# Patient Record
Sex: MALE | Race: WHITE | Employment: OTHER | URBAN - METROPOLITAN AREA
[De-identification: names, ages, dates, MRNs, and addresses within clinical notes are randomized per-mention and may not be internally consistent; named-entity substitution may affect disease eponyms.]

---

## 2017-10-02 ENCOUNTER — TELEPHONE (OUTPATIENT)
Dept: HEMATOLOGY | Age: 62
End: 2017-10-02

## 2017-10-02 NOTE — TELEPHONE ENCOUNTER
Spoke to patient about appointment on 10/3/17. Informed patient to bring insurance information and medications. Patient asked if we received referral. Transferred patient to referral coordinator.

## 2017-10-03 ENCOUNTER — DOCUMENTATION ONLY (OUTPATIENT)
Dept: HEMATOLOGY | Age: 62
End: 2017-10-03

## 2017-10-03 ENCOUNTER — OFFICE VISIT (OUTPATIENT)
Dept: HEMATOLOGY | Age: 62
End: 2017-10-03

## 2017-10-03 VITALS
TEMPERATURE: 97.4 F | HEART RATE: 74 BPM | WEIGHT: 278.4 LBS | BODY MASS INDEX: 36.9 KG/M2 | SYSTOLIC BLOOD PRESSURE: 179 MMHG | HEIGHT: 73 IN | DIASTOLIC BLOOD PRESSURE: 72 MMHG | OXYGEN SATURATION: 99 %

## 2017-10-03 DIAGNOSIS — K75.81 NASH (NONALCOHOLIC STEATOHEPATITIS): Primary | ICD-10-CM

## 2017-10-03 RX ORDER — POTASSIUM CHLORIDE 750 MG/1
TABLET, FILM COATED, EXTENDED RELEASE ORAL
COMMUNITY
End: 2017-12-28 | Stop reason: ALTCHOICE

## 2017-10-03 RX ORDER — LISINOPRIL 20 MG/1
20 TABLET ORAL DAILY
Qty: 90 TAB | Refills: 3 | Status: SHIPPED | OUTPATIENT
Start: 2017-10-03 | End: 2017-11-02 | Stop reason: SDUPTHER

## 2017-10-03 RX ORDER — FUROSEMIDE 40 MG/1
40 TABLET ORAL DAILY
Qty: 90 TAB | Refills: 3 | Status: SHIPPED | OUTPATIENT
Start: 2017-10-03 | End: 2018-01-02 | Stop reason: SDUPTHER

## 2017-10-03 RX ORDER — CHOLECALCIFEROL (VITAMIN D3) 125 MCG
1 CAPSULE ORAL 2 TIMES DAILY
COMMUNITY
End: 2020-06-19

## 2017-10-03 RX ORDER — SPIRONOLACTONE 100 MG/1
100 TABLET, FILM COATED ORAL DAILY
Qty: 90 TAB | Refills: 3 | Status: SHIPPED | OUTPATIENT
Start: 2017-10-03 | End: 2018-01-02 | Stop reason: SDUPTHER

## 2017-10-03 RX ORDER — METOLAZONE 5 MG/1
TABLET ORAL DAILY
COMMUNITY
End: 2017-10-03

## 2017-10-03 RX ORDER — GUAIFENESIN 100 MG/5ML
81 LIQUID (ML) ORAL DAILY
COMMUNITY
End: 2018-05-09

## 2017-10-03 NOTE — PROGRESS NOTES
[address] MD Miguel, [address], [facility], [facility], NP    KENNETH Lacy, [facility]-BC   Camille Draper, MELVA Gann NP        at [facility]     [address] [facility], [address] [address]  [address].     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) (Chronic) ICD-10-CM: K75.81, K74.60  ICD-9-CM: 571.8, 571.5  2/22/2016        Thrombocytopenia (HCC) (Chronic) ICD-10-CM: D69.6  ICD-9-CM: 287.5  2/22/2016        GERD (gastroesophageal reflux disease) (Chronic) ICD-10-CM: K21.9  ICD-9-CM: 530.81  2/22/2016        CAD (coronary artery disease) (Chronic) ICD-10-CM: I25.10  ICD-9-CM: 414.00  2/22/2016        DM type 2 (diabetes mellitus, type 2) ([facility] (Chronic) ICD-10-CM: E11.9  ICD-9-CM: 250.00  2/22/2016              The physicians listed above have asked me to see Genaro Binu in consultation regarding chronic HCV and its management. All medical records sent by the referring physicians were reviewed including imaging studies. The patient is a 57 y.o.  male who was found to have fatty liver disease on liver biopsy in 2014. Serologic evaluation was either all negative or within the limits of normal.      Ultrasound and CT scan of the liver were performed in 3/2017. The results of the imaging suggested cirrhosis. An assessment of liver fibrosis with biopsy done 7/2014 showed cirrhosis and mild mixed macro- and microvesicular steatosis.     The most recent laboratory studies indicate the AST is elevated, ALT is normal, ALP is elevated, tests of hepatic synthetic and metabolic function are depressed, total bilirubin is elevated, INR is elevated, albumin is depressed, and the platelet count is depressed. The patient notes fatigue, pain in the right side over the liver, problems concentrating, jumbles words and sentences, lower extremity edema. The patient has limitations in functional activities secondary to these symptoms. The patient has not experienced pain in the right side over the liver, yellowing of the eyes or skin, problems concentrating or swelling of the lower extremities. ALLERGIES  No Known Allergies    MEDICATIONS  Current Outpatient Prescriptions   Medication Sig    aspirin 81 mg chewable tablet Take 81 mg by mouth daily.  potassium chloride SR (KLOR-CON 10) 10 mEq tablet Take  by mouth.  cholecalciferol, vitamin D3, (VITAMIN D3) 2,000 unit tab Take  by mouth.  lisinopril (PRINIVIL, ZESTRIL) 20 mg tablet Take 1 Tab by mouth daily. Indications: hypertension    spironolactone (ALDACTONE) 100 mg tablet Take 1 Tab by mouth daily. Indications: EDEMA DUE TO HEPATIC CIRRHOSIS    furosemide (LASIX) 40 mg tablet Take 1 Tab by mouth daily.  lactulose (CHRONULAC) 20 gram/30 mL soln solution Take 30 mL by mouth three (3) times daily. Adjust  dose as needed such that you have 2 loose/soft bowel movements a day without diarrhea. Indications: HEPATIC ENCEPHALOPATHY    rifaximin (XIFAXAN) 550 mg tablet Take 1 Tab by mouth two (2) times a day.  pantoprazole (PROTONIX) 40 mg tablet Take 40 mg by mouth daily.  metFORMIN (GLUCOPHAGE) 500 mg tablet Take 500 mg by mouth two (2) times daily (with meals).  glimepiride (AMARYL) 4 mg tablet Take 4 mg by mouth two (2) times a day.  simvastatin (ZOCOR) 20 mg tablet Take 20 mg by mouth nightly.  levothyroxine (SYNTHROID) 50 mcg tablet Take 75 mcg by mouth Daily (before breakfast).      No current facility-administered medications for this visit. SYSTEM REVIEW NOT RELATED TO LIVER DISEASE OR REVIEWED ABOVE:  Constitution systems: Negative for fever, chills, weight gain, weight loss. Eyes: Negative for visual changes. ENT: Negative for sore throat, painful swallowing. Respiratory: Negative for cough, hemoptysis, SOB. Cardiology: Negative for chest pain, palpitations. GI:  Negative for constipation or diarrhea. : Negative for urinary frequency, dysuria, hematuria, nocturia. Skin: Negative for rash. Hematology: Negative for easy bruising, blood clots. Musculo-skeletal: Legs are hot below the knees. Neurologic:  + dizziness, vertigo, memory problems related to HE. Psychology: Negative for anxiety, depression. FAMILY HISTORY:  The father  of prostate cancer. The mother  of multiple myeloma. There is no family history of liver disease. SOCIAL HISTORY:  The patient is . The patient has 3 children. The patient occasional cigar. The patient has been abstinent from alcohol since 2016. The patient does not work, he is on social security. PHYSICAL EXAMINATION:  Visit Vitals    /72 (BP 1 Location: Left arm, BP Patient Position: Sitting)    Pulse 74    Temp 97.4 °F (36.3 °C) (Tympanic)    Ht 6' 1\" (1.854 m)    Wt 278 lb 6.4 oz (126.3 kg)    SpO2 99%    BMI 36.73 kg/m2     General: No acute distress. Eyes: Sclera anicteric. ENT: No oral lesions. Nodes: No adenopathy. Skin: No spider angiomata. No jaundice. No palmar erythema. Respiratory: Lungs clear to auscultation. Cardiovascular:  Regular heart rate. No murmurs. No JVD. Abdomen: Soft non-tender. Liver size enlarged 4 finger widths below rib cage. Spleen enlarged. No obvious ascites. Extremities: +1 BLE. No muscle wasting. No gross arthritic changes. Neurologic: Alert and oriented, using incorrect words during interaction, slow speech. Cranial nerves grossly intact.   No brigitteixis. LABORATORY STUDIES:  From 1/2017:  AST/ALT/ALP/T Bili/ALB:  64/ 36/ 179/ 2.2 /2.9  WBC/HB/PLT/INR: 5.4/ hct: 37.9/ 91/ 1.2  BUN/CREAT: xx/ 0.82    Liver [facility] Latest Ref Rng & Units 10/3/2017 2/23/2016   WBC 3.4 - 10.8 x10E3/uL 4.2 4.5   ANC 1.8 - 8.0 K/UL  2.1   HGB 12.6 - 17.7 g/dL 10.6 (L) 10.3 (L)    - 379 x10E3/uL 66 (LL) 67 (L)   INR 0.8 - 1.2 1.3 (H) 1.3 (H)   AST 0 - 40 IU/L 52 (H) 55 (H)   ALT 0 - 44 IU/L 29 52   Alk Phos 39 - 117 IU/L 175 (H) 129 (H)   Bili, Total 0.0 - 1.2 mg/dL 4.0 (H) 1.9 (H)   Albumin 3.6 - 4.8 g/dL 2.8 (L) 2.6 (L)   BUN 8 - 27 mg/dL 17 17   Creat 0.76 - 1.27 mg/dL 1.06 0.90   Na 134 - 144 mmol/L 137 144   K 3.5 - 5.2 mmol/L 3.7 3.3 (L)   Cl 96 - 106 mmol/L 100 109 (H)   CO2 18 - 29 mmol/L 28 24   Glucose 65 - 99 mg/dL 341 (H) 208 (H)   Ammonia <32 UMOL/L  83 (H)     SEROLOGIES:  Not available or performed. LIVER HISTOLOGY:  7/2014:  Cirhosis with mild mixed macro- and microvesicular steatosis. ENDOSCOPIC PROCEDURES:  2/2017. EGD by Dr Deya Cote. No esophageal varices. RADIOLOGY:  2/2017. Ultrasound of liver. Echogenic consistent with chronic liver disease. No liver mass lesions. No dilated bile ducts. No ascites. 3/2017 CT scan of abdomen. Changes consistent with cirrhosis. No liver mass lesions. Splenomegaly. Splenorenal shunt. No ascites. OTHER TESTING:  Not available or performed    ASSESSMENT AND PLAN:  CAMEJO with cirrhosis. Liver transaminases are elevated. Alkaline phosphate is elevated. Liver function is depressed. Total bilirubin is elevated. Serum albumin is depressed. INR is prolonged. The platelet count is depressed. Will request medial records from [facility]. Suspect extensive serologic studies were performed there and do not need to repeat them now if we are able to get records. Based upon laboratory studies and imaging the patient appears to have cirrhosis.       Will perform laboratory testing to monitor liver function and degree of liver injury. This included BMP, hepatic panel, CBC with platelet count and INR. There is no medication of vitamin supplements that we advocate for CAMEJO. Using glitazones in patients without diabetes mellitus has been shown to reduce fat content in the liver but has no effect on fibrosis and is associated with weight gain. Vitamin E has also been used but the data is not very good and most experts no longer advocate this. The only medical treatments for CAMEJO and cirrhosis are though clinical trials. The patient was offered participation in a clinical trial for treatment of CAMEJO. The patient would like to particiapte in a clinical trial.    The patient was counseled regarding diet and exercise to achieve weight loss. The best diet for patients with fatty liver is one very low in carbohydrates and enriched with protein such as an [employer] program.      The patient was told to to consume any food products and drinks containing fructose as this enhances hepatic fat synthesis. Hepatic encephalopathy  is not well controlled on current doses of lactulose and Xifaxan. Restrict dietary protein to that tolerated without overt HE. Patient was instructed to keep a food diary to record protein intake. It will be be extremely difficult for the patients to do Sue Bump in the face of HE. The patient has not developed ascites. Lower extremity edema and anasarca is improved but persistent despite current dose of diuretics. Will stop HCTZ and metolazone. Will start step 1 diuretics. The patient does not have esophageal varices by EGD in 2/2017. The patient was directed to continue all current medications at the current dosages. There are no contraindications for the patient to take any medications that are necessary for treatment of other medical issues including medications for diabetes mellitus and hypercholesterolemia.     The patient was counseled regarding alcohol consumption and that this could contribute to fatty liver disease. The need for vaccination against viral hepatitis A and B will be assessed with serologic and instituted as appropriate. [facility] screening will be performed. AFP was ordered today and ultrasound will be scheduled. All of the above issues were discussed with the patient. All questions were answered. The patient expressed a clear understanding of the above.     [address] in 4 weeks or sooner if can enroll into a CAMEJO clinical trial.    Kayla Solorzano MD  Liver [place] of [address] [place], [place] [id number] Rainer Espinosa [address] [place] [address] [address]  [address].  [phone]

## 2017-10-04 LAB
ALBUMIN SERPL-MCNC: 2.8 G/DL (ref 3.6–4.8)
ALBUMIN/GLOB SERPL: 1 {RATIO} (ref 1.2–2.2)
ALP SERPL-CCNC: 175 IU/L (ref 39–117)
ALT SERPL-CCNC: 29 IU/L (ref 0–44)
AST SERPL-CCNC: 52 IU/L (ref 0–40)
BILIRUB SERPL-MCNC: 4 MG/DL (ref 0–1.2)
BUN SERPL-MCNC: 17 MG/DL (ref 8–27)
BUN/CREAT SERPL: 16 (ref 10–24)
CALCIUM SERPL-MCNC: 8.6 MG/DL (ref 8.6–10.2)
CHLORIDE SERPL-SCNC: 100 MMOL/L (ref 96–106)
CO2 SERPL-SCNC: 28 MMOL/L (ref 18–29)
CREAT SERPL-MCNC: 1.06 MG/DL (ref 0.76–1.27)
ERYTHROCYTE [DISTWIDTH] IN BLOOD BY AUTOMATED COUNT: 14.8 % (ref 12.3–15.4)
GLOBULIN SER CALC-MCNC: 2.8 G/DL (ref 1.5–4.5)
GLUCOSE SERPL-MCNC: 341 MG/DL (ref 65–99)
HCT VFR BLD AUTO: 29.9 % (ref 37.5–51)
HGB BLD-MCNC: 10.6 G/DL (ref 12.6–17.7)
INR PPP: 1.3 (ref 0.8–1.2)
MCH RBC QN AUTO: 32.8 PG (ref 26.6–33)
MCHC RBC AUTO-ENTMCNC: 35.5 G/DL (ref 31.5–35.7)
MCV RBC AUTO: 93 FL (ref 79–97)
MORPHOLOGY BLD-IMP: ABNORMAL
PLATELET # BLD AUTO: 66 X10E3/UL (ref 150–379)
POTASSIUM SERPL-SCNC: 3.7 MMOL/L (ref 3.5–5.2)
PROT SERPL-MCNC: 5.6 G/DL (ref 6–8.5)
PROTHROMBIN TIME: 13.4 SEC (ref 9.1–12)
RBC # BLD AUTO: 3.23 X10E6/UL (ref 4.14–5.8)
SODIUM SERPL-SCNC: 137 MMOL/L (ref 134–144)
WBC # BLD AUTO: 4.2 X10E3/UL (ref 3.4–10.8)

## 2017-10-11 ENCOUNTER — TELEPHONE (OUTPATIENT)
Dept: HEMATOLOGY | Age: 62
End: 2017-10-11

## 2017-10-11 NOTE — TELEPHONE ENCOUNTER
Called patient to obtain the name of his mail order pharmacy and phone number so that I can fax his Rx to the mail order per Shaji Pabon NP.

## 2017-10-19 NOTE — PROGRESS NOTES
Left VM for pt stating labs the same as last year. Giving name to Paula Dumont today to inquire about study availability.

## 2017-10-24 NOTE — TELEPHONE ENCOUNTER
Pt's wife called saying the request for Lisinopril was not sent to their mail-in pharmacy ([facility]). Requested Prescriptions     Pending Prescriptions Disp Refills    lisinopril (PRINIVIL, ZESTRIL) 20 mg tablet 90 Tab 3     Sig: Take 1 Tab by mouth daily.  Indications: hypertension

## 2017-11-02 ENCOUNTER — OFFICE VISIT (OUTPATIENT)
Dept: HEMATOLOGY | Age: 62
End: 2017-11-02

## 2017-11-02 VITALS
BODY MASS INDEX: 34.54 KG/M2 | OXYGEN SATURATION: 100 % | SYSTOLIC BLOOD PRESSURE: 159 MMHG | DIASTOLIC BLOOD PRESSURE: 69 MMHG | HEART RATE: 76 BPM | WEIGHT: 261.8 LBS | TEMPERATURE: 97.8 F

## 2017-11-02 DIAGNOSIS — K75.81 LIVER CIRRHOSIS SECONDARY TO NASH (HCC): Primary | Chronic | ICD-10-CM

## 2017-11-02 DIAGNOSIS — K74.60 LIVER CIRRHOSIS SECONDARY TO NASH (HCC): Primary | Chronic | ICD-10-CM

## 2017-11-02 DIAGNOSIS — R60.0 LOWER LEG EDEMA: ICD-10-CM

## 2017-11-02 DIAGNOSIS — D69.6 THROMBOCYTOPENIA (HCC): Chronic | ICD-10-CM

## 2017-11-02 DIAGNOSIS — K75.81 NASH (NONALCOHOLIC STEATOHEPATITIS): ICD-10-CM

## 2017-11-02 DIAGNOSIS — E11.9 TYPE 2 DIABETES MELLITUS WITHOUT COMPLICATION, WITHOUT LONG-TERM CURRENT USE OF INSULIN (HCC): Chronic | ICD-10-CM

## 2017-11-02 RX ORDER — GABAPENTIN 300 MG/1
300 CAPSULE ORAL 3 TIMES DAILY
Qty: 90 CAP | Refills: 3 | Status: SHIPPED | OUTPATIENT
Start: 2017-11-02 | End: 2018-01-05 | Stop reason: SDUPTHER

## 2017-11-02 RX ORDER — LISINOPRIL 20 MG/1
20 TABLET ORAL DAILY
Qty: 90 TAB | Refills: 3 | Status: SHIPPED | OUTPATIENT
Start: 2017-11-02 | End: 2018-05-09

## 2017-11-02 NOTE — PROGRESS NOTES
[address] MD Miguel, [address], [facility], [place]       MELVA Tanner PA-C Jenita Colonel, [facility]   [facility] [place], NP Florine Merlin, NP        at [address]     [address] [facility], [address] [address]     [phone]     FAX: [phone]    at [address], [id number] [place] [address], [address]     [phone]     FAX: [phone]     Patient Care Team:  Jarrell Pérez MD as PCP - General (Family Practice)  Lacie Paige MD (Nephrology)  Megan Bryant MD (Gastroenterology)  Paula Avila MD as Physician (Internal Medicine)     Patient Active Problem List   Diagnosis Code    Liver cirrhosis secondary to [facility] ([facility] K75.81, K74.60    Thrombocytopenia ([facility] D69.6    GERD (gastroesophageal reflux disease) K21.9    CAD (coronary artery disease) I25.10    DM type 2 (diabetes mellitus, type 2) ([facility] E11.9       Charlean Harada returns to the [address] regarding chronic HCV in the setting of cirrhosis. The active problem list, all pertinent past medical history, medications, radiologic findings and laboratory findings related to the liver disorder were reviewed with the patient. The patient is a 57 y.o.  male who was found to have fatty liver disease on liver biopsy in 2014. Serologic evaluation was either all negative or within the limits of normal.      Ultrasound and CT scan of the liver were performed in 3/2017. The results of the imaging suggested cirrhosis with no liver masses suggestive of HCC. An assessment of liver fibrosis with biopsy done 7/2014 showed cirrhosis and mild mixed macro- and microvesicular steatosis. LE edema resolved with step 1 diuretics. He has lost 16 lbs over the last month.     The patient notes fatigue, pain in the right side over the liver, problems concentrating, jumbles words and sentences, balance issues and LE 'burning' bilaterally. The patient has limitations in functional activities secondary to these symptoms. The patient has not experienced pain in the right side over the liver, yellowing of the eyes or skin, pruritus, melena or hematochezia. ALLERGIES  No Known Allergies    MEDICATIONS  Current Outpatient Prescriptions   Medication Sig    lisinopril (PRINIVIL, ZESTRIL) 20 mg tablet Take 1 Tab by mouth daily. Indications: hypertension    gabapentin (NEURONTIN) 300 mg capsule Take 1 Cap by mouth three (3) times daily.  aspirin 81 mg chewable tablet Take 81 mg by mouth daily.  potassium chloride SR (KLOR-CON 10) 10 mEq tablet Take  by mouth.  cholecalciferol, vitamin D3, (VITAMIN D3) 2,000 unit tab Take  by mouth.  spironolactone (ALDACTONE) 100 mg tablet Take 1 Tab by mouth daily. Indications: EDEMA DUE TO HEPATIC CIRRHOSIS    furosemide (LASIX) 40 mg tablet Take 1 Tab by mouth daily.  lactulose (CHRONULAC) 20 gram/30 mL soln solution Take 30 mL by mouth three (3) times daily. Adjust  dose as needed such that you have 2 loose/soft bowel movements a day without diarrhea. Indications: HEPATIC ENCEPHALOPATHY    rifaximin (XIFAXAN) 550 mg tablet Take 1 Tab by mouth two (2) times a day.  pantoprazole (PROTONIX) 40 mg tablet Take 40 mg by mouth daily.  metFORMIN (GLUCOPHAGE) 500 mg tablet Take 500 mg by mouth two (2) times daily (with meals).  glimepiride (AMARYL) 4 mg tablet Take 4 mg by mouth two (2) times a day.  simvastatin (ZOCOR) 20 mg tablet Take 20 mg by mouth nightly.  levothyroxine (SYNTHROID) 50 mcg tablet Take 75 mcg by mouth Daily (before breakfast). No current facility-administered medications for this visit. SYSTEM REVIEW NOT RELATED TO LIVER DISEASE OR REVIEWED ABOVE:  Constitution systems: Negative for fever, chills, weight gain, weight loss. Eyes: Negative for visual changes. ENT: Negative for sore throat, painful swallowing.    Respiratory: Negative for cough, hemoptysis, SOB. Cardiology: Negative for chest pain, palpitations. GI:  Negative for constipation or diarrhea. : Negative for urinary frequency, dysuria, hematuria, nocturia. Skin: Negative for rash. Hematology: Negative for easy bruising, blood clots. Musculo-skeletal: Legs are hot below the knees. Neurologic:  + dizziness, vertigo, memory problems related to HE. Psychology: Negative for anxiety, depression. FAMILY HISTORY:  The father  of prostate cancer. The mother  of multiple myeloma. There is no family history of liver disease. SOCIAL HISTORY:  The patient is . The patient has 3 children. The patient occasional cigar. The patient has been abstinent from alcohol since 2016. The patient is on social security. PHYSICAL EXAMINATION:  Visit Vitals    /69    Pulse 76    Temp 97.8 °F (36.6 °C) (Oral)    Wt 261 lb 12.8 oz (118.8 kg)    SpO2 100%    BMI 34.54 kg/m2     General: No acute distress. Eyes: Sclera anicteric. ENT: No oral lesions. Nodes: No adenopathy. Skin: No spider angiomata. No jaundice. No palmar erythema. Respiratory: Lungs clear to auscultation. Cardiovascular:  Regular heart rate. No murmurs. No JVD. Abdomen: Soft non-tender. Liver size enlarged 4 finger widths below rib cage. Spleen enlarged. No obvious ascites. Extremities: Trace LE edema bilaterally. No muscle wasting. No gross arthritic changes. Neurologic: Alert and oriented, using incorrect words during interaction, slow speech. Cranial nerves grossly intact. No asterixis.     LABORATORY STUDIES:  Liver [place] of [address]  [address] & Units 2017 10/3/2017 2016   WBC 3.4 - 10.8 x10E3/uL 6.1 4.2 4.5   ANC 1.8 - 8.0 K/UL   2.1   HGB 12.6 - 17.7 g/dL 11.7 (L) 10.6 (L) 10.3 (L)    - 379 x10E3/uL 92 (LL) 66 (LL) 67 (L)   INR 0.8 - 1.2 1.3 (H) 1.3 (H) 1.3 (H)   AST 0 - 40 IU/L 53 (H) 52 (H) 55 (H)   ALT 0 - 44 IU/L 38 29 52   Alk Phos 39 - 117 IU/L 215 (H) 175 (H) 129 (H)   Bili, Total 0.0 - 1.2 mg/dL 2.6 (H) 4.0 (H) 1.9 (H)   Albumin 3.6 - 4.8 g/dL 2.8 (L) 2.8 (L) 2.6 (L)   BUN 8 - 27 mg/dL 18 17 17   Creat 0.76 - 1.27 mg/dL 1.27 1.06 0.90   Na 134 - 144 mmol/L 145 (H) 137 144   K 3.5 - 5.2 mmol/L 4.5 3.7 3.3 (L)   Cl 96 - 106 mmol/L 106 100 109 (H)   CO2 18 - 29 mmol/L 26 28 24   Glucose 65 - 99 mg/dL 105 (H) 341 (H) 208 (H)   Ammonia 27 - 102 ug/dL 317 (HH)  83 (H)     SEROLOGIES:  Not available or performed. LIVER HISTOLOGY:  7/2014: Cirhosis with mild mixed macro- and microvesicular steatosis. ENDOSCOPIC PROCEDURES:  2/2017. EGD by Dr Tyler Matthew. No esophageal varices. RADIOLOGY:  2/2017. Ultrasound of liver. Echogenic consistent with chronic liver disease. No liver mass lesions. No dilated bile ducts. No ascites. 3/2017 CT scan of abdomen. Changes consistent with cirrhosis. No liver mass lesions. Splenomegaly. Splenorenal shunt. No ascites. OTHER TESTING:  Not available or performed    ASSESSMENT AND PLAN:  CAMEJO with cirrhosis. Liver transaminases are elevated. Alkaline phosphate is elevated. Liver function is depressed. Total bilirubin is elevated. Serum albumin is depressed. INR is prolonged. The platelet count is depressed. There is no medication of vitamin supplements that we advocate for CAMEJO. Using glitazones in patients without diabetes mellitus has been shown to reduce fat content in the liver but has no effect on fibrosis and is associated with weight gain. Vitamin E has also been used but the data is not very good and most experts no longer advocate this. The only medical treatments for CAMEJO and cirrhosis are through clinical trials. The patient would like to particiapte in a clinical trial. Unfortunately, he is currently not eligible for any of our active clinical trials that are enrolling. He will be monitored regularly.      The patient was counseled regarding diet and exercise to achieve weight loss. Encouraged patient to follow a healthy diet and make sure his other medical conditions are well controlled. The patient was told to to consume any food products and drinks containing fructose as this enhances hepatic fat synthesis. Hepatic encephalopathy. Currenlty not consistently well-controlled on current doses of lactulose and Xifaxan. He achieves 2-3 BMs without diarrhea but he remains off balance and confused. Ammonia is significantly elevated today. Directed patient to continue protein restriction and a food diary to see if this improves his symptoms. The patient has not developed ascites. Lower extremity edema and anasarca. Step 1 diuretics have been effective. Continue. LE discomfort. He describes this as an internal burning sensation in both LE. Prescribed patient Neurontin to see if this improves his symptoms. Will reassess at his next follow up visit. The patient does not have esophageal varices by EGD in 2/2017. Hypertension. Patient's BP is significantly elevated in the clinic today. Discussed the importance of regular follow up with their PCP to monitor/manage this. Patient verbalized understanding. The patient was directed to continue all current medications at the current dosages. There are no contraindications for the patient to take any medications that are necessary for treatment of other medical issues including medications for diabetes mellitus and hypercholesterolemia. The patient was counseled regarding alcohol consumption and that this could contribute to fatty liver disease. The need for vaccination against viral hepatitis A and B will be assessed with serologic and instituted as appropriate. [address] screening will be performed. CT was ordered today to rule out [address] All of the above issues were discussed with the patient. All questions were answered. The patient expressed a clear understanding of the above.     Follow-up Liver [place] [facility] [place] in 6-8 weeks.     Shahzad Griffith NP  [address] [address] [address], [address] [place] [address]  Ph: [phone]  Fax: [phone]

## 2017-11-02 NOTE — MR AVS SNAPSHOT
Visit Information Date & Time Provider Department Dept. Phone Encounter #  
 11/2/2017  8:30 AM April SHARON Gtz, [facility] [facility] 219 [id number] Follow-up Instructions Return in about 6 weeks (around 12/14/2017). Upcoming Health Maintenance Date Due Hepatitis C Screening 1955 LIPID PANEL Q1 1955 FOOT EXAM Q1 12/21/1964 MICROALBUMIN Q1 12/21/1964 EYE EXAM RETINAL OR DILATED Q1 12/21/1964 Pneumococcal 19-64 Medium Risk (1 of 1 - PPSV23) 12/21/1973 DTaP/Tdap/Td series (1 - Tdap) 12/21/1975 FOBT Q 1 YEAR AGE 50-75 12/21/2004 ZOSTER VACCINE AGE 60> 10/21/2014 HEMOGLOBIN A1C Q6M 8/23/2016 INFLUENZA AGE 9 TO ADULT 8/1/2017 Allergies as of 11/2/2017  Review Complete On: 11/2/2017 By: Berna Orellana NP No Known Allergies Current Immunizations  Never Reviewed No immunizations on file. Not reviewed this visit You Were Diagnosed With   
  
 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  
 159/69 76 97.8 °F (36.6 °C) (Oral) 261 lb 12.8 oz (118.8 kg) 100% 34.54 kg/m2 Smoking Status Never Smoker Vitals History BMI and BSA Data Body Mass Index Body Surface Area 34.54 kg/m 2 2.47 m 2 Preferred Pharmacy Pharmacy Name Phone Swetha Hives [address], [address] [facility] [phone] Your Updated Medication List  
  
   
This list is accurate as of: 11/2/17  9:02 AM.  Always use your most recent med list.  
  
  
  
  
 aspirin 81 mg chewable tablet Take 81 mg by mouth daily. furosemide 40 mg tablet Commonly known as:  LASIX Take 1 Tab by mouth daily. gabapentin 300 mg capsule Commonly known as:  NEURONTIN  
 Take 1 Cap by mouth three (3) times daily. glimepiride 4 mg tablet Commonly known as:  AMARYL Take 4 mg by mouth two (2) times a day. lactulose 20 gram/30 mL Soln solution Commonly known as:  Sid Alamo Take 30 mL by mouth three (3) times daily. Adjust  dose as needed such that you have 2 loose/soft bowel movements a day without diarrhea. Indications: HEPATIC ENCEPHALOPATHY  
  
 levothyroxine 50 mcg tablet Commonly known as:  SYNTHROID Take 75 mcg by mouth Daily (before breakfast). lisinopril 20 mg tablet Commonly known as:  Mart Domingo Take 1 Tab by mouth daily. Indications: hypertension  
  
 metFORMIN 500 mg tablet Commonly known as:  GLUCOPHAGE Take 500 mg by mouth two (2) times daily (with meals). pantoprazole 40 mg tablet Commonly known as:  PROTONIX Take 40 mg by mouth daily. potassium chloride SR 10 mEq tablet Commonly known as:  KLOR-CON 10 Take  by mouth. rifAXIMin 550 mg tablet Commonly known as:  Vanessa Fish Take 1 Tab by mouth two (2) times a day. simvastatin 20 mg tablet Commonly known as:  ZOCOR Take 20 mg by mouth nightly. spironolactone 100 mg tablet Commonly known as:  ALDACTONE Take 1 Tab by mouth daily. Indications: EDEMA DUE TO HEPATIC CIRRHOSIS  
  
 VITAMIN D3 2,000 unit Tab Generic drug:  cholecalciferol (vitamin D3) Take  by mouth. Prescriptions Sent to Pharmacy Refills  
 lisinopril (PRINIVIL, ZESTRIL) 20 mg tablet 3 Sig: Take 1 Tab by mouth daily. Indications: hypertension Class: Normal  
 Pharmacy: [facility] [address] Ph #: [phone] Route: Oral  
 gabapentin (NEURONTIN) 300 mg capsule 3 Sig: Take 1 Cap by mouth three (3) times daily. Class: Normal  
 [facility] [address] Ph #: [phone] Route: Oral  
  
We Performed the Following AMMONIA [id number] CPT(R)] CBC W/O DIFF [91160 CPT(R)] METABOLIC PANEL, COMPREHENSIVE [20312 CPT(R)] PROTHROMBIN TIME + INR [64289 CPT(R)] Follow-up Instructions Return in about 6 weeks (around 12/14/2017). Introducing \[facility]\"" & HEALTH SERVICES! Yannibarrett Levy introduces Ungalli patient portal. Now you can access parts of your medical record, email your doctor's office, and request medication refills online. 1. In your internet browser, go to [email or web address] 2. Click on the First Time User? Click Here link in the Sign In box. You will see the New Member Sign Up page. 3. Enter your Ungalli Access Code exactly as it appears below. You will not need to use this code after youve completed the sign-up process. If you do not sign up before the expiration date, you must request a new code. · Ungalli Access Code: [email or web address] Expires: 1/1/2018 10:19 AM 
 
4. Enter the last four digits of your Social Security Number (xxxx) and Date of Birth (mm/dd/yyyy) as indicated and click Submit. You will be taken to the next sign-up page. 5. Create a Ungalli ID. This will be your Ungalli login ID and cannot be changed, so think of one that is secure and easy to remember. 6. Create a Ungalli password. You can change your password at any time. 7. Enter your Password Reset Question and Answer. This can be used at a later time if you forget your password. 8. Enter your e-mail address. You will receive e-mail notification when new information is available in [address] 9. Click Sign Up. You can now view and download portions of your medical record. 10. Click the Download Summary menu link to download a portable copy of your medical information. If you have questions, please visit the Frequently Asked Questions section of the Ungalli website. Remember, Ungalli is NOT to be used for urgent needs. For medical emergencies, dial 911. Now available from your iPhone and Android! Please provide this summary of care documentation to your next provider. Your primary care clinician is listed as Blanca Avery. If you have any questions after today's visit, please call [phone].

## 2017-11-03 LAB
ALBUMIN SERPL-MCNC: 2.8 G/DL (ref 3.6–4.8)
ALBUMIN/GLOB SERPL: 1 {RATIO} (ref 1.2–2.2)
ALP SERPL-CCNC: 215 IU/L (ref 39–117)
ALT SERPL-CCNC: 38 IU/L (ref 0–44)
AMMONIA PLAS-MCNC: 317 UG/DL (ref 27–102)
AST SERPL-CCNC: 53 IU/L (ref 0–40)
BILIRUB SERPL-MCNC: 2.6 MG/DL (ref 0–1.2)
BUN SERPL-MCNC: 18 MG/DL (ref 8–27)
BUN/CREAT SERPL: 14 (ref 10–24)
CALCIUM SERPL-MCNC: 8.5 MG/DL (ref 8.6–10.2)
CHLORIDE SERPL-SCNC: 106 MMOL/L (ref 96–106)
CO2 SERPL-SCNC: 26 MMOL/L (ref 18–29)
CREAT SERPL-MCNC: 1.27 MG/DL (ref 0.76–1.27)
ERYTHROCYTE [DISTWIDTH] IN BLOOD BY AUTOMATED COUNT: 14.2 % (ref 12.3–15.4)
GFR SERPLBLD CREATININE-BSD FMLA CKD-EPI: 60 ML/MIN/1.73
GFR SERPLBLD CREATININE-BSD FMLA CKD-EPI: 70 ML/MIN/1.73
GLOBULIN SER CALC-MCNC: 2.9 G/DL (ref 1.5–4.5)
GLUCOSE SERPL-MCNC: 105 MG/DL (ref 65–99)
HCT VFR BLD AUTO: 33.3 % (ref 37.5–51)
HGB BLD-MCNC: 11.7 G/DL (ref 12.6–17.7)
INR PPP: 1.3 (ref 0.8–1.2)
MCH RBC QN AUTO: 32.4 PG (ref 26.6–33)
MCHC RBC AUTO-ENTMCNC: 35.1 G/DL (ref 31.5–35.7)
MCV RBC AUTO: 92 FL (ref 79–97)
MORPHOLOGY BLD-IMP: ABNORMAL
PLATELET # BLD AUTO: 92 X10E3/UL (ref 150–379)
POTASSIUM SERPL-SCNC: 4.5 MMOL/L (ref 3.5–5.2)
PROT SERPL-MCNC: 5.7 G/DL (ref 6–8.5)
PROTHROMBIN TIME: 13.4 SEC (ref 9.1–12)
RBC # BLD AUTO: 3.61 X10E6/UL (ref 4.14–5.8)
SODIUM SERPL-SCNC: 145 MMOL/L (ref 134–144)
WBC # BLD AUTO: 6.1 X10E3/UL (ref 3.4–10.8)

## 2017-11-27 RX ORDER — LACTULOSE 10 G/15ML
20 SOLUTION ORAL 3 TIMES DAILY
Qty: 1800 ML | Refills: 1 | Status: SHIPPED | OUTPATIENT
Start: 2017-11-27 | End: 2018-04-27 | Stop reason: SDUPTHER

## 2017-11-27 NOTE — PROGRESS NOTES
Called back wife Sandy Alejandro about lactulose refill and hyperkalemia. Dr. Alanna Lundberg took pt off potassium and spironolactone. Do not know the actual number. Advised wife risk of cardiac arrhythmia outweighs edema management. She expects a call back from Dr. Alanna Lundberg to discuss. She is frustrated with his symptoms and lack of improvement. Listened to her for a long time and offered support. Chiquita Mccrary NP  3:00 PM

## 2017-11-27 NOTE — TELEPHONE ENCOUNTER
The patient is out of medicatioin and does not have enough to make it through the day. Please send to the pharmacy ([facility] [address] in additionl to the refill the pts wife (JACEK ) would like tracy reyes to give her a call as soon as possible to discuss the patients progress since last visit. Requested Prescriptions     Pending Prescriptions Disp Refills    lactulose (CHRONULAC) 20 gram/30 mL soln solution 1800 mL 1     Sig: Take 30 mL by mouth three (3) times daily. Adjust  dose as needed such that you have 2 loose/soft bowel movements a day without diarrhea.   Indications: Hepatic Encephalopathy

## 2017-12-08 ENCOUNTER — HOSPITAL ENCOUNTER (OUTPATIENT)
Dept: CT IMAGING | Age: 62
Discharge: HOME | End: 2017-12-08
Attending: NURSE PRACTITIONER
Payer: COMMERCIAL

## 2017-12-08 DIAGNOSIS — K75.81 LIVER CIRRHOSIS SECONDARY TO NASH (HCC): Chronic | ICD-10-CM

## 2017-12-08 DIAGNOSIS — D69.6 THROMBOCYTOPENIA (HCC): Chronic | ICD-10-CM

## 2017-12-08 DIAGNOSIS — K74.60 LIVER CIRRHOSIS SECONDARY TO NASH (HCC): Chronic | ICD-10-CM

## 2017-12-08 PROCEDURE — 74011636320 HC RX REV CODE- 636/320: Performed by: NURSE PRACTITIONER

## 2017-12-08 PROCEDURE — 74011000255 HC RX REV CODE- 255: Performed by: NURSE PRACTITIONER

## 2017-12-08 PROCEDURE — 74170 CT ABD WO CNTRST FLWD CNTRST: CPT

## 2017-12-08 PROCEDURE — 74011000258 HC RX REV CODE- 258: Performed by: NURSE PRACTITIONER

## 2017-12-08 RX ORDER — SODIUM CHLORIDE 9 MG/ML
50 INJECTION, SOLUTION INTRAVENOUS
Status: COMPLETED | OUTPATIENT
Start: 2017-12-08 | End: 2017-12-08

## 2017-12-08 RX ORDER — BARIUM SULFATE 20 MG/ML
900 SUSPENSION ORAL
Status: COMPLETED | OUTPATIENT
Start: 2017-12-08 | End: 2017-12-08

## 2017-12-08 RX ADMIN — IOPAMIDOL 100 ML: 612 INJECTION, SOLUTION INTRAVENOUS at 09:37

## 2017-12-08 RX ADMIN — SODIUM CHLORIDE 50 ML/HR: 900 INJECTION, SOLUTION INTRAVENOUS at 09:37

## 2017-12-08 RX ADMIN — BARIUM SULFATE 900 ML: 21 SUSPENSION ORAL at 09:39

## 2017-12-28 ENCOUNTER — OFFICE VISIT (OUTPATIENT)
Dept: HEMATOLOGY | Age: 62
End: 2017-12-28

## 2017-12-28 VITALS
OXYGEN SATURATION: 100 % | BODY MASS INDEX: 37.21 KG/M2 | DIASTOLIC BLOOD PRESSURE: 60 MMHG | WEIGHT: 282 LBS | TEMPERATURE: 96 F | SYSTOLIC BLOOD PRESSURE: 146 MMHG | HEART RATE: 70 BPM

## 2017-12-28 DIAGNOSIS — K75.81 LIVER CIRRHOSIS SECONDARY TO NASH (HCC): Chronic | ICD-10-CM

## 2017-12-28 DIAGNOSIS — K74.60 LIVER CIRRHOSIS SECONDARY TO NASH (HCC): Chronic | ICD-10-CM

## 2017-12-28 DIAGNOSIS — K75.81 NASH (NONALCOHOLIC STEATOHEPATITIS): Primary | ICD-10-CM

## 2017-12-28 DIAGNOSIS — D69.6 THROMBOCYTOPENIA (HCC): Chronic | ICD-10-CM

## 2017-12-28 DIAGNOSIS — R60.0 LOWER LEG EDEMA: ICD-10-CM

## 2017-12-28 DIAGNOSIS — G57.93 NEUROPATHY INVOLVING BOTH LOWER EXTREMITIES: ICD-10-CM

## 2017-12-28 NOTE — MR AVS SNAPSHOT
Visit Information Date & Time Provider Department Dept. Phone Encounter #  
 12/28/2017 11:00 AM April SHARON Acpatricia, [address] [facility] 219 [id number] Follow-up Instructions Return for 2-3 month follow up. Upcoming Health Maintenance Date Due Hepatitis C Screening 1955 LIPID PANEL Q1 1955 FOOT EXAM Q1 12/21/1964 MICROALBUMIN Q1 12/21/1964 EYE EXAM RETINAL OR DILATED Q1 12/21/1964 Pneumococcal 19-64 Medium Risk (1 of 1 - PPSV23) 12/21/1973 DTaP/Tdap/Td series (1 - Tdap) 12/21/1975 FOBT Q 1 YEAR AGE 50-75 12/21/2004 ZOSTER VACCINE AGE 60> 10/21/2014 HEMOGLOBIN A1C Q6M 8/23/2016 Influenza Age 5 to Adult 8/1/2017 Allergies as of 12/28/2017  Review Complete On: 12/28/2017 By: Berna Rios NP No Known Allergies Current Immunizations  Never Reviewed No immunizations on file. Not reviewed this visit You Were Diagnosed With   
  
 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 3 Vitals BP Pulse Temp Weight(growth percentile) SpO2 BMI  
 146/60 (BP 1 Location: Left arm, BP Patient Position: Sitting) 70 96 °F (35.6 °C) (Tympanic) 282 lb (127.9 kg) 100% 37.21 kg/m2 Smoking Status Never Smoker Vitals History BMI and BSA Data Body Mass Index Body Surface Area  
 37.21 kg/m 2 2.57 m 2 Preferred Pharmacy Pharmacy Name Phone Edu Tavera [address], [address] [facility] [phone] Your Updated Medication List  
  
   
This list is accurate as of: 12/28/17 11:43 AM.  Always use your most recent med list.  
  
  
  
  
 aspirin 81 mg chewable tablet Take 81 mg by mouth daily. furosemide 40 mg tablet Commonly known as:  LASIX Take 1 Tab by mouth daily. gabapentin 300 mg capsule Commonly known as:  NEURONTIN Take 1 Cap by mouth three (3) times daily. glimepiride 4 mg tablet Commonly known as:  AMARYL Take 4 mg by mouth two (2) times a day. lactulose 20 gram/30 mL Soln solution Commonly known as:  Coby Bustard Take 30 mL by mouth three (3) times daily. Adjust  dose as needed such that you have 2 loose/soft bowel movements a day without diarrhea. Indications: Hepatic Encephalopathy  
  
 levothyroxine 50 mcg tablet Commonly known as:  SYNTHROID Take 75 mcg by mouth Daily (before breakfast). lisinopril 20 mg tablet Commonly known as:  Nae Cage Take 1 Tab by mouth daily. Indications: hypertension  
  
 metFORMIN 500 mg tablet Commonly known as:  GLUCOPHAGE Take 500 mg by mouth two (2) times daily (with meals). pantoprazole 40 mg tablet Commonly known as:  PROTONIX Take 40 mg by mouth daily. rifAXIMin 550 mg tablet Commonly known as:  [place]le Take 1 Tab by mouth two (2) times a day. simvastatin 20 mg tablet Commonly known as:  ZOCOR Take 20 mg by mouth nightly. spironolactone 100 mg tablet Commonly known as:  ALDACTONE Take 1 Tab by mouth daily. Indications: EDEMA DUE TO HEPATIC CIRRHOSIS  
  
 VITAMIN D3 2,000 unit Tab Generic drug:  cholecalciferol (vitamin D3) Take  by mouth. We Performed the Following AMMONIA [id number] CPT(R)] CBC W/O DIFF [57532 CPT(R)] METABOLIC PANEL, COMPREHENSIVE [11022 CPT(R)] PROTHROMBIN TIME + INR [55619 CPT(R)] Follow-up Instructions Return for 2-3 month follow up. Introducing [facility] & HEALTH SERVICES! [facility] introduces Progeniq patient portal. Now you can access parts of your medical record, email your doctor's office, and request medication refills online. 1. In your internet browser, go to [email or web address] 2. Click on the First Time User? Click Here link in the Sign In box. You will see the New Member Sign Up page. 3. Enter your NullPointer Access Code exactly as it appears below. You will not need to use this code after youve completed the sign-up process. If you do not sign up before the expiration date, you must request a new code. · NullPointer Access Code: [email or web address] Expires: 1/1/2018  9:19 AM 
 
4. Enter the last four digits of your Social Security Number (xxxx) and Date of Birth (mm/dd/yyyy) as indicated and click Submit. You will be taken to the next sign-up page. 5. Create a Ryzingt ID. This will be your NullPointer login ID and cannot be changed, so think of one that is secure and easy to remember. 6. Create a NullPointer password. You can change your password at any time. 7. Enter your Password Reset Question and Answer. This can be used at a later time if you forget your password. 8. Enter your e-mail address. You will receive e-mail notification when new information is available in [address] 9. Click Sign Up. You can now view and download portions of your medical record. 10. Click the Download Summary menu link to download a portable copy of your medical information. If you have questions, please visit the Frequently Asked Questions section of the NullPointer website. Remember, NullPointer is NOT to be used for urgent needs. For medical emergencies, dial 911. Now available from your iPhone and Android! Please provide this summary of care documentation to your next provider. Your primary care clinician is listed as Nitin Benson. If you have any questions after today's visit, please call [phone].

## 2017-12-28 NOTE — PROGRESS NOTES
1. Have you been to the ER, urgent care clinic since your last visit? Hospitalized since your last visit? No    2. Have you seen or consulted any other health care providers outside of the [facility] since your last visit? Include any pap smears or colon screening.  No   Chief Complaint   Patient presents with    Follow-up     6 week follow up      Visit Vitals    /60 (BP 1 Location: Left arm, BP Patient Position: Sitting)    Pulse 70    Temp 96 °F (35.6 °C) (Tympanic)    Wt 282 lb (127.9 kg)    SpO2 100%    BMI 37.21 kg/m2     PHQ over the last two weeks 12/28/2017   Little interest or pleasure in doing things Several days   Feeling down, depressed or hopeless Several days   Total Score PHQ 2 2     Learning Assessment 12/28/2017   PRIMARY LEARNER Patient   PRIMARY LANGUAGE ENGLISH   LEARNER PREFERENCE PRIMARY LISTENING   ANSWERED BY patient    RELATIONSHIP SELF

## 2017-12-29 LAB
ALBUMIN SERPL-MCNC: 2.9 G/DL (ref 3.6–4.8)
ALBUMIN/GLOB SERPL: 1 {RATIO} (ref 1.2–2.2)
ALP SERPL-CCNC: 162 IU/L (ref 39–117)
ALT SERPL-CCNC: 20 IU/L (ref 0–44)
AMMONIA PLAS-MCNC: 131 UG/DL (ref 27–102)
AST SERPL-CCNC: 39 IU/L (ref 0–40)
BILIRUB SERPL-MCNC: 3.4 MG/DL (ref 0–1.2)
BUN SERPL-MCNC: 22 MG/DL (ref 8–27)
BUN/CREAT SERPL: 21 (ref 10–24)
CALCIUM SERPL-MCNC: 8.4 MG/DL (ref 8.6–10.2)
CHLORIDE SERPL-SCNC: 106 MMOL/L (ref 96–106)
CO2 SERPL-SCNC: 27 MMOL/L (ref 18–29)
CREAT SERPL-MCNC: 1.03 MG/DL (ref 0.76–1.27)
ERYTHROCYTE [DISTWIDTH] IN BLOOD BY AUTOMATED COUNT: 15.6 % (ref 12.3–15.4)
GLOBULIN SER CALC-MCNC: 2.8 G/DL (ref 1.5–4.5)
GLUCOSE SERPL-MCNC: 186 MG/DL (ref 65–99)
HCT VFR BLD AUTO: 28.2 % (ref 37.5–51)
HGB BLD-MCNC: 9.4 G/DL (ref 13–17.7)
INR PPP: 1.3 (ref 0.8–1.2)
MCH RBC QN AUTO: 31.8 PG (ref 26.6–33)
MCHC RBC AUTO-ENTMCNC: 33.3 G/DL (ref 31.5–35.7)
MCV RBC AUTO: 95 FL (ref 79–97)
MORPHOLOGY BLD-IMP: ABNORMAL
PLATELET # BLD AUTO: 67 X10E3/UL (ref 150–379)
POTASSIUM SERPL-SCNC: 4.7 MMOL/L (ref 3.5–5.2)
PROT SERPL-MCNC: 5.7 G/DL (ref 6–8.5)
PROTHROMBIN TIME: 13.7 SEC (ref 9.1–12)
RBC # BLD AUTO: 2.96 X10E6/UL (ref 4.14–5.8)
SODIUM SERPL-SCNC: 144 MMOL/L (ref 134–144)
WBC # BLD AUTO: 4.3 X10E3/UL (ref 3.4–10.8)

## 2018-01-02 PROBLEM — G57.93 NEUROPATHY INVOLVING BOTH LOWER EXTREMITIES: Status: ACTIVE | Noted: 2018-01-02

## 2018-01-02 RX ORDER — TRIAMCINOLONE ACETONIDE 1 MG/G
CREAM TOPICAL 2 TIMES DAILY
Qty: 80 G | Refills: 3 | Status: SHIPPED | OUTPATIENT
Start: 2018-01-02 | End: 2018-08-27

## 2018-01-02 RX ORDER — SPIRONOLACTONE 100 MG/1
200 TABLET, FILM COATED ORAL DAILY
Qty: 180 TAB | Refills: 3 | Status: SHIPPED | OUTPATIENT
Start: 2018-01-02 | End: 2018-05-09

## 2018-01-02 RX ORDER — FUROSEMIDE 40 MG/1
80 TABLET ORAL DAILY
Qty: 180 TAB | Refills: 3 | Status: SHIPPED | OUTPATIENT
Start: 2018-01-02 | End: 2018-05-09

## 2018-01-02 NOTE — PROGRESS NOTES
[address] Maryanne Rivera MD, [address], [facility], [place]       Marci Hoffmann, KENNETH Millan, [facility]   MELVA Montiel NP        at [facility]     [address], [address] [facility], [address]     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 K75.81, K74.60    Thrombocytopenia ([facility] D69.6    GERD (gastroesophageal reflux disease) K21.9    CAD (coronary artery disease) I25.10    DM type 2 (diabetes mellitus, type 2) ([facility] E11.9    Lower leg edema R60.0    CAMEJO (nonalcoholic steatohepatitis) K75.81       Deloris Elias [facility] the [facility] [place] [facility] [place] regarding chronic HCV in the setting of cirrhosis. The active problem list, all pertinent past medical history, medications, radiologic findings and laboratory findings related to the liver disorder were reviewed with the patient. The patient is a 60 y.o.  male who was found to have fatty liver disease on liver biopsy in 2014. Serologic evaluation was either all negative or within the limits of normal.      Recent abdominal CT in December 2017 demonstrated changes consistent with cirrhosis with no liver masses suggestive of HCC. An assessment of liver fibrosis with biopsy done 7/2014 showed cirrhosis and mild mixed macro- and microvesicular steatosis. LE edema had resolved with step 1 diuretics. Spironolactone was discontinued along with potassium supplements by another provider because of hyperkalemia.  This resulted in weight gain/edema again. He then restarted it 4 weeks ago and edema has improved. He feels much better today. Patient continues on Xifaxan and lactulose daily. He has at least 2-3 BMs daily without diarrhea. He has not had an excerebration in HE since last office visit. Patient reported a burning sensation in both LE last visit. Neurontin was prescribed. He states that his symptoms are much better today. The patient notes ongoing fatigue, pain in the right side over the liver, problems concentrating and balance issues. He states that he overall feels better compared to last visit. The patient has limitations in functional activities secondary to these symptoms. The patient has not experienced pain in the right side over the liver, yellowing of the eyes or skin, pruritus, melena or hematochezia. ALLERGIES  No Known Allergies    MEDICATIONS  Current Outpatient Prescriptions   Medication Sig    lactulose (CHRONULAC) 20 gram/30 mL soln solution Take 30 mL by mouth three (3) times daily. Adjust  dose as needed such that you have 2 loose/soft bowel movements a day without diarrhea. Indications: Hepatic Encephalopathy    lisinopril (PRINIVIL, ZESTRIL) 20 mg tablet Take 1 Tab by mouth daily. Indications: hypertension    gabapentin (NEURONTIN) 300 mg capsule Take 1 Cap by mouth three (3) times daily.  aspirin 81 mg chewable tablet Take 81 mg by mouth daily.  cholecalciferol, vitamin D3, (VITAMIN D3) 2,000 unit tab Take  by mouth.  spironolactone (ALDACTONE) 100 mg tablet Take 1 Tab by mouth daily. Indications: EDEMA DUE TO HEPATIC CIRRHOSIS    furosemide (LASIX) 40 mg tablet Take 1 Tab by mouth daily.  rifaximin (XIFAXAN) 550 mg tablet Take 1 Tab by mouth two (2) times a day.  pantoprazole (PROTONIX) 40 mg tablet Take 40 mg by mouth daily.  metFORMIN (GLUCOPHAGE) 500 mg tablet Take 500 mg by mouth two (2) times daily (with meals).     glimepiride (AMARYL) 4 mg tablet Take 4 mg by mouth two (2) times a day.  simvastatin (ZOCOR) 20 mg tablet Take 20 mg by mouth nightly.  levothyroxine (SYNTHROID) 50 mcg tablet Take 75 mcg by mouth Daily (before breakfast). No current facility-administered medications for this visit. SYSTEM REVIEW NOT RELATED TO LIVER DISEASE OR REVIEWED ABOVE:  Constitution systems: Negative for fever, chills, weight gain, weight loss. Eyes: Negative for visual changes. ENT: Negative for sore throat, painful swallowing. Respiratory: Negative for cough, hemoptysis, SOB. Cardiology: Negative for chest pain, palpitations. GI:  Negative for constipation or diarrhea. : Negative for urinary frequency, dysuria, hematuria, nocturia. Skin: Positive for LE skin discoloration. Negative for rash. Hematology: Negative for easy bruising, blood clots. Musculo-skeletal: Legs are hot below the knees. Neurologic:  + dizziness, vertigo, memory problems related to HE. Psychology: Negative for anxiety, depression. FAMILY HISTORY:  The father  of prostate cancer. The mother  of multiple myeloma. There is no family history of liver disease. SOCIAL HISTORY:  The patient is . The patient has 3 children. The patient occasional cigar. The patient has been abstinent from alcohol since 2016. The patient is on social security. PHYSICAL EXAMINATION:  Visit Vitals    /60 (BP 1 Location: Left arm, BP Patient Position: Sitting)    Pulse 70    Temp 96 °F (35.6 °C) (Tympanic)    Wt 282 lb (127.9 kg)    SpO2 100%    BMI 37.21 kg/m2     General: No acute distress. Eyes: Sclera anicteric. ENT: No oral lesions. Nodes: No adenopathy. Skin: No spider angiomata. No jaundice. No palmar erythema. Respiratory: Lungs clear to auscultation. Cardiovascular:  Regular heart rate. No murmurs. No JVD. Abdomen: Soft non-tender. Liver size enlarged 4 finger widths below rib cage. Spleen enlarged.  No obvious ascites. Extremities: Trace LE edema bilaterally. No muscle wasting. No gross arthritic changes. Neurologic: Alert and oriented but has slow speech. Cranial nerves grossly intact. No asterixis. LABORATORY STUDIES:  Liver [place] of [address] [place] [address] 12/28/2017 11/2/2017   WBC 3.4 - 10.8 x10E3/uL 4.3 6.1   ANC 1.8 - 8.0 K/UL     HGB 13.0 - 17.7 g/dL 9.4 (L) 11.7 (L)    - 379 x10E3/uL 67 (LL) 92 (LL)   INR 0.8 - 1.2 1.3 (H) 1.3 (H)   AST 0 - 40 IU/L 39 53 (H)   ALT 0 - 44 IU/L 20 38   Alk Phos 39 - 117 IU/L 162 (H) 215 (H)   Bili, Total 0.0 - 1.2 mg/dL 3.4 (H) 2.6 (H)   Albumin 3.6 - 4.8 g/dL 2.9 (L) 2.8 (L)   BUN 8 - 27 mg/dL 22 18   Creat 0.76 - 1.27 mg/dL 1.03 1.27   Na 134 - 144 mmol/L 144 145 (H)   K 3.5 - 5.2 mmol/L 4.7 4.5   Cl 96 - 106 mmol/L 106 106   CO2 18 - 29 mmol/L 27 26   Glucose 65 - 99 mg/dL 186 (H) 105 (H)   Ammonia 27 - 102 ug/dL 131 (HH) 317 (HH)     11/2017. MELD 15  12/2017. MELD 14    SEROLOGIES:  Not available or performed. LIVER HISTOLOGY:  7/2014: Cirhosis with mild mixed macro- and microvesicular steatosis. ENDOSCOPIC PROCEDURES:  2/2017. EGD by Dr Gerry Sharpe. No esophageal varices. RADIOLOGY:  2/2017. Ultrasound of liver. Echogenic consistent with chronic liver disease. No liver mass lesions. No dilated bile ducts. No ascites. 3/2017 CT scan of abdomen. Changes consistent with cirrhosis. No liver mass lesions. Splenomegaly. Splenorenal shunt. No ascites. 12/2017. CT scan of abdomen. Cirrhotic liver and splenomegaly, with evidence of portal hypertension. No acute findings. No suspicious liver masses. OTHER TESTING:  Not available or performed    ASSESSMENT AND PLAN:  CAMEJO with cirrhosis. Liver transaminases are normal. Alkaline phosphate is elevated. Liver function is depressed. Total bilirubin is elevated. Serum albumin is depressed. INR is prolonged. The platelet count is depressed.  Current MELD is 14,    There is no medication of vitamin supplements that we advocate for CAMEJO. Using glitazones in patients without diabetes mellitus has been shown to reduce fat content in the liver but has no effect on fibrosis and is associated with weight gain. Vitamin E has also been used but the data is not very good and most experts no longer advocate this. The only medical treatments for CAMEJO and cirrhosis are through clinical trials. The patient would like to particiapte in a clinical trial. Unfortunately, he is currently not eligible for any of our active clinical trials that are enrolling. He will be monitored regularly. The patient was counseled regarding diet and exercise to achieve weight loss. Encouraged patient to follow a healthy diet and make sure his other medical conditions are well controlled. The patient was told to to consume any food products and drinks containing fructose as this enhances hepatic fat synthesis. Hepatic encephalopathy is better controlled on current doses of lactulose and Xifaxan. He achieves 2-3 BMs without diarrhea. Ammonia is better today compared to last office visit. The patient has not developed ascites. Lower extremity edema and anasarca. Step 1 diuretics have been effective. Continue without K supplements. LE discomfort. Neurontin improves his symptoms. Continue. The patient does not have esophageal varices by EGD in 2/2017. The patient was directed to continue all current medications at the current dosages. There are no contraindications for the patient to take any medications that are necessary for treatment of other medical issues including medications for diabetes mellitus and hypercholesterolemia. The patient was counseled regarding alcohol consumption and that this could contribute to fatty liver disease. The need for vaccination against viral hepatitis A and B will be assessed with serologic and instituted as appropriate. [address] screening. CT ruled out [address] in December 2017.  He is up to date. Next imaging will be in June 2017. All of the above issues were discussed with the patient. All questions were answered. The patient expressed a clear understanding of the above. [facility] [address] in 2 months.     Gabrielle Vincent NP  [address] [address], [address] [place] [address]  Ph: [phone]  Fax: [phone]

## 2018-01-05 NOTE — TELEPHONE ENCOUNTER
Requested Prescriptions     Pending Prescriptions Disp Refills    gabapentin (NEURONTIN) 300 mg capsule 90 Cap 3     Sig: Take 1 Cap by mouth three (3) times daily.

## 2018-01-08 RX ORDER — GABAPENTIN 300 MG/1
300 CAPSULE ORAL 3 TIMES DAILY
Qty: 90 CAP | Refills: 3 | Status: SHIPPED | OUTPATIENT
Start: 2018-01-08 | End: 2018-08-27

## 2018-01-09 ENCOUNTER — APPOINTMENT (OUTPATIENT)
Dept: GENERAL RADIOLOGY | Age: 63
End: 2018-01-09
Attending: EMERGENCY MEDICINE
Payer: COMMERCIAL

## 2018-01-09 ENCOUNTER — HOSPITAL ENCOUNTER (OUTPATIENT)
Age: 63
Setting detail: OBSERVATION
Discharge: HOME | End: 2018-01-10
Attending: EMERGENCY MEDICINE | Admitting: HOSPITALIST
Payer: COMMERCIAL

## 2018-01-09 DIAGNOSIS — R06.02 SOB (SHORTNESS OF BREATH): Primary | ICD-10-CM

## 2018-01-09 DIAGNOSIS — R09.02 HYPOXIA: ICD-10-CM

## 2018-01-09 DIAGNOSIS — J06.9 ACUTE UPPER RESPIRATORY INFECTION: ICD-10-CM

## 2018-01-09 PROBLEM — R05.9 COUGH: Status: ACTIVE | Noted: 2018-01-09

## 2018-01-09 LAB
ALBUMIN SERPL-MCNC: 2.6 G/DL (ref 3.5–5)
ALBUMIN/GLOB SERPL: 0.7 {RATIO} (ref 1.1–2.2)
ALP SERPL-CCNC: 121 U/L (ref 45–117)
ALT SERPL-CCNC: 39 U/L (ref 12–78)
AMMONIA PLAS-SCNC: 61 UMOL/L
ANION GAP SERPL CALC-SCNC: 10 MMOL/L (ref 5–15)
APPEARANCE UR: CLEAR
AST SERPL-CCNC: 68 U/L (ref 15–37)
BACTERIA URNS QL MICRO: NEGATIVE /HPF
BASOPHILS # BLD: 0 K/UL (ref 0–0.1)
BASOPHILS NFR BLD: 0 % (ref 0–1)
BILIRUB SERPL-MCNC: 3.8 MG/DL (ref 0.2–1)
BILIRUB UR QL: NEGATIVE
BNP SERPL-MCNC: 350 PG/ML (ref 0–125)
BUN SERPL-MCNC: 26 MG/DL (ref 6–20)
BUN/CREAT SERPL: 19 (ref 12–20)
CALCIUM SERPL-MCNC: 8.5 MG/DL (ref 8.5–10.1)
CHLORIDE SERPL-SCNC: 104 MMOL/L (ref 97–108)
CO2 SERPL-SCNC: 24 MMOL/L (ref 21–32)
COLOR UR: ABNORMAL
CREAT SERPL-MCNC: 1.34 MG/DL (ref 0.7–1.3)
DIFFERENTIAL METHOD BLD: ABNORMAL
EOSINOPHIL # BLD: 0.1 K/UL (ref 0–0.4)
EOSINOPHIL NFR BLD: 1 % (ref 0–7)
EPITH CASTS URNS QL MICRO: ABNORMAL /LPF
ERYTHROCYTE [DISTWIDTH] IN BLOOD BY AUTOMATED COUNT: 14.6 % (ref 11.5–14.5)
FLUAV AG NPH QL IA: POSITIVE
FLUBV AG NOSE QL IA: NEGATIVE
GLOBULIN SER CALC-MCNC: 3.7 G/DL (ref 2–4)
GLUCOSE BLD STRIP.AUTO-MCNC: 100 MG/DL (ref 65–100)
GLUCOSE BLD STRIP.AUTO-MCNC: 121 MG/DL (ref 65–100)
GLUCOSE BLD STRIP.AUTO-MCNC: 61 MG/DL (ref 65–100)
GLUCOSE BLD STRIP.AUTO-MCNC: 63 MG/DL (ref 65–100)
GLUCOSE SERPL-MCNC: 48 MG/DL (ref 65–100)
GLUCOSE UR STRIP.AUTO-MCNC: NEGATIVE MG/DL
HCT VFR BLD AUTO: 30 % (ref 36.6–50.3)
HGB BLD-MCNC: 10.4 G/DL (ref 12.1–17)
HGB UR QL STRIP: ABNORMAL
HYALINE CASTS URNS QL MICRO: ABNORMAL /LPF (ref 0–5)
KETONES UR QL STRIP.AUTO: NEGATIVE MG/DL
LEUKOCYTE ESTERASE UR QL STRIP.AUTO: NEGATIVE
LYMPHOCYTES # BLD: 0.4 K/UL (ref 0.8–3.5)
LYMPHOCYTES NFR BLD: 6 % (ref 12–49)
MCH RBC QN AUTO: 32.1 PG (ref 26–34)
MCHC RBC AUTO-ENTMCNC: 34.7 G/DL (ref 30–36.5)
MCV RBC AUTO: 92.6 FL (ref 80–99)
MONOCYTES # BLD: 0.7 K/UL (ref 0–1)
MONOCYTES NFR BLD: 11 % (ref 5–13)
NEUTS SEG # BLD: 5.5 K/UL (ref 1.8–8)
NEUTS SEG NFR BLD: 82 % (ref 32–75)
NITRITE UR QL STRIP.AUTO: NEGATIVE
PH UR STRIP: 5 [PH] (ref 5–8)
PLATELET # BLD AUTO: 61 K/UL (ref 150–400)
POTASSIUM SERPL-SCNC: 4.2 MMOL/L (ref 3.5–5.1)
PROT SERPL-MCNC: 6.3 G/DL (ref 6.4–8.2)
PROT UR STRIP-MCNC: ABNORMAL MG/DL
RBC # BLD AUTO: 3.24 M/UL (ref 4.1–5.7)
RBC #/AREA URNS HPF: ABNORMAL /HPF (ref 0–5)
RBC MORPH BLD: ABNORMAL
SERVICE CMNT-IMP: ABNORMAL
SERVICE CMNT-IMP: NORMAL
SODIUM SERPL-SCNC: 138 MMOL/L (ref 136–145)
SP GR UR REFRACTOMETRY: 1.01 (ref 1–1.03)
UR CULT HOLD, URHOLD: NORMAL
UROBILINOGEN UR QL STRIP.AUTO: 1 EU/DL (ref 0.2–1)
WBC # BLD AUTO: 6.7 K/UL (ref 4.1–11.1)
WBC URNS QL MICRO: ABNORMAL /HPF (ref 0–4)

## 2018-01-09 PROCEDURE — 71045 X-RAY EXAM CHEST 1 VIEW: CPT

## 2018-01-09 PROCEDURE — 81001 URINALYSIS AUTO W/SCOPE: CPT | Performed by: EMERGENCY MEDICINE

## 2018-01-09 PROCEDURE — 99285 EMERGENCY DEPT VISIT HI MDM: CPT

## 2018-01-09 PROCEDURE — 77030029684 HC NEB SM VOL KT MONA -A

## 2018-01-09 PROCEDURE — 96376 TX/PRO/DX INJ SAME DRUG ADON: CPT

## 2018-01-09 PROCEDURE — 96366 THER/PROPH/DIAG IV INF ADDON: CPT

## 2018-01-09 PROCEDURE — 74011636637 HC RX REV CODE- 636/637: Performed by: HOSPITALIST

## 2018-01-09 PROCEDURE — 74011000250 HC RX REV CODE- 250: Performed by: HOSPITALIST

## 2018-01-09 PROCEDURE — 87804 INFLUENZA ASSAY W/OPTIC: CPT | Performed by: EMERGENCY MEDICINE

## 2018-01-09 PROCEDURE — 96375 TX/PRO/DX INJ NEW DRUG ADDON: CPT

## 2018-01-09 PROCEDURE — 96361 HYDRATE IV INFUSION ADD-ON: CPT

## 2018-01-09 PROCEDURE — 36415 COLL VENOUS BLD VENIPUNCTURE: CPT | Performed by: EMERGENCY MEDICINE

## 2018-01-09 PROCEDURE — 74011000258 HC RX REV CODE- 258: Performed by: HOSPITALIST

## 2018-01-09 PROCEDURE — 94640 AIRWAY INHALATION TREATMENT: CPT

## 2018-01-09 PROCEDURE — 96367 TX/PROPH/DG ADDL SEQ IV INF: CPT

## 2018-01-09 PROCEDURE — 74011250636 HC RX REV CODE- 250/636: Performed by: EMERGENCY MEDICINE

## 2018-01-09 PROCEDURE — 93005 ELECTROCARDIOGRAM TRACING: CPT

## 2018-01-09 PROCEDURE — 85025 COMPLETE CBC W/AUTO DIFF WBC: CPT | Performed by: EMERGENCY MEDICINE

## 2018-01-09 PROCEDURE — 82140 ASSAY OF AMMONIA: CPT | Performed by: EMERGENCY MEDICINE

## 2018-01-09 PROCEDURE — 80053 COMPREHEN METABOLIC PANEL: CPT | Performed by: EMERGENCY MEDICINE

## 2018-01-09 PROCEDURE — 83880 ASSAY OF NATRIURETIC PEPTIDE: CPT | Performed by: EMERGENCY MEDICINE

## 2018-01-09 PROCEDURE — 94761 N-INVAS EAR/PLS OXIMETRY MLT: CPT

## 2018-01-09 PROCEDURE — 74011250637 HC RX REV CODE- 250/637: Performed by: HOSPITALIST

## 2018-01-09 PROCEDURE — 82962 GLUCOSE BLOOD TEST: CPT

## 2018-01-09 PROCEDURE — 99218 HC RM OBSERVATION: CPT

## 2018-01-09 PROCEDURE — 74011000250 HC RX REV CODE- 250: Performed by: EMERGENCY MEDICINE

## 2018-01-09 PROCEDURE — 96365 THER/PROPH/DIAG IV INF INIT: CPT

## 2018-01-09 RX ORDER — ALBUTEROL SULFATE 0.83 MG/ML
2.5 SOLUTION RESPIRATORY (INHALATION)
Status: COMPLETED | OUTPATIENT
Start: 2018-01-09 | End: 2018-01-09

## 2018-01-09 RX ORDER — DEXTROSE 50 % IN WATER (D50W) INTRAVENOUS SYRINGE
25
Status: COMPLETED | OUTPATIENT
Start: 2018-01-09 | End: 2018-01-09

## 2018-01-09 RX ORDER — ACETAMINOPHEN 325 MG/1
650 TABLET ORAL
Status: DISCONTINUED | OUTPATIENT
Start: 2018-01-09 | End: 2018-01-10 | Stop reason: HOSPADM

## 2018-01-09 RX ORDER — GUAIFENESIN 100 MG/5ML
81 LIQUID (ML) ORAL DAILY
Status: DISCONTINUED | OUTPATIENT
Start: 2018-01-10 | End: 2018-01-10 | Stop reason: HOSPADM

## 2018-01-09 RX ORDER — DEXTROSE 50 % IN WATER (D50W) INTRAVENOUS SYRINGE
12.5-25 AS NEEDED
Status: DISCONTINUED | OUTPATIENT
Start: 2018-01-09 | End: 2018-01-10 | Stop reason: HOSPADM

## 2018-01-09 RX ORDER — ZOLPIDEM TARTRATE 5 MG/1
5 TABLET ORAL
Status: DISCONTINUED | OUTPATIENT
Start: 2018-01-09 | End: 2018-01-10 | Stop reason: HOSPADM

## 2018-01-09 RX ORDER — GABAPENTIN 300 MG/1
300 CAPSULE ORAL 3 TIMES DAILY
Status: DISCONTINUED | OUTPATIENT
Start: 2018-01-09 | End: 2018-01-10 | Stop reason: HOSPADM

## 2018-01-09 RX ORDER — OSELTAMIVIR PHOSPHATE 75 MG/1
75 CAPSULE ORAL 2 TIMES DAILY
Status: DISCONTINUED | OUTPATIENT
Start: 2018-01-09 | End: 2018-01-10 | Stop reason: HOSPADM

## 2018-01-09 RX ORDER — SIMVASTATIN 20 MG/1
20 TABLET, FILM COATED ORAL
Status: DISCONTINUED | OUTPATIENT
Start: 2018-01-09 | End: 2018-01-10 | Stop reason: HOSPADM

## 2018-01-09 RX ORDER — LEVOTHYROXINE SODIUM 75 UG/1
75 TABLET ORAL
Status: DISCONTINUED | OUTPATIENT
Start: 2018-01-10 | End: 2018-01-10 | Stop reason: HOSPADM

## 2018-01-09 RX ORDER — SODIUM CHLORIDE 0.9 % (FLUSH) 0.9 %
5-10 SYRINGE (ML) INJECTION AS NEEDED
Status: DISCONTINUED | OUTPATIENT
Start: 2018-01-09 | End: 2018-01-10 | Stop reason: HOSPADM

## 2018-01-09 RX ORDER — PANTOPRAZOLE SODIUM 40 MG/1
40 TABLET, DELAYED RELEASE ORAL
Status: DISCONTINUED | OUTPATIENT
Start: 2018-01-10 | End: 2018-01-10 | Stop reason: HOSPADM

## 2018-01-09 RX ORDER — LISINOPRIL 10 MG/1
20 TABLET ORAL DAILY
Status: DISCONTINUED | OUTPATIENT
Start: 2018-01-10 | End: 2018-01-10 | Stop reason: HOSPADM

## 2018-01-09 RX ORDER — MAGNESIUM SULFATE 100 %
4 CRYSTALS MISCELLANEOUS AS NEEDED
Status: DISCONTINUED | OUTPATIENT
Start: 2018-01-09 | End: 2018-01-10 | Stop reason: HOSPADM

## 2018-01-09 RX ORDER — IPRATROPIUM BROMIDE AND ALBUTEROL SULFATE 2.5; .5 MG/3ML; MG/3ML
1.5 SOLUTION RESPIRATORY (INHALATION)
Status: DISCONTINUED | OUTPATIENT
Start: 2018-01-09 | End: 2018-01-10 | Stop reason: HOSPADM

## 2018-01-09 RX ORDER — DEXTROSE MONOHYDRATE AND SODIUM CHLORIDE 5; .45 G/100ML; G/100ML
75 INJECTION, SOLUTION INTRAVENOUS CONTINUOUS
Status: DISCONTINUED | OUTPATIENT
Start: 2018-01-09 | End: 2018-01-10

## 2018-01-09 RX ORDER — SODIUM CHLORIDE 0.9 % (FLUSH) 0.9 %
5-10 SYRINGE (ML) INJECTION EVERY 8 HOURS
Status: DISCONTINUED | OUTPATIENT
Start: 2018-01-09 | End: 2018-01-10 | Stop reason: HOSPADM

## 2018-01-09 RX ORDER — SPIRONOLACTONE 100 MG/1
200 TABLET, FILM COATED ORAL DAILY
Status: DISCONTINUED | OUTPATIENT
Start: 2018-01-10 | End: 2018-01-10 | Stop reason: HOSPADM

## 2018-01-09 RX ORDER — ONDANSETRON 2 MG/ML
4 INJECTION INTRAMUSCULAR; INTRAVENOUS
Status: DISCONTINUED | OUTPATIENT
Start: 2018-01-09 | End: 2018-01-10 | Stop reason: HOSPADM

## 2018-01-09 RX ORDER — SODIUM CHLORIDE 9 MG/ML
75 INJECTION, SOLUTION INTRAVENOUS CONTINUOUS
Status: CANCELLED | OUTPATIENT
Start: 2018-01-09

## 2018-01-09 RX ORDER — PREDNISONE 20 MG/1
40 TABLET ORAL
Status: DISCONTINUED | OUTPATIENT
Start: 2018-01-09 | End: 2018-01-10 | Stop reason: HOSPADM

## 2018-01-09 RX ORDER — INSULIN LISPRO 100 [IU]/ML
INJECTION, SOLUTION INTRAVENOUS; SUBCUTANEOUS
Status: DISCONTINUED | OUTPATIENT
Start: 2018-01-09 | End: 2018-01-10 | Stop reason: HOSPADM

## 2018-01-09 RX ADMIN — CEFTRIAXONE SODIUM 1 G: 1 INJECTION, POWDER, FOR SOLUTION INTRAMUSCULAR; INTRAVENOUS at 19:32

## 2018-01-09 RX ADMIN — ACETAMINOPHEN 650 MG: 325 TABLET, FILM COATED ORAL at 20:38

## 2018-01-09 RX ADMIN — AZITHROMYCIN 500 MG: 500 INJECTION, POWDER, LYOPHILIZED, FOR SOLUTION INTRAVENOUS at 20:06

## 2018-01-09 RX ADMIN — IPRATROPIUM BROMIDE AND ALBUTEROL SULFATE 1.5 ML: .5; 3 SOLUTION RESPIRATORY (INHALATION) at 23:08

## 2018-01-09 RX ADMIN — DEXTROSE MONOHYDRATE 25 G: 25 INJECTION, SOLUTION INTRAVENOUS at 22:08

## 2018-01-09 RX ADMIN — PREDNISONE 40 MG: 20 TABLET ORAL at 22:09

## 2018-01-09 RX ADMIN — OSELTAMIVIR PHOSPHATE 75 MG: 75 CAPSULE ORAL at 21:29

## 2018-01-09 RX ADMIN — ALBUTEROL SULFATE 2.5 MG: 2.5 SOLUTION RESPIRATORY (INHALATION) at 17:04

## 2018-01-09 RX ADMIN — GABAPENTIN 300 MG: 300 CAPSULE ORAL at 22:09

## 2018-01-09 RX ADMIN — SODIUM CHLORIDE 1000 ML: 900 INJECTION, SOLUTION INTRAVENOUS at 17:58

## 2018-01-09 RX ADMIN — DEXTROSE MONOHYDRATE AND SODIUM CHLORIDE 75 ML/HR: 5; .45 INJECTION, SOLUTION INTRAVENOUS at 20:37

## 2018-01-09 RX ADMIN — DEXTROSE MONOHYDRATE 12.5 G: 500 INJECTION PARENTERAL at 17:57

## 2018-01-09 NOTE — ED PROVIDER NOTES
HPI Comments: 60 y.o. male with past medical history significant for HTN, CAD, diabetes, GERD, anemia, and hepatic encephalopathy who presents from home via private vehicle with chief complaint of SOB. Pt reports he started with cold symptoms 4 days ago, but states it seemed to resolve on its own 2 days ago. Pt states yesterday he had just a slight cough. Pt states today he started with SOB and \"raspy\" breath sounds. Pt reports he has chronic calf edema, stating he had gained 28 pounds of water weight, 20 of it he has lost after his diuretic was increased. Pt reports he taking HTN medication and has an enlarged heart. Pt denies a hx of asthma or COPD or MI. There are no other acute medical concerns at this time. Social hx: Nonsmoker; No EtOH use  PCP: Mary Grace Lovelace MD    Note written by Francy Villalpando. Talia Causey, as dictated by Ronak Alvarez MD 4:12 PM      The history is provided by the patient. No  was used. Past Medical History:   Diagnosis Date    Arthritis     Autoimmune disease ([address]     ITP    CAD (coronary artery disease)     enlarged heart ?  Cancer ([address]     skin ca removed from forehead    Chronic kidney disease     kidney stones    Coagulation disorder (HCC)     low plts    Diabetes (HCC)     type 2    GERD (gastroesophageal reflux disease)     Hepatic encephalopathy (HCC)     Hypertension     Ill-defined condition     obesity per pt    Ill-defined condition     anemia    Liver disease     elevated liver enzymes    PUD (peptic ulcer disease)     stomach ulcers       Past Surgical History:   Procedure Laterality Date    ABDOMEN SURGERY PROC UNLISTED      ana    HX APPENDECTOMY      HX OTHER SURGICAL      mohs procedure for skin ca.    [facility] HX UROLOGICAL      vasectomy         Family History:   Problem Relation Age of Onset    Cancer Mother      multiple myeloma    Cancer Father      prostate    MS Sister     Cancer Maternal Grandmother      ?where   [facility] Cancer Maternal Grandfather      ?where    Heart Failure Paternal Grandmother     Cancer Paternal Grandfather      ? where       Social History     Social History    Marital status:      Spouse name: N/A    Number of children: N/A    Years of education: N/A     Occupational History    Not on file. Social History Main Topics    Smoking status: Never Smoker    Smokeless tobacco: Never Used    Alcohol use No    Drug use: Not on file    Sexual activity: Not on file     Other Topics Concern    Not on file     Social History Narrative         ALLERGIES: Review of patient's allergies indicates no known allergies. Review of Systems   Constitutional: Positive for appetite change. Negative for chills and fever. HENT: Positive for congestion. Negative for rhinorrhea, sore throat and trouble swallowing. Eyes: Negative for photophobia. Respiratory: Positive for cough and shortness of breath. Cardiovascular: Negative for chest pain and palpitations. Gastrointestinal: Negative for abdominal pain, nausea and vomiting. Genitourinary: Negative for dysuria, frequency and hematuria. Musculoskeletal: Negative for arthralgias. Neurological: Negative for dizziness, syncope and weakness. Psychiatric/Behavioral: Negative for behavioral problems. The patient is not nervous/anxious. All other systems reviewed and are negative. Vitals:    01/09/18 1501 01/09/18 1555   BP: 147/66    Pulse: (!) 101    Resp: 24    Temp: 99.2 °F (37.3 °C) (!) 100.5 °F (38.1 °C)   SpO2: 94%    Height: 6' (1.829 m)             Physical Exam   Constitutional: He appears well-developed. Obese. HENT:   Head: Normocephalic and atraumatic. Mouth/Throat: Oropharynx is clear and moist.   Eyes: EOM are normal. Pupils are equal, round, and reactive to light. Neck: Normal range of motion. Neck supple. Cardiovascular: Normal rate, regular rhythm, normal heart sounds and intact distal pulses.   Exam reveals no gallop and no friction rub. No murmur heard. Pulse is 110. Pulmonary/Chest: Effort normal. No respiratory distress. He has wheezes. He has no rales. Scattered wheezes throughout. Sats 92% on RA. Abdominal: Soft. There is no tenderness. There is no rebound. Musculoskeletal: Normal range of motion. He exhibits edema. He exhibits no tenderness. 1/4 edema on lower extremities. Muscle deconditioning. Neurological: He is alert. No cranial nerve deficit. Motor; symmetric   Skin: No erythema. Psychiatric: He has a normal mood and affect. His behavior is normal.   Nursing note and vitals reviewed. Note written by Deandre Carlton, as dictated by Mirta Duverney, MD 4:12 PM    [facility]  ED Course       Procedures         Patient is a nonsmoker. He denies having a history of asthma and COPD. Patient has diabetes. cirrhosis,  and hypertension. He reports a history of cardiomegaly. Patient has chronic renal disease from hypertension and diabetes. Recently he has had a 20 pound weight loss after a diuresis. Apparently the edema is felt to be renal and hepatic in etiology. Patient has what sounds like a viral upper respiratory infection. He has mild hypoxia at 92% on room air. He was quite short of breath at triage but more comfortable at rest. Chest x-ray does not show pneumonia although patient has a fever and cough and shortness of breath. His pneumonia may show up later. Renal function test are pending. Mirta Duverney, MD  4:17 PM  ED EKG interpretation:  Rhythm: normal sinus rhythm; and regular . Rate (approx.): 100; Axis: normal; P wave: normal; QRS interval: normal ; ST/T wave: non-specific changes; in  Lead: ; Other findings: left ventricular hypertrophy. This EKG was interpreted by Mirta Duverney, MD,ED Provider. 4:18 PM    Note: Patient has hypoglycemia. He is on metformin and glipizide. Appetite and food intake has been decreased since the onset of this respiratory illness several days ago. Chest x-ray does not show pneumonia. Patient will be covered with antibiotics for possible community-acquired pneumonia anyway. Despite his problems with edema since he has an acute infection he'll be given a liter of normal saline. Patient has eaten a snack and glucose will be rechecked.   Ariel Esparza MD  4:51 PM

## 2018-01-09 NOTE — ED NOTES
Patients lab BS 48. Dr. Kimberly Diaz notified and ordered patient to be given food and BS to be recheck. Peanut butter crackers given to patient.

## 2018-01-10 VITALS
BODY MASS INDEX: 38.22 KG/M2 | DIASTOLIC BLOOD PRESSURE: 88 MMHG | TEMPERATURE: 97.7 F | HEART RATE: 100 BPM | SYSTOLIC BLOOD PRESSURE: 152 MMHG | RESPIRATION RATE: 24 BRPM | WEIGHT: 282.19 LBS | OXYGEN SATURATION: 97 % | HEIGHT: 72 IN

## 2018-01-10 PROBLEM — J11.1 INFLUENZA: Status: ACTIVE | Noted: 2018-01-10

## 2018-01-10 LAB
ALBUMIN SERPL-MCNC: 2.4 G/DL (ref 3.5–5)
ALBUMIN/GLOB SERPL: 0.7 {RATIO} (ref 1.1–2.2)
ALP SERPL-CCNC: 99 U/L (ref 45–117)
ALT SERPL-CCNC: 36 U/L (ref 12–78)
ANION GAP SERPL CALC-SCNC: 8 MMOL/L (ref 5–15)
ANION GAP SERPL CALC-SCNC: 9 MMOL/L (ref 5–15)
AST SERPL-CCNC: 60 U/L (ref 15–37)
ATRIAL RATE: 100 BPM
BASOPHILS # BLD: 0 K/UL (ref 0–0.1)
BASOPHILS NFR BLD: 0 % (ref 0–1)
BILIRUB SERPL-MCNC: 2.8 MG/DL (ref 0.2–1)
BUN SERPL-MCNC: 28 MG/DL (ref 6–20)
BUN SERPL-MCNC: 30 MG/DL (ref 6–20)
BUN/CREAT SERPL: 21 (ref 12–20)
BUN/CREAT SERPL: 23 (ref 12–20)
CALCIUM SERPL-MCNC: 7.9 MG/DL (ref 8.5–10.1)
CALCIUM SERPL-MCNC: 8.1 MG/DL (ref 8.5–10.1)
CALCULATED P AXIS, ECG09: 60 DEGREES
CALCULATED R AXIS, ECG10: 5 DEGREES
CALCULATED T AXIS, ECG11: 98 DEGREES
CHLORIDE SERPL-SCNC: 102 MMOL/L (ref 97–108)
CHLORIDE SERPL-SCNC: 103 MMOL/L (ref 97–108)
CO2 SERPL-SCNC: 24 MMOL/L (ref 21–32)
CO2 SERPL-SCNC: 25 MMOL/L (ref 21–32)
CREAT SERPL-MCNC: 1.32 MG/DL (ref 0.7–1.3)
CREAT SERPL-MCNC: 1.35 MG/DL (ref 0.7–1.3)
DIAGNOSIS, 93000: NORMAL
DIFFERENTIAL METHOD BLD: ABNORMAL
EOSINOPHIL # BLD: 0 K/UL (ref 0–0.4)
EOSINOPHIL NFR BLD: 0 % (ref 0–7)
ERYTHROCYTE [DISTWIDTH] IN BLOOD BY AUTOMATED COUNT: 14.6 % (ref 11.5–14.5)
GLOBULIN SER CALC-MCNC: 3.4 G/DL (ref 2–4)
GLUCOSE BLD STRIP.AUTO-MCNC: 107 MG/DL (ref 65–100)
GLUCOSE BLD STRIP.AUTO-MCNC: 245 MG/DL (ref 65–100)
GLUCOSE BLD STRIP.AUTO-MCNC: 259 MG/DL (ref 65–100)
GLUCOSE SERPL-MCNC: 203 MG/DL (ref 65–100)
GLUCOSE SERPL-MCNC: 292 MG/DL (ref 65–100)
HCT VFR BLD AUTO: 26.7 % (ref 36.6–50.3)
HGB BLD-MCNC: 9.1 G/DL (ref 12.1–17)
LYMPHOCYTES # BLD: 0.2 K/UL (ref 0.8–3.5)
LYMPHOCYTES NFR BLD: 3 % (ref 12–49)
MCH RBC QN AUTO: 31.7 PG (ref 26–34)
MCHC RBC AUTO-ENTMCNC: 34.1 G/DL (ref 30–36.5)
MCV RBC AUTO: 93 FL (ref 80–99)
MONOCYTES # BLD: 0.5 K/UL (ref 0–1)
MONOCYTES NFR BLD: 6 % (ref 5–13)
NEUTS BAND NFR BLD MANUAL: 5 % (ref 0–6)
NEUTS SEG # BLD: 6.8 K/UL (ref 1.8–8)
NEUTS SEG NFR BLD: 86 % (ref 32–75)
P-R INTERVAL, ECG05: 178 MS
PLATELET # BLD AUTO: 50 K/UL (ref 150–400)
POTASSIUM SERPL-SCNC: 4.1 MMOL/L (ref 3.5–5.1)
POTASSIUM SERPL-SCNC: 4.4 MMOL/L (ref 3.5–5.1)
PROT SERPL-MCNC: 5.8 G/DL (ref 6.4–8.2)
Q-T INTERVAL, ECG07: 334 MS
QRS DURATION, ECG06: 94 MS
QTC CALCULATION (BEZET), ECG08: 430 MS
RBC # BLD AUTO: 2.87 M/UL (ref 4.1–5.7)
RBC MORPH BLD: ABNORMAL
RBC MORPH BLD: ABNORMAL
SERVICE CMNT-IMP: ABNORMAL
SODIUM SERPL-SCNC: 135 MMOL/L (ref 136–145)
SODIUM SERPL-SCNC: 136 MMOL/L (ref 136–145)
VENTRICULAR RATE, ECG03: 100 BPM
WBC # BLD AUTO: 7.5 K/UL (ref 4.1–11.1)

## 2018-01-10 PROCEDURE — 74011636637 HC RX REV CODE- 636/637: Performed by: HOSPITALIST

## 2018-01-10 PROCEDURE — 74011250637 HC RX REV CODE- 250/637: Performed by: HOSPITALIST

## 2018-01-10 PROCEDURE — 80053 COMPREHEN METABOLIC PANEL: CPT | Performed by: HOSPITALIST

## 2018-01-10 PROCEDURE — 85025 COMPLETE CBC W/AUTO DIFF WBC: CPT | Performed by: HOSPITALIST

## 2018-01-10 PROCEDURE — 80048 BASIC METABOLIC PNL TOTAL CA: CPT | Performed by: NURSE PRACTITIONER

## 2018-01-10 PROCEDURE — 96366 THER/PROPH/DIAG IV INF ADDON: CPT

## 2018-01-10 PROCEDURE — 99218 HC RM OBSERVATION: CPT

## 2018-01-10 PROCEDURE — 36415 COLL VENOUS BLD VENIPUNCTURE: CPT | Performed by: HOSPITALIST

## 2018-01-10 PROCEDURE — 74011250636 HC RX REV CODE- 250/636: Performed by: NURSE PRACTITIONER

## 2018-01-10 PROCEDURE — 74011000250 HC RX REV CODE- 250: Performed by: HOSPITALIST

## 2018-01-10 PROCEDURE — 94640 AIRWAY INHALATION TREATMENT: CPT

## 2018-01-10 PROCEDURE — 82962 GLUCOSE BLOOD TEST: CPT

## 2018-01-10 RX ORDER — OSELTAMIVIR PHOSPHATE 75 MG/1
75 CAPSULE ORAL 2 TIMES DAILY
Qty: 10 CAP | Refills: 0 | Status: SHIPPED | OUTPATIENT
Start: 2018-01-10 | End: 2018-01-15

## 2018-01-10 RX ORDER — AMOXICILLIN AND CLAVULANATE POTASSIUM 875; 125 MG/1; MG/1
1 TABLET, FILM COATED ORAL EVERY 12 HOURS
Qty: 14 TAB | Refills: 0 | OUTPATIENT
Start: 2018-01-10 | End: 2018-01-10

## 2018-01-10 RX ORDER — AMOXICILLIN AND CLAVULANATE POTASSIUM 875; 125 MG/1; MG/1
1 TABLET, FILM COATED ORAL EVERY 12 HOURS
Qty: 14 TAB | Refills: 0 | Status: SHIPPED | OUTPATIENT
Start: 2018-01-10 | End: 2018-01-17

## 2018-01-10 RX ORDER — OSELTAMIVIR PHOSPHATE 75 MG/1
75 CAPSULE ORAL 2 TIMES DAILY
Qty: 10 CAP | Refills: 0 | OUTPATIENT
Start: 2018-01-10 | End: 2018-01-10

## 2018-01-10 RX ORDER — SODIUM CHLORIDE 9 MG/ML
100 INJECTION, SOLUTION INTRAVENOUS CONTINUOUS
Status: DISCONTINUED | OUTPATIENT
Start: 2018-01-10 | End: 2018-01-10 | Stop reason: HOSPADM

## 2018-01-10 RX ORDER — GUAIFENESIN 600 MG/1
600 TABLET, EXTENDED RELEASE ORAL EVERY 12 HOURS
Status: DISCONTINUED | OUTPATIENT
Start: 2018-01-10 | End: 2018-01-10 | Stop reason: HOSPADM

## 2018-01-10 RX ADMIN — OSELTAMIVIR PHOSPHATE 75 MG: 75 CAPSULE ORAL at 08:22

## 2018-01-10 RX ADMIN — SODIUM CHLORIDE 100 ML/HR: 900 INJECTION, SOLUTION INTRAVENOUS at 12:00

## 2018-01-10 RX ADMIN — IPRATROPIUM BROMIDE AND ALBUTEROL SULFATE 1.5 ML: .5; 3 SOLUTION RESPIRATORY (INHALATION) at 04:30

## 2018-01-10 RX ADMIN — LISINOPRIL 20 MG: 10 TABLET ORAL at 08:22

## 2018-01-10 RX ADMIN — IPRATROPIUM BROMIDE AND ALBUTEROL SULFATE 1.5 ML: .5; 3 SOLUTION RESPIRATORY (INHALATION) at 07:57

## 2018-01-10 RX ADMIN — LEVOTHYROXINE SODIUM 75 MCG: 75 TABLET ORAL at 05:29

## 2018-01-10 RX ADMIN — PREDNISONE 40 MG: 20 TABLET ORAL at 08:23

## 2018-01-10 RX ADMIN — RIFAXIMIN 550 MG: 550 TABLET ORAL at 05:29

## 2018-01-10 RX ADMIN — IPRATROPIUM BROMIDE AND ALBUTEROL SULFATE 1.5 ML: .5; 3 SOLUTION RESPIRATORY (INHALATION) at 11:20

## 2018-01-10 RX ADMIN — SPIRONOLACTONE 200 MG: 100 TABLET, FILM COATED ORAL at 08:23

## 2018-01-10 RX ADMIN — LACTULOSE 20 G: 20 SOLUTION ORAL at 08:22

## 2018-01-10 RX ADMIN — INSULIN LISPRO 3 UNITS: 100 INJECTION, SOLUTION INTRAVENOUS; SUBCUTANEOUS at 08:23

## 2018-01-10 RX ADMIN — INSULIN LISPRO 5 UNITS: 100 INJECTION, SOLUTION INTRAVENOUS; SUBCUTANEOUS at 12:36

## 2018-01-10 RX ADMIN — ASPIRIN 81 MG 81 MG: 81 TABLET ORAL at 08:22

## 2018-01-10 RX ADMIN — PANTOPRAZOLE SODIUM 40 MG: 40 TABLET, DELAYED RELEASE ORAL at 08:23

## 2018-01-10 NOTE — PROGRESS NOTES
Admission Medication Reconciliation:    Information obtained from: Pt and pt's visitors    Significant PMH/Disease States:   Past Medical History:   Diagnosis Date    Arthritis     Autoimmune disease ([facility]     ITP    CAD (coronary artery disease)     enlarged heart ?  Cancer ([facility] [address] [facility]     skin ca removed from forehead    Chronic kidney disease     kidney stones    Coagulation disorder (HCC)     low plts    Diabetes (HCC)     type 2    GERD (gastroesophageal reflux disease)     Hepatic encephalopathy (HCC)     Hypertension     Ill-defined condition     obesity per pt    Ill-defined condition     anemia    Liver disease     elevated liver enzymes    PUD (peptic ulcer disease)     stomach ulcers       Chief Complaint for this Admission:    Chief Complaint   Patient presents with    Cough    Shortness of Breath         Allergies:  Review of patient's allergies indicates no known allergies. Prior to Admission Medications:   Prior to Admission Medications   Prescriptions Last Dose Informant Patient Reported? Taking?   aspirin 81 mg chewable tablet 1/9/2018 at am  Yes Yes   Sig: Take 81 mg by mouth daily. cholecalciferol, vitamin D3, (VITAMIN D3) 2,000 unit tab 1/8/2018 at pm  Yes Yes   Sig: Take 1 Tab by mouth two (2) times a day. furosemide (LASIX) 40 mg tablet 1/9/2018 at am  No Yes   Sig: Take 2 Tabs by mouth daily. Patient taking differently: Take 40 mg by mouth daily. gabapentin (NEURONTIN) 300 mg capsule 1/8/2018 at pm  No Yes   Sig: Take 1 Cap by mouth three (3) times daily. Patient taking differently: Take 300 mg by mouth daily. glimepiride (AMARYL) 4 mg tablet 1/9/2018 at am  Yes Yes   Sig: Take 4 mg by mouth two (2) times a day. lactulose (CHRONULAC) 20 gram/30 mL soln solution 1/9/2018 at Unknown time  No Yes   Sig: Take 30 mL by mouth three (3) times daily. Adjust  dose as needed such that you have 2 loose/soft bowel movements a day without diarrhea.   Indications: Hepatic Encephalopathy   Patient taking differently: Take 20 g by mouth two (2) times a day. Adjust  dose as needed such that you have 2 loose/soft bowel movements a day without diarrhea. Currently taking 30 mL every morning and 15 mL every afternoon  Indications: Hepatic Encephalopathy   levothyroxine (SYNTHROID) 50 mcg tablet 1/8/2018 at pm  Yes Yes   Sig: Take 75 mcg by mouth Daily (before breakfast). lisinopril (PRINIVIL, ZESTRIL) 20 mg tablet 1/8/2018 at pm  No Yes   Sig: Take 1 Tab by mouth daily. Indications: hypertension   metFORMIN (GLUCOPHAGE) 500 mg tablet 1/9/2018 at am  Yes Yes   Sig: Take 500 mg by mouth two (2) times daily (with meals). pantoprazole (PROTONIX) 40 mg tablet 1/9/2018 at am  Yes Yes   Sig: Take 40 mg by mouth daily. rifaximin (XIFAXAN) 550 mg tablet 1/9/2018 at am  No Yes   Sig: Take 1 Tab by mouth two (2) times a day. simvastatin (ZOCOR) 20 mg tablet 1/9/2018 at am  Yes Yes   Sig: Take 20 mg by mouth nightly. spironolactone (ALDACTONE) 100 mg tablet 1/9/2018 at am  No Yes   Sig: Take 2 Tabs by mouth daily. Indications: EDEMA DUE TO HEPATIC CIRRHOSIS   Patient taking differently: Take 100 mg by mouth daily. Indications: EDEMA DUE TO HEPATIC CIRRHOSIS   triamcinolone acetonide (KENALOG) 0.1 % topical cream 1/2/2018 at Unknown time  No Yes   Sig: Apply  to affected area two (2) times a day. use thin layer      Facility-Administered Medications: None         Comments/Recommendations:   Reviewed medications and allergies with pt and visitor, updated last doses. Medications removed: None  Medications added: None  Medications changed: Lactulose 20g/30mL soln updated to 30mL qAM and 15mL every afternoon, Vitamin D3 now 2000 units BID, furosemide is now 40mg once daily, gabapentin is now 300mg once daily, spironolactone now 100mg once daily. No other medication changes.       Deon Perez, PharmD Candidate [id number]

## 2018-01-10 NOTE — DISCHARGE SUMMARY
Discharge Summary       PATIENT ID: Denise Goldstein  MRN: [id number]   YOB: 1955    DATE OF ADMISSION: 1/9/2018  3:29 PM    DATE OF DISCHARGE: 1/10/2018  PRIMARY CARE PROVIDER: Yakaov Beach MD       DISCHARGING PHYSICIAN: Linette Helms NP    To contact this individual call [phone] and ask the  to page. If unavailable ask to be transferred the Adult Hospitalist Department. CONSULTATIONS: IP CONSULT TO HOSPITALIST    PROCEDURES/SURGERIES: * No surgery found *    ADMITTING DIAGNOSES & HOSPITAL COURSE:     This is a 79-year-old  male with a past medical history of hypertension, diabetes mellitus type 2, GERD, cirrhosis, being followed by Dr. Lorena Arguello. He comes over here because of shortness of breath and cough that he has been having since last 4-5 days. He claims that his symptoms started last Saturday evening at around 11:00 p.m. At that time, he all of a sudden started having cough, which was \"a rattling cough\" with shortness of breath which he continued to have. He claims that he felt slightly better on Sunday, but then since yesterday he continued to get worse to the point that he was having difficulty in breathing as well as sleeping. He claims that he has not been eating as much as he should and when he was brought in the ER his blood sugars were low as well. Also, he claims that he has nausea but he never vomited. He did not check his temperature at home, but in the ER the patient was noted to have a low-grade fever. No headache, dizziness. No diarrhea. He also claims that his wife also has a similar cough. Mr. Claire Rosales was diagnosed with Influenza A and was treated with Zully Flu. He looked remarkably better the following AM and his repeat BMP was stable. He was discharged in stable condition with Tamiflu and Augmentin for likely flu related bronchitis.     DISCHARGE DIAGNOSES / PLAN:      Influenza A  Zully Flu  IVF  Rest and Supportive Care  Tylenol for fever, careful in light of liver failure    Dehydration  Responded well to IVF  Tolerating diet    Elevated Creatinine on known CKD 3  For fu for BMP in PCP office next week  Patient of Dr. Chela CAMEJO and Cirrhosis  Outpatient fu as previously scheduled    Hypertension  Home meds resumption    Hypoglycemia  Resolved    Disposition: home       PENDING TEST RESULTS:   At the time of discharge the following test results are still pending:na     FOLLOW UP APPOINTMENTS:    Follow-up Information     Follow up With Details Comments Allie Ho MD Schedule an appointment as soon as possible for a visit in 1 week hospital fu [address] [place] [address] 48:  1. Please follow up with your PCP, Nephrologist, endocrinologist and hepatologist as needed2. Complete your course of Tamiflu for 5 days and antibiotic for possible bronchitis exacerbation with your flu. 3. Take Mucinex as needed for cough and eat a yogurt daily to prevent secondary infection  4. Have your Labs drawn next week for BMP to check your kidney function with your PCP      DIET: resume    ACTIVITY: resume    WOUND CARE: na    EQUIPMENT needed: na              DISCHARGE MEDICATIONS:  Discharge Medication List as of 1/10/2018  1:06 PM      CONTINUE these medications which have CHANGED    Details   amoxicillin-clavulanate (AUGMENTIN) 875-125 mg per tablet Take 1 Tab by mouth every twelve (12) hours for 7 days. , Print, Disp-14 Tab, R-0      oseltamivir (TAMIFLU) 75 mg capsule Take 1 Cap by mouth two (2) times a day for 5 days. , Print, Disp-10 Cap, R-0         CONTINUE these medications which have NOT CHANGED    Details   gabapentin (NEURONTIN) 300 mg capsule Take 1 Cap by mouth three (3) times daily. , Normal, Disp-90 Cap, R-3      triamcinolone acetonide (KENALOG) 0.1 % topical cream Apply  to affected area two (2) times a day.  use thin layer, Normal, Disp-80 g, R-3 spironolactone (ALDACTONE) 100 mg tablet Take 2 Tabs by mouth daily. Indications: EDEMA DUE TO HEPATIC CIRRHOSIS, Normal, Disp-180 Tab, R-3      furosemide (LASIX) 40 mg tablet Take 2 Tabs by mouth daily. , Normal, Disp-180 Tab, R-3      lactulose (CHRONULAC) 20 gram/30 mL soln solution Take 30 mL by mouth three (3) times daily. Adjust  dose as needed such that you have 2 loose/soft bowel movements a day without diarrhea. Indications: Hepatic Encephalopathy, Print, Disp-1800 mL, R-1      lisinopril (PRINIVIL, ZESTRIL) 20 mg tablet Take 1 Tab by mouth daily. Indications: hypertension, NormalPlease discontinue lisinopril-HCTZ and fill this prescription instead/please call [phone] with any questionsDisp-90 Tab, R-3      aspirin 81 mg chewable tablet Take 81 mg by mouth daily. , Historical Med      cholecalciferol, vitamin D3, (VITAMIN D3) 2,000 unit tab Take 1 Tab by mouth two (2) times a day., Historical Med      rifaximin (XIFAXAN) 550 mg tablet Take 1 Tab by mouth two (2) times a day., Print, Disp-60 Tab, R-0      pantoprazole (PROTONIX) 40 mg tablet Take 40 mg by mouth daily. , Historical Med      metFORMIN (GLUCOPHAGE) 500 mg tablet Take 500 mg by mouth two (2) times daily (with meals). , Historical Med      glimepiride (AMARYL) 4 mg tablet Take 4 mg by mouth two (2) times a day., Historical Med      simvastatin (ZOCOR) 20 mg tablet Take 20 mg by mouth nightly., Historical Med      levothyroxine (SYNTHROID) 50 mcg tablet Take 75 mcg by mouth Daily (before breakfast). , Historical Med               NOTIFY YOUR PHYSICIAN FOR ANY OF THE FOLLOWING:   Fever over 101 degrees for 24 hours. Chest pain, shortness of breath, fever, chills, nausea, vomiting, diarrhea, change in mentation, falling, weakness, bleeding. Severe pain or pain not relieved by medications. Or, any other signs or symptoms that you may have questions about.     DISPOSITION:    Home With:   OT  PT  HH  RN       Long term SNF/Inpatient Rehab   x Independent/assisted living    Hospice    Other:       PATIENT CONDITION AT DISCHARGE:     Functional status    Poor     Deconditioned    x Independent      Cognition   x  Lucid     Forgetful     Dementia      Catheters/lines (plus indication)    Sharma     PICC     PEG    x None      Code status   x  Full code     DNR      PHYSICAL EXAMINATION AT DISCHARGE:     GENERAL:  Not in acute distress, comfortable, sitting up on stretcher   HEAD:  Normocephalic, atraumatic. Eyes:  anicteric Ears: Bilateral hearing normal, no growth. Nose:  No polyps, no bleeding. Mouth:  Dry mucous membranes. Decent oral hygiene. NECK:  Supple, no JVD, no thyromegaly. LUNGS:  few scattered ronchi but otherwise clear blt  HEART:  S1, S2 normal.  No murmurs, rubs or gallops. ABDOMEN:  Bowel sounds present, soft, nontender, nondistended. NEUROLOGIC:  Alert and oriented, SILVA x 4, ambulates with normal gait  PSYCHIATRIC:  Mood and affect appropriate. Alert, awake, oriented x3.     Visit Vitals    /88 (BP 1 Location: Right arm, BP Patient Position: At rest)    Pulse 100    Temp 97.7 °F (36.5 °C)    Resp 24    Ht 6' (1.829 m)    Wt 128 kg (282 lb 3 oz)    SpO2 97%    BMI 38.27 kg/m2       CHRONIC MEDICAL DIAGNOSES:  Problem List as of 1/10/2018  Date Reviewed: 1/9/2018          Codes Class Noted - Resolved    Influenza ICD-10-CM: J11.1  ICD-9-CM: 487.1  1/10/2018 - Present        * (Principal)Cough ICD-10-CM: V04  ICD-9-CM: 786.2  1/9/2018 - Present        Neuropathy involving both lower extremities ICD-10-CM: G57.93  ICD-9-CM: 356.9  1/2/2018 - Present        Lower leg edema ICD-10-CM: R60.0  ICD-9-CM: 782.3  11/2/2017 - Present        CAMEJO (nonalcoholic steatohepatitis) ICD-10-CM: K75.81  ICD-9-CM: 571.8  11/2/2017 - Present        Liver cirrhosis secondary to CAMEJO ([facility] [address] (Chronic) ICD-10-CM: K75.81, K74.60  ICD-9-CM: 571.8, 571.5  2/22/2016 - Present        Thrombocytopenia (HCC) (Chronic) ICD-10-CM: D69.6  ICD-9-CM: 287.5 2/22/2016 - Present        GERD (gastroesophageal reflux disease) (Chronic) ICD-10-CM: K21.9  ICD-9-CM: 530.81  2/22/2016 - Present        CAD (coronary artery disease) (Chronic) ICD-10-CM: I25.10  ICD-9-CM: 414.00  2/22/2016 - Present        DM type 2 (diabetes mellitus, type 2) (HCC) (Chronic) ICD-10-CM: E11.9  ICD-9-CM: 250.00  2/22/2016 - Present        RESOLVED: Hepatic encephalopathy ([facility] [address] ICD-10-CM: K72.90  ICD-9-CM: 572.2  2/22/2016 - 2/23/2016              Greater than 30 minutes were spent with the patient on counseling and coordination of care    Signed:   Denis Baker NP  1/10/2018  4:56 PM

## 2018-01-10 NOTE — ED NOTES
Patient had a repeat blood sugar taken, it was found to be 100. Patient reports that his mouth is dry but otherwise has no complaints. He was given some water and mouth swabs to help. He was also told the RT would be by shortly to give him a breathing treatment and it might help with that feeling. Patient otherwise has no complaints. Will continue to monitor. CM x 3 Call bell within reach.

## 2018-01-10 NOTE — DISCHARGE INSTRUCTIONS
Discharge Instructions       PATIENT ID: Eileen Cotrez  MRN: [id number]   YOB: 1955    DATE OF ADMISSION: 1/9/2018  3:29 PM    DATE OF DISCHARGE: 1/10/2018    PRIMARY CARE PROVIDER: Tyler Blood MD       DISCHARGING PHYSICIAN: Nya Prater NP    To contact this individual call [phone] and ask the  to page. If unavailable ask to be transferred the Adult Hospitalist Department. DISCHARGE DIAGNOSES Influenza    CONSULTATIONS: IP CONSULT TO HOSPITALIST    PROCEDURES/SURGERIES: * No surgery found *    PENDING TEST RESULTS:   At the time of discharge the following test results are still pending: na    FOLLOW UP APPOINTMENTS:   Follow-up Information     Follow up With Details Comments Contact Elizabeth Cline MD Schedule an appointment as soon as possible for a visit in 1 week [address]:  1. Please follow up with your PCP, Nephrologist, endocrinologist and hepatologist as needed2. Complete your course of Tamiflu for 5 days and antibiotic for possible bronchitis exacerbation with your flu. 3. Take Mucinex as needed for cough and eat a yogurt daily to prevent secondary infection  4. Have your Labs drawn next week for BMP to check your kidney function with your PCP      DIET: resume    ACTIVITY: resume    WOUND CARE: na    EQUIPMENT needed: na      DISCHARGE MEDICATIONS:   See Medication Reconciliation Form    · It is important that you take the medication exactly as they are prescribed. · Keep your medication in the bottles provided by the pharmacist and keep a list of the medication names, dosages, and times to be taken in your wallet. · Do not take other medications without consulting your doctor. NOTIFY YOUR PHYSICIAN FOR ANY OF THE FOLLOWING:   Fever over 101 degrees for 24 hours.    Chest pain, shortness of breath, fever, chills, nausea, vomiting, diarrhea, change in mentation, falling, weakness, bleeding. Severe pain or pain not relieved by medications. Or, any other signs or symptoms that you may have questions about.       DISPOSITION:    Home With:   OT  PT  HH  RN       SNF/Inpatient Rehab/LTAC   x Independent/assisted living    Hospice    Other:     CDMP Checked:   Yes x       Signed:   Teresa Saleem NP  1/10/2018  12:07 PM

## 2018-01-10 NOTE — ED NOTES
Patient is resting in bed comfortably. Remains on monitor, all vitals are stable. No apparent distress. Call bell within reach of patient at this time.

## 2018-01-10 NOTE — H&P
[address]   ACUTE CARE HISTORY AND PHYSICAL    Nikhil Rodriguez  MR#: [id number]  : 1955  ACCOUNT #: [de-identified]   DATE OF SERVICE: 2018    ADMITTING ATTENDING: Nikita Estrada MD     PRIMARY CARE PHYSICIAN:  Zander Garcia MD    CHIEF COMPLAINT:  Cough with shortness of breath since the last 4 days. HISTORY OF PRESENT ILLNESS:  This is a 59-year-old  male with a past medical history of hypertension, diabetes mellitus type 2, GERD, cirrhosis, being followed by Dr. Burton Holland. He comes over here because of shortness of breath and cough that he has been having since last 4-5 days. He claims that his symptoms started last Saturday evening at around 11:00 p.m. At that time, he all of a sudden started having cough, which was \"a rattling cough\" with shortness of breath which he continued to have. He claims that he felt slightly better on , but then since yesterday he continued to get worse to the point that he was having difficulty in breathing as well as sleeping. He claims that he has not been eating as much as he should and when he was brought in the ER his blood sugars were low as well. Also, he claims that he has nausea but he never vomited. He did not check his temperature at home, but in the ER the patient was noted to have a low-grade fever. No headache, dizziness. No diarrhea. He also claims that his wife also has a similar cough. REVIEW OF SYSTEMS:  Pertinent positives as above. Rest of the review of systems was done. They were all negative except for above. On specifically asking the patient's wife, admits that since last 10 days the patient has gained weight, but recently Dr. Burton Holland had increased the dose of his home medications and that helped him. PAST MEDICAL HISTORY:  1. Hypertension. 2.  Diabetes mellitus type 2.  3.  GERD. 4.  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 PAST SURGICAL HISTORY:    1. Appendectomy. 2.  Cholecystectomy.     FAMILY HISTORY:  Not significant for coronary artery disease. SOCIAL HISTORY:  Nonalcoholic. Smokes pipe occasionally. Non-IV drug abuser. ALLERGIES:  NO KNOWN DRUG ALLERGIES. HOME MEDICATIONS: The patient is on the following medications:   1. Lasix 80 mg p.o. daily. 2.  Neurontin 300 mg p.o. t.i.d.  3.  Aspirin 81 mg p.o. daily. 4.  Vitamin D3 2000 units p.o. daily. 5.  Synthroid 75 mcg p.o. daily. 6.  Lisinopril 20 mg p.o. daily. 7.  Metformin 500 mg p.o. b.i.d.  8.  Protonix 40 mg p.o. daily. 9.  Xifaxan 550 mg p.o. b.i.d. 10.  Zocor 20 mg p.o. at bedtime. 11.  Aldactone 100 mg tablet, 2 tablets p.o. daily. PHYSICAL EXAMINATION:    VITAL SIGNS:  At the time the patient was seen, the patient's vitals were as follows:  Blood pressure 147/66, pulse 101, temperature 100.5 degrees Fahrenheit, respiratory rate 24, saturating 94% on room air. GENERAL:  Not in acute distress, comfortable, lying in bed. HEAD:  Normocephalic, atraumatic. Eyes:  PERRLA. EOMI. Ears: Bilateral hearing normal, no growth. Nose:  No polyps, no bleeding. Mouth:  Dry mucous membranes. Decent oral hygiene. NECK:  Supple, no JVD, no thyromegaly. LUNGS:  Bilateral coarse breath sounds throughout with wheezes. HEART:  S1, S2 normal.  No murmurs, rubs or gallops. ABDOMEN:  Bowel sounds present, soft, nontender, nondistended. NEUROLOGIC:  Cranial nerves II-XII intact. Motor 5/5 bilateral upper limbs and lower limbs. Sensory normal.  PSYCHIATRIC:  Mood and affect appropriate. Alert, awake, oriented x3. LABORATORY AND RADIOLOGICAL RESULTS:  WBC 6.7, hemoglobin 10.2, hematocrit 30 and a platelet count of 19,031. Urinalysis no signs of infection. Sodium 138, potassium 4.2, chloride 104, bicarbonate 24, glucose of 48, BUN 26, creatinine 1.3, ammonia level of 61. X-ray of the chest shows hypoinflated, grossly clear lungs. EKG shows sinus rhythm, no ST-T wave changes suggestive of ischemia.     ASSESSMENT AND PLAN: 1.  This is a 78-year-old male with a past medical history of liver cirrhosis with non-alcoholic steatohepatitis (CAMEJO), diabetes mellitus type 2 on oral hypoglycemics, hypertension. He comes over here with cough and shortness of breath likely secondary to viral prodrome. The patient's flu screen is still awaited. I will admit the patient under observation. We will put the patient on Levaquin for now and will also put the patient on steroid to decrease his inflammation. Based on his flu screen we will decide whether to put the patient on Tamiflu or not. I will also put the patient on DuoNeb. I am admitting the patient under observation. The patient is already informed regarding that. 2.  Dehydration. I will put the patient on IV fluids. I am holding the patient's Lasix for now, which might be why might need to be restarted tomorrow. 3.  History of cirrhosis with CAMEJO. I will continue the patient's home medication except for the Lasix. Again, that needs to be readdressed tomorrow. 4.  Hypertension. We will continue the patient's home medication. 5.  History of hepatic encephalopathy. I will continue the patient's Xifaxan and lactulose. 6.  Hypoglycemia. I will hold the patient's oral hypoglycemic and will put the patient on IV fluids. I spent about 45 minutes in taking care of this patient.       Gabriella Warner MD       PG / HN  D: 01/09/2018 18:29     T: 01/09/2018 19:41  JOB #: [id number]

## 2018-01-10 NOTE — PROGRESS NOTES
CM met with pt at the bedside, confirmed pt is active with DR. Kristian Briscoe, currently flu positive. Pt expects to discharge back to his home with outpatient follow up. Observation letter provided to pt, pt verbalized understanding of this. No barriers or needs identified. DEBBIE Bhandari    Care Management Interventions  PCP Verified by CM:  Yes  Current Support Network: Lives with Spouse  Confirm Follow Up Transport: Family  Plan discussed with Pt/Family/Caregiver: Yes  Freedom of Choice Offered: Yes  Discharge Location  Discharge Placement: Home

## 2018-01-10 NOTE — ED NOTES
Patient was stuck for his labs. He was also assisted to stand out of bed to void which he accomplished without distress. Continues to be comfortable. CM x 3. Call bell within reach.

## 2018-01-10 NOTE — ED NOTES
Patient found to have a blood sugar of 62 at this time. His Lispro has been held and he was given an amp of D50 per his standing orders. He stated he immediately felt a little better. He was repositioned per his request. No other complaints at this time. Will continue to monitor. CM x 3. Call bell within reach of patient.

## 2018-01-10 NOTE — ED NOTES
Bedside and Verbal shift change report given to Arlette Read RN (oncoming nurse) by Khanh Arreguin RN (offgoing nurse). Report given with SBAR, Kardex, Intake/Output, MAR and Recent Results.

## 2018-01-10 NOTE — ED NOTES
Patient continues to rest comfortably at this time. VSS. Afebrile. Will monitor. CM x 3. Call bell within reach.

## 2018-01-10 NOTE — ED NOTES
Assumed care of patient at this time. He is alert and oriented, but states, \"I feel like crap. \" His next antibiotic, Azithromycin was started through his PIV, and he was assisted with repositioning. VSS, but he is febrile at 100F. He is slightly tachycardic at this time as well. Will re-check his sugar when I return to the room. CM x 3. Call bell within reach. No complaints at this time.

## 2018-01-10 NOTE — ED NOTES
Patient was woken up by RT for a breathing treatment and a repeat sugar. . He is tolerating breathing treatment without complication at this time. Continue to monitor. CM x 3. Call bell within reach of patient.

## 2018-01-10 NOTE — PROGRESS NOTES
Problem: Falls - Risk of  Goal: *Absence of Falls  Document Jeet Fall Risk and appropriate interventions in the flowsheet.    Outcome: Progressing Towards Goal  Fall Risk Interventions:  Mobility Interventions: Communicate number of staff needed for ambulation/transfer         Medication Interventions: Patient to call before getting OOB    Elimination Interventions: Call light in reach, Patient to call for help with toileting needs, Toilet paper/wipes in reach, Toileting schedule/hourly rounds, Urinal in reach

## 2018-01-10 NOTE — ED NOTES
Reassessed patient at this time. His blood sugar has dropped from last check, but is still within range. Patient has been started on continuous D5NS. 45 infusion. At this time he states he is very comfortable on his current bed, and would like to just go to sleep and not get a hospital bed. Denies any pain at this time. Lungs sound coarse throughout. VSS. CM x 3. Call bell within reach of patient.

## 2018-01-19 ENCOUNTER — DOCUMENTATION ONLY (OUTPATIENT)
Dept: HEMATOLOGY | Age: 63
End: 2018-01-19

## 2018-01-19 NOTE — PROGRESS NOTES
Chart reviewed for possible screening into clinical trial for CAMEJO. Will discuss further with providers.

## 2018-02-15 ENCOUNTER — TELEPHONE (OUTPATIENT)
Dept: HEMATOLOGY | Age: 63
End: 2018-02-15

## 2018-02-15 NOTE — TELEPHONE ENCOUNTER
Spoke with patient's wife Antonette Jones who is on the HIPPA list.  Patient requires a prior [place] for Xifaxan. Called for a prior auth. Will have approval status within 24-48 hours. Prior Auth number:  [id number]. Informed patients wife, who stated he has enough to get through Sunday 2/18/18. Informed patient I will speak with Marcella Acosta NP to see if Lactulose alone will be okay if prior authorization is not approved by Friday 2/16/18, then call her back.

## 2018-03-29 ENCOUNTER — OFFICE VISIT (OUTPATIENT)
Dept: HEMATOLOGY | Age: 63
End: 2018-03-29

## 2018-03-29 VITALS
SYSTOLIC BLOOD PRESSURE: 151 MMHG | OXYGEN SATURATION: 99 % | DIASTOLIC BLOOD PRESSURE: 69 MMHG | BODY MASS INDEX: 34.95 KG/M2 | WEIGHT: 258 LBS | HEIGHT: 72 IN | RESPIRATION RATE: 14 BRPM | TEMPERATURE: 97.8 F | HEART RATE: 75 BPM

## 2018-03-29 DIAGNOSIS — K75.81 LIVER CIRRHOSIS SECONDARY TO NASH (HCC): Primary | Chronic | ICD-10-CM

## 2018-03-29 DIAGNOSIS — E11.9 TYPE 2 DIABETES MELLITUS WITHOUT COMPLICATION, WITHOUT LONG-TERM CURRENT USE OF INSULIN (HCC): Chronic | ICD-10-CM

## 2018-03-29 DIAGNOSIS — I10 ESSENTIAL HYPERTENSION: ICD-10-CM

## 2018-03-29 DIAGNOSIS — K75.81 NASH (NONALCOHOLIC STEATOHEPATITIS): ICD-10-CM

## 2018-03-29 DIAGNOSIS — K76.82 HEPATIC ENCEPHALOPATHY: ICD-10-CM

## 2018-03-29 DIAGNOSIS — K74.60 LIVER CIRRHOSIS SECONDARY TO NASH (HCC): Primary | Chronic | ICD-10-CM

## 2018-03-29 DIAGNOSIS — D69.6 THROMBOCYTOPENIA (HCC): Chronic | ICD-10-CM

## 2018-03-29 DIAGNOSIS — E11.21 TYPE 2 DIABETES WITH NEPHROPATHY (HCC): ICD-10-CM

## 2018-03-29 DIAGNOSIS — G57.93 NEUROPATHY INVOLVING BOTH LOWER EXTREMITIES: ICD-10-CM

## 2018-03-29 DIAGNOSIS — R60.0 LOWER LEG EDEMA: ICD-10-CM

## 2018-03-29 NOTE — MR AVS SNAPSHOT
[address] [facility] [phone] [address] [place] [address] 
[phone] Patient: Fareed Yu MRN: [id number] :1955 Visit Information Date & Time Provider Department Dept. Phone Encounter #  
 3/29/2018 10:00 AM April SHARON Gu[facility], [facility] [id number] Upcoming Health Maintenance Date Due Hepatitis C Screening 1955 LIPID PANEL Q1 1955 FOOT EXAM Q1 1964 MICROALBUMIN Q1 1964 EYE EXAM RETINAL OR DILATED Q1 1964 Pneumococcal 19-64 Medium Risk (1 of 1 - PPSV23) 1973 DTaP/Tdap/Td series (1 - Tdap) 1975 FOBT Q 1 YEAR AGE 50-75 2004 ZOSTER VACCINE AGE 60> 10/21/2014 Influenza Age 5 to Adult 2017 HEMOGLOBIN A1C Q6M 2018 Allergies as of 3/29/2018  Review Complete On: 3/29/2018 By: Leonela Hayes No Known Allergies Current Immunizations  Never Reviewed No immunizations on file. Not reviewed this visit You Were Diagnosed With   
  
 Codes Comments Liver cirrhosis secondary to CAMEJO ([facility] [address]    -  Primary ICD-10-CM: K75.81, K74.60 ICD-9-CM: 571.8, 571.5 CAMEJO (nonalcoholic steatohepatitis)     ICD-10-CM: I53.58 ICD-9-CM: 571.8 Thrombocytopenia ([facility] [address]     ICD-10-CM: D69.6 ICD-9-CM: 287.5 Vitals BP Pulse Temp Resp Height(growth percentile) Weight(growth percentile) 151/69 (BP 1 Location: Left arm, BP Patient Position: Sitting) 75 97.8 °F (36.6 °C) (Oral) 14 6' (1.829 m) 258 lb (117 kg) SpO2 BMI Smoking Status 99% 34.99 kg/m2 Never Smoker Vitals History BMI and BSA Data Body Mass Index Body Surface Area 34.99 kg/m 2 2.44 m 2 Preferred Pharmacy Pharmacy Name Phone [facility], [address] [address] [place] [address] [address] Your Updated Medication List  
  
   
This list is accurate as of 3/29/18 10:42 AM.  Always use your most recent med list.  
  
  
  
  
 aspirin 81 mg chewable tablet Take 81 mg by mouth daily. furosemide 40 mg tablet Commonly known as:  LASIX Take 2 Tabs by mouth daily. gabapentin 300 mg capsule Commonly known as:  NEURONTIN Take 1 Cap by mouth three (3) times daily. glimepiride 4 mg tablet Commonly known as:  AMARYL Take 2 mg by mouth two (2) times a day. lactulose 20 gram/30 mL Soln solution Commonly known as:  Thomasena Christen Take 30 mL by mouth three (3) times daily. Adjust  dose as needed such that you have 2 loose/soft bowel movements a day without diarrhea. Indications: Hepatic Encephalopathy  
  
 levothyroxine 50 mcg tablet Commonly known as:  SYNTHROID Take 75 mcg by mouth Daily (before breakfast). lisinopril 20 mg tablet Commonly known as:  Leta Ohara Take 1 Tab by mouth daily. Indications: hypertension  
  
 metFORMIN 500 mg tablet Commonly known as:  GLUCOPHAGE Take 500 mg by mouth two (2) times daily (with meals). pantoprazole 40 mg tablet Commonly known as:  PROTONIX Take 40 mg by mouth daily. rifAXIMin 550 mg tablet Commonly known as:  Brain Shank Take 1 Tab by mouth two (2) times a day. simvastatin 20 mg tablet Commonly known as:  ZOCOR Take 20 mg by mouth nightly. spironolactone 100 mg tablet Commonly known as:  ALDACTONE Take 2 Tabs by mouth daily. Indications: EDEMA DUE TO HEPATIC CIRRHOSIS  
  
 triamcinolone acetonide 0.1 % topical cream  
Commonly known as:  KENALOG Apply  to affected area two (2) times a day. use thin layer VITAMIN D3 2,000 unit Tab Generic drug:  cholecalciferol (vitamin D3) Take 1 Tab by mouth two (2) times a day. To-Do List   
 04/28/2018 Imaging:  CT ABD W WO CONT Referral Information Referral ID Referred By Referred To  
  
 [id number] [facility] Not Available Visits Status Start Date End Date 1 New Request 3/29/18 3/29/19 If your referral has a status of pending review or denied, additional information will be sent to support the outcome of this decision. Introducing [facility]! Dear Adrienne Lopez: 
Thank you for requesting a [employer] account. Our records indicate that you already have an active [employer] account. You can access your account anytime at [email or web address] Did you know that you can access your hospital and ER discharge instructions at any time in [employer]? You can also review all of your test results from your hospital stay or ER visit. Additional Information If you have questions, please visit the Frequently Asked Questions section of the [employer] website at [email or web address] Remember, [employer] is NOT to be used for urgent needs. For medical emergencies, dial 911. Now available from your iPhone and Android! Please provide this summary of care documentation to your next provider. Your primary care clinician is listed as Mary Grace Lovelace. If you have any questions after today's visit, please call [phone].

## 2018-03-29 NOTE — PROGRESS NOTES
Pola Feldman is a 60 y.o. male      Chief Complaint   Patient presents with    Follow-up     3 Month Follow-up       1. Have you been to the ER, urgent care clinic since your last visit? Hospitalized since your last visit? No    2. Have you seen or consulted any other health care providers outside of the [place] [address] since your last visit? Include any pap smears or colon screening.  No

## 2018-03-29 NOTE — PROGRESS NOTES
[address] Maryanne Rivera MD, [address], [place], [address] [facility]       MELVA De La Rosa PA-C Hulda Abbot, MICHELE-BC   MELVA Luque NP        at [address], [address] [address]     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 ([facility] K75.81, K74.60    Thrombocytopenia ([facility] D69.6    GERD (gastroesophageal reflux disease) K21.9    CAD (coronary artery disease) I25.10    DM type 2 (diabetes mellitus, type 2) ([facility] E11.9    Lower leg edema R60.0    CAMEJO (nonalcoholic steatohepatitis) K75.81    Neuropathy involving both lower extremities G57.93    Cough R05    Influenza J11.1    Type 2 diabetes with nephropathy ([facility] E11.21       Kamila Mills returns to the [address] regarding chronic HCV in the setting of cirrhosis. The active problem list, all pertinent past medical history, medications, radiologic findings and laboratory findings related to the liver disorder were reviewed with the patient. The patient is a 60 y.o.  male who was found to have fatty liver disease on liver biopsy in 2014. Serologic evaluation was either all negative or within the limits of normal.      Recent abdominal CT in December 2017 demonstrated changes consistent with cirrhosis with no liver masses suggestive of HCC. An assessment of liver fibrosis with biopsy done 7/2014 showed cirrhosis and mild mixed macro- and microvesicular steatosis. LE edema.  Resolved with step 1 diuretics. Patient continues on Xifaxan and lactulose daily. He has at least 2-3 BMs daily without diarrhea. He reports today that he still has bouts of confusion and unsteadiness. Patient reported a burning sensation in both LE last visit. Neurontin has improved his symptoms. The patient notes ongoing fatigue, pain in the right side over the liver, problems concentrating and balance issues. He states that he overall feels the same compared to last visit. The patient has limitations in functional activities secondary to these symptoms. The patient has not experienced yellowing of the eyes or skin, pruritus, melena or hematochezia. ALLERGIES  No Known Allergies    MEDICATIONS  Current Outpatient Prescriptions   Medication Sig    rifAXIMin (XIFAXAN) 550 mg tablet Take 1 Tab by mouth two (2) times a day.  gabapentin (NEURONTIN) 300 mg capsule Take 1 Cap by mouth three (3) times daily. (Patient taking differently: Take 300 mg by mouth daily.)    triamcinolone acetonide (KENALOG) 0.1 % topical cream Apply  to affected area two (2) times a day. use thin layer    spironolactone (ALDACTONE) 100 mg tablet Take 2 Tabs by mouth daily. Indications: EDEMA DUE TO HEPATIC CIRRHOSIS (Patient taking differently: Take 100 mg by mouth daily. Indications: EDEMA DUE TO HEPATIC CIRRHOSIS)    furosemide (LASIX) 40 mg tablet Take 2 Tabs by mouth daily. (Patient taking differently: Take 40 mg by mouth daily.)    lactulose (CHRONULAC) 20 gram/30 mL soln solution Take 30 mL by mouth three (3) times daily. Adjust  dose as needed such that you have 2 loose/soft bowel movements a day without diarrhea. Indications: Hepatic Encephalopathy (Patient taking differently: Take 20 g by mouth two (2) times a day. Adjust  dose as needed such that you have 2 loose/soft bowel movements a day without diarrhea.  Currently taking 30 mL every morning and 15 mL every afternoon  Indications: Hepatic Encephalopathy)    lisinopril (PRINIVIL, ZESTRIL) 20 mg tablet Take 1 Tab by mouth daily. Indications: hypertension (Patient taking differently: Take 10 mg by mouth daily. Indications: hypertension)    aspirin 81 mg chewable tablet Take 81 mg by mouth daily.  cholecalciferol, vitamin D3, (VITAMIN D3) 2,000 unit tab Take 1 Tab by mouth two (2) times a day.  pantoprazole (PROTONIX) 40 mg tablet Take 40 mg by mouth daily.  metFORMIN (GLUCOPHAGE) 500 mg tablet Take 500 mg by mouth two (2) times daily (with meals).  glimepiride (AMARYL) 4 mg tablet Take 2 mg by mouth two (2) times a day.  simvastatin (ZOCOR) 20 mg tablet Take 20 mg by mouth nightly.  levothyroxine (SYNTHROID) 50 mcg tablet Take 75 mcg by mouth Daily (before breakfast). No current facility-administered medications for this visit. SYSTEM REVIEW NOT RELATED TO LIVER DISEASE OR REVIEWED ABOVE:  Constitution systems: Negative for fever, chills, weight gain, weight loss. Eyes: Negative for visual changes. ENT: Negative for sore throat, painful swallowing. Respiratory: Negative for cough, hemoptysis, SOB. Cardiology: Negative for chest pain, palpitations. GI:  Negative for constipation or diarrhea. : Negative for urinary frequency, dysuria, hematuria, nocturia. Skin: Positive for LE skin discoloration. Negative for rash. Hematology: Negative for easy bruising, blood clots. Musculo-skeletal: Legs are hot below the knees. Neurologic:  + dizziness, vertigo, memory problems related to HE. Psychology: Negative for anxiety, depression. FAMILY HISTORY:  The father  of prostate cancer. The mother  of multiple myeloma. There is no family history of liver disease. SOCIAL HISTORY:  The patient is . The patient has 3 children. The patient occasional cigar. The patient has been abstinent from alcohol since 2016. The patient is on social security.     PHYSICAL EXAMINATION:  Visit Vitals    /69 (BP 1 Location: Left arm, BP Patient Position: Sitting)    Pulse 75    Temp 97.8 °F (36.6 °C) (Oral)    Resp 14    Ht 6' (1.829 m)    Wt 258 lb (117 kg)    SpO2 99%    BMI 34.99 kg/m2     General: No acute distress. Eyes: Sclera anicteric. ENT: No oral lesions. Nodes: No adenopathy. Skin: No spider angiomata. No jaundice. No palmar erythema. Respiratory: Lungs clear to auscultation. Cardiovascular:  Regular heart rate. No murmurs. No JVD. Abdomen: Soft non-tender. Liver size enlarged 4 finger widths below rib cage. Spleen enlarged. No obvious ascites. Extremities: Trace LE edema bilaterally. No muscle wasting. No gross arthritic changes. Neurologic: Alert and oriented but has slow speech. Cranial nerves grossly intact. No asterixis. LABORATORY STUDIES:  LOCAL LABS-3/19/2018:  WBC/HGB/PLT: 6.5/10.8/80  AST/ALT/ALP/BILI/ALB: 58/36/160/3.1/3.1  BUN/CR: 30/1.26    Liver [place] of [address] Units 12/28/2017 11/2/2017   WBC 3.4 - 10.8 x10E3/uL 4.3 6.1   ANC 1.8 - 8.0 K/UL     HGB 13.0 - 17.7 g/dL 9.4 (L) 11.7 (L)    - 379 x10E3/uL 67 (LL) 92 (LL)   INR 0.8 - 1.2 1.3 (H) 1.3 (H)   AST 0 - 40 IU/L 39 53 (H)   ALT 0 - 44 IU/L 20 38   Alk Phos 39 - 117 IU/L 162 (H) 215 (H)   Bili, Total 0.0 - 1.2 mg/dL 3.4 (H) 2.6 (H)   Albumin 3.6 - 4.8 g/dL 2.9 (L) 2.8 (L)   BUN 8 - 27 mg/dL 22 18   Creat 0.76 - 1.27 mg/dL 1.03 1.27   Na 134 - 144 mmol/L 144 145 (H)   K 3.5 - 5.2 mmol/L 4.7 4.5   Cl 96 - 106 mmol/L 106 106   CO2 18 - 29 mmol/L 27 26   Glucose 65 - 99 mg/dL 186 (H) 105 (H)   Ammonia 27 - 102 ug/dL 131 (HH) 317 (HH)     11/2017. MELD 15  12/2017. MELD 14    SEROLOGIES:  Not available or performed. LIVER HISTOLOGY:  7/2014: Cirhosis with mild mixed macro- and microvesicular steatosis. ENDOSCOPIC PROCEDURES:  2/2017. EGD by Dr Heide Alvarez. No esophageal varices. RADIOLOGY:  2/2017. Ultrasound of liver. Echogenic consistent with chronic liver disease. No liver mass lesions.  No dilated bile ducts. No ascites. 3/2017 CT scan of abdomen. Changes consistent with cirrhosis. No liver mass lesions. Splenomegaly. Splenorenal shunt. No ascites. 12/2017. CT scan of abdomen. Cirrhotic liver and splenomegaly, with evidence of portal hypertension. No acute findings. No suspicious liver masses. OTHER TESTING:  Not available or performed    ASSESSMENT AND PLAN:  CAMEJO with cirrhosis. Liver transaminases are normal. Alkaline phosphate is elevated. Liver function is depressed but overall stable. The platelet count is depressed. Current MELD is 14. There is no medication of vitamin supplements that we advocate for CAMEJO. Using glitazones in patients without diabetes mellitus has been shown to reduce fat content in the liver but has no effect on fibrosis and is associated with weight gain. Vitamin E has also been used but the data is not very good and most experts no longer advocate this. The patient was counseled regarding diet and exercise to achieve weight loss. Encouraged patient to follow a healthy diet and make sure his other medical conditions are well controlled. The patient was told to to consume any food products and drinks containing fructose as this enhances hepatic fat synthesis. Hepatic encephalopathy is better controlled on current doses of lactulose and Xifaxan but he still has his good days and bad days. He achieves 2-3 BMs without diarrhea but only takes lactulose 30 ml once daily. Directed patient to split the dose to 15 mL BID to see if this improves his symptoms. Will reassess at his next follow up appointment. The patient has not developed ascites. Lower extremity edema and anasarca. Step 1 diuretics have been effective. Continue. LE discomfort. Neurontin improves his symptoms. Continue. The patient does not have esophageal varices by EGD in 2/2017. Next EGD will be in February 2019. [address] screening. CT ruled out [address] in December 2017. He is up to date.  Next imaging will be in June 2018. This was ordered today. Hypertension. Patient's BP is significantly elevated in the clinic today. Discussed the importance of regular follow up with their PCP to monitor/manage this. Patient verbalized understanding. The patient was directed to continue all current medications at the current dosages. There are no contraindications for the patient to take any medications that are necessary for treatment of other medical issues including medications for diabetes mellitus and hypercholesterolemia. The patient was counseled regarding alcohol consumption and that this could contribute to fatty liver disease. The need for vaccination against viral hepatitis A and B will be assessed with serologic and instituted as appropriate. All of the above issues were discussed with the patient. All questions were answered. The patient expressed a clear understanding of the above. [address] [place] [address] in 3-4 months.     Agnieszka Mendes NP  [address] [address]  Ph: [phone]  Fax: [phone]

## 2018-04-27 DIAGNOSIS — K76.82 HEPATIC ENCEPHALOPATHY: Primary | ICD-10-CM

## 2018-04-27 RX ORDER — LACTULOSE 10 G/15ML
20 SOLUTION ORAL 3 TIMES DAILY
Qty: 1800 ML | Refills: 1 | OUTPATIENT
Start: 2018-04-27 | End: 2018-04-30 | Stop reason: SDUPTHER

## 2018-04-27 NOTE — TELEPHONE ENCOUNTER
Patient called needing a refill sent to the [address] [place] [address] for lactulose ASAP as they are completely out.

## 2018-04-30 DIAGNOSIS — K76.82 HEPATIC ENCEPHALOPATHY: ICD-10-CM

## 2018-04-30 RX ORDER — LACTULOSE 10 G/15ML
20 SOLUTION ORAL 3 TIMES DAILY
Qty: 1800 ML | Refills: 1 | Status: SHIPPED | OUTPATIENT
Start: 2018-04-30 | End: 2018-11-29 | Stop reason: ALTCHOICE

## 2018-04-30 NOTE — TELEPHONE ENCOUNTER
Patient's prescription was sent to mail order as opposed to at the local pharmacy. Patient is completely out and requesting that prescription be sent there instead. Patient is concerned that medicaiton has already been processed and mailed but needs it ASAP as he is out of the medication. Patient requests a phone call back regarding the status of this medication.

## 2018-05-06 ENCOUNTER — APPOINTMENT (OUTPATIENT)
Dept: GENERAL RADIOLOGY | Age: 63
DRG: 683 | End: 2018-05-06
Attending: EMERGENCY MEDICINE
Payer: COMMERCIAL

## 2018-05-06 ENCOUNTER — APPOINTMENT (OUTPATIENT)
Dept: CT IMAGING | Age: 63
DRG: 683 | End: 2018-05-06
Attending: EMERGENCY MEDICINE
Payer: COMMERCIAL

## 2018-05-06 ENCOUNTER — APPOINTMENT (OUTPATIENT)
Dept: ULTRASOUND IMAGING | Age: 63
DRG: 683 | End: 2018-05-06
Attending: HOSPITALIST
Payer: COMMERCIAL

## 2018-05-06 ENCOUNTER — HOSPITAL ENCOUNTER (INPATIENT)
Age: 63
LOS: 3 days | Discharge: HOME | DRG: 683 | End: 2018-05-09
Attending: EMERGENCY MEDICINE | Admitting: INTERNAL MEDICINE
Payer: COMMERCIAL

## 2018-05-06 DIAGNOSIS — D69.6 THROMBOCYTOPENIA (HCC): Chronic | ICD-10-CM

## 2018-05-06 DIAGNOSIS — K76.82 HEPATIC ENCEPHALOPATHY: ICD-10-CM

## 2018-05-06 DIAGNOSIS — D64.9 ANEMIA, UNSPECIFIED TYPE: ICD-10-CM

## 2018-05-06 DIAGNOSIS — N17.9 ACUTE KIDNEY INJURY (HCC): Primary | ICD-10-CM

## 2018-05-06 DIAGNOSIS — K74.60 CIRRHOSIS OF LIVER WITHOUT ASCITES, UNSPECIFIED HEPATIC CIRRHOSIS TYPE (HCC): ICD-10-CM

## 2018-05-06 DIAGNOSIS — K75.81 NASH (NONALCOHOLIC STEATOHEPATITIS): ICD-10-CM

## 2018-05-06 PROBLEM — R41.82 ALTERED MENTAL STATUS: Status: ACTIVE | Noted: 2018-05-06

## 2018-05-06 LAB
ALBUMIN SERPL-MCNC: 3 G/DL (ref 3.5–5)
ALBUMIN/GLOB SERPL: 0.8 {RATIO} (ref 1.1–2.2)
ALP SERPL-CCNC: 173 U/L (ref 45–117)
ALT SERPL-CCNC: 50 U/L (ref 12–78)
AMMONIA PLAS-SCNC: 136 UMOL/L
AMMONIA PLAS-SCNC: 217 UMOL/L
AMMONIA PLAS-SCNC: 294 UMOL/L
ANION GAP BLD CALC-SCNC: 19 MMOL/L (ref 10–20)
ANION GAP SERPL CALC-SCNC: 10 MMOL/L (ref 5–15)
ANION GAP SERPL CALC-SCNC: 16 MMOL/L (ref 5–15)
ANION GAP SERPL CALC-SCNC: 9 MMOL/L (ref 5–15)
APPEARANCE UR: CLEAR
AST SERPL-CCNC: 55 U/L (ref 15–37)
ATRIAL RATE: 106 BPM
BACTERIA URNS QL MICRO: NEGATIVE /HPF
BASOPHILS # BLD: 0 K/UL (ref 0–0.1)
BASOPHILS NFR BLD: 0 % (ref 0–1)
BILIRUB SERPL-MCNC: 2.9 MG/DL (ref 0.2–1)
BILIRUB UR QL: NEGATIVE
BUN BLD-MCNC: 55 MG/DL (ref 9–20)
BUN SERPL-MCNC: 50 MG/DL (ref 6–20)
BUN SERPL-MCNC: 50 MG/DL (ref 6–20)
BUN SERPL-MCNC: 51 MG/DL (ref 6–20)
BUN/CREAT SERPL: 18 (ref 12–20)
BUN/CREAT SERPL: 20 (ref 12–20)
BUN/CREAT SERPL: 20 (ref 12–20)
CA-I BLD-MCNC: 1.17 MMOL/L (ref 1.12–1.32)
CALCIUM SERPL-MCNC: 8.5 MG/DL (ref 8.5–10.1)
CALCIUM SERPL-MCNC: 8.7 MG/DL (ref 8.5–10.1)
CALCIUM SERPL-MCNC: 9.6 MG/DL (ref 8.5–10.1)
CALCULATED P AXIS, ECG09: 72 DEGREES
CALCULATED R AXIS, ECG10: 11 DEGREES
CALCULATED T AXIS, ECG11: 82 DEGREES
CHLORIDE BLD-SCNC: 108 MMOL/L (ref 98–107)
CHLORIDE SERPL-SCNC: 107 MMOL/L (ref 97–108)
CHLORIDE SERPL-SCNC: 112 MMOL/L (ref 97–108)
CHLORIDE SERPL-SCNC: 113 MMOL/L (ref 97–108)
CO2 BLD-SCNC: 20 MMOL/L (ref 21–32)
CO2 SERPL-SCNC: 15 MMOL/L (ref 21–32)
CO2 SERPL-SCNC: 19 MMOL/L (ref 21–32)
CO2 SERPL-SCNC: 22 MMOL/L (ref 21–32)
COLOR UR: ABNORMAL
CREAT BLD-MCNC: 2.3 MG/DL (ref 0.6–1.3)
CREAT SERPL-MCNC: 2.45 MG/DL (ref 0.7–1.3)
CREAT SERPL-MCNC: 2.49 MG/DL (ref 0.7–1.3)
CREAT SERPL-MCNC: 2.85 MG/DL (ref 0.7–1.3)
CREAT UR-MCNC: 128 MG/DL
DIAGNOSIS, 93000: NORMAL
DIFFERENTIAL METHOD BLD: ABNORMAL
EOSINOPHIL # BLD: 0.2 K/UL (ref 0–0.4)
EOSINOPHIL NFR BLD: 2 % (ref 0–7)
EPITH CASTS URNS QL MICRO: ABNORMAL /LPF
ERYTHROCYTE [DISTWIDTH] IN BLOOD BY AUTOMATED COUNT: 15.3 % (ref 11.5–14.5)
EST. AVERAGE GLUCOSE BLD GHB EST-MCNC: NORMAL MG/DL
GLOBULIN SER CALC-MCNC: 3.9 G/DL (ref 2–4)
GLUCOSE BLD STRIP.AUTO-MCNC: 104 MG/DL (ref 65–100)
GLUCOSE BLD STRIP.AUTO-MCNC: 131 MG/DL (ref 65–100)
GLUCOSE BLD STRIP.AUTO-MCNC: 137 MG/DL (ref 65–100)
GLUCOSE BLD STRIP.AUTO-MCNC: 140 MG/DL (ref 65–100)
GLUCOSE BLD STRIP.AUTO-MCNC: 185 MG/DL (ref 65–100)
GLUCOSE BLD STRIP.AUTO-MCNC: 217 MG/DL (ref 65–100)
GLUCOSE BLD STRIP.AUTO-MCNC: 258 MG/DL (ref 65–100)
GLUCOSE BLD-MCNC: 184 MG/DL (ref 65–100)
GLUCOSE SERPL-MCNC: 118 MG/DL (ref 65–100)
GLUCOSE SERPL-MCNC: 172 MG/DL (ref 65–100)
GLUCOSE SERPL-MCNC: 236 MG/DL (ref 65–100)
GLUCOSE UR STRIP.AUTO-MCNC: NEGATIVE MG/DL
HBA1C MFR BLD: 4.7 % (ref 4.2–6.3)
HCT VFR BLD AUTO: 29.4 % (ref 36.6–50.3)
HCT VFR BLD CALC: 22 % (ref 36.6–50.3)
HEMOCCULT STL QL: NEGATIVE
HGB BLD-MCNC: 10.1 G/DL (ref 12.1–17)
HGB UR QL STRIP: ABNORMAL
IMM GRANULOCYTES # BLD: 0 K/UL
IMM GRANULOCYTES NFR BLD AUTO: 0 %
KETONES UR QL STRIP.AUTO: NEGATIVE MG/DL
LEUKOCYTE ESTERASE UR QL STRIP.AUTO: ABNORMAL
LYMPHOCYTES # BLD: 1 K/UL (ref 0.8–3.5)
LYMPHOCYTES NFR BLD: 13 % (ref 12–49)
MAGNESIUM SERPL-MCNC: 1.9 MG/DL (ref 1.6–2.4)
MCH RBC QN AUTO: 34.4 PG (ref 26–34)
MCHC RBC AUTO-ENTMCNC: 34.4 G/DL (ref 30–36.5)
MCV RBC AUTO: 100 FL (ref 80–99)
MONOCYTES # BLD: 0.2 K/UL (ref 0–1)
MONOCYTES NFR BLD: 2 % (ref 5–13)
NEUTS SEG # BLD: 6.1 K/UL (ref 1.8–8)
NEUTS SEG NFR BLD: 83 % (ref 32–75)
NITRITE UR QL STRIP.AUTO: NEGATIVE
NRBC # BLD: 0 K/UL (ref 0–0.01)
NRBC BLD-RTO: 0 PER 100 WBC
P-R INTERVAL, ECG05: 214 MS
PH UR STRIP: 6 [PH] (ref 5–8)
PLATELET # BLD AUTO: 93 K/UL (ref 150–400)
PMV BLD AUTO: 10.2 FL (ref 8.9–12.9)
POTASSIUM BLD-SCNC: 6.5 MMOL/L (ref 3.5–5.1)
POTASSIUM SERPL-SCNC: 5.1 MMOL/L (ref 3.5–5.1)
POTASSIUM SERPL-SCNC: 5.7 MMOL/L (ref 3.5–5.1)
POTASSIUM SERPL-SCNC: 6 MMOL/L (ref 3.5–5.1)
PROT SERPL-MCNC: 6.9 G/DL (ref 6.4–8.2)
PROT UR STRIP-MCNC: NEGATIVE MG/DL
PSA SERPL-MCNC: 0.1 NG/ML (ref 0.01–4)
Q-T INTERVAL, ECG07: 330 MS
QRS DURATION, ECG06: 94 MS
QTC CALCULATION (BEZET), ECG08: 438 MS
RBC # BLD AUTO: 2.94 M/UL (ref 4.1–5.7)
RBC #/AREA URNS HPF: ABNORMAL /HPF (ref 0–5)
RBC MORPH BLD: ABNORMAL
SERVICE CMNT-IMP: ABNORMAL
SODIUM BLD-SCNC: 139 MMOL/L (ref 136–145)
SODIUM SERPL-SCNC: 138 MMOL/L (ref 136–145)
SODIUM SERPL-SCNC: 141 MMOL/L (ref 136–145)
SODIUM SERPL-SCNC: 144 MMOL/L (ref 136–145)
SODIUM UR-SCNC: 64 MMOL/L
SP GR UR REFRACTOMETRY: 1.02 (ref 1–1.03)
TROPONIN I SERPL-MCNC: <0.04 NG/ML
UR CULT HOLD, URHOLD: NORMAL
UROBILINOGEN UR QL STRIP.AUTO: 1 EU/DL (ref 0.2–1)
VENTRICULAR RATE, ECG03: 106 BPM
WBC # BLD AUTO: 7.5 K/UL (ref 4.1–11.1)
WBC URNS QL MICRO: ABNORMAL /HPF (ref 0–4)
YEAST URNS QL MICRO: PRESENT

## 2018-05-06 PROCEDURE — 74011636637 HC RX REV CODE- 636/637: Performed by: INTERNAL MEDICINE

## 2018-05-06 PROCEDURE — 82570 ASSAY OF URINE CREATININE: CPT | Performed by: INTERNAL MEDICINE

## 2018-05-06 PROCEDURE — 94761 N-INVAS EAR/PLS OXIMETRY MLT: CPT

## 2018-05-06 PROCEDURE — 80053 COMPREHEN METABOLIC PANEL: CPT | Performed by: EMERGENCY MEDICINE

## 2018-05-06 PROCEDURE — 93005 ELECTROCARDIOGRAM TRACING: CPT

## 2018-05-06 PROCEDURE — 81001 URINALYSIS AUTO W/SCOPE: CPT | Performed by: INTERNAL MEDICINE

## 2018-05-06 PROCEDURE — 82272 OCCULT BLD FECES 1-3 TESTS: CPT | Performed by: HOSPITALIST

## 2018-05-06 PROCEDURE — 74011250636 HC RX REV CODE- 250/636: Performed by: INTERNAL MEDICINE

## 2018-05-06 PROCEDURE — 80048 BASIC METABOLIC PNL TOTAL CA: CPT | Performed by: INTERNAL MEDICINE

## 2018-05-06 PROCEDURE — 96374 THER/PROPH/DIAG INJ IV PUSH: CPT

## 2018-05-06 PROCEDURE — 99285 EMERGENCY DEPT VISIT HI MDM: CPT

## 2018-05-06 PROCEDURE — 82140 ASSAY OF AMMONIA: CPT | Performed by: INTERNAL MEDICINE

## 2018-05-06 PROCEDURE — 70450 CT HEAD/BRAIN W/O DYE: CPT

## 2018-05-06 PROCEDURE — 84153 ASSAY OF PSA TOTAL: CPT | Performed by: HOSPITALIST

## 2018-05-06 PROCEDURE — P9047 ALBUMIN (HUMAN), 25%, 50ML: HCPCS | Performed by: INTERNAL MEDICINE

## 2018-05-06 PROCEDURE — 85025 COMPLETE CBC W/AUTO DIFF WBC: CPT | Performed by: EMERGENCY MEDICINE

## 2018-05-06 PROCEDURE — 72125 CT NECK SPINE W/O DYE: CPT

## 2018-05-06 PROCEDURE — 74011250636 HC RX REV CODE- 250/636: Performed by: EMERGENCY MEDICINE

## 2018-05-06 PROCEDURE — 80047 BASIC METABLC PNL IONIZED CA: CPT

## 2018-05-06 PROCEDURE — 76770 US EXAM ABDO BACK WALL COMP: CPT

## 2018-05-06 PROCEDURE — 36415 COLL VENOUS BLD VENIPUNCTURE: CPT | Performed by: HOSPITALIST

## 2018-05-06 PROCEDURE — 96361 HYDRATE IV INFUSION ADD-ON: CPT

## 2018-05-06 PROCEDURE — 82140 ASSAY OF AMMONIA: CPT | Performed by: EMERGENCY MEDICINE

## 2018-05-06 PROCEDURE — 74011636637 HC RX REV CODE- 636/637: Performed by: EMERGENCY MEDICINE

## 2018-05-06 PROCEDURE — 74011000250 HC RX REV CODE- 250: Performed by: HOSPITALIST

## 2018-05-06 PROCEDURE — 65660000001 HC RM ICU INTERMED STEPDOWN

## 2018-05-06 PROCEDURE — 83036 HEMOGLOBIN GLYCOSYLATED A1C: CPT | Performed by: INTERNAL MEDICINE

## 2018-05-06 PROCEDURE — 82140 ASSAY OF AMMONIA: CPT | Performed by: HOSPITALIST

## 2018-05-06 PROCEDURE — 51798 US URINE CAPACITY MEASURE: CPT

## 2018-05-06 PROCEDURE — 84300 ASSAY OF URINE SODIUM: CPT | Performed by: INTERNAL MEDICINE

## 2018-05-06 PROCEDURE — 74011250637 HC RX REV CODE- 250/637: Performed by: INTERNAL MEDICINE

## 2018-05-06 PROCEDURE — 82962 GLUCOSE BLOOD TEST: CPT

## 2018-05-06 PROCEDURE — 74011000250 HC RX REV CODE- 250

## 2018-05-06 PROCEDURE — 84484 ASSAY OF TROPONIN QUANT: CPT | Performed by: EMERGENCY MEDICINE

## 2018-05-06 PROCEDURE — 80048 BASIC METABOLIC PNL TOTAL CA: CPT | Performed by: HOSPITALIST

## 2018-05-06 PROCEDURE — 77030034850

## 2018-05-06 PROCEDURE — 71045 X-RAY EXAM CHEST 1 VIEW: CPT

## 2018-05-06 PROCEDURE — 74011250637 HC RX REV CODE- 250/637: Performed by: HOSPITALIST

## 2018-05-06 PROCEDURE — 83735 ASSAY OF MAGNESIUM: CPT | Performed by: HOSPITALIST

## 2018-05-06 RX ORDER — ONDANSETRON 2 MG/ML
4 INJECTION INTRAMUSCULAR; INTRAVENOUS
Status: DISCONTINUED | OUTPATIENT
Start: 2018-05-06 | End: 2018-05-09 | Stop reason: HOSPADM

## 2018-05-06 RX ORDER — MELATONIN
2000 2 TIMES DAILY
Status: DISCONTINUED | OUTPATIENT
Start: 2018-05-06 | End: 2018-05-09 | Stop reason: HOSPADM

## 2018-05-06 RX ORDER — INSULIN LISPRO 100 [IU]/ML
INJECTION, SOLUTION INTRAVENOUS; SUBCUTANEOUS EVERY 6 HOURS
Status: DISCONTINUED | OUTPATIENT
Start: 2018-05-06 | End: 2018-05-07

## 2018-05-06 RX ORDER — SODIUM POLYSTYRENE SULFONATE 15 G/60ML
15 SUSPENSION ORAL; RECTAL
Status: COMPLETED | OUTPATIENT
Start: 2018-05-06 | End: 2018-05-06

## 2018-05-06 RX ORDER — SODIUM BICARBONATE 1 MEQ/ML
50 SYRINGE (ML) INTRAVENOUS
Status: DISCONTINUED | OUTPATIENT
Start: 2018-05-06 | End: 2018-05-06

## 2018-05-06 RX ORDER — SODIUM POLYSTYRENE SULFONATE 15 G/60ML
30 SUSPENSION ORAL; RECTAL
Status: DISCONTINUED | OUTPATIENT
Start: 2018-05-06 | End: 2018-05-06

## 2018-05-06 RX ORDER — DEXTROSE 50 % IN WATER (D50W) INTRAVENOUS SYRINGE
12.5-25 AS NEEDED
Status: DISCONTINUED | OUTPATIENT
Start: 2018-05-06 | End: 2018-05-09 | Stop reason: HOSPADM

## 2018-05-06 RX ORDER — SODIUM CHLORIDE 0.9 % (FLUSH) 0.9 %
5-10 SYRINGE (ML) INJECTION EVERY 8 HOURS
Status: DISCONTINUED | OUTPATIENT
Start: 2018-05-06 | End: 2018-05-09 | Stop reason: HOSPADM

## 2018-05-06 RX ORDER — GUAIFENESIN 100 MG/5ML
81 LIQUID (ML) ORAL DAILY
Status: DISCONTINUED | OUTPATIENT
Start: 2018-05-06 | End: 2018-05-06

## 2018-05-06 RX ORDER — SIMVASTATIN 20 MG/1
20 TABLET, FILM COATED ORAL
Status: DISCONTINUED | OUTPATIENT
Start: 2018-05-06 | End: 2018-05-09 | Stop reason: HOSPADM

## 2018-05-06 RX ORDER — ALBUTEROL SULFATE 5 MG/ML
5 SOLUTION RESPIRATORY (INHALATION)
Status: DISCONTINUED | OUTPATIENT
Start: 2018-05-06 | End: 2018-05-06

## 2018-05-06 RX ORDER — AMMONIA 15 % (W/V)
AMPUL (EA) INHALATION
Status: COMPLETED
Start: 2018-05-06 | End: 2018-05-06

## 2018-05-06 RX ORDER — SODIUM CHLORIDE 0.9 % (FLUSH) 0.9 %
5-10 SYRINGE (ML) INJECTION AS NEEDED
Status: DISCONTINUED | OUTPATIENT
Start: 2018-05-06 | End: 2018-05-09 | Stop reason: HOSPADM

## 2018-05-06 RX ORDER — MAGNESIUM SULFATE 100 %
4 CRYSTALS MISCELLANEOUS AS NEEDED
Status: DISCONTINUED | OUTPATIENT
Start: 2018-05-06 | End: 2018-05-09 | Stop reason: HOSPADM

## 2018-05-06 RX ORDER — LEVOTHYROXINE SODIUM 75 UG/1
75 TABLET ORAL
Status: DISCONTINUED | OUTPATIENT
Start: 2018-05-06 | End: 2018-05-09 | Stop reason: HOSPADM

## 2018-05-06 RX ORDER — SODIUM BICARBONATE 1 MEQ/ML
50 SYRINGE (ML) INTRAVENOUS ONCE
Status: COMPLETED | OUTPATIENT
Start: 2018-05-06 | End: 2018-05-06

## 2018-05-06 RX ORDER — PANTOPRAZOLE SODIUM 40 MG/1
40 TABLET, DELAYED RELEASE ORAL DAILY
Status: DISCONTINUED | OUTPATIENT
Start: 2018-05-06 | End: 2018-05-09 | Stop reason: HOSPADM

## 2018-05-06 RX ORDER — ALBUMIN HUMAN 250 G/1000ML
25 SOLUTION INTRAVENOUS EVERY 6 HOURS
Status: DISCONTINUED | OUTPATIENT
Start: 2018-05-06 | End: 2018-05-09 | Stop reason: HOSPADM

## 2018-05-06 RX ORDER — SODIUM CHLORIDE 9 MG/ML
75 INJECTION, SOLUTION INTRAVENOUS CONTINUOUS
Status: DISCONTINUED | OUTPATIENT
Start: 2018-05-06 | End: 2018-05-09

## 2018-05-06 RX ADMIN — Medication 5 ML: at 06:59

## 2018-05-06 RX ADMIN — SODIUM POLYSTYRENE SULFONATE 15 G: 15 SUSPENSION ORAL; RECTAL at 09:47

## 2018-05-06 RX ADMIN — RIFAXIMIN 550 MG: 550 TABLET ORAL at 08:57

## 2018-05-06 RX ADMIN — LACTULOSE 30 G: 20 SOLUTION ORAL at 16:01

## 2018-05-06 RX ADMIN — LACTULOSE 20 G: 20 SOLUTION ORAL at 08:58

## 2018-05-06 RX ADMIN — ALBUMIN (HUMAN) 25 G: 0.25 INJECTION, SOLUTION INTRAVENOUS at 23:55

## 2018-05-06 RX ADMIN — SODIUM CHLORIDE 1000 ML: 900 INJECTION, SOLUTION INTRAVENOUS at 02:34

## 2018-05-06 RX ADMIN — ASPIRIN 81 MG CHEWABLE TABLET 81 MG: 81 TABLET CHEWABLE at 09:00

## 2018-05-06 RX ADMIN — RIFAXIMIN 550 MG: 550 TABLET ORAL at 17:46

## 2018-05-06 RX ADMIN — LEVOTHYROXINE SODIUM 75 MCG: 75 TABLET ORAL at 07:30

## 2018-05-06 RX ADMIN — HUMAN INSULIN 10 UNITS: 100 INJECTION, SOLUTION SUBCUTANEOUS at 02:34

## 2018-05-06 RX ADMIN — VITAMIN D, TAB 1000IU (100/BT) 2000 UNITS: 25 TAB at 08:57

## 2018-05-06 RX ADMIN — AMMONIA INHALANTS: 0.04 INHALANT RESPIRATORY (INHALATION) at 06:12

## 2018-05-06 RX ADMIN — SODIUM CHLORIDE 1000 ML: 900 INJECTION, SOLUTION INTRAVENOUS at 02:35

## 2018-05-06 RX ADMIN — SODIUM CHLORIDE 75 ML/HR: 900 INJECTION, SOLUTION INTRAVENOUS at 23:55

## 2018-05-06 RX ADMIN — ALBUMIN (HUMAN) 25 G: 0.25 INJECTION, SOLUTION INTRAVENOUS at 18:12

## 2018-05-06 RX ADMIN — SODIUM BICARBONATE 50 MEQ: 84 INJECTION INTRAVENOUS at 10:10

## 2018-05-06 RX ADMIN — PANTOPRAZOLE SODIUM 40 MG: 40 TABLET, DELAYED RELEASE ORAL at 08:57

## 2018-05-06 RX ADMIN — SODIUM CHLORIDE 75 ML/HR: 900 INJECTION, SOLUTION INTRAVENOUS at 08:58

## 2018-05-06 RX ADMIN — VITAMIN D, TAB 1000IU (100/BT) 2000 UNITS: 25 TAB at 17:46

## 2018-05-06 RX ADMIN — SIMVASTATIN 20 MG: 20 TABLET, FILM COATED ORAL at 21:26

## 2018-05-06 RX ADMIN — SODIUM POLYSTYRENE SULFONATE 15 G: 15 SUSPENSION ORAL; RECTAL at 05:46

## 2018-05-06 RX ADMIN — LACTULOSE 30 G: 20 SOLUTION ORAL at 19:00

## 2018-05-06 RX ADMIN — Medication 10 ML: at 21:28

## 2018-05-06 RX ADMIN — Medication 5 ML: at 14:00

## 2018-05-06 RX ADMIN — INSULIN LISPRO 2 UNITS: 100 INJECTION, SOLUTION INTRAVENOUS; SUBCUTANEOUS at 23:54

## 2018-05-06 RX ADMIN — LACTULOSE 30 G: 20 SOLUTION ORAL at 21:26

## 2018-05-06 NOTE — ROUTINE PROCESS
TRANSFER - OUT REPORT:    Verbal report given to Shelley Vidales RN(name) on Deniz Dorman  being transferred to [facility] (unit) for routine progression of care       Report consisted of patients Situation, Background, Assessment and   Recommendations(SBAR). Information from the following report(s) SBAR, ED Summary, [facility] and Recent Results was reviewed with the receiving nurse. Lines:   Peripheral IV 05/06/18 Right Hand (Active)   Site Assessment Clean, dry, & intact 5/6/2018  1:19 AM   Phlebitis Assessment 0 5/6/2018  1:19 AM   Infiltration Assessment 0 5/6/2018  1:19 AM   Dressing Status Clean, dry, & intact 5/6/2018  1:19 AM   Dressing Type Transparent 5/6/2018  1:19 AM   Hub Color/Line Status Pink;Flushed;Patent 5/6/2018  1:19 AM   Action Taken Blood drawn 5/6/2018  1:19 AM        Opportunity for questions and clarification was provided.       Patient transported with:   Monitor  Registered Nurse

## 2018-05-06 NOTE — H&P
[address]  HISTORY AND PHYSICAL      Allison Beard  MR#: [id number]  : 1955  ACCOUNT #: [de-identified]   ADMIT DATE: 2018    PRIMARY CARE PHYSICIAN:  Swathi Smith MD    CHIEF COMPLAINT:  Altered mental status. HISTORY OF PRESENT ILLNESS:  Patient is a 27-year-old male with history of hepatic encephalopathy and liver cirrhosis who was brought to the emergency room with complaints of altered mental status. His spouse, who is at bedside, reports that altered mental status started a few days ago, but got worse yesterday, Saturday. The patient was said to have fallen on Wednesday because he was taking a shower without the wife by his side. The wife reports that patient has progressively declined over the last several months. His ambulation is more limited. On Saturday, the patient is said to have become more lethargic. The wife started giving him extra doses of lactulose. The patient is on 30 mL of lactulose 3 times a day, but yesterday the wife gave him extra dosage. The patient was said to have started moving his bowels later on in the evening and the bowel movement became loose. There is no report of fever, chills, or rigors. The patient has a slight cough which is unchanged from prior. There is no nausea, vomiting, abdominal pain, constipation. Patient has been on lactulose for more than two years and the wife reports that lactulose has been useful in controlling the patient until recently. There is no report of headache, lightheadedness, dizziness, blurry vision, double vision, syncopal episode or seizures. The patient has no neck pain or neck stiffness. He has no palpitations, irregular heartbeat, anxiety or depression. He denied chest pain, chest tightness, shortness of breath, dyspnea on exertion, PND, orthopnea. Patient denied, diaphoresis, wheezing or rhonchi. There is also no report of fever, chills or rigors.   The patient has no dysuria, frequency, hematuria, urethral discharge. There is no report of lower extremity pain or swelling. The wife states that any time his ammonia level is elevated, he usually becomes more lethargic and ammonia level on presentation to the emergency room was 294. PAST MEDICAL HISTORY:  1. ITP.  2.  Osteoarthritis. 3.  Coronary artery disease. 4.  History of skin cancer in the forehead. 5.  History of kidney stones. 6. Low platelets, thrombocytopenia. 7.  Diabetes type 2.  8.  Gastroesophageal reflux disease. 9.  History of hepatic encephalopathy. 10.  Hypertension. 11.  Liver cirrhosis. The wife is reporting nonalcoholic liver cirrhosis. 12.  Peptic ulcer disease. 13.  Anemia. PAST SURGICAL HISTORY:  1. Cholecystectomy. 2.  Appendectomy. 3.  Mohs procedure for skin cancer. 4.  Vasectomy. MEDICATIONS:  Aspirin 81 mg daily, cholecalciferol 1 tablet 2 times daily, furosemide 40 mg 2 tablets daily, gabapentin 300 mg 3 times daily, glimepiride 2 mg 2 times daily, lactulose 20 grams 3 times daily, levothyroxine 75 mcg daily, lisinopril 20 mg daily, metformin 500 mg 3 times daily with meals, pantoprazole 40 mg daily, rifaximin 550 mg 2 times daily, simvastatin 20 mg every night, spironolactone 100 mg 2 tablets daily and triamcinolone acetonide (Kenalog) 0.1% topical cream applied to affected area 2 times daily. ALLERGIES:  No known drug allergies. SOCIAL HISTORY:  Patient is , lives with spouse. Denies tobacco, alcohol or recreational drug use. FAMILY HISTORY:  Significant for multiple myeloma in the mother, prostate cancer in the father, MS in the sister, heart failure in the paternal grandmother. REVIEW OF SYSTEMS:  More than twelve systems were reviewed and the pertinent positives are in the history of present illness. All others are negative. PHYSICAL EXAMINATION:  GENERAL:  Patient was seen lying in bed. Lethargic and disoriented.   VITAL SIGNS:  Pulse 109, respirations 23, temperature 98.1, O2 sat 99%. HEAD, EYES, EAR, NOSE AND THROAT:  Atraumatic. Normocephalic. Anicteric, not pale, not jaundiced. Extraocular muscles are intact. Pupils bilaterally reactive and equal.  NECK:  Supple, no JVD, no carotid bruit. CARDIOVASCULAR:  Heart sounds 1 and 2 tachycardic. No gallop, no friction rub. RESPIRATION:  No wheezing, no rhonchi, no rales. ABDOMEN:  Soft, nontender. Bowel sounds normoactive. NEUROLOGIC:  Lethargic, disoriented, does not follow commands. SKIN:  Warm, dry, normal skin color, normal skin turgor. PSYCHIATRIC:  Lethargic. EXTREMITIES:  No edema, no cyanosis. DIAGNOSTIC TEST:  EKG showed sinus tachycardia at 106 beats per minute, nonspecific ST-T wave changes. LABORATORY DATA:  WBC 7.5, hemoglobin 10.1, hematocrit 29.4, platelet 93. Sodium 138, potassium 6, chloride 107, carbon dioxide 15, glucose 236, BUN 51, creatinine 3.85, calcium 9.6, ALT 50, AST 55, alkaline phosphatase 173. Troponin negative. Ammonia 294. Chest x-ray showed no acute process. CT head pending. CT cervical spine pending. ASSESSMENT:    1. Altered mental status. 2.  Dehydration. 3.  Acute on chronic kidney disease. 4.  Hepatic encephalopathy. 5.  Liver cirrhosis. 6.  Peptic ulcer disease. 7.  Thrombocytopenia. 8.  Coronary artery disease. 9.  Diabetes. 10.  ITP. 11.  Gastroesophageal reflux disease. 12.  Anemia. PLAN:    1. Admit patient to intermediate care unit under hospitalist service. 2.  IV fluid hydration. 3.  Monitor kidney function with hydration. 4.  Monitor electrolytes with hydration. 5.  Repeat serum potassium s/p fluids and insulin. Serial ammonia levels. 6.  Follow up result of CT head and cervical spine. 7.  Lactulose for 3-4 bowel movements daily. 8.  Aspiration precaution, fall precautions, skin care precautions, out of bed to chair with assistance. 9.  DVT prophylaxis, bilateral SCDs.   10.  Extensive patient and spouse education was done by bedside. 11.  Monitor vital signs including blood pressure as per unit protocol. 12.  We will restart appropriate home medications. Hold metformin and glimepiride for now. Start the patient on sliding scale with Accu-Chek a.c. and at bedtime. 13.  I discussed advanced directive with the patient and the spouse who is by bedside. The spouse is the next of kin. 14.  CODE:  FULL CODE.  15.  Further management will depend on the patient's clinical progression, further evaluation by attending physician and the results of tests.     MD PRABHJOT Obrien/HUMBERTO  D: 05/06/2018 03:10     T: 05/06/2018 07:47  JOB #: [id number]

## 2018-05-06 NOTE — PROGRESS NOTES
Problem: Altered Thought Process (Adult/Pediatric)  Goal: *STG: Remains safe in hospital  Outcome: Progressing Towards Goal  Pt A&O x2. Pt received lactulose and kayexalate for elevated ammonia levels. Pt placed on a bed alarm and nurse call cord in place and family at bedside. Will continue to monitor and educate. 2152: pt bladder scanned of 800 mL  0955: Dr. Barbara Bahena notified, received orders to insertion as zaldivar. 1000: zaldivar inserted, drained 650 mL out     Primary Nurse Rosendo Spear and Tish Tim RN performed a dual skin assessment on this patient No impairment noted  Norman score is 15      Bedside shift change report given to [address] [place] [address] (oncoming nurse) by Kirsty Escobar RN (offgoing nurse). Report included the following information SBAR, Kardex, Procedure Summary, Intake/Output, MAR, Accordion, Recent Results, Med Rec Status, Cardiac Rhythm NSR and Alarm Parameters .

## 2018-05-06 NOTE — ED PROVIDER NOTES
Patient is a 60 y.o. male presenting with altered mental status, diarrhea, and vomiting. Altered mental status      Diarrhea    Associated symptoms include diarrhea and vomiting. Vomiting    Associated symptoms include diarrhea. 60year old male with history of hepatic encephalopathy, CKD, PUD, low platelets, CAD, presents with AMS worsening through out the day. Hasn't been back to normal for weeks, but for the most part can take care of himself during the day. He did fall on Wednesday because he was taking a shower while wife was gone. Today he seemed acutely worse. Started giving him more lactulose today with increased diarrhea but not helping. No fevers. Slight cough. Past Medical History:   Diagnosis Date    Arthritis     Autoimmune disease ([facility]     ITP    CAD (coronary artery disease)     enlarged heart ?  Cancer ([facility]     skin ca removed from forehead    Chronic kidney disease     kidney stones    Coagulation disorder (HCC)     low plts    Diabetes (HCC)     type 2    GERD (gastroesophageal reflux disease)     Hepatic encephalopathy (HCC)     Hypertension     Ill-defined condition     obesity per pt    Ill-defined condition     anemia    Liver disease     elevated liver enzymes    PUD (peptic ulcer disease)     stomach ulcers       Past Surgical History:   Procedure Laterality Date    ABDOMEN SURGERY PROC UNLISTED      ana    HX APPENDECTOMY      HX OTHER SURGICAL      mohs procedure for skin ca.     HX UROLOGICAL      vasectomy         Family History:   Problem Relation Age of Onset    Cancer Mother      multiple myeloma    Cancer Father      prostate    MS Sister     Cancer Maternal Grandmother      ?where    Cancer Maternal Grandfather      ?where    Heart Failure Paternal Grandmother     Cancer Paternal Grandfather      ? where       Social History     Social History    Marital status:      Spouse name: N/A    Number of children: N/A    Years of education: N/A     Occupational History    Not on file. Social History Main Topics    Smoking status: Never Smoker    Smokeless tobacco: Never Used    Alcohol use No    Drug use: Not on file    Sexual activity: Not on file     Other Topics Concern    Not on file     Social History Narrative         ALLERGIES: Review of patient's allergies indicates no known allergies. Review of Systems   Unable to perform ROS: Mental status change   Gastrointestinal: Positive for diarrhea and vomiting. Vitals:    05/06/18 0114   Pulse: (!) 109   Resp: 23   Temp: 98.1 °F (36.7 °C)   SpO2: 99%   Weight: 113.4 kg (250 lb)   Height: 6' (1.829 m)            Physical Exam   Constitutional: He appears well-developed and well-nourished. No distress. HENT:   Head: Normocephalic and atraumatic. Mouth/Throat: Oropharynx is clear and moist. No oropharyngeal exudate. Eyes: Conjunctivae and EOM are normal. Pupils are equal, round, and reactive to light. Right eye exhibits no discharge. Left eye exhibits no discharge. No scleral icterus. Neck: Normal range of motion. Neck supple. No JVD present. Cardiovascular: Normal rate, regular rhythm, normal heart sounds and intact distal pulses. Exam reveals no gallop and no friction rub. No murmur heard. Pulmonary/Chest: Effort normal and breath sounds normal. No stridor. No respiratory distress. He has no wheezes. He has no rales. He exhibits no tenderness. Abdominal: Soft. Bowel sounds are normal. He exhibits no distension and no mass. There is no tenderness. There is no rebound and no guarding. Musculoskeletal: Normal range of motion. He exhibits no edema or tenderness. Neurological: He is alert. He has normal reflexes. No cranial nerve deficit. He exhibits normal muscle tone. Coordination normal.   Can tell me his name  Follows simple commands. Skin: Skin is warm and dry. No rash noted. No erythema.         MDM  Number of Diagnoses or Management Options  Acute kidney injury [facility]):   Hepatic encephalopathy [facility]):   Critical Care  Total time providing critical care: 30-74 minutes  45 minutes      ED Course       Procedures         ED EKG interpretation:  Rhythm: sinus tachycardia; and regular . Rate (approx.): 106; Axis: normal; P wave: prolonged; QRS interval: normal ; ST/T wave: non-specific changes;. This EKG was interpreted by Roseann Mohamud MD,ED Provider. Acute kidney injury with potassium of 6. Insulin ordered, fluids. Spoke with hospitalist who will admit.

## 2018-05-06 NOTE — ED TRIAGE NOTES
Pt to ED via EMS for c/o AMS, diarrhea and vomiting post at home treatment for hepatic encephalopathy. Per EMS, pt near unresponsive on the toilet their arrival, became more responsive on route, also began vomiting. Last known well sometime Saturday morning. . Sinus tach on 12 lead. Pt is typically A/Ox4; currently oriented to self.

## 2018-05-06 NOTE — CONSULTS
Consultation Note    NAME: Edson Sheriff   :  1955   MRN:  [id number]     Date/Time:  2018 3:50 PM    I have been asked to see this patient by Dr. Helene Briceno  for advice/opinion re: YANA. Assessment :    Plan:  CKD-3-Dr. DukeFqtq-Xasu-urxtxfei creatinine 1.3  YANA  Hyperkalemia  AMS  Metabolic acidosis  Hepatic encephalopathy  DM  HTN  Hypothyroidism   Unclear what has caused YANA. U/S shows no hydro. Creatinine has improved overnight with IVF. Send urine studies. Continue IVF for now. Potassium better with treatment. Subjective:   CHIEF COMPLAINT:  \"I feel better. \"    HISTORY OF PRESENT ILLNESS:     Damien Toth is a 60 y.o.   male who has a history of CKD-3 followed by Dr. Armando Whitehead. Review of records shows that the patient last was seen in our office in March. Creatinine then was 1.26. He can only give limited history. Additional history is obtained from his daughter and review of records. His daughter says that he had been doing well up until yesterday. She says that he started to AMS and as the day progressed was \"almost comatose. \"  She says that his po intake was poor for 48 hours PTA. In the ER, his ammonia level and creatinine were quite elevated and he is admitted. Past Medical History:   Diagnosis Date    Arthritis     Autoimmune disease ([facility]     ITP    CAD (coronary artery disease)     enlarged heart ?     Cancer ([facility]     skin ca removed from forehead    Chronic kidney disease     kidney stones    Coagulation disorder (HCC)     low plts    Diabetes (HCC)     type 2    GERD (gastroesophageal reflux disease)     Hepatic encephalopathy (HCC)     Hypertension     Ill-defined condition     obesity per pt    Ill-defined condition     anemia    Liver disease     elevated liver enzymes    PUD (peptic ulcer disease)     stomach ulcers      Past Surgical History:   Procedure Laterality Date    ABDOMEN SURGERY PROC UNLISTED      ana    HX APPENDECTOMY      HX OTHER SURGICAL mohs procedure for skin ca.  HX UROLOGICAL      vasectomy     Social History   Substance Use Topics    Smoking status: Never Smoker    Smokeless tobacco: Never Used    Alcohol use No      Family History   Problem Relation Age of Onset    Cancer Mother      multiple myeloma    Cancer Father      prostate    MS Sister     Cancer Maternal Grandmother      ?where    Cancer Maternal Grandfather      ?where    Heart Failure Paternal Grandmother     Cancer Paternal Grandfather      ? where      No Known Allergies   Prior to Admission medications    Medication Sig Start Date End Date Taking? Authorizing Provider   lactulose (CHRONULAC) 20 gram/30 mL soln solution Take 30 mL by mouth three (3) times daily. Adjust  dose as needed such that you have 2 loose/soft bowel movements a day without diarrhea. 4/30/18  Yes Berna Roldan NP   rifAXIMin (XIFAXAN) 550 mg tablet Take 1 Tab by mouth two (2) times a day. 2/13/18  Yes Berna Roldan NP   gabapentin (NEURONTIN) 300 mg capsule Take 1 Cap by mouth three (3) times daily. Patient taking differently: Take 300 mg by mouth daily. 1/8/18  Yes Berna Roldan NP   triamcinolone acetonide (KENALOG) 0.1 % topical cream Apply  to affected area two (2) times a day. use thin layer 1/2/18  Yes Berna Roldan NP   spironolactone (ALDACTONE) 100 mg tablet Take 2 Tabs by mouth daily. Indications: EDEMA DUE TO HEPATIC CIRRHOSIS  Patient taking differently: Take 100 mg by mouth daily. Indications: EDEMA DUE TO HEPATIC CIRRHOSIS 1/2/18  Yes Berna Roldan NP   furosemide (LASIX) 40 mg tablet Take 2 Tabs by mouth daily. Patient taking differently: Take 40 mg by mouth daily. 1/2/18  Yes Berna Roldan NP   lisinopril (PRINIVIL, ZESTRIL) 20 mg tablet Take 1 Tab by mouth daily. Indications: hypertension  Patient taking differently: Take 10 mg by mouth daily. Indications: hypertension 11/2/17  Yes Berna Roldan NP   aspirin 81 mg chewable tablet Take 81 mg by mouth daily.    Yes Historical Provider   cholecalciferol, vitamin D3, (VITAMIN D3) 2,000 unit tab Take 1 Tab by mouth two (2) times a day. Yes Historical Provider   pantoprazole (PROTONIX) 40 mg tablet Take 40 mg by mouth daily. Yes Historical Provider   metFORMIN (GLUCOPHAGE) 500 mg tablet Take 500 mg by mouth two (2) times daily (with meals). Yes Historical Provider   glimepiride (AMARYL) 4 mg tablet Take 2 mg by mouth daily. Yes Historical Provider   simvastatin (ZOCOR) 20 mg tablet Take 20 mg by mouth nightly. Yes Historical Provider   levothyroxine (SYNTHROID) 50 mcg tablet Take 75 mcg by mouth Daily (before breakfast).    Yes Historical Provider     REVIEW OF SYSTEMS:     [x]  Unable to obtain reliable ROS due to  [x] mental status  [] sedated   [] intubated   [] Total of 12 systems reviewed as follows:  Constitutional: negative fever, negative chills, negative weight loss  Eyes:   negative visual changes  ENT:   negative sore throat, tongue or lip swelling  Respiratory:  negative cough, negative dyspnea  Cards:  negative for chest pain, palpitations, lower extremity edema  GI:   negative for nausea, vomiting, diarrhea, and abdominal pain  :  negative for frequency, dysuria  Integument:  negative for rash and pruritus  Heme:  negative for easy bruising and gum/nose bleeding  Musculoskel: negative for myalgias,  back pain and muscle weakness  Neuro:  negative for headaches, dizziness, vertigo  Psych:  negative for feelings of anxiety, depression   Travel?: none    Objective:   VITALS:    Visit Vitals    /43 (BP 1 Location: Left arm, BP Patient Position: At rest)    Pulse 85    Temp 98.4 °F (36.9 °C)    Resp 15    Ht 6' (1.829 m)    Wt 111.1 kg (245 lb)    SpO2 100%    BMI 33.23 kg/m2     PHYSICAL EXAM:  Gen:  []  WD []  WN  [] cachectic []  thin []  obese []  disheveled             [x]  ill apearing  []   Critical  []   Chronic    []  No acute distress    HEENT:   [x] NC/AT/PERRL    [x] pink conjunctivae [] pale conjunctivae                  PERRL  [] yes  [] no      [] moist mucosa    [] dry mucosa    hearing intact to voice [] yes  [] No                 NECK:   supple [x] yes  [] no        masses [] yes  [x] No               thyroid  []  non tender  []  tender    RESP:   [x] CTA bilaterally/no wheezing/rhonchi/rales/crackles    [] rhonchi bilaterally - no dullness  [] wheezing   [] rhonchi   [] crackles     use of accessory muscles [] yes [] no    CARD:   [x]  regular rate and rhythm/No murmurs/rubs/gallops    murmur  [] yes ()  [] no      Rubs  [] yes  [] no       Gallops [] yes  [] no    Rate []  regular  []  irregular        carotid bruits  [] Right  []  Left                 LE edema [] yes  [x] no           JVP  []  yes   []  no    ABD:    [x] soft/ distended/non tender/+bowel sounds/no HSM    []  Rigid    tenderness [] yes [] no   Liver enlargement  []  yes []  no                Spleen enlargement  []  yes []  no     distended []  yes [] no     bowel sound  [] hypoactive   [] hyperactive    LYMPH:    Neck []  yes [x]  no       Axillae []  yes [x]  no    SKIN:   Rashes []  yes   [x]  no    Ulcers []  yes   [x]  no               [] tight to palpitation    skin turgor []  good  [] poor  [] decreased               Cyanosis/clubbing []  yes []  no    NEUR:   [x] cranial nerves II-XII grossly intact       [] Cranial nerves deficit                 []  facial droop    []  slurred speech   [] aphasic     [] Strength normal     []  weakness  []  LUE  []   RUE/ []  LLE  []   RLE    follows commands  [x]  yes []  no           PSYCH:   insight [] poor [x] good   Alert and Oriented to  [x] person  [x] place  []  time                    [] depressed [] anxious [] agitated  [] lethargic [] stuporous  [] sedated     LAB DATA REVIEWED:    Recent Labs      05/06/18   0118   WBC  7.5   HGB  10.1*   HCT  29.4*   PLT  93*     Recent Labs      05/06/18   1312  05/06/18   0403  05/06/18   0118   NA  144  141  138   K  5.1  5.7* 6.0*   CL  112*  113*  107   CO2  22  19*  15*   BUN  50*  50*  51*   CREA  2.45*  2.49*  2.85*   GLU  118*  172*  236*   CA  8.7  8.5  9.6   MG  1.9   --    --      Recent Labs      05/06/18   0118   SGOT  55*   ALT  50   AP  173*   TBILI  2.9*   ALB  3.0*   GLOB  3.9     No results for input(s): INR, PTP, APTT in the last 72 hours. No lab exists for component: INREXT   No results for input(s): FE, TIBC, PSAT, FERR in the last 72 hours. No results for input(s): PH, PCO2, PO2 in the last 72 hours. No results for input(s): CPK, CKMB in the last 72 hours.     No lab exists for component: TROPONINI  Lab Results   Component Value Date/Time    Glucose (POC) 104 (H) 05/06/2018 12:15 PM    Glucose (POC) 137 (H) 05/06/2018 08:03 AM    Glucose (POC) 184 (H) 05/06/2018 03:50 AM    Glucose (POC) 217 (H) 05/06/2018 03:20 AM    Glucose (POC) 258 (H) 05/06/2018 02:06 AM    Glucose (POC) 259 (H) 01/10/2018 11:39 AM       Procedures: see electronic medical records for all procedures/Xrays and details which were not copied into this note but were reviewed prior to creation of Plan.    ________________________________________________________________________       ___________________________________________________  Consulting Physician: Fe Vance MD

## 2018-05-06 NOTE — IP AVS SNAPSHOT
[facility] [place] [place] [address] 
[phone] Patient: Lelia Coombs MRN: [id number] :1955 About your hospitalization You were admitted on:  May 6, 2018 You last received care in the:  [address] [place] [address] You were discharged on:  May 9, 2018 Why you were hospitalized Your primary diagnosis was: Altered Mental Status Follow-up Information Follow up With Details Comments Contact Info Jordon Blackwell MD Go on 5/15/2018 Hospital PCP f/u appointment on Tuesday May 15 @ 12:00 p.m. [address] [facility] [address] [place] [address] 
[phone] Ángela Roach MD In 1 week  [address] [facility] [address] [place] [address] 
[phone] Schedule an appointment as soon as possible for a visit in 3 days Call [phone] for pulmonary function test and ABGs on room air and ABGs on oxygen. Your Scheduled Appointments   9:00 AM EDT  
CT ABD W WO CONT with BSI CT 1 [facility] at [address] [facility] ([address] [facility] [address] [phone] CONTRAST STUDY: 1. The patient should not eat solid food four hours before the appointment but should be encouraged to drink clear liquids. 2.  If you have to drink oral contrast, please pick it up any weekday prior to your appointment, if you cannot please check in 2 hrs before appt time. 3.  The patient will require IV access for contrast administration. 4.  The patient should not take Ibuprofen (Advil, Motrin, etc.) and Naproxen Sodium (Aleve, etc.)  on the day of the exam. Stopping non-steroidal anti-inflammatory agents (NSAIDs) like Ibuprofen decreases the risk of kidney damage from the x-ray contrast (dye). 5.  Bring any non [facility] facility films/images pertaining to the area of interest with you on the day of appointment.  6.  Bring current lab work if available (within last 90 days CMP) ***If scheduled at [facility], iSTAT is not available, labs will need to be done before appointment*** 7. Check in at registration at least 30 minutes before appt time unless you were instructed to do otherwise. Thursday June 28, 2018  9:30 AM EDT Follow Up with April MELVA [address] ([facility]) [address] [place] [address] [phone] [address] [place] [address]  
[phone] Discharge Orders None A check fede indicates which time of day the medication should be taken. My Medications CHANGE how you take these medications Instructions Each Dose to Equal  
 Morning Noon Evening Bedtime  
 gabapentin 300 mg capsule Commonly known as:  NEURONTIN What changed:  when to take this Your last dose was: Your next dose is: Take 1 Cap by mouth three (3) times daily. 300 mg CONTINUE taking these medications Instructions Each Dose to Equal  
 Morning Noon Evening Bedtime  
 glimepiride 4 mg tablet Commonly known as:  AMARYL Your last dose was: Your next dose is: Take 2 mg by mouth daily. 2 mg  
    
   
   
   
  
 lactulose 20 gram/30 mL Soln solution Commonly known as:  Kayy Sciara Your last dose was: Your next dose is: Take 30 mL by mouth three (3) times daily. Adjust  dose as needed such that you have 2 loose/soft bowel movements a day without diarrhea. 20 g  
    
   
   
   
  
 levothyroxine 50 mcg tablet Commonly known as:  SYNTHROID Your last dose was: Your next dose is: Take 75 mcg by mouth Daily (before breakfast). 75 mcg  
    
   
   
   
  
 pantoprazole 40 mg tablet Commonly known as:  PROTONIX Your last dose was: Your next dose is: Take 40 mg by mouth daily. 40 mg  
    
   
   
   
  
 rifAXIMin 550 mg tablet Commonly known as:  Kathie Call  
 Your last dose was: Your next dose is: Take 1 Tab by mouth two (2) times a day. 550 mg  
    
   
   
   
  
 simvastatin 20 mg tablet Commonly known as:  ZOCOR Your last dose was: Your next dose is: Take 20 mg by mouth nightly. 20 mg  
    
   
   
   
  
 triamcinolone acetonide 0.1 % topical cream  
Commonly known as:  KENALOG Your last dose was: Your next dose is:    
   
   
 Apply  to affected area two (2) times a day. use thin layer VITAMIN D3 2,000 unit Tab Generic drug:  cholecalciferol (vitamin D3) Your last dose was: Your next dose is: Take 1 Tab by mouth two (2) times a day. 1 Tab STOP taking these medications   
 aspirin 81 mg chewable tablet  
   
  
 furosemide 40 mg tablet Commonly known as:  LASIX  
   
  
 lisinopril 20 mg tablet Commonly known as:  PRINIVIL, ZESTRIL  
   
  
 metFORMIN 500 mg tablet Commonly known as:  GLUCOPHAGE  
   
  
 spironolactone 100 mg tablet Commonly known as:  ALDACTONE Discharge Instructions Discharge Instructions PATIENT ID: Kayla Cook MRN: [id number] YOB: 1955 DATE OF ADMISSION: 5/6/2018  1:05 AM   
DATE OF DISCHARGE: 5/9/2018 PRIMARY CARE PROVIDER: Rocky Alcantar MD  
 
 
DISCHARGING PHYSICIAN: Roberto Carlos Spaulding MD   
To contact this individual call [phone] and ask the  to page. If unavailable ask to be transferred the Adult Hospitalist Department. DISCHARGE DIAGNOSES ; Hepatic encephalopathy, YANA, Liver cirrhosis, CONSULTATIONS: IP CONSULT TO HEPATOLOGY 
IP CONSULT TO NEPHROLOGY 
IP CONSULT TO CARDIOLOGY PROCEDURES/SURGERIES: * No surgery found * PENDING TEST RESULTS:  
At the time of discharge the following test results are still pending: FOLLOW UP APPOINTMENTS:  
Follow-up Information Follow up With Details Comments Contact Info Martha Arevalo MD   [address] [facility] [address] 
[phone] Rosa Tafoya MD In 1 week  [address] [place] [address] [address] 
[phone] ADDITIONAL CARE RECOMMENDATIONS:  
 
DIET: Cardiac Diet ACTIVITY: Activity as tolerated WOUND CARE: none EQUIPMENT needed:  
 
 
DISCHARGE MEDICATIONS: 
 See Medication Reconciliation Form · It is important that you take the medication exactly as they are prescribed. · Keep your medication in the bottles provided by the pharmacist and keep a list of the medication names, dosages, and times to be taken in your wallet. · Do not take other medications without consulting your doctor. NOTIFY YOUR PHYSICIAN FOR ANY OF THE FOLLOWING:  
Fever over 101 degrees for 24 hours. Chest pain, shortness of breath, fever, chills, nausea, vomiting, diarrhea, change in mentation, falling, weakness, bleeding. Severe pain or pain not relieved by medications. Or, any other signs or symptoms that you may have questions about. DISPOSITION: 
 x Home With: 
 OT  PT  [facility]  RN  
  
 SNF/Inpatient Rehab/LTAC Independent/assisted living Hospice Other: CDMP Checked:  
Yes x Signed:  
Aubrey Pizano MD 
5/9/2018 
1:51 PM 
 
  
  
  
[facility] Announcement We are excited to announce that we are making your provider's discharge notes available to you in [facility]. You will see these notes when they are completed and signed by the physician that discharged you from your recent hospital stay. If you have any questions or concerns about any information you see in [facility], please call the Health Information Department where you were seen or reach out to your Primary Care Provider for more information about your plan of care. Introducing [facility]! Dear Jenniffer Abreu: 
Thank you for requesting a [facility] account.   Our records indicate that you already have an active [employer] account. You can access your account anytime at [email or web address]. [employer] Did you know that you can access your hospital and ER discharge instructions at any time in [employer]? You can also review all of your test results from your hospital stay or ER visit. Additional Information If you have questions, please visit the Frequently Asked Questions section of the [employer] website at [email or web address] [email or web address]/. Remember, [employer] is NOT to be used for urgent needs. For medical emergencies, dial 911. Now available from your iPhone and Android! Introducing Ray Stiles As a [employer] patient, I wanted to make you aware of our electronic visit tool called Ray Stiles. [employer] allows you to connect within minutes with a medical provider 24 hours a day, seven days a week via a mobile device or tablet or logging into a secure website from your computer. You can access [employer] from anywhere in the [place]. A virtual visit might be right for you when you have a simple condition and feel like you just dont want to get out of bed, or cant get away from work for an appointment, when your regular [employer] provider is not available (evenings, weekends or holidays), or when youre out of town and need minor care. Electronic visits cost only $49 and if the [employer] provider determines a prescription is needed to treat your condition, one can be electronically transmitted to a nearby pharmacy*. Please take a moment to enroll today if you have not already done so. The enrollment process is free and takes just a few minutes. To enroll, please download the [employer] robb to your tablet or phone, or visit [email or web address] to enroll on your computer.    
And, as an [address] [place] [address] patient with a [employer] account, the results of your visits will be scanned into your electronic medical record and your primary care provider will be able to view the scanned results. We urge you to continue to see your regular [employer] provider for your ongoing medical care. And while your primary care provider may not be the one available when you seek a [employer] virtual visit, the peace of mind you get from getting a real diagnosis real time can be priceless. For more information on Ray [employer]maria d[employer], view our Frequently Asked Questions (FAQs) at [email or web address]. Sincerely, 
 
Dian Bingham MD 
Chief Medical Officer Albania Hansen *:  certain medications cannot be prescribed via [employer] Providers Seen During Your Hospitalization Provider Specialty Primary office phone Shavonne Monson MD Emergency Medicine [phone] Shun Carlin MD Hospitalist [phone] Andrews Mae MD Internal Medicine [phone] Your Primary Care Physician (PCP) Primary Care Physician Office Phone Office Fax [address] [place] [address] [phone] You are allergic to the following No active allergies Recent Documentation Height Weight BMI Smoking Status 1.829 m 111.7 kg 33.4 kg/m2 Never Smoker Emergency Contacts Name Discharge Info Relation Home Work Mobile [address] [facility] CAREGIVER [3] Spouse [3] [phone] Patient Belongings The following personal items are in your possession at time of discharge: 
  Dental Appliances: None  Visual Aid: Glasses      Home Medications: None   Jewelry: With patient, Ring  Clothing: Pants, Shirt, With patient    Other Valuables: Eyeglasses Please provide this summary of care documentation to your next provider.  
  
  
 
  
Signatures-by signing, you are acknowledging that this After Visit Summary has been reviewed with you and you have received a copy. Patient Signature:  ____________________________________________________________ Date:  ____________________________________________________________  
  
Rosendo Matsu Provider Signature:  ____________________________________________________________ Date:  ____________________________________________________________

## 2018-05-06 NOTE — PROGRESS NOTES
Admitted this am    Give K exelate, Bicarb, insert Sharma. Continue Rifaximin, Increase Lactulose titrate to 3-4 loose BM. Repeat Lytes later this morning. He has Flapping tremors. Check stool for OB.

## 2018-05-06 NOTE — CONSULTS
70 Stacie Wylie MD, FACP, [facility] [place], [place]       MELVA Anthony PA-C Johann Boos, ACNP-BC   Sandra Michelle, MELVA [facility] Law LynnCastañeda 136    at [address], [address] Marlee Boo  22.    [phone]    FAX: [address] [place] [address]    at [facility], [address] [place] [address], [address]    [phone]    FAX: [phone]       HEPATOLOGY CONSULT NOTE  The patient is well known to me and regularly cared for at [address] [address]. 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 He has been clinically stable without any overt complications of cirrhosis for the past 2 years. He had and episode of HE in 2016 but has had stable cognitive function on lactulose and xifaxan. Over the past 6 months he has had a decline in functional status without any worsening in the MELD. He has never developed ascites, had variceal bleeding and the last EGD in 1/2017 demonstrated no esophegeal varices. Yesterday he developed worsening confusion and could no longer respond to questions. He was then brought to the ED. He was found to have YANA with Screat up from a baseline of 1.2 mg to 2.8 mg.    I have reviewed the Emergency room note, Hospital admission note, Notes by all other physicians who have seen the patient during this hospitalization to date. I have reviewed the problem list and the reason for this hospitalization. I have reviewed the allergies and the medications the patient was taking at home prior to this hospitalization. ASSESSMENT AND PLAN:  Cirrhosis  This is secondary to CAMEJO. He has been clinically stable for the past 2 years.   This is his first episodes of hepatic decompensation and the first time he has developed YANA as a result of cirrhosis. CTP score 8, Child class B, MELD score 24. Will need to start liver transplant evaluation testing. HE  He had been on lactulose and xifaxan at home. He developed HE as a result of severe dehydration. This was likely precipitated from diarrhea he had from lactulose and inabililty to drink fluids because of HE. Will treat with lactulose and xifaxan. I would keep him on his normal doses of lactulose TID and xifaxan BID. His mental status and HE will improve with resolution of dehydration and improvement in renal function. YANA  This is secondary to dehydration as noted above. Screat is already going down with hydration. However, if you keep giving him saline he will develop edema and ascites which we want to avoid. Will start IV albumin 25 gm Q6H. Please limit IV saline. Hold diuretics. I have Can held his low dose ASA because this may worsen renal function in patients with cirrhosis. Anemia  Secondary to bone marrow suppression due to chronic disease and poor nutrition and portal hypertension with possible GI blood loss. No need for emergent EGD as long as HB remains stable. The last EGD about 1 year ago showed no varices. I will do elective EGD later this week after HE fully resolved. Thrombocytopenia  This is secondary to cirrhosis. No treatment needed. Liver Transplant evaluation  It will take about 4 days for the Screat to come down to near abseline so he could go home. Will start LT evaluation testing while he is here. He will need a heart catheterization and I would like to do this during this hospitalization after SCreat gets down to baseline on last day prior to DC. SYSTEM REVIEW:  Constitution systems: Progressive weakness for past 3-4 months. Eyes: Negative for visual changes. ENT: Negative for sore throat, painful swallowing. Respiratory: Negative for cough, hemoptysis, SOB.    Cardiology: Negative for chest pain, palpitations. GI:  Negative for constipation or diarrhea. : Negative for urinary frequency, dysuria, hematuria, nocturia. Skin: Negative for rash. Hematology: Negative for easy bruising, blood clots. Musculo-skelatal: Negative for back pain, muscle pain, weakness. Neurologic: Negative for headaches, dizziness, vertigo, memory problems not related to HE. Psychology: Negative for anxiety, depression. FAMILY HISTORY:  The father  of prostate cancer. The mother  of multiple myeloma. There is no family history of liver disease.       SOCIAL HISTORY:  The patient is . The patient has 3 children. The patient occasional cigar. The patient has been abstinent from alcohol since 2016. The patient is on social security. PHYSICAL EXAMINATION:  VS: per nursing note  General:  Ill appearing  Eyes:  Sclera anicteric. ENT:  No oral lesions. Thyroid normal.  Nodes:  No adenopathy. Skin:  Spider angiomata. No jaundice. Respiratory:  Lungs clear to auscultation. Cardiovascular:  Regular heart rate. Abdomen:  Soft non-tender, No obvious ascites. Extremities:  No lower extremity edema. No muscle wasting. Neurologic:  Lethargic but oriented. Mildly confused. Cranial nerves grossly intact. Asterixis present. LABORATORY:  Results for Andreas Franklin (MRN [id number]) as of 2018 16:45   Ref.  Range 2018 01:18 2018 04:03   WBC Latest Ref Range: 4.1 - 11.1 K/uL 7.5    HGB Latest Ref Range: 12.1 - 17.0 g/dL 10.1 (L)    PLATELET Latest Ref Range: 150 - 400 K/uL 93 (L)    Sodium Latest Ref Range: 136 - 145 mmol/L 138 141   Potassium Latest Ref Range: 3.5 - 5.1 mmol/L 6.0 (H) 5.7 (H)   Chloride Latest Ref Range: 97 - 108 mmol/L 107 113 (H)   CO2 Latest Ref Range: 21 - 32 mmol/L 15 (LL) 19 (L)   Glucose Latest Ref Range: 65 - 100 mg/dL 236 (H) 172 (H)   BUN Latest Ref Range: 6 - 20 MG/DL 51 (H) 50 (H)   Creatinine Latest Ref Range: 0.70 - 1.30 MG/DL 2.85 (H) 2.49 (H) Bilirubin, total Latest Ref Range: 0.2 - 1.0 MG/DL 2.9 (H)    Albumin Latest Ref Range: 3.5 - 5.0 g/dL 3.0 (L)    ALT (SGPT) Latest Ref Range: 12 - 78 U/L 50    AST Latest Ref Range: 15 - 37 U/L 55 (H)    Alk. phosphatase Latest Ref Range: 45 - 117 U/L 173 (H)    Ammonia Latest Ref Range: <32 UMOL/L 294 (H) 217 (H)       RADIOLOGY:  11/2017. CT scan abdomen with and without IV contrast.  Changes consistent with cirrhosis. No liver mass lesions. No dilated bile ducts. No ascites.       Ovidio Cifuentes MD  Liver [place] of [address] [place] [address] 8098 [facility] 7  [facility]  22.  [phone]

## 2018-05-06 NOTE — IP AVS SNAPSHOT
[address] [address] 
[phone] Patient: Edson Sheriff MRN: [id number] :1955 A check fede indicates which time of day the medication should be taken. My Medications CHANGE how you take these medications Instructions Each Dose to Equal  
 Morning Noon Evening Bedtime  
 gabapentin 300 mg capsule Commonly known as:  NEURONTIN What changed:  when to take this Your last dose was: Your next dose is: Take 1 Cap by mouth three (3) times daily. 300 mg CONTINUE taking these medications Instructions Each Dose to Equal  
 Morning Noon Evening Bedtime  
 glimepiride 4 mg tablet Commonly known as:  AMARYL Your last dose was: Your next dose is: Take 2 mg by mouth daily. 2 mg  
    
   
   
   
  
 lactulose 20 gram/30 mL Soln solution Commonly known as:  Kayy Sciara Your last dose was: Your next dose is: Take 30 mL by mouth three (3) times daily. Adjust  dose as needed such that you have 2 loose/soft bowel movements a day without diarrhea. 20 g  
    
   
   
   
  
 levothyroxine 50 mcg tablet Commonly known as:  SYNTHROID Your last dose was: Your next dose is: Take 75 mcg by mouth Daily (before breakfast). 75 mcg  
    
   
   
   
  
 pantoprazole 40 mg tablet Commonly known as:  PROTONIX Your last dose was: Your next dose is: Take 40 mg by mouth daily. 40 mg  
    
   
   
   
  
 rifAXIMin 550 mg tablet Commonly known as:  Kathie Call Your last dose was: Your next dose is: Take 1 Tab by mouth two (2) times a day. 550 mg  
    
   
   
   
  
 simvastatin 20 mg tablet Commonly known as:  ZOCOR Your last dose was: Your next dose is: Take 20 mg by mouth nightly.   
 20 mg  
    
   
   
 triamcinolone acetonide 0.1 % topical cream  
Commonly known as:  KENALOG Your last dose was: Your next dose is:    
   
   
 Apply  to affected area two (2) times a day. use thin layer VITAMIN D3 2,000 unit Tab Generic drug:  cholecalciferol (vitamin D3) Your last dose was: Your next dose is: Take 1 Tab by mouth two (2) times a day. 1 Tab STOP taking these medications   
 aspirin 81 mg chewable tablet  
   
  
 furosemide 40 mg tablet Commonly known as:  LASIX  
   
  
 lisinopril 20 mg tablet Commonly known as:  PRINIVIL, ZESTRIL  
   
  
 metFORMIN 500 mg tablet Commonly known as:  GLUCOPHAGE  
   
  
 spironolactone 100 mg tablet Commonly known as:  ALDACTONE

## 2018-05-07 LAB
AMMONIA PLAS-SCNC: 93 UMOL/L
ANION GAP SERPL CALC-SCNC: 10 MMOL/L (ref 5–15)
BUN SERPL-MCNC: 39 MG/DL (ref 6–20)
BUN/CREAT SERPL: 20 (ref 12–20)
CALCIUM SERPL-MCNC: 8.9 MG/DL (ref 8.5–10.1)
CHLORIDE SERPL-SCNC: 113 MMOL/L (ref 97–108)
CO2 SERPL-SCNC: 20 MMOL/L (ref 21–32)
CREAT SERPL-MCNC: 1.93 MG/DL (ref 0.7–1.3)
GLUCOSE BLD STRIP.AUTO-MCNC: 114 MG/DL (ref 65–100)
GLUCOSE BLD STRIP.AUTO-MCNC: 204 MG/DL (ref 65–100)
GLUCOSE BLD STRIP.AUTO-MCNC: 222 MG/DL (ref 65–100)
GLUCOSE BLD STRIP.AUTO-MCNC: 268 MG/DL (ref 65–100)
GLUCOSE SERPL-MCNC: 137 MG/DL (ref 65–100)
MAGNESIUM SERPL-MCNC: 2 MG/DL (ref 1.6–2.4)
PHOSPHATE SERPL-MCNC: 3.1 MG/DL (ref 2.6–4.7)
POTASSIUM SERPL-SCNC: 4.6 MMOL/L (ref 3.5–5.1)
SERVICE CMNT-IMP: ABNORMAL
SODIUM SERPL-SCNC: 143 MMOL/L (ref 136–145)

## 2018-05-07 PROCEDURE — 74011250636 HC RX REV CODE- 250/636: Performed by: INTERNAL MEDICINE

## 2018-05-07 PROCEDURE — 74011250637 HC RX REV CODE- 250/637: Performed by: INTERNAL MEDICINE

## 2018-05-07 PROCEDURE — 84100 ASSAY OF PHOSPHORUS: CPT | Performed by: INTERNAL MEDICINE

## 2018-05-07 PROCEDURE — 74011636637 HC RX REV CODE- 636/637: Performed by: INTERNAL MEDICINE

## 2018-05-07 PROCEDURE — 74011250637 HC RX REV CODE- 250/637: Performed by: HOSPITALIST

## 2018-05-07 PROCEDURE — 82962 GLUCOSE BLOOD TEST: CPT

## 2018-05-07 PROCEDURE — P9047 ALBUMIN (HUMAN), 25%, 50ML: HCPCS | Performed by: INTERNAL MEDICINE

## 2018-05-07 PROCEDURE — 82140 ASSAY OF AMMONIA: CPT | Performed by: HOSPITALIST

## 2018-05-07 PROCEDURE — 80048 BASIC METABOLIC PNL TOTAL CA: CPT | Performed by: INTERNAL MEDICINE

## 2018-05-07 PROCEDURE — 65270000032 HC RM SEMIPRIVATE

## 2018-05-07 PROCEDURE — 74011636637 HC RX REV CODE- 636/637: Performed by: HOSPITALIST

## 2018-05-07 PROCEDURE — 83735 ASSAY OF MAGNESIUM: CPT | Performed by: INTERNAL MEDICINE

## 2018-05-07 PROCEDURE — 36415 COLL VENOUS BLD VENIPUNCTURE: CPT | Performed by: INTERNAL MEDICINE

## 2018-05-07 RX ORDER — INSULIN LISPRO 100 [IU]/ML
INJECTION, SOLUTION INTRAVENOUS; SUBCUTANEOUS
Status: DISCONTINUED | OUTPATIENT
Start: 2018-05-07 | End: 2018-05-09 | Stop reason: HOSPADM

## 2018-05-07 RX ORDER — GABAPENTIN 300 MG/1
300 CAPSULE ORAL
Status: DISCONTINUED | OUTPATIENT
Start: 2018-05-07 | End: 2018-05-09 | Stop reason: HOSPADM

## 2018-05-07 RX ADMIN — INSULIN LISPRO 5 UNITS: 100 INJECTION, SOLUTION INTRAVENOUS; SUBCUTANEOUS at 12:36

## 2018-05-07 RX ADMIN — ALBUMIN (HUMAN) 25 G: 0.25 INJECTION, SOLUTION INTRAVENOUS at 23:19

## 2018-05-07 RX ADMIN — ALBUMIN (HUMAN) 25 G: 0.25 INJECTION, SOLUTION INTRAVENOUS at 06:00

## 2018-05-07 RX ADMIN — INSULIN LISPRO 3 UNITS: 100 INJECTION, SOLUTION INTRAVENOUS; SUBCUTANEOUS at 17:09

## 2018-05-07 RX ADMIN — INSULIN LISPRO 2 UNITS: 100 INJECTION, SOLUTION INTRAVENOUS; SUBCUTANEOUS at 22:02

## 2018-05-07 RX ADMIN — RIFAXIMIN 550 MG: 550 TABLET ORAL at 17:10

## 2018-05-07 RX ADMIN — Medication 10 ML: at 22:03

## 2018-05-07 RX ADMIN — LACTULOSE 30 G: 20 SOLUTION ORAL at 22:02

## 2018-05-07 RX ADMIN — VITAMIN D, TAB 1000IU (100/BT) 2000 UNITS: 25 TAB at 09:31

## 2018-05-07 RX ADMIN — ALBUMIN (HUMAN) 25 G: 0.25 INJECTION, SOLUTION INTRAVENOUS at 17:10

## 2018-05-07 RX ADMIN — Medication 10 ML: at 06:00

## 2018-05-07 RX ADMIN — RIFAXIMIN 550 MG: 550 TABLET ORAL at 09:31

## 2018-05-07 RX ADMIN — SIMVASTATIN 20 MG: 20 TABLET, FILM COATED ORAL at 22:02

## 2018-05-07 RX ADMIN — PANTOPRAZOLE SODIUM 40 MG: 40 TABLET, DELAYED RELEASE ORAL at 09:33

## 2018-05-07 RX ADMIN — GABAPENTIN 300 MG: 300 CAPSULE ORAL at 22:02

## 2018-05-07 RX ADMIN — LEVOTHYROXINE SODIUM 75 MCG: 75 TABLET ORAL at 07:30

## 2018-05-07 RX ADMIN — LACTULOSE 30 G: 20 SOLUTION ORAL at 17:10

## 2018-05-07 RX ADMIN — VITAMIN D, TAB 1000IU (100/BT) 2000 UNITS: 25 TAB at 17:10

## 2018-05-07 RX ADMIN — LACTULOSE 30 G: 20 SOLUTION ORAL at 09:30

## 2018-05-07 RX ADMIN — ALBUMIN (HUMAN) 25 G: 0.25 INJECTION, SOLUTION INTRAVENOUS at 12:37

## 2018-05-07 NOTE — PROGRESS NOTES
TRANSFER - IN REPORT:    Verbal report received from NickRN(name) on Julio Bennett  being received from [facility]) for routine progression of care      Report consisted of patients Situation, Background, Assessment and   Recommendations(SBAR). Information from the following report(s) SBAR, Kardex, [facility] and Recent Results was reviewed with the receiving nurse. Opportunity for questions and clarification was provided. Assessment completed upon patients arrival to unit and care assumed.

## 2018-05-07 NOTE — PROGRESS NOTES
Occupational Therapy Screening:  Services maybe indicated at this time. [facility] based on results obtained during the nursing admission assessment. The patients chart was reviewed . Please order a consult for occupational therapy if patient has had a decline in function from baseline and you would like an evaluation to be completed. Thank you.

## 2018-05-07 NOTE — PROGRESS NOTES
Primary Nurse Rosy Tafoya RN and MARCUS Rodriguez performed a dual skin assessment on this patient No impairment noted  Norman score is 19

## 2018-05-07 NOTE — PROGRESS NOTES
[address] [place] Ignacia Eisenberg 281 2439 [facility],6Th Floor       Yaneli Brothers MD, [address], [facility], [place]        April MELVA Patel, KENNETH Cordova, [facility]-BC   Jim Salas, MELVA Cowart, MELVA Garay Liver [place] of [facility]    at [address]    [address] [place] [address], [facility] Alex    [facility], Susanneczi  22.    [phone]    FAX: [address] [place] [address] Avenue    at [address], [address], [address]    [phone]    FAX: [phone]       HEPATOLOGY PROGRESS NOTE   I have reviewed the events of the past 24 hours, including all clinician notes, vital signs, medications the patient has received and laboratory studies. The patient is well known to me and regularly cared for at [address] [address]. 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 He has been clinically stable without any overt complications of cirrhosis for the past 2 years. He had and episode of HE in 2016 but has had stable cognitive function on lactulose and xifaxan. Over the past 6 months he has had a decline in functional status without any worsening in the MELD. He has never developed ascites, had variceal bleeding and the last EGD in 1/2017 demonstrated no esophogeal varices. On 5/5/18 he developed worsening confusion and could no longer respond to questions. He was then brought to the ED. He was found to have YANA with Screat up from a baseline of 1.2 mg to 2.8 mg. No acute events overnight.      ASSESSMENT AND PLAN:  Cirrhosis  This is secondary to CAMEJO. He has had hepatic decompensation over the last 2 years with HE. He started to show signs of YANA in 1/2018. CTP score 8, Child class B, MELD score 20. Will need to start liver transplant evaluation testing.     HE  He had been on lactulose and Xifaxan at home.   He developed HE as a result of severe dehydration. This was likely precipitated from diarrhea he had from lactulose and inability to drink fluids because of HE. Will treat with lactulose and Xifaxan. I would keep him on his normal doses of lactulose TID and Xifaxan BID. His mental status and HE will improve with resolution of dehydration and improvement in renal function. I also advised about protein restrictions. He has trouble remaining completely symptom free.      YANA  This is secondary to dehydration as noted above. Screat is already going down with hydration. The saline order is in but none noted in I/O flowsheet. Continue IV albumin 25 gm Q6H. Please limit IV saline. Hold diuretics. I have held his low dose ASA because this may worsen renal function in patients with cirrhosis.     Anemia  Secondary to bone marrow suppression due to chronic disease and poor nutrition and portal hypertension with possible GI blood loss. No need for emergent EGD as long as HB remains stable. The last EGD about 1 year ago showed no varices. I will do an elective EGD later this week after HE fully resolved.     Thrombocytopenia  This is secondary to cirrhosis. No treatment needed.     Liver Transplant evaluation  It will take about 4 days for the Screat to come down to near baseline so he could go home. Will start LT evaluation testing while he is here. I have spoken with the patient, wife and HUNTER Horton. She will coordinate a meeting with pt and wife tomorrow to start the process. He will need a heart catheterization and I would like to do this during this hospitalization after SCreat gets down to baseline on last day prior to DC.     SYSTEM REVIEW:  Constitution systems: Progressive weakness for past 3-4 months. Eyes: Negative for visual changes. ENT: Negative for sore throat, painful swallowing. Respiratory: Negative for cough, hemoptysis, SOB. Cardiology: Negative for chest pain, palpitations.   GI: Negative for constipation or diarrhea. : Negative for urinary frequency, dysuria, hematuria, nocturia. Skin: Negative for rash. Hematology: Negative for easy bruising, blood clots. Musculo-skeletal: Negative for back pain, muscle pain, weakness. Neurologic: Negative for headaches, dizziness, vertigo, memory problems not related to HE. Psychology: Negative for anxiety, depression.       FAMILY HISTORY:  The father  of prostate cancer.    The mother  of multiple myeloma. There is no family history of liver disease.        SOCIAL HISTORY:  The patient is .    The patient has 3 children. The patient has an occasional cigar.     The patient has been abstinent from alcohol since 2016.    The patient is on social security.      PHYSICAL EXAMINATION:  VS: per nursing note  General:  Sitting up in bed watching TV. Eyes:  Sclera anicteric. ENT:  No oral lesions. Nodes:  No adenopathy. Skin:  Spider angiomata. No jaundice. Respiratory:  Lungs clear to auscultation. Cardiovascular:  Regular heart rate. Abdomen:  Soft non-tender, no obvious ascites. Extremities:  No lower extremity edema. No muscle wasting. Neurologic:  A&O x 3. Feels much better mentally. Cranial nerves grossly intact.   Asterixis present.     LABORATORY:  Liver [place] of [address] [place] [address] Ref Rng & Units 2018   WBC 4.1 - 11.1 K/uL      ANC 1.8 - 8.0 K/UL      HGB 12.1 - 17.0 g/dL       - 400 K/uL      INR 0.8 - 1.2      AST 15 - 37 U/L      ALT 12 - 78 U/L      Alk Phos 45 - 117 U/L      Bili, Total 0.2 - 1.0 MG/DL      Albumin 3.5 - 5.0 g/dL      BUN 6 - 20 MG/DL 39 (H)  50 (H)   BUN (iSTAT) 9 - 20 mg/dL      Creat 0.70 - 1.30 MG/DL 1.93 (H)  2.45 (H)   Creat (iSTAT) 0.6 - 1.3 mg/dL      Na 136 - 145 mmol/L 143  144   K 3.5 - 5.1 mmol/L 4.6  5.1   Cl 97 - 108 mmol/L 113 (H)  112 (H)   CO2 21 - 32 mmol/L 20 (L)  22   Glucose 65 - 100 mg/dL 137 (H)  118 (H)   Magnesium 1.6 - 2.4 mg/dL 2.0  1.9   Ammonia <32 UMOL/L  136 (H)      Liver [place] of [address] Units 5/6/2018   WBC 4.1 - 11.1 K/uL    ANC 1.8 - 8.0 K/UL    HGB 12.1 - 17.0 g/dL     - 400 K/uL    INR 0.8 - 1.2    AST 15 - 37 U/L    ALT 12 - 78 U/L    Alk Phos 45 - 117 U/L    Bili, Total 0.2 - 1.0 MG/DL    Albumin 3.5 - 5.0 g/dL    BUN 6 - 20 MG/DL 50 (H)   BUN (iSTAT) 9 - 20 mg/dL    Creat 0.70 - 1.30 MG/DL 2.49 (H)   Creat (iSTAT) 0.6 - 1.3 mg/dL    Na 136 - 145 mmol/L 141   K 3.5 - 5.1 mmol/L 5.7 (H)   Cl 97 - 108 mmol/L 113 (H)   CO2 21 - 32 mmol/L 19 (L)   Glucose 65 - 100 mg/dL 172 (H)   Magnesium 1.6 - 2.4 mg/dL    Ammonia <32 UMOL/L 217 (H)      RADIOLOGY:  11/2017. CT scan abdomen with and without IV contrast.  Changes consistent with cirrhosis. No liver mass lesions. No dilated bile ducts. No ascites.     MICHELE Salinas-BC  Liver [place] [facility] [address] [facility], [address] [address] andres  22.  [phone]

## 2018-05-07 NOTE — PROGRESS NOTES
Problem: Falls - Risk of  Goal: *Absence of Falls  Document Jeet Fall Risk and appropriate interventions in the flowsheet. Outcome: Progressing Towards Goal  Fall Risk Interventions:  Mobility Interventions: Communicate number of staff needed for ambulation/transfer, Patient to call before getting OOB, PT Consult for mobility concerns    Mentation Interventions: Door open when patient unattended, Evaluate medications/consider consulting pharmacy, Increase mobility, More frequent rounding, Reorient patient, Room close to nurse's station    Medication Interventions: Evaluate medications/consider consulting pharmacy, Patient to call before getting OOB    Elimination Interventions: Call light in reach, Patient to call for help with toileting needs, Toileting schedule/hourly rounds         Bed in low position. Locked bed wheels. Call bell and personal items within reach. Side rails up x3. Hourly rounding performed.  Bed alarm on and working

## 2018-05-07 NOTE — CDMP QUERY
There is documentation of Hepatic encephalopathy for this patient; to accurately capture the SOI & ROM for this patient can this be further clarified to reflect Acute Hepatic encephalopathy in the setting of Ammonia levels 294/217/136/93, & AMS requiring Lactulose, Xifaxan and lab monitoring      => Acute and subacute hepatic failure without coma  => Other explanation of clinical findings  => Clinically Undetermined (no explanation for clinical findings)    The medical record reflects the following clinical findings, treatment, and risk factors. Risk Factors:  Dehydration    Clinical Indicators: AMS  Ammonia levels 294/217/136/93    Treatment:   Lactulose, Xifaxan, lab monitoring and Hepatology consult. Please clarify and document your clinical opinion in the progress notes and discharge summary including the definitive and/or presumptive diagnosis, (suspected or probable), related to the above clinical findings. Please include clinical findings supporting your diagnosis.     Thank you  Freeman Villa  [facility]  [phone]

## 2018-05-07 NOTE — PROGRESS NOTES
NAME: Kye Herrera        :  1955        MRN:  [id number]        Assessment :    Plan:  --CKD-3-Dr. Ling Bunn creatinine 1.3  YANA  Hyperkalemia  AMS  Metabolic acidosis  Hepatic encephalopathy  DM  HTN  Hypothyroidism --Creatinine down today. Urine sediment bland. U/S with no hydro. Good UO    Would continue IVF. Would try to hold off on cath until creatinine back to baseline. Subjective:     Chief Complaint:  \" I feel much better. \"  +stools. No dyspnea. No N/V. Review of Systems:    Symptom Y/N Comments  Symptom Y/N Comments   Fever/Chills    Chest Pain     Poor Appetite    Edema     Cough    Abdominal Pain     Sputum    Joint Pain     SOB/GRIFFITHS    Pruritis/Rash     Nausea/vomit    Tolerating PT/OT     Diarrhea    Tolerating Diet     Constipation    Other       Could not obtain due to:      Objective:     VITALS:   Last 24hrs VS reviewed since prior progress note.  Most recent are:  Visit Vitals    BP (!) 152/31 (BP 1 Location: Left arm, BP Patient Position: At rest)    Pulse 76    Temp 97.8 °F (36.6 °C)    Resp 18    Ht 6' (1.829 m)    Wt 111.7 kg (246 lb 4.1 oz)    SpO2 100%    BMI 33.4 kg/m2       Intake/Output Summary (Last 24 hours) at 18 0915  Last data filed at 18 7687   Gross per 24 hour   Intake           1007.5 ml   Output             2175 ml   Net          -1167.5 ml      Telemetry Reviewed:     PHYSICAL EXAM:  General: NAD  No edema      Lab Data Reviewed: (see below)    Medications Reviewed: (see below)    PMH/SH reviewed - no change compared to H&P  ________________________________________________________________________  Care Plan discussed with:  Patient     Family      RN     Care Manager                    Consultant:          Comments   >50% of visit spent in counseling and coordination of care ________________________________________________________________________  Garrick Aviles MD     Procedures: see electronic medical records for all procedures/Xrays and details which  were not copied into this note but were reviewed prior to creation of Plan. LABS:  Recent Labs      05/06/18   0118   WBC  7.5   HGB  10.1*   HCT  29.4*   PLT  93*     Recent Labs      05/07/18   0405  05/06/18   1312  05/06/18   0403   NA  143  144  141   K  4.6  5.1  5.7*   CL  113*  112*  113*   CO2  20*  22  19*   BUN  39*  50*  50*   CREA  1.93*  2.45*  2.49*   GLU  137*  118*  172*   CA  8.9  8.7  8.5   MG  2.0  1.9   --    PHOS  3.1   --    --      Recent Labs      05/06/18 0118   SGOT  55*   AP  173*   TP  6.9   ALB  3.0*   GLOB  3.9     No results for input(s): INR, PTP, APTT in the last 72 hours. No lab exists for component: INREXT   No results for input(s): FE, TIBC, PSAT, FERR in the last 72 hours. No results found for: FOL, RBCF   No results for input(s): PH, PCO2, PO2 in the last 72 hours. No results for input(s): CPK, CKMB in the last 72 hours.     No lab exists for component: TROPONINI  No components found for: [place]  Lab Results   Component Value Date/Time    Color DARK YELLOW 05/06/2018 05:48 PM    Appearance CLEAR 05/06/2018 05:48 PM    Specific gravity 1.017 05/06/2018 05:48 PM    pH (UA) 6.0 05/06/2018 05:48 PM    Protein NEGATIVE  05/06/2018 05:48 PM    Glucose NEGATIVE  05/06/2018 05:48 PM    Ketone NEGATIVE  05/06/2018 05:48 PM    Bilirubin NEGATIVE  05/06/2018 05:48 PM    Urobilinogen 1.0 05/06/2018 05:48 PM    Nitrites NEGATIVE  05/06/2018 05:48 PM    Leukocyte Esterase MODERATE (A) 05/06/2018 05:48 PM    Epithelial cells FEW 05/06/2018 05:48 PM    Bacteria NEGATIVE  05/06/2018 05:48 PM    WBC 20-50 05/06/2018 05:48 PM    RBC 5-10 05/06/2018 05:48 PM       MEDICATIONS:  Current Facility-Administered Medications   Medication Dose Route Frequency    cholecalciferol (VITAMIN D3) tablet 2,000 Units  2,000 Units Oral BID    levothyroxine (SYNTHROID) tablet 75 mcg  75 mcg Oral ACB    pantoprazole (PROTONIX) tablet 40 mg  40 mg Oral DAILY    rifAXIMin (XIFAXAN) tablet 550 mg  550 mg Oral BID    simvastatin (ZOCOR) tablet 20 mg  20 mg Oral QHS    sodium chloride (NS) flush 5-10 mL  5-10 mL IntraVENous Q8H    sodium chloride (NS) flush 5-10 mL  5-10 mL IntraVENous PRN    ondansetron (ZOFRAN) injection 4 mg  4 mg IntraVENous Q4H PRN    0.9% sodium chloride infusion  75 mL/hr IntraVENous CONTINUOUS    glucose chewable tablet 16 g  4 Tab Oral PRN    dextrose (D50W) injection syrg 12.5-25 g  12.5-25 g IntraVENous PRN    glucagon (GLUCAGEN) injection 1 mg  1 mg IntraMUSCular PRN    insulin lispro (HUMALOG) injection   SubCUTAneous Q6H    lactulose enema in sterile water  1,000 mL Rectal Q6H PRN    lactulose (CHRONULAC) solution 30 g  30 g Oral TID    albumin human 25% (BUMINATE) solution 25 g  25 g IntraVENous Q6H

## 2018-05-07 NOTE — PROGRESS NOTES
Hospitalist Progress Note              Susan Baptiste MD.                                                             Cell: [phone]                               NAME:  Marcella Guzman  :  1955  MRN:  [id number]  Date of Service:  2018    Summary: Patient is a 50-year-old male with history of hepatic encephalopathy and liver cirrhosis who was brought to the emergency room with complaints of altered mental status. Patient was admitted for Hepatic encephalopathy. Assessment/Plan:  Hepatic Encephalopathy: POA, d/t dehydration induced by lactulose. Ammonia on admission was 294. Continue gentle NS IVF hydration. 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 U/A unremarkable except for candiduria  - Continue IVF, and monitor renal indices  - nephrology following. Remove zaldivar    Anemia  Thrombocytopenia: Due to cirrhosis  No active bleeding. Hypothyroidism: Continue synthroid    DM type 2: On ISS as per protocol  BG is stable  Continue accu checks    Asymptomatic candiduria: remove zaldivar         Code status: full  DVT prophylaxsis:SCD  Dispo: to be determined         Interval History/Subjective:    F/u for hepatic encephalopathy  No acute overnight event. Feeling much better. Conversational. No new complaints. No N/V or abdominal pain    Review of Systems:  Pertinent items are noted in HPI. Objective:     VITALS:   Last 24hrs VS reviewed since prior progress note.  Most recent are:  Visit Vitals    BP (!) 152/31 (BP 1 Location: Left arm, BP Patient Position: At rest)    Pulse 76    Temp 97.8 °F (36.6 °C)    Resp 18    Ht 6' (1.829 m)    Wt 111.7 kg (246 lb 4.1 oz)    SpO2 100%    BMI 33.4 kg/m2       Intake/Output Summary (Last 24 hours) at 05/07/18 0943  Last data filed at 05/07/18 4574   Gross per 24 hour   Intake           1007.5 ml   Output             2175 ml   Net          -1167.5 ml        PHYSICAL EXAM:  General: No acute distress, cooperative, pleasant  EENT: EOMI. Anicteric sclerae. Oral mucous moist, oropharynx benign  Resp: CTA bilaterally. No wheezing/rhonchi/rales. No accessory muscle use  CV: Regular rhythm, normal rate, no murmurs, gallops, rubs  GI: Soft, non distended, non tender. normoactive bowel sounds, no hepatosplenomegaly Extremities: No edema, warm, 2+ pulses throughout  Neurologic: Moves all extremities. AAOx3, CN II-XII grossly intact. lethargic  Psych: Good insight. Not anxious nor agitated. Skin: Good Turgor, no rashes or ulcers  : Sharma catheter  Lab Data Personally Reviewed: (see below)     Medications list Personally Reviewed:  x YES  NO     _______________________________________________________________________  Care Plan discussed with:  Patient/Family and Nurse    Total NON critical care TIME:  30 minutes    Tiffanie Verdin MD     Procedures: see electronic medical records for all procedures/Xrays and details which were not copied into this note but were reviewed prior to creation of Plan. LABS:  Recent Labs      05/06/18   0118   WBC  7.5   HGB  10.1*   HCT  29.4*   PLT  93*     Recent Labs      05/07/18   0405  05/06/18   1312  05/06/18   0403   NA  143  144  141   K  4.6  5.1  5.7*   CL  113*  112*  113*   CO2  20*  22  19*   BUN  39*  50*  50*   CREA  1.93*  2.45*  2.49*   GLU  137*  118*  172*   CA  8.9  8.7  8.5   MG  2.0  1.9   --    PHOS  3.1   --    --      Recent Labs      05/06/18   0118   SGOT  55*   ALT  50   AP  173*   TBILI  2.9*   TP  6.9   ALB  3.0*   GLOB  3.9     No results for input(s): INR, PTP, APTT in the last 72 hours. No lab exists for component: INREXT   No results for input(s): FE, TIBC, PSAT, FERR in the last 72 hours.    No results found for: FOL, RBCF   No results for input(s): PH, PCO2, PO2 in the last 72 hours.   Recent Labs      05/06/18   0118   TROIQ  <0.04     No results found for: CHOL, CHOLX, CHLST, CHOLV, HDL, LDL, LDLC, DLDLP, TGLX, TRIGL, TRIGP, CHHD, CHHDX  Lab Results   Component Value Date/Time    Glucose (POC) 114 (H) 05/07/2018 05:57 AM    Glucose (POC) 140 (H) 05/06/2018 11:35 PM    Glucose (POC) 185 (H) 05/06/2018 09:11 PM    Glucose (POC) 131 (H) 05/06/2018 05:41 PM    Glucose (POC) 104 (H) 05/06/2018 12:15 PM     Lab Results   Component Value Date/Time    Color DARK YELLOW 05/06/2018 05:48 PM    Appearance CLEAR 05/06/2018 05:48 PM    Specific gravity 1.017 05/06/2018 05:48 PM    pH (UA) 6.0 05/06/2018 05:48 PM    Protein NEGATIVE  05/06/2018 05:48 PM    Glucose NEGATIVE  05/06/2018 05:48 PM    Ketone NEGATIVE  05/06/2018 05:48 PM    Bilirubin NEGATIVE  05/06/2018 05:48 PM    Urobilinogen 1.0 05/06/2018 05:48 PM    Nitrites NEGATIVE  05/06/2018 05:48 PM    Leukocyte Esterase MODERATE (A) 05/06/2018 05:48 PM    Epithelial cells FEW 05/06/2018 05:48 PM    Bacteria NEGATIVE  05/06/2018 05:48 PM    WBC 20-50 05/06/2018 05:48 PM    RBC 5-10 05/06/2018 05:48 PM

## 2018-05-08 LAB
AMMONIA PLAS-SCNC: 133 UMOL/L
ANION GAP SERPL CALC-SCNC: 8 MMOL/L (ref 5–15)
BUN SERPL-MCNC: 24 MG/DL (ref 6–20)
BUN/CREAT SERPL: 15 (ref 12–20)
CALCIUM SERPL-MCNC: 8.9 MG/DL (ref 8.5–10.1)
CHLORIDE SERPL-SCNC: 114 MMOL/L (ref 97–108)
CO2 SERPL-SCNC: 22 MMOL/L (ref 21–32)
CREAT SERPL-MCNC: 1.65 MG/DL (ref 0.7–1.3)
GLUCOSE BLD STRIP.AUTO-MCNC: 135 MG/DL (ref 65–100)
GLUCOSE BLD STRIP.AUTO-MCNC: 213 MG/DL (ref 65–100)
GLUCOSE BLD STRIP.AUTO-MCNC: 242 MG/DL (ref 65–100)
GLUCOSE BLD STRIP.AUTO-MCNC: 243 MG/DL (ref 65–100)
GLUCOSE SERPL-MCNC: 93 MG/DL (ref 65–100)
INR PPP: 1.5 (ref 0.9–1.1)
POTASSIUM SERPL-SCNC: 4.7 MMOL/L (ref 3.5–5.1)
PROTHROMBIN TIME: 14.8 SEC (ref 9–11.1)
SERVICE CMNT-IMP: ABNORMAL
SODIUM SERPL-SCNC: 144 MMOL/L (ref 136–145)

## 2018-05-08 PROCEDURE — 82140 ASSAY OF AMMONIA: CPT | Performed by: HOSPITALIST

## 2018-05-08 PROCEDURE — 93306 TTE W/DOPPLER COMPLETE: CPT | Performed by: INTERNAL MEDICINE

## 2018-05-08 PROCEDURE — 74011250637 HC RX REV CODE- 250/637: Performed by: SPECIALIST

## 2018-05-08 PROCEDURE — 97116 GAIT TRAINING THERAPY: CPT

## 2018-05-08 PROCEDURE — 36415 COLL VENOUS BLD VENIPUNCTURE: CPT | Performed by: HOSPITALIST

## 2018-05-08 PROCEDURE — 85610 PROTHROMBIN TIME: CPT | Performed by: PHYSICIAN ASSISTANT

## 2018-05-08 PROCEDURE — 74011250637 HC RX REV CODE- 250/637: Performed by: INTERNAL MEDICINE

## 2018-05-08 PROCEDURE — 82962 GLUCOSE BLOOD TEST: CPT

## 2018-05-08 PROCEDURE — 74011250636 HC RX REV CODE- 250/636: Performed by: INTERNAL MEDICINE

## 2018-05-08 PROCEDURE — 74011250637 HC RX REV CODE- 250/637: Performed by: HOSPITALIST

## 2018-05-08 PROCEDURE — 65270000032 HC RM SEMIPRIVATE

## 2018-05-08 PROCEDURE — 74011636637 HC RX REV CODE- 636/637: Performed by: HOSPITALIST

## 2018-05-08 PROCEDURE — 93306 TTE W/DOPPLER COMPLETE: CPT

## 2018-05-08 PROCEDURE — P9047 ALBUMIN (HUMAN), 25%, 50ML: HCPCS | Performed by: INTERNAL MEDICINE

## 2018-05-08 PROCEDURE — 80048 BASIC METABOLIC PNL TOTAL CA: CPT | Performed by: INTERNAL MEDICINE

## 2018-05-08 PROCEDURE — 97161 PT EVAL LOW COMPLEX 20 MIN: CPT

## 2018-05-08 RX ORDER — GUAIFENESIN 100 MG/5ML
81 LIQUID (ML) ORAL DAILY
Status: DISCONTINUED | OUTPATIENT
Start: 2018-05-08 | End: 2018-05-09

## 2018-05-08 RX ADMIN — Medication 10 ML: at 22:49

## 2018-05-08 RX ADMIN — LACTULOSE 30 G: 20 SOLUTION ORAL at 10:28

## 2018-05-08 RX ADMIN — ALBUMIN (HUMAN) 25 G: 0.25 INJECTION, SOLUTION INTRAVENOUS at 12:23

## 2018-05-08 RX ADMIN — ALBUMIN (HUMAN) 25 G: 0.25 INJECTION, SOLUTION INTRAVENOUS at 06:36

## 2018-05-08 RX ADMIN — ALBUMIN (HUMAN) 25 G: 0.25 INJECTION, SOLUTION INTRAVENOUS at 23:37

## 2018-05-08 RX ADMIN — GABAPENTIN 300 MG: 300 CAPSULE ORAL at 21:58

## 2018-05-08 RX ADMIN — INSULIN LISPRO 2 UNITS: 100 INJECTION, SOLUTION INTRAVENOUS; SUBCUTANEOUS at 21:58

## 2018-05-08 RX ADMIN — ASPIRIN 81 MG 81 MG: 81 TABLET ORAL at 18:38

## 2018-05-08 RX ADMIN — INSULIN LISPRO 3 UNITS: 100 INJECTION, SOLUTION INTRAVENOUS; SUBCUTANEOUS at 18:39

## 2018-05-08 RX ADMIN — RIFAXIMIN 550 MG: 550 TABLET ORAL at 18:38

## 2018-05-08 RX ADMIN — SIMVASTATIN 20 MG: 20 TABLET, FILM COATED ORAL at 21:58

## 2018-05-08 RX ADMIN — RIFAXIMIN 550 MG: 550 TABLET ORAL at 10:28

## 2018-05-08 RX ADMIN — LEVOTHYROXINE SODIUM 75 MCG: 75 TABLET ORAL at 06:35

## 2018-05-08 RX ADMIN — LACTULOSE 30 G: 20 SOLUTION ORAL at 18:41

## 2018-05-08 RX ADMIN — Medication 10 ML: at 06:32

## 2018-05-08 RX ADMIN — LACTULOSE 30 G: 20 SOLUTION ORAL at 21:58

## 2018-05-08 RX ADMIN — PANTOPRAZOLE SODIUM 40 MG: 40 TABLET, DELAYED RELEASE ORAL at 10:28

## 2018-05-08 RX ADMIN — VITAMIN D, TAB 1000IU (100/BT) 2000 UNITS: 25 TAB at 18:38

## 2018-05-08 RX ADMIN — INSULIN LISPRO 3 UNITS: 100 INJECTION, SOLUTION INTRAVENOUS; SUBCUTANEOUS at 12:23

## 2018-05-08 RX ADMIN — VITAMIN D, TAB 1000IU (100/BT) 2000 UNITS: 25 TAB at 10:28

## 2018-05-08 RX ADMIN — ALBUMIN (HUMAN) 25 G: 0.25 INJECTION, SOLUTION INTRAVENOUS at 18:38

## 2018-05-08 NOTE — PROGRESS NOTES
Hospitalist Progress Note              Sergo Ross MD.                                                             Cell: [phone]                               NAME:  Felix Gray  :  1955  MRN:  [id number]  Date of Service:  2018    Summary: Patient is a 77-year-old male with history of hepatic encephalopathy and liver cirrhosis who was brought to the emergency room with complaints of altered mental status. Patient was admitted for Hepatic encephalopathy. Assessment/Plan:  Hepatic Encephalopathy: POA, d/t dehydration induced by lactulose. Ammonia on admission was 294. Appears to have resolved although ammonia level is up this morning. 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 Transthoracic Echo ordered. For cardiac cath due to CAD risk factors  Management as per Hepatology. YANA on chronic kidney injury  Metabolic acidosis  Sec to pre-renal azotemia from dehydration  - US no hydronephrosis. U/A unremarkable except for candiduria  - Renal indices stable. IVF Discontinued  - nephrology following. Anemia  Thrombocytopenia: Due to cirrhosis  No active bleeding. Hypothyroidism: Continue synthroid    DM type 2: On ISS as per protocol  BG is stable  Continue accu checks    Asymptomatic candiduria: remove zaldivar  No treatment necessary. Code status: full  DVT prophylaxsis:SCD  Dispo: to be determined         Interval History/Subjective:    F/u for hepatic encephalopathy  No acute overnight event. No new complaints. No N/V or abdominal pain    Review of Systems:  Pertinent items are noted in HPI. Objective:     VITALS:   Last 24hrs VS reviewed since prior progress note.  Most recent are:  Visit Vitals    /47    Pulse 86    Temp 99.5 °F (37.5 °C)  Resp 16    Ht 6' (1.829 m)    Wt 111.7 kg (246 lb 4.1 oz)    SpO2 99%    BMI 33.4 kg/m2       Intake/Output Summary (Last 24 hours) at 05/08/18 1018  Last data filed at 05/08/18 0730   Gross per 24 hour   Intake              150 ml   Output                2 ml   Net              148 ml        PHYSICAL EXAM:  General: No acute distress, cooperative, pleasant  EENT: EOMI. Anicteric sclerae. Oral mucous moist, oropharynx benign  Resp: CTA bilaterally. No wheezing/rhonchi/rales. No accessory muscle use  CV: Regular rhythm, normal rate, no murmurs, gallops, rubs  GI: Soft, non distended, non tender. normoactive bowel sounds, no hepatosplenomegaly Extremities: No edema, warm, 2+ pulses throughout  Neurologic: Moves all extremities. AAOx3, CN II-XII grossly intact. lethargic  Psych: Good insight. Not anxious nor agitated. Skin: Good Turgor, no rashes or ulcers  : Sharma catheter  Lab Data Personally Reviewed: (see below)     Medications list Personally Reviewed:  x YES  NO     _______________________________________________________________________  Care Plan discussed with:  Patient/Family and Nurse    Total NON critical care TIME:  30 minutes    Gracie Read MD     Procedures: see electronic medical records for all procedures/Xrays and details which were not copied into this note but were reviewed prior to creation of Plan. LABS:  Recent Labs      05/06/18   0118   WBC  7.5   HGB  10.1*   HCT  29.4*   PLT  93*     Recent Labs      05/08/18   0535  05/07/18   0405  05/06/18   1312   NA  144  143  144   K  4.7  4.6  5.1   CL  114*  113*  112*   CO2  22  20*  22   BUN  24*  39*  50*   CREA  1.65*  1.93*  2.45*   GLU  93  137*  118*   CA  8.9  8.9  8.7   MG   --   2.0  1.9   PHOS   --   3.1   --      Recent Labs      05/06/18   0118   SGOT  55*   ALT  50   AP  173*   TBILI  2.9*   TP  6.9   ALB  3.0*   GLOB  3.9     No results for input(s): INR, PTP, APTT in the last 72 hours.     No lab exists for component: INREXT, INREXT   No results for input(s): FE, TIBC, PSAT, FERR in the last 72 hours. No results found for: FOL, RBCF   No results for input(s): PH, PCO2, PO2 in the last 72 hours.   Recent Labs      05/06/18   0118   TROIQ  <0.04     No results found for: CHOL, CHOLX, CHLST, CHOLV, HDL, LDL, LDLC, DLDLP, TGLX, TRIGL, TRIGP, CHHD, CHHDX  Lab Results   Component Value Date/Time    Glucose (POC) 135 (H) 05/08/2018 06:08 AM    Glucose (POC) 204 (H) 05/07/2018 09:15 PM    Glucose (POC) 222 (H) 05/07/2018 04:24 PM    Glucose (POC) 268 (H) 05/07/2018 12:05 PM    Glucose (POC) 114 (H) 05/07/2018 05:57 AM     Lab Results   Component Value Date/Time    Color DARK YELLOW 05/06/2018 05:48 PM    Appearance CLEAR 05/06/2018 05:48 PM    Specific gravity 1.017 05/06/2018 05:48 PM    pH (UA) 6.0 05/06/2018 05:48 PM    Protein NEGATIVE  05/06/2018 05:48 PM    Glucose NEGATIVE  05/06/2018 05:48 PM    Ketone NEGATIVE  05/06/2018 05:48 PM    Bilirubin NEGATIVE  05/06/2018 05:48 PM    Urobilinogen 1.0 05/06/2018 05:48 PM    Nitrites NEGATIVE  05/06/2018 05:48 PM    Leukocyte Esterase MODERATE (A) 05/06/2018 05:48 PM    Epithelial cells FEW 05/06/2018 05:48 PM    Bacteria NEGATIVE  05/06/2018 05:48 PM    WBC 20-50 05/06/2018 05:48 PM    RBC 5-10 05/06/2018 05:48 PM

## 2018-05-08 NOTE — PROGRESS NOTES
Problem: Discharge Planning  Goal: *Discharge to safe environment  Outcome: Progressing Towards Goal  See CM notes  DEBBIE Harding

## 2018-05-08 NOTE — PROGRESS NOTES
Reason for Admission:   Hepatic encephalopathy               RRAT Score:     22             Resources/supports as identified by patient/family:   Patient identified his wife, mother-in-law and son                  Top Challenges facing patient (as identified by patient/family and CM): None identified    Finances/Medication cost?   None identified. Patient's insurance covers Rx cost each month. Transportation? Patients' family with provide transportation at the time of discharge               Support system or lack thereof? Patient identified his wife, mother-in-law and son     Living arrangements? Patient lives with his wife, mother-in-law and adult son in a private residence in [facility]. Self-care/ADLs/Cognition? Patient requires assistance with completing ADLs/IADLs occasionally. Current Advanced Directive/Advance Care Plan:  Patient is full code and does not have advance care plan of file                          Plan for utilizing home health:    None recommended at this time. Patient denied previously use of home health                       Likelihood of readmission: Low                 Transition of Care Plan:         TBD pending patient's disposition and recommendations. Care management will be available in case any needs arise. Care Management Interventions  PCP Verified by CM: Yes  Mode of Transport at Discharge:  Other (see comment) (Patient's family )  Transition of Care Consult (CM Consult): Discharge Planning  Discharge Durable Medical Equipment: No  Physical Therapy Consult: Yes  Occupational Therapy Consult: No  Speech Therapy Consult: No  Current Support Network: Lives with Spouse  Confirm Follow Up Transport: Self  Plan discussed with Pt/Family/Caregiver: No   Resource Information Provided?: No  Discharge Location  Discharge Placement: Home with family assistance

## 2018-05-08 NOTE — PROGRESS NOTES
[address]  Rolly Castaneda MD, FACP, [address] [place], [address] [facility]  Camryn Galdamez, MELVA Lai, KENNETH CHÁVEZ, ACNP-BC   Farooq YEAGER, MELVA Conti, MELVA   [address] [facility]  [address] [facility], [address] [place]  [place], [address]    [phone]    FAX: [phone] [facility]  [address], [address]  [place], [address] [facility]    [phone]    FAX: [phone]       HEPATOLOGY PROGRESS NOTE    The patient is well known to me and regularly cared for at [facility] is a 60year old  male with cirrhosis secondary to Anahi Reyes has been clinically stable without any overt complications of cirrhosis for the past 2 years. Julián Nolan had and episode of HE in 2016 but has had stable cognitive function on lactulose and xifaxan.  Over the past 6 months he has had a decline in functional status without any worsening in the MELD.  He has never developed ascites, had variceal bleeding and the last EGD in 1/2017 demonstrated no esophogeal varices. On 5/5/18 he developed worsening confusion and could no longer respond to questions.  He was then brought to the ED. Julián Nolan was found to have YANA with Screat up from a baseline of 1.2 mg to 2.8 mg. With hydration and colloid expansion Screat has declined to 1.65 mg. His mental status is greatly improved. Ammonia remains elevated but this is a poor measure for HE and does not correlate with severity of HE. We can stop checking ammonia. We have started LT evalution testing.     ASSESSMENT AND PLAN:  Cirrhosis  This is secondary to Anahi Reyes has had hepatic decompensation over the last 2 years with HE.  He started to show signs of YANA in 1/2018.  CTP score 8, Child class B, MELD score 20.  Will need to start liver transplant evaluation testing.      HE  He had been on lactulose and Xifaxan at home. [facility] developed HE as a result of severe dehydration.  This was likely precipitated from diarrhea he had from lactulose and inability to drink fluids because of Ezzie Smaller is markedly improved just by continuing lactulose and Xifaxan and with correction of dehydration. YANA  This is secondary to dehydration as noted above.  Screat continues to improve. Can stop IV saline. Continue IV albumin 25 gm Q6H.  I have held his low dose ASA because this may worsen renal function in patients with cirrhosis.     Anemia  Secondary to bone marrow suppression due to chronic disease and poor nutrition and portal hypertension with possible GI blood loss.  No need for emergent EGD as long as HB remains stable. The last EGD about 1 year ago showed no varices.  I will do an elective EGD later this week after HE fully resolved.      Thrombocytopenia  This is secondary to cirrhosis.  No treatment needed.      Liver Transplant evaluation  I have ordered ECHO for today. We should be able to get PFTs and ABGS to assess for shunt tomorrow since mental status is now better. He has risk factors for CAD and will need cardiac catheterization. ,  Would like to do this just prior to DC after SCreat has come down to baseline. I have asked Cardiology to see him        PHYSICAL EXAMINATION:  VS: per nursing note  General:  Sitting up in bed watching TV. Eyes:  Sclera anicteric. ENT:  No oral lesions.    Nodes:  No adenopathy. Skin:  Spider angiomata.  No jaundice. Respiratory:  Lungs clear to auscultation. Cardiovascular:  Regular heart rate. Abdomen:  Soft non-tender, no obvious ascites. Extremities:  No lower extremity edema.  No muscle wasting.    Neurologic:  A&O x 3. Feels much better mentally.  Cranial nerves grossly intact.  Asterixis present.      LABORATORY:  Results for Deng Gr (MRN [id number]) as of 5/8/2018 06:57   Ref.  Range 5/6/2018 01:18 5/6/2018 04:03 5/6/2018 13:12 5/7/2018 04:05 5/8/2018 05:35   WBC Latest Ref Range: 4.1 - 11.1 K/uL 7.5       HGB Latest Ref Range: 12.1 - 17.0 g/dL 10.1 (L)       PLATELET Latest Ref Range: 150 - 400 K/uL 93 (L)       Sodium Latest Ref Range: 136 - 145 mmol/L 138 141 144 143 144   Potassium Latest Ref Range: 3.5 - 5.1 mmol/L 6.0 (H) 5.7 (H) 5.1 4.6 4.7   Chloride Latest Ref Range: 97 - 108 mmol/L 107 113 (H) 112 (H) 113 (H) 114 (H)   CO2 Latest Ref Range: 21 - 32 mmol/L 15 (LL) 19 (L) 22 20 (L) 22   Glucose Latest Ref Range: 65 - 100 mg/dL 236 (H) 172 (H) 118 (H) 137 (H) 93   BUN Latest Ref Range: 6 - 20 MG/DL 51 (H) 50 (H) 50 (H) 39 (H) 24 (H)   Creatinine Latest Ref Range: 0.70 - 1.30 MG/DL 2.85 (H) 2.49 (H) 2.45 (H) 1.93 (H) 1.65 (H)   Bilirubin, total Latest Ref Range: 0.2 - 1.0 MG/DL 2.9 (H)       Albumin Latest Ref Range: 3.5 - 5.0 g/dL 3.0 (L)       ALT (SGPT) Latest Ref Range: 12 - 78 U/L 50       AST Latest Ref Range: 15 - 37 U/L 55 (H)       Alk.  phosphatase Latest Ref Range: 45 - 117 U/L 173 (H)       Ammonia Latest Ref Range: <32 UMOL/L 294 (H) 217 (H) 136 (H) 93 (H) 133 (H)       Almita Matute MD  [facility] [place] of [address] [place] [address] Drive [address] [place] [address]. [phone]

## 2018-05-08 NOTE — PROGRESS NOTES
physical Therapy EVALUATION/DISCHARGE  Patient: Thor Christiansen (4 y.o. male)  Date: 5/8/2018  Primary Diagnosis: Altered mental status        Precautions:   Fall  ASSESSMENT :  Based on the objective data described below, the patient was admitted with AMS. Patient received seated EOB and agreeable to therapy. Pt reported prior to admission he was living in a 2 story  home with his spouse, son, and mother-in-law. Pt reported he typically remains in the home due to low tolerance to activity and will at times use a cane for mobility; pt no longer drives. Patient tolerated evaluation well. Patient oriented to self and location. Patient completed sit<>stand without AD independent, ambulated around the unit with standby assist and no LOB noted. Patient with forward flexed head and trunk, decreased step clearance bilaterally. Patient remained seated in chair with all needs met. Patient does not require any further acute skilled PT needs at this time. Will complete order. Recommend patient remain OOB to chair for all meals, ambulate with RW staff with 1 person assist for supervision. Skilled physical therapy is not indicated at this time. PLAN :  Discharge Recommendations: None  Further Equipment Recommendations for Discharge: none     SUBJECTIVE:   Patient stated I am a very independent person.     OBJECTIVE DATA SUMMARY:   HISTORY:    Past Medical History:   Diagnosis Date    Arthritis     Autoimmune disease ([facility] [address]     ITP    CAD (coronary artery disease)     enlarged heart ?     Cancer ([facility] [address]     skin ca removed from forehead    Chronic kidney disease     kidney stones    Coagulation disorder (HCC)     low plts    Diabetes (HCC)     type 2    GERD (gastroesophageal reflux disease)     Hepatic encephalopathy (HCC)     Hypertension     Ill-defined condition     obesity per pt    Ill-defined condition     anemia    Liver disease     elevated liver enzymes    PUD (peptic ulcer disease)     stomach ulcers Past Surgical History:   Procedure Laterality Date    ABDOMEN SURGERY PROC UNLISTED      ana    HX APPENDECTOMY      HX OTHER SURGICAL      mohs procedure for skin ca.  HX UROLOGICAL      vasectomy     Prior Level of Function/Home Situation: independent, ambulatory without AD, at times will use a cane. Lives with spouse, son and mother-in-law  Personal factors and/or comorbidities impacting plan of care:     Home Situation  Home Environment: Private residence  # Steps to Enter: 3  One/Two Story Residence: Two story  Living Alone: No  Support Systems: Child(niyah), Spouse/Significant Other/Partner  Patient Expects to be Discharged to[de-identified] Private residence  Current DME Used/Available at Home: [address], [place], straight, Walker, rolling, Shower chair    EXAMINATION/PRESENTATION/DECISION MAKING:   Critical Behavior:  Neurologic State: Alert  Orientation Level: Oriented to person, Oriented to place, Oriented to situation  Cognition: Appropriate decision making, Appropriate safety awareness, Follows commands     Hearing: Auditory  Auditory Impairment: None  Skin:    Edema:   Range Of Motion:  AROM: Within functional limits           PROM: Within functional limits           Strength:    Strength: Within functional limits                    Tone & Sensation:                                  Coordination:     Vision:      Functional Mobility:  Bed Mobility:              Transfers:  Sit to Stand: Independent  Stand to Sit: Independent                       Balance:   Sitting: Intact  Standing: Intact  Ambulation/Gait Training:  Distance (ft): 380 Feet (ft)  Assistive Device: Gait belt  Ambulation - Level of Assistance: Supervision        Gait Abnormalities: Decreased step clearance; Path deviations        Base of Support: Widened     Speed/Ania: Pace decreased (<100 feet/min)  Step Length: Right shortened;Left shortened                         Stairs:                Therapeutic Exercises:       Functional Measure:  Tinetti test:    Sitting Balance: 1  Arises: 1  Attempts to Rise: 2  Immediate Standing Balance: 2  Standing Balance: 2  Nudged: 2  Eyes Closed: 1  Turn 360 Degrees - Continuous/Discontinuous: 1  Turn 360 Degrees - Steady/Unsteady: 1  Sitting Down: 1  Balance Score: 14  Indication of Gait: 1  R Step Length/Height: 1  L Step Length/Height: 1  R Foot Clearance: 1  L Foot Clearance: 1  Step Symmetry: 1  Step Continuity: 1  Path: 2  Trunk: 1  Walking Time: 1  Gait Score: 11  Total Score: 25       Tinetti Test and G-code impairment scale:  Percentage of Impairment CH    0%   CI    1-19% CJ    20-39% CK    40-59% CL    60-79% CM    80-99% CN     100%   Tinetti  Score 0-28 28 23-27 17-22 12-16 6-11 1-5 0       Tinetti Tool Score Risk of Falls  <19 = High Fall Risk  19-24 = Moderate Fall Risk  25-28 = Low Fall Risk  Tinetti ME. Performance-Oriented Assessment of Mobility Problems in Elderly Patients. [address]; [id number]. (Scoring Description: PT Bulletin Feb. 10, 1993)    Older adults: Marti Fletcher et al, 2009; n = 1000 [facility] elderly evaluated with ABC, SCOUT, ADL, and IADL)  · Mean SCOUT score for males aged 69-68 years = 26.21(3.40)  · Mean SCOUT score for females age 69-68 years = 25.16(4.30)  · Mean SCOUT score for males over 80 years = 23.29(6.02)  · Mean SCOUT score for females over 80 years = 17.20(8.32)         G codes: In compliance with CMSs Claims Based Outcome Reporting, the following G-code set was chosen for this patient based on their primary functional limitation being treated: The outcome measure chosen to determine the severity of the functional limitation was the tinetti with a score of 25/28 which was correlated with the impairment scale.     ? Mobility - Walking and Moving Around:     - CURRENT STATUS: CI - 1%-19% impaired, limited or restricted    - GOAL STATUS: CI - 1%-19% impaired, limited or restricted    - D/C STATUS:  CI - 1%-19% impaired, limited or restricted        Based on the above components, the patient evaluation is determined to be of the following complexity level: LOW     Pain:  Pain Scale 1: Numeric (0 - 10)  Pain Intensity 1: 0     Activity Tolerance:   Good. VSS  Please refer to the flowsheet for vital signs taken during this treatment. After treatment:   [x]   Patient left in no apparent distress sitting up in chair  []   Patient left in no apparent distress in bed  [x]   Call bell left within reach  [x]   Nursing notified  []   Caregiver present  []   Bed alarm activated    COMMUNICATION/EDUCATION:   Communication/Collaboration:  [x]   Fall prevention education was provided and the patient/caregiver indicated understanding. [x]   Patient/family have participated as able and agree with findings and recommendations. []   Patient is unable to participate in plan of care at this time.   Findings and recommendations were discussed with: Registered Nurse    Thank you for this referral.  Asuncion Chung PT , DPT   Time Calculation: 17 mins

## 2018-05-08 NOTE — CONSULTS
Cardiology Consult Note      Patient Name: Gisele Sheehan  : 1955 MRN: [id number]  Date: 2018  Time: 11:46 AM    Admit Diagnosis: Altered mental status    Primary Cardiologist: none   Consulting Cardiologist: Radha Morales M.D.    Raegan Zazueta for Consult: pre op liver transplant    Requesting MD: Luz Cavanaugh MD    HPI:  Gisele Sheehan is a 60 y.o. male admitted on 2018  for Altered mental status. has a past medical history of Arthritis; Autoimmune disease ([facility] [address] CAD (coronary artery disease); Cancer ([facility] [address] Chronic kidney disease; Coagulation disorder ([facility] [address] Diabetes ([facility] [address] GERD (gastroesophageal reflux disease); Hepatic encephalopathy ([facility] [address] Hypertension; Ill-defined condition; Ill-defined condition; Liver disease; and PUD (peptic ulcer disease). He also has no past medical history of Adverse effect of anesthesia or Sleep apnea. Admitted for worsening confusion and hepatic encephalopathy. H/o CAMEJO and now undergoing w/u for live transplant. Patient denies h/o CAD, MI or HLD. Understands his condition very well and admits to looking forward to transplant. Admits to being a non smoker and non drinker of ETOH. No significant family history for CAD, only cancer. Subjective:  Lucid and talkative. Denies CP or SOB. Assessment and Plan     1. Cirrhosis   - 2/2 Alphia Lather   - Work up for transplant  2. HE   - Lactulose and Xifaxan  3. YANA   - Nephrology following   - IVF - Cr 1.6 - baseline 1.3  4. Anemia   - bone marrow suppresion   - check PT/INR prior to cath  5. Thrombocytopenia   - 2/2 Cirrhosis    Echo EF 65%, NRWMA. Mod PHTN. Normal valves. Checking PT/INR. Work up underway for transplant. Request for cardiac cath. D/w patient risks and procedure details. Posted for 9:30 am on . Patient's wife will be available tomorrow before cath to have all questions answered. Cardiology attending: seen and examined.  Agree with assess and plan  Minimal cardiac risk factors and no worrisome symptoms, however no real physical activity. Chest clear, cv rrr sys murmur, ext tr edema. High risk for complications related to cath because of liver disease, diabetes and renal insufficiency. Will plan for right and left heart cath wed am if INR ok. No lv gram to minimize contrast. Procedure risks benefits alternatives and potential outcomes discussed with pt and questions answered. His wife will be in early tomorrow and will discuss further with her as well. No specialty comments available. Review of Systems:     GENERAL   Recent weight loss - no   Fever -----------------   no   Chills -----------------   no     EYES, VISION   Visual Changes - no     EARS, NOSE, THROAT   Hearing loss ----------- no   Swallowing difficulties - no     CARDIOVASCULAR   Chest pain/pressure ---- no   Arrhythmia/palpitations - no       RESPIRATORY   Cough ------------------ no   Shortness of breath - no   Wheezing -------------- no   GASTROINTESTINAL   Abdominal pain - no   Heartburn -------- no   Bloody stool ----- no     GENITOURINARY   Frequent urination - no   Urgency -------------- no     MUSCULOSKELETAL   Joint pain/swelling ---- no   Musculoskeletal pain - no     SKIN & INTEGUMENTARY   Rashes - no   Sores --- no         NEUROLOGICAL   Numbness/tingling - no   Sensation loss ------ no     PSYCHIATRIC   Nervousness/anxiety - no   Depression -------------- no     ENDOCRINE   Heat/cold intolerance - no   Excessive thirst -------- no     HEMATOLOGIC/LYMPHATIC   Abnormal bleeding - no     ALL/IMMUN   Allergic reaction ------ no   Recurrent infections - no     Previous treatment/evaluation includes none . Cardiac risk factors: male gender. Past Medical History:   Diagnosis Date    Arthritis     Autoimmune disease ([facility] [address]     ITP    CAD (coronary artery disease)     enlarged heart ?     Cancer ([facility] [address]     skin ca removed from forehead    Chronic kidney disease kidney stones    Coagulation disorder (HCC)     low plts    Diabetes (HCC)     type 2    GERD (gastroesophageal reflux disease)     Hepatic encephalopathy (HCC)     Hypertension     Ill-defined condition     obesity per pt    Ill-defined condition     anemia    Liver disease     elevated liver enzymes    PUD (peptic ulcer disease)     stomach ulcers     Past Surgical History:   Procedure Laterality Date    ABDOMEN SURGERY PROC UNLISTED      ana    HX APPENDECTOMY      HX OTHER SURGICAL      mohs procedure for skin ca.     HX UROLOGICAL      vasectomy     Current Facility-Administered Medications   Medication Dose Route Frequency    insulin lispro (HUMALOG) injection   SubCUTAneous AC&HS    gabapentin (NEURONTIN) capsule 300 mg  300 mg Oral QHS    cholecalciferol (VITAMIN D3) tablet 2,000 Units  2,000 Units Oral BID    levothyroxine (SYNTHROID) tablet 75 mcg  75 mcg Oral ACB    pantoprazole (PROTONIX) tablet 40 mg  40 mg Oral DAILY    rifAXIMin (XIFAXAN) tablet 550 mg  550 mg Oral BID    simvastatin (ZOCOR) tablet 20 mg  20 mg Oral QHS    sodium chloride (NS) flush 5-10 mL  5-10 mL IntraVENous Q8H    sodium chloride (NS) flush 5-10 mL  5-10 mL IntraVENous PRN    ondansetron (ZOFRAN) injection 4 mg  4 mg IntraVENous Q4H PRN    0.9% sodium chloride infusion  75 mL/hr IntraVENous CONTINUOUS    glucose chewable tablet 16 g  4 Tab Oral PRN    dextrose (D50W) injection syrg 12.5-25 g  12.5-25 g IntraVENous PRN    glucagon (GLUCAGEN) injection 1 mg  1 mg IntraMUSCular PRN    lactulose enema in sterile water  1,000 mL Rectal Q6H PRN    lactulose (CHRONULAC) solution 30 g  30 g Oral TID    albumin human 25% (BUMINATE) solution 25 g  25 g IntraVENous Q6H       No Known Allergies   Family History   Problem Relation Age of Onset    Cancer Mother      multiple myeloma    Cancer Father      prostate    MS Sister     Cancer Maternal Grandmother      ?where    Cancer Maternal Grandfather ?where    Heart Failure Paternal Grandmother     Cancer Paternal Grandfather      ? where      Social History     Social History    Marital status:      Spouse name: N/A    Number of children: N/A    Years of education: N/A     Social History Main Topics    Smoking status: Never Smoker    Smokeless tobacco: Never Used    Alcohol use No    Drug use: None    Sexual activity: Not Asked     Other Topics Concern    None     Social History Narrative       Objective:    Physical Exam    Vitals:   Vitals:    05/07/18 1344 05/07/18 2200 05/08/18 0135 05/08/18 1020   BP: 145/70 154/61 128/47 145/68   Pulse: 78 90 86 89   Resp: 14 16 16 16   Temp: 98.4 °F (36.9 °C) 98.6 °F (37 °C) 99.5 °F (37.5 °C) 98.1 °F (36.7 °C)   SpO2: 100% 100% 99% 100%   Weight:       Height:           General:    Alert, cooperative, no distress, appears stated age. Neck:   Supple, no carotid bruit and no JVD. Back:     Symmetric, normal curvature. Lungs:     Clear to auscultation bilaterally. Heart[de-identified]    Regular rate and rhythm, S1, S2 normal, 1/6 SE murmur. NO click, rub or gallop. Abdomen:     Soft, non-tender. Bowel sounds normal.    Extremities:   Extremities normal, atraumatic, no cyanosis tr edema. Vascular:   Pulses - 2+ radials   Skin:   Skin color normal. No rashes or lesions   Neurologic:   CN II-XII grossly intact.         Telemetry: normal sinus rhythm    ECG:   EKG Results     Procedure 720 Value Units Date/Time    SCANNED CARDIAC RHYTHM STRIP [[id number]] Collected:  05/08/18 0545    Order Status:  Completed Updated:  05/08/18 0557    SCANNED CARDIAC RHYTHM STRIP [[id number]] Collected:  05/07/18 0516    Order Status:  Completed Updated:  05/07/18 0627    EKG 12 LEAD INITIAL [[id number]] Collected:  05/06/18 0118    Order Status:  Completed Updated:  05/06/18 0925     Ventricular Rate 106 BPM      Atrial Rate 106 BPM      P-R Interval 214 ms      QRS Duration 94 ms      Q-T Interval 330 ms      QTC Calculation (Bezet) 438 ms      Calculated P Axis 72 degrees      Calculated R Axis 11 degrees      Calculated T Axis 82 degrees      Diagnosis --     Sinus tachycardia with 1st degree AV block  When compared with ECG of 09-JAN-2018 15:18,  MS interval has increased  T wave inversion less evident in Lateral leads  Confirmed by Ernie Paul MD, Barbara Corona ([id number]) on 5/6/2018 9:25:00 AM            Data Review:     Radiology:   XR Results (most recent):    Results from [place] encounter on 05/06/18   XR CHEST PORT   Narrative EXAM:  XR CHEST PORT    INDICATION:  cough    COMPARISON:  1/9/2018    FINDINGS:    A portable AP radiograph of the chest was obtained at 2:11 hours. The patient is  on a cardiac monitor. The lungs are hypoinflated, but clear. The cardiac and  mediastinal contours and pulmonary vascularity are normal.  The bones and soft  tissues are unremarkable. There has been no change since the prior study. Impression IMPRESSION:     No acute process. Recent Labs      05/06/18   0118   TROIQ  <0.04     Recent Labs      05/08/18   0535  05/07/18   0405   NA  144  143   K  4.7  4.6   CL  114*  113*   CO2  22  20*   BUN  24*  39*   CREA  1.65*  1.93*   GLU  93  137*   PHOS   --   3.1   CA  8.9  8.9     Recent Labs      05/06/18   0118   WBC  7.5   HGB  10.1*   HCT  29.4*   PLT  93*     Recent Labs      05/06/18   0118   SGOT  55*   AP  173*     No results for input(s): CHOL, LDLC in the last 72 hours. No lab exists for component: TGL, HDLC,  HBA1C  No results for input(s): CRP, TSH, TSHEXT in the last 72 hours. No lab exists for component: ESR    Tinomye Glenmp. Michelle Thomas M.D.          [facility] [address] [place] [address] Rd[address] [facility] [facility] [address]     Donna Osei     [phone]    Luis Medeiros MD

## 2018-05-08 NOTE — PROGRESS NOTES
NAME: Chirag Santos        :  1955        MRN:  [id number]        Assessment :    Plan:  --CKD-3-Dr. Lenore Rutledge creatinine 1.3  YANA  Hyperkalemia  AMS  Metabolic acidosis  Hepatic encephalopathy  DM  HTN  Hypothyroidism --Creatinine continues to improve. Urine sediment bland. U/S with no hydro. Good UO    Would continue IVF. Creatinine just about at baseline. Subjective:     Chief Complaint:  \" Can you change me off of this renal diet? \"  Improved PO intake. No dyspnea. No N/V. Review of Systems:    Symptom Y/N Comments  Symptom Y/N Comments   Fever/Chills    Chest Pain     Poor Appetite    Edema     Cough    Abdominal Pain     Sputum    Joint Pain     SOB/GRIFFITHS    Pruritis/Rash     Nausea/vomit    Tolerating PT/OT     Diarrhea    Tolerating Diet     Constipation    Other       Could not obtain due to:      Objective:     VITALS:   Last 24hrs VS reviewed since prior progress note.  Most recent are:  Visit Vitals    /68    Pulse 89    Temp 98.1 °F (36.7 °C)    Resp 16    Ht 6' (1.829 m)    Wt 111.7 kg (246 lb 4.1 oz)    SpO2 100%    BMI 33.4 kg/m2       Intake/Output Summary (Last 24 hours) at 18 1131  Last data filed at 18 0730   Gross per 24 hour   Intake              150 ml   Output                2 ml   Net              148 ml      Telemetry Reviewed:     PHYSICAL EXAM:  General: NAD  No edema      Lab Data Reviewed: (see below)    Medications Reviewed: (see below)    PMH/SH reviewed - no change compared to H&P  ________________________________________________________________________  Care Plan discussed with:  Patient     Family      RN     Care Manager                    Consultant:          Comments   >50% of visit spent in counseling and coordination of care       ________________________________________________________________________  Wai Robins MD     Procedures: see electronic medical records for all procedures/Xrays and details which  were not copied into this note but were reviewed prior to creation of Plan. LABS:  Recent Labs      05/06/18   0118   WBC  7.5   HGB  10.1*   HCT  29.4*   PLT  93*     Recent Labs      05/08/18   0535  05/07/18   0405  05/06/18   1312   NA  144  143  144   K  4.7  4.6  5.1   CL  114*  113*  112*   CO2  22  20*  22   BUN  24*  39*  50*   CREA  1.65*  1.93*  2.45*   GLU  93  137*  118*   CA  8.9  8.9  8.7   MG   --   2.0  1.9   PHOS   --   3.1   --      Recent Labs      05/06/18   0118   SGOT  55*   AP  173*   TP  6.9   ALB  3.0*   GLOB  3.9     No results for input(s): INR, PTP, APTT in the last 72 hours. No lab exists for component: INREXT, INREXT   No results for input(s): FE, TIBC, PSAT, FERR in the last 72 hours. No results found for: FOL, RBCF   No results for input(s): PH, PCO2, PO2 in the last 72 hours. No results for input(s): CPK, CKMB in the last 72 hours.     No lab exists for component: TROPONINI  No components found for: [place]  Lab Results   Component Value Date/Time    Color DARK YELLOW 05/06/2018 05:48 PM    Appearance CLEAR 05/06/2018 05:48 PM    Specific gravity 1.017 05/06/2018 05:48 PM    pH (UA) 6.0 05/06/2018 05:48 PM    Protein NEGATIVE  05/06/2018 05:48 PM    Glucose NEGATIVE  05/06/2018 05:48 PM    Ketone NEGATIVE  05/06/2018 05:48 PM    Bilirubin NEGATIVE  05/06/2018 05:48 PM    Urobilinogen 1.0 05/06/2018 05:48 PM    Nitrites NEGATIVE  05/06/2018 05:48 PM    Leukocyte Esterase MODERATE (A) 05/06/2018 05:48 PM    Epithelial cells FEW 05/06/2018 05:48 PM    Bacteria NEGATIVE  05/06/2018 05:48 PM    WBC 20-50 05/06/2018 05:48 PM    RBC 5-10 05/06/2018 05:48 PM       MEDICATIONS:  Current Facility-Administered Medications   Medication Dose Route Frequency    insulin lispro (HUMALOG) injection   SubCUTAneous AC&HS    gabapentin (NEURONTIN) capsule 300 mg  300 mg Oral QHS    cholecalciferol (VITAMIN D3) tablet 2,000 Units  2,000 Units Oral BID    levothyroxine (SYNTHROID) tablet 75 mcg  75 mcg Oral ACB    pantoprazole (PROTONIX) tablet 40 mg  40 mg Oral DAILY    rifAXIMin (XIFAXAN) tablet 550 mg  550 mg Oral BID    simvastatin (ZOCOR) tablet 20 mg  20 mg Oral QHS    sodium chloride (NS) flush 5-10 mL  5-10 mL IntraVENous Q8H    sodium chloride (NS) flush 5-10 mL  5-10 mL IntraVENous PRN    ondansetron (ZOFRAN) injection 4 mg  4 mg IntraVENous Q4H PRN    0.9% sodium chloride infusion  75 mL/hr IntraVENous CONTINUOUS    glucose chewable tablet 16 g  4 Tab Oral PRN    dextrose (D50W) injection syrg 12.5-25 g  12.5-25 g IntraVENous PRN    glucagon (GLUCAGEN) injection 1 mg  1 mg IntraMUSCular PRN    lactulose enema in sterile water  1,000 mL Rectal Q6H PRN    lactulose (CHRONULAC) solution 30 g  30 g Oral TID    albumin human 25% (BUMINATE) solution 25 g  25 g IntraVENous Q6H

## 2018-05-08 NOTE — PROGRESS NOTES
Attempted to see patient for PT evaluation, however patient off the unit at this time. Will hold at this time and follow up time permitting and patient availability. Thanks.    Ilene Obrien, PT, DPT

## 2018-05-09 VITALS
WEIGHT: 246.25 LBS | TEMPERATURE: 98.3 F | BODY MASS INDEX: 33.35 KG/M2 | SYSTOLIC BLOOD PRESSURE: 145 MMHG | DIASTOLIC BLOOD PRESSURE: 72 MMHG | HEIGHT: 72 IN | HEART RATE: 76 BPM | RESPIRATION RATE: 16 BRPM | OXYGEN SATURATION: 100 %

## 2018-05-09 LAB
ANION GAP SERPL CALC-SCNC: 10 MMOL/L (ref 5–15)
BUN SERPL-MCNC: 22 MG/DL (ref 6–20)
BUN/CREAT SERPL: 16 (ref 12–20)
CALCIUM SERPL-MCNC: 8.7 MG/DL (ref 8.5–10.1)
CHLORIDE SERPL-SCNC: 113 MMOL/L (ref 97–108)
CO2 SERPL-SCNC: 21 MMOL/L (ref 21–32)
CREAT SERPL-MCNC: 1.38 MG/DL (ref 0.7–1.3)
GLUCOSE BLD STRIP.AUTO-MCNC: 124 MG/DL (ref 65–100)
GLUCOSE BLD STRIP.AUTO-MCNC: 148 MG/DL (ref 65–100)
GLUCOSE SERPL-MCNC: 166 MG/DL (ref 65–100)
POTASSIUM SERPL-SCNC: 4.5 MMOL/L (ref 3.5–5.1)
SERVICE CMNT-IMP: ABNORMAL
SERVICE CMNT-IMP: ABNORMAL
SODIUM SERPL-SCNC: 144 MMOL/L (ref 136–145)

## 2018-05-09 PROCEDURE — 82962 GLUCOSE BLOOD TEST: CPT

## 2018-05-09 PROCEDURE — 74011000250 HC RX REV CODE- 250: Performed by: SPECIALIST

## 2018-05-09 PROCEDURE — 93456 R HRT CORONARY ARTERY ANGIO: CPT

## 2018-05-09 PROCEDURE — 80048 BASIC METABOLIC PNL TOTAL CA: CPT | Performed by: INTERNAL MEDICINE

## 2018-05-09 PROCEDURE — 74011250637 HC RX REV CODE- 250/637: Performed by: SPECIALIST

## 2018-05-09 PROCEDURE — 36415 COLL VENOUS BLD VENIPUNCTURE: CPT | Performed by: INTERNAL MEDICINE

## 2018-05-09 PROCEDURE — 74011636320 HC RX REV CODE- 636/320: Performed by: SPECIALIST

## 2018-05-09 PROCEDURE — 74011250637 HC RX REV CODE- 250/637: Performed by: INTERNAL MEDICINE

## 2018-05-09 PROCEDURE — 77030013744

## 2018-05-09 PROCEDURE — 74011250636 HC RX REV CODE- 250/636: Performed by: SPECIALIST

## 2018-05-09 PROCEDURE — 77030004533 HC CATH ANGI DX IMP BSC -B

## 2018-05-09 PROCEDURE — P9047 ALBUMIN (HUMAN), 25%, 50ML: HCPCS | Performed by: INTERNAL MEDICINE

## 2018-05-09 PROCEDURE — B2111ZZ FLUOROSCOPY OF MULTIPLE CORONARY ARTERIES USING LOW OSMOLAR CONTRAST: ICD-10-PCS | Performed by: SPECIALIST

## 2018-05-09 PROCEDURE — 74011636637 HC RX REV CODE- 636/637: Performed by: HOSPITALIST

## 2018-05-09 PROCEDURE — C1894 INTRO/SHEATH, NON-LASER: HCPCS

## 2018-05-09 PROCEDURE — 4A023N6 MEASUREMENT OF CARDIAC SAMPLING AND PRESSURE, RIGHT HEART, PERCUTANEOUS APPROACH: ICD-10-PCS | Performed by: SPECIALIST

## 2018-05-09 PROCEDURE — 74011250636 HC RX REV CODE- 250/636: Performed by: INTERNAL MEDICINE

## 2018-05-09 PROCEDURE — C1751 CATH, INF, PER/CENT/MIDLINE: HCPCS

## 2018-05-09 PROCEDURE — C1769 GUIDE WIRE: HCPCS

## 2018-05-09 RX ORDER — HEPARIN SODIUM 1000 [USP'U]/ML
1000-10000 INJECTION, SOLUTION INTRAVENOUS; SUBCUTANEOUS AS NEEDED
Status: DISCONTINUED | OUTPATIENT
Start: 2018-05-09 | End: 2018-05-09

## 2018-05-09 RX ORDER — SODIUM CHLORIDE 9 MG/ML
3 INJECTION, SOLUTION INTRAVENOUS AS NEEDED
Status: DISCONTINUED | OUTPATIENT
Start: 2018-05-09 | End: 2018-05-09

## 2018-05-09 RX ORDER — SODIUM CHLORIDE 9 MG/ML
3 INJECTION, SOLUTION INTRAVENOUS CONTINUOUS
Status: DISCONTINUED | OUTPATIENT
Start: 2018-05-09 | End: 2018-05-09 | Stop reason: SDUPTHER

## 2018-05-09 RX ORDER — SODIUM CHLORIDE 0.9 % (FLUSH) 0.9 %
5 SYRINGE (ML) INJECTION AS NEEDED
Status: DISCONTINUED | OUTPATIENT
Start: 2018-05-09 | End: 2018-05-09

## 2018-05-09 RX ORDER — SODIUM CHLORIDE 9 MG/ML
1.5 INJECTION, SOLUTION INTRAVENOUS AS NEEDED
Status: DISCONTINUED | OUTPATIENT
Start: 2018-05-09 | End: 2018-05-09

## 2018-05-09 RX ORDER — SODIUM CHLORIDE 9 MG/ML
25 INJECTION, SOLUTION INTRAVENOUS CONTINUOUS
Status: DISCONTINUED | OUTPATIENT
Start: 2018-05-09 | End: 2018-05-09 | Stop reason: HOSPADM

## 2018-05-09 RX ORDER — MIDAZOLAM HYDROCHLORIDE 1 MG/ML
1-10 INJECTION, SOLUTION INTRAMUSCULAR; INTRAVENOUS
Status: DISCONTINUED | OUTPATIENT
Start: 2018-05-09 | End: 2018-05-09

## 2018-05-09 RX ORDER — FENTANYL CITRATE 50 UG/ML
25-200 INJECTION, SOLUTION INTRAMUSCULAR; INTRAVENOUS
Status: DISCONTINUED | OUTPATIENT
Start: 2018-05-09 | End: 2018-05-09

## 2018-05-09 RX ORDER — HEPARIN SODIUM 200 [USP'U]/100ML
1000 INJECTION, SOLUTION INTRAVENOUS AS NEEDED
Status: DISCONTINUED | OUTPATIENT
Start: 2018-05-09 | End: 2018-05-09

## 2018-05-09 RX ORDER — SODIUM CHLORIDE 9 MG/ML
1.5 INJECTION, SOLUTION INTRAVENOUS CONTINUOUS
Status: DISCONTINUED | OUTPATIENT
Start: 2018-05-09 | End: 2018-05-09

## 2018-05-09 RX ORDER — CLOPIDOGREL 300 MG/1
600 TABLET, FILM COATED ORAL AS NEEDED
Status: DISCONTINUED | OUTPATIENT
Start: 2018-05-09 | End: 2018-05-09

## 2018-05-09 RX ORDER — ATROPINE SULFATE 0.1 MG/ML
.5-1 INJECTION INTRAVENOUS AS NEEDED
Status: DISCONTINUED | OUTPATIENT
Start: 2018-05-09 | End: 2018-05-09

## 2018-05-09 RX ORDER — INSULIN GLARGINE 100 [IU]/ML
10 INJECTION, SOLUTION SUBCUTANEOUS
Status: DISCONTINUED | OUTPATIENT
Start: 2018-05-09 | End: 2018-05-09 | Stop reason: HOSPADM

## 2018-05-09 RX ORDER — LIDOCAINE HYDROCHLORIDE 10 MG/ML
10-30 INJECTION INFILTRATION; PERINEURAL
Status: DISCONTINUED | OUTPATIENT
Start: 2018-05-09 | End: 2018-05-09

## 2018-05-09 RX ADMIN — INSULIN LISPRO 2 UNITS: 100 INJECTION, SOLUTION INTRAVENOUS; SUBCUTANEOUS at 13:31

## 2018-05-09 RX ADMIN — FENTANYL CITRATE 25 MCG: 50 INJECTION, SOLUTION INTRAMUSCULAR; INTRAVENOUS at 09:48

## 2018-05-09 RX ADMIN — ASPIRIN 81 MG 81 MG: 81 TABLET ORAL at 08:58

## 2018-05-09 RX ADMIN — Medication 10 ML: at 06:37

## 2018-05-09 RX ADMIN — HEPARIN SODIUM 2000 UNITS: 200 INJECTION, SOLUTION INTRAVENOUS at 09:43

## 2018-05-09 RX ADMIN — ALBUMIN (HUMAN) 25 G: 0.25 INJECTION, SOLUTION INTRAVENOUS at 13:31

## 2018-05-09 RX ADMIN — ALBUMIN (HUMAN) 25 G: 0.25 INJECTION, SOLUTION INTRAVENOUS at 06:37

## 2018-05-09 RX ADMIN — LIDOCAINE HYDROCHLORIDE 10 ML: 10 INJECTION, SOLUTION INFILTRATION; PERINEURAL at 09:54

## 2018-05-09 RX ADMIN — IOPAMIDOL 59 ML: 755 INJECTION, SOLUTION INTRAVENOUS at 10:16

## 2018-05-09 RX ADMIN — SODIUM CHLORIDE 3 ML/KG/HR: 900 INJECTION, SOLUTION INTRAVENOUS at 09:30

## 2018-05-09 RX ADMIN — SODIUM CHLORIDE 25 ML/HR: 900 INJECTION, SOLUTION INTRAVENOUS at 09:48

## 2018-05-09 RX ADMIN — MIDAZOLAM HYDROCHLORIDE 1 MG: 1 INJECTION, SOLUTION INTRAMUSCULAR; INTRAVENOUS at 09:48

## 2018-05-09 NOTE — DISCHARGE SUMMARY
Discharge Summary       PATIENT ID: Rolinda Nyhan  MRN: [id number]   YOB: 1955    DATE OF ADMISSION: 5/6/2018  1:05 AM    DATE OF DISCHARGE: 5/9/2018  PRIMARY CARE PROVIDER: Paradise Murrieta MD       DISCHARGING PHYSICIAN: Landrum Opitz, MD    To contact this individual call [phone] and ask the  to page. If unavailable ask to be transferred the Adult Hospitalist Department. CONSULTATIONS: IP CONSULT TO HEPATOLOGY  IP CONSULT TO NEPHROLOGY  IP CONSULT TO CARDIOLOGY    PROCEDURES/SURGERIES: * No surgery found *    ADMITTING DIAGNOSES & HOSPITAL COURSE:     Patient is a 71-year-old male with history of hepatic encephalopathy and liver cirrhosis who was brought to the emergency room with complaints of altered mental status. Patient was admitted for Hepatic encephalopathy    Patient was rehydrated with normal saline intravenous fluid and with albumin due to hypoalbuminemia. His hepatic encephalopathy was likely precipitated by dehydration. He also received lactulose due to hyperammonemia. Hepatology- Dr Jessie You was consulted. Patient's mental status improved on admission and was at baseline prior to discharge. DISCHARGE DIAGNOSES / PLAN:      Hepatic Encephalopathy: POA, d/t dehydration induced by lactulose. Ammonia on admission was 294. Appears to have resolved although ammonia level is up this morning. We will continue lactulose and rifaximine  Resolved  No need checking ammonia levels  Continue supportive care and follow up with PCP and hepatology as an out-patient.     Liver Cirrhosis due to CAMEJO: Meld score of 24  Continue lactulose and rifaximin  He is being considered for liver transplantation. Transthoracic Echo; ef 65%. No regional wall motion abnormalities. S/p cardiac catheterization; very mild pul htn with elevated ra pressure. Left main ok, 43% mid lad (quantitative analysis), lcx and rca mild irregs.  No left heart or lv because of renal insufficiency and recent normal echo  Follow up with Hepatology as an out-patient.     YANA on chronic kidney injury  Metabolic acidosis: resolved  Sec to pre-renal azotemia from dehydration  - US no hydronephrosis. U/A unremarkable except for candiduria  - Renal indices improved and stable. IVF Discontinued  - Follow up with nephrology as an OP     Anemia  Thrombocytopenia: Due to cirrhosis  No active bleeding.     Hypothyroidism: Continue synthroid     DM type 2: On ISS as per protocol  BG range 140- 243  Add low dose lantus 10 units qhs  Continue accu checks     Asymptomatic candiduria: remove zaldivar  No treatment necessary. Code status: full  DVT prophylaxsis:SCD  Disposition prior to discharge: Patient is hemodynamically stable and has improved. Patient was discharged home. PENDING TEST RESULTS:   At the time of discharge the following test results are still pending:     FOLLOW UP APPOINTMENTS:    Follow-up Information     None           ADDITIONAL CARE RECOMMENDATIONS:     DIET: Cardiac Diet    ACTIVITY: Activity as tolerated    WOUND CARE: none    EQUIPMENT needed:       DISCHARGE MEDICATIONS:  Current Discharge Medication List            NOTIFY YOUR PHYSICIAN FOR ANY OF THE FOLLOWING:   Fever over 101 degrees for 24 hours. Chest pain, shortness of breath, fever, chills, nausea, vomiting, diarrhea, change in mentation, falling, weakness, bleeding. Severe pain or pain not relieved by medications. Or, any other signs or symptoms that you may have questions about.     DISPOSITION:   x Home With:   OT  PT  HH  RN       Long term SNF/Inpatient Rehab    Independent/assisted living    Hospice    Other:       PATIENT CONDITION AT DISCHARGE:     Functional status    Poor    x Deconditioned     Independent      Cognition    x Lucid     Forgetful     Dementia      Catheters/lines (plus indication)    Zaldivar     PICC     PEG    x None      Code status    x Full code     DNR      PHYSICAL EXAMINATION AT DISCHARGE:   Refer to Progress Note      CHRONIC MEDICAL DIAGNOSES:  Problem List as of 5/9/2018  Date Reviewed: 5/6/2018          Codes Class Noted - Resolved    * (Principal)Altered mental status ICD-10-CM: R41.82  ICD-9-CM: 780.97  5/6/2018 - Present        Type 2 diabetes with nephropathy ([facility] ICD-10-CM: E11.21  ICD-9-CM: 250.40, 583.81  3/29/2018 - Present        Hepatic encephalopathy ([facility] ICD-10-CM: K72.90  ICD-9-CM: 572.2  3/29/2018 - Present        Influenza ICD-10-CM: J11.1  ICD-9-CM: 487.1  1/10/2018 - Present        Cough ICD-10-CM: R05  ICD-9-CM: 786.2  1/9/2018 - Present        Neuropathy involving both lower extremities ICD-10-CM: G57.93  ICD-9-CM: 356.9  1/2/2018 - Present        Lower leg edema ICD-10-CM: R60.0  ICD-9-CM: 782.3  11/2/2017 - Present        CAMEJO (nonalcoholic steatohepatitis) ICD-10-CM: K75.81  ICD-9-CM: 571.8  11/2/2017 - Present        Liver cirrhosis secondary to CAMEJO ([facility] (Chronic) ICD-10-CM: K75.81, K74.60  ICD-9-CM: 571.8, 571.5  2/22/2016 - Present        Thrombocytopenia ([facility] (Chronic) ICD-10-CM: D69.6  ICD-9-CM: 287.5  2/22/2016 - Present        GERD (gastroesophageal reflux disease) (Chronic) ICD-10-CM: K21.9  ICD-9-CM: 530.81  2/22/2016 - Present        CAD (coronary artery disease) (Chronic) ICD-10-CM: I25.10  ICD-9-CM: 414.00  2/22/2016 - Present        DM type 2 (diabetes mellitus, type 2) ([facility] (Chronic) ICD-10-CM: E11.9  ICD-9-CM: 250.00  2/22/2016 - Present        RESOLVED: Hepatic encephalopathy ([facility] ICD-10-CM: K72.90  ICD-9-CM: 572.2  2/22/2016 - 2/23/2016              Greater than 30 minutes were spent with the patient on counseling and coordination of care    Signed:   Kenrick Bailey MD  5/9/2018  1:45 PM

## 2018-05-09 NOTE — PROGRESS NOTES
Came by to see patient. He is still in cath lab. Will come back later. Nurse navigator meeting with family regarding transplant will happen today at 15:00. Chiquita Hoyos NP  11:49 AM

## 2018-05-09 NOTE — ROUTINE PROCESS
TRANSFER - IN REPORT:    Verbal report received from Oleksandr, [place] (name) on Lelia Dong  being received from Cath lab (unit) for routine progression of care      Report consisted of patients Situation, Background, Assessment and   Recommendations(SBAR). Information from the following report(s) SBAR, Kardex, Procedure Summary, Intake/Output and MAR was reviewed with the receiving nurse. Opportunity for questions and clarification was provided.

## 2018-05-09 NOTE — PROGRESS NOTES
Hospitalist Progress Note              Aleksandr Ortiz MD.                                                             Cell: [phone]                               NAME:  Vickey Perrin  :  1955  MRN:  [id number]  Date of Service:  2018    Summary: Patient is a 25-year-old male with history of hepatic encephalopathy and liver cirrhosis who was brought to the emergency room with complaints of altered mental status. Patient was admitted for Hepatic encephalopathy. Assessment/Plan:  Hepatic Encephalopathy: POA, d/t dehydration induced by lactulose. Ammonia on admission was 294. Appears to have resolved although ammonia level is up this morning. We will continue lactulose and rifaximine  Resolved  No need checking ammonia levels  Continue supportive care    Liver Cirrhosis due to CAMEJO: Meld score of 24  Continue lactulose and rifaximin  He is being considered for liver transplantation. Transthoracic Echo; ef 65%. No regional wall motion abnormalities. S/p cardiac catheterization; very mild pul htn with elevated ra pressure. Left main ok, 43% mid lad (quantitative analysis), lcx and rca mild irregs. No left heart or lv because of renal insufficiency and recent normal echo  Management as per Hepatology. YANA on chronic kidney injury  Metabolic acidosis: resolved  Sec to pre-renal azotemia from dehydration  - US no hydronephrosis. U/A unremarkable except for candiduria  - Renal indices stable. IVF Discontinued  - nephrology following. Anemia  Thrombocytopenia: Due to cirrhosis  No active bleeding. Hypothyroidism: Continue synthroid    DM type 2: On ISS as per protocol  BG range 140- 243  Add low dose lantus 1o units qhs  Continue accu checks    Asymptomatic candiduria: remove zaldivar  No treatment necessary. Code status: full  DVT prophylaxsis:SCD  Dispo: d/c home later today after liver transplant meeting.      Interval History/Subjective:    F/u for hepatic encephalopathy  No acute overnight event. No new complaints. Review of Systems:  Pertinent items are noted in HPI. Objective:     VITALS:   Last 24hrs VS reviewed since prior progress note. Most recent are:  Visit Vitals    /66    Pulse 71    Temp 97.8 °F (36.6 °C)    Resp 16    Ht 6' (1.829 m)    Wt 111.7 kg (246 lb 4.1 oz)    SpO2 100%    BMI 33.4 kg/m2     No intake or output data in the 24 hours ending 05/09/18 1336     PHYSICAL EXAM:  General: No acute distress, cooperative, pleasant  EENT: EOMI. Anicteric sclerae. Oral mucous moist, oropharynx benign  Resp: CTA bilaterally. No wheezing/rhonchi/rales. No accessory muscle use  CV: Regular rhythm, normal rate, no murmurs, gallops, rubs  GI: Soft, non distended, non tender. normoactive bowel sounds, no hepatosplenomegaly Extremities: No edema, warm, 2+ pulses throughout  Neurologic: Moves all extremities. AAOx3, CN II-XII grossly intact. lethargic  Psych: Good insight. Not anxious nor agitated. Skin: Good Turgor, no rashes or ulcers  :  Right groin- no hematoma  Lab Data Personally Reviewed: (see below)     Medications list Personally Reviewed:  x YES  NO     _______________________________________________________________________  Care Plan discussed with:  Patient/Family and Nurse    Total NON critical care TIME:  30 minutes    Shaunna Oswald MD     Procedures: see electronic medical records for all procedures/Xrays and details which were not copied into this note but were reviewed prior to creation of Plan. LABS:  No results for input(s): WBC, HGB, HCT, PLT, HGBEXT, HCTEXT, PLTEXT, HGBEXT, HCTEXT, PLTEXT in the last 72 hours.   Recent Labs      05/09/18   0214  05/08/18   0535  05/07/18   0405   NA  144  144  143   K  4.5  4.7  4.6   CL  113*  114*  113*   CO2  21  22  20*   BUN  22*  24*  39*   CREA  1.38*  1.65*  1.93*   GLU  166*  93  137*   CA  8.7  8.9  8.9   MG   --    --   2.0 PHOS   --    --   3.1     No results for input(s): SGOT, GPT, ALT, AP, TBIL, TBILI, TP, ALB, GLOB, GGT, AML, LPSE in the last 72 hours. No lab exists for component: AMYP, HLPSE  Recent Labs      05/08/18   1409   INR  1.5*   PTP  14.8*      No results for input(s): FE, TIBC, PSAT, FERR in the last 72 hours. No results found for: FOL, RBCF   No results for input(s): PH, PCO2, PO2 in the last 72 hours. No results for input(s): CPK, CKNDX, TROIQ in the last 72 hours.     No lab exists for component: CPKMB  No results found for: CHOL, CHOLX, CHLST, CHOLV, HDL, LDL, LDLC, DLDLP, TGLX, TRIGL, TRIGP, CHHD, CHHDX  Lab Results   Component Value Date/Time    Glucose (POC) 148 (H) 05/09/2018 12:55 PM    Glucose (POC) 124 (H) 05/09/2018 06:23 AM    Glucose (POC) 213 (H) 05/08/2018 08:45 PM    Glucose (POC) 242 (H) 05/08/2018 04:29 PM    Glucose (POC) 243 (H) 05/08/2018 12:07 PM     Lab Results   Component Value Date/Time    Color DARK YELLOW 05/06/2018 05:48 PM    Appearance CLEAR 05/06/2018 05:48 PM    Specific gravity 1.017 05/06/2018 05:48 PM    pH (UA) 6.0 05/06/2018 05:48 PM    Protein NEGATIVE  05/06/2018 05:48 PM    Glucose NEGATIVE  05/06/2018 05:48 PM    Ketone NEGATIVE  05/06/2018 05:48 PM    Bilirubin NEGATIVE  05/06/2018 05:48 PM    Urobilinogen 1.0 05/06/2018 05:48 PM    Nitrites NEGATIVE  05/06/2018 05:48 PM    Leukocyte Esterase MODERATE (A) 05/06/2018 05:48 PM    Epithelial cells FEW 05/06/2018 05:48 PM    Bacteria NEGATIVE  05/06/2018 05:48 PM    WBC 20-50 05/06/2018 05:48 PM    RBC 5-10 05/06/2018 05:48 PM

## 2018-05-09 NOTE — PROGRESS NOTES
Hospital PCP LEATHA follow-up appointment with Cynthia Paul on Tuesday May 15,2018 @ 12:00 p.m.  Added to AVS.   Lorie Polo CM Specialist

## 2018-05-09 NOTE — PROGRESS NOTES
D/C paperwork printed and reviewed with pt and wife. Pt given copy of d/c instructions. Pt verbalized understanding of information. This nurse emphasized the importance of making follow-up apts especially for his Pulmonary Function Tests. Pt going to be seen and followed by [facility] [employer] after his discharge home. All lines discontinued. Pt changed and belongings collected. Unit staff to escort him off floor in wheelchair.

## 2018-05-09 NOTE — PROGRESS NOTES
NAME: Savita Gómez        :  1955        MRN:  [id number]        Assessment :    Plan:  --CKD-3-Dr. Samantha Chicas creatinine 1.3  YANA  Hyperkalemia  AMS  Metabolic acidosis  Hepatic encephalopathy  DM  HTN  Hypothyroidism --Creatinine continues to improve. Urine sediment bland. U/S with no hydro. Good UO. Creatinine appears to be at baseline. Subjective:     Chief Complaint:  \"My test went fine. \"    No dyspnea. No N/V. Review of Systems:    Symptom Y/N Comments  Symptom Y/N Comments   Fever/Chills    Chest Pain     Poor Appetite    Edema     Cough    Abdominal Pain     Sputum    Joint Pain     SOB/GRIFFITHS    Pruritis/Rash     Nausea/vomit    Tolerating PT/OT     Diarrhea    Tolerating Diet     Constipation    Other       Could not obtain due to:      Objective:     VITALS:   Last 24hrs VS reviewed since prior progress note. Most recent are:  Visit Vitals    /70    Pulse 77    Temp 97.7 °F (36.5 °C)    Resp 16    Ht 6' (1.829 m)    Wt 111.7 kg (246 lb 4.1 oz)    SpO2 100%    BMI 33.4 kg/m2     No intake or output data in the 24 hours ending 18 1435   Telemetry Reviewed:     PHYSICAL EXAM:  General: NAD  No edema      Lab Data Reviewed: (see below)    Medications Reviewed: (see below)    PMH/SH reviewed - no change compared to H&P  ________________________________________________________________________  Care Plan discussed with:  Patient     Family      RN     Care Manager                    Consultant:          Comments   >50% of visit spent in counseling and coordination of care       ________________________________________________________________________  Megan Rasmussen MD     Procedures: see electronic medical records for all procedures/Xrays and details which  were not copied into this note but were reviewed prior to creation of Plan.       LABS:  No results for input(s): WBC, HGB, HCT, PLT, HGBEXT, HCTEXT, PLTEXT, HGBEXT, HCTEXT, PLTEXT in the last 72 hours. Recent Labs      05/09/18   0214  05/08/18   0535  05/07/18   0405   NA  144  144  143   K  4.5  4.7  4.6   CL  113*  114*  113*   CO2  21  22  20*   BUN  22*  24*  39*   CREA  1.38*  1.65*  1.93*   GLU  166*  93  137*   CA  8.7  8.9  8.9   MG   --    --   2.0   PHOS   --    --   3.1     No results for input(s): SGOT, GPT, AP, TBIL, TP, ALB, GLOB, GGT, AML, LPSE in the last 72 hours. No lab exists for component: AMYP, HLPSE  Recent Labs      05/08/18   1409   INR  1.5*   PTP  14.8*      No results for input(s): FE, TIBC, PSAT, FERR in the last 72 hours. No results found for: FOL, RBCF   No results for input(s): PH, PCO2, PO2 in the last 72 hours. No results for input(s): CPK, CKMB in the last 72 hours.     No lab exists for component: TROPONINI  No components found for: [place]  Lab Results   Component Value Date/Time    Color DARK YELLOW 05/06/2018 05:48 PM    Appearance CLEAR 05/06/2018 05:48 PM    Specific gravity 1.017 05/06/2018 05:48 PM    pH (UA) 6.0 05/06/2018 05:48 PM    Protein NEGATIVE  05/06/2018 05:48 PM    Glucose NEGATIVE  05/06/2018 05:48 PM    Ketone NEGATIVE  05/06/2018 05:48 PM    Bilirubin NEGATIVE  05/06/2018 05:48 PM    Urobilinogen 1.0 05/06/2018 05:48 PM    Nitrites NEGATIVE  05/06/2018 05:48 PM    Leukocyte Esterase MODERATE (A) 05/06/2018 05:48 PM    Epithelial cells FEW 05/06/2018 05:48 PM    Bacteria NEGATIVE  05/06/2018 05:48 PM    WBC 20-50 05/06/2018 05:48 PM    RBC 5-10 05/06/2018 05:48 PM       MEDICATIONS:  Current Facility-Administered Medications   Medication Dose Route Frequency    insulin glargine (LANTUS) injection 10 Units  10 Units SubCUTAneous QHS    insulin lispro (HUMALOG) injection   SubCUTAneous AC&HS    gabapentin (NEURONTIN) capsule 300 mg  300 mg Oral QHS    cholecalciferol (VITAMIN D3) tablet 2,000 Units  2,000 Units Oral BID    levothyroxine (SYNTHROID) tablet 75 mcg  75 mcg Oral ACB    pantoprazole (PROTONIX) tablet 40 mg  40 mg Oral DAILY    rifAXIMin (XIFAXAN) tablet 550 mg  550 mg Oral BID    simvastatin (ZOCOR) tablet 20 mg  20 mg Oral QHS    sodium chloride (NS) flush 5-10 mL  5-10 mL IntraVENous Q8H    sodium chloride (NS) flush 5-10 mL  5-10 mL IntraVENous PRN    ondansetron (ZOFRAN) injection 4 mg  4 mg IntraVENous Q4H PRN    glucose chewable tablet 16 g  4 Tab Oral PRN    dextrose (D50W) injection syrg 12.5-25 g  12.5-25 g IntraVENous PRN    glucagon (GLUCAGEN) injection 1 mg  1 mg IntraMUSCular PRN    lactulose enema in sterile water  1,000 mL Rectal Q6H PRN    lactulose (CHRONULAC) solution 30 g  30 g Oral TID    albumin human 25% (BUMINATE) solution 25 g  25 g IntraVENous Q6H

## 2018-05-09 NOTE — DIABETES MGMT
Progress Note     Chart reviewed for elevated blood glucose ( > 180 mg/dL x 2 in the past 24 hours). Recommendations/ Comments: If appropriate, please consider adding a basal insulin while inpatient, such as Lantus 11 units (0.1 units/kg/day) if glucose continues to be > 140 mg/dL. Morning glucose: 124 mg/dL (per am POCT Glucose). Required 8 units of correction in the last 24 hours. Inpatient medications for glucose management:  1. Correction Scale: Lispro (Humalog) Normal Sensitivity scale to cover for glucose > 139 mg/dL before meals and for glucose >199 at bedtime        POC Glucose last 24hrs:   Lab Results   Component Value Date/Time     (H) 05/09/2018 02:14 AM    GLUCPOC 124 (H) 05/09/2018 06:23 AM    GLUCPOC 213 (H) 05/08/2018 08:45 PM    GLUCPOC 242 (H) 05/08/2018 04:29 PM        Estimated Creatinine Clearance: 70.7 mL/min (based on Cr of 1.38). Diet order:   Active Orders   Diet    DIET NPO        PO intake: Patient Vitals for the past 72 hrs:   % Diet Eaten   05/06/18 1715 30 %   05/06/18 1400 15 %       History of Diabetes:   Rolinda Nyhan is a 60 y.o. male with a past medical history significant for DM per  Uma Drew MD's H&P dated 5/6/2018. Prior to admission medications for glucose management: per past medical records  -Metformin 500 mg- twice a day with meals   -Amaryl 2mg daily     A1C:   Lab Results   Component Value Date/Time    Hemoglobin A1c 4.7 05/06/2018 04:03 AM    Hemoglobin A1c 6.3 02/23/2016 02:06 AM       Reference range*:  Increased risk for diabetes: 5.7 - 6.4%  Diabetes: >6.4%  Glycemic control for adults with diabetes: <7.0 %    *RICKI FERRER (2014). Diagnosis and classification of diabetes mellitus. Diabetes care, 37, S81. Thank you. Kenton Luevano.  Ray MPH, RN, BSN, [address]   [phone]    -A target glucose range of 140180 mg/dL (7.810.0 mmol/L) is recommended for the majority of critically ill patients and noncritically ill patients. -More stringent goals, such as 110140 mg/dL (6.17.8 mmol/L), may be appropriate for selected patients, if this can be achieved without significant hypoglycemia. *    *Diabetes Care in the Hospital: Kary Hansen in Diabetes2018  American Diabetes Association  Diabetes Care Jan 2018, 41 (Supplement 1) [id number]; DOI: 10.2337/tm96-M305

## 2018-05-09 NOTE — PROGRESS NOTES
[address]  Farzana Bro MD, FACP, [address] [place], [address] [facility]  Anita Hernandez, MELVA Leyva, KENNETH CHÁVEZ, ACNP-BC   Farooq YEAGER, MELVA Mayorga NP   [address] [facility]  [address] [facility], [address] [address]  [place], [address]    [phone]    FAX: [phone] [address]    at [facility]  [address], [address]  [place], [address] [facility]    [phone]    FAX: [phone]       HEPATOLOGY PROGRESS NOTE    The patient is well known to me and regularly cared for at [facility] is a 60year old  male with cirrhosis secondary to [place] has been clinically stable without any overt complications of cirrhosis for the past 2 years. Bharathi Oneill had and episode of HE in 2016 but has had stable cognitive function on lactulose and xifaxan.  Over the past 6 months he has had a decline in functional status without any worsening in the MELD.  He has never developed ascites, had variceal bleeding and the last EGD in 1/2017 demonstrated no esophogeal varices. On 5/5/18 he developed worsening confusion and could no longer respond to questions.  He was then brought to the ED. Bharathi Oneill was found to have YANA with Screat up from a baseline of 1.2 mg to 2.8 mg. With hydration and colloid expansion Screat has declined to 1.65 mg. His mental status is greatly improved. Ammonia remains elevated but this is a poor measure for HE and does not correlate with severity of HE. We can stop checking ammonia. We have started LT evalution testing.     ASSESSMENT AND PLAN:  Cirrhosis  This is secondary to [place] has had hepatic decompensation over the last 2 years with HE.  He started to show signs of YANA in 1/2018.  CTP score 8, Child class B, MELD score 20.       HE  He had been on lactulose and Xifaxan at home. Kenna Gordon developed HE as a result of severe dehydration.  This was likely precipitated from diarrhea he had from lactulose and inability to drink fluids because of Malini Damari is markedly improved just by continuing lactulose and Xifaxan and with correction of dehydration. YANA  This is secondary to dehydration as noted above.  Screat continues to improve. Continue IV albumin 25 gm Q6H.  I have held his low dose ASA because this may worsen renal function in patients with cirrhosis.     Anemia  Secondary to bone marrow suppression due to chronic disease and poor nutrition and portal hypertension with possible GI blood loss.  No need for emergent EGD as long as HB remains stable. The last EGD about 1 year ago showed no varices. I will do an elective EGD later this week after HE fully resolved.      Thrombocytopenia  This is secondary to cirrhosis.  No treatment needed.      Liver Transplant evaluation  Kyra met with the patient and family today. Cath was completed this morning and showed no significant disease. We have ordered PFTs to be done tomorrow morning, so hopefully patient can be discharged tomorrow.         PHYSICAL EXAMINATION:  VS: per nursing note  General:  Sitting up in bed watching TV. Eyes:  Sclera anicteric. ENT:  No oral lesions.    Nodes:  No adenopathy. Skin:  Spider angiomata.  No jaundice. Respiratory:  Lungs clear to auscultation. Cardiovascular:  Regular heart rate. Abdomen:  Soft non-tender, no obvious ascites. Extremities:  No lower extremity edema.  No muscle wasting.    Neurologic:  A&O x 3.  Feels much better mentally.  Cranial nerves grossly intact.  Asterixis present.      LABORATORY:  Liver [place] of [address] & Units 5/9/2018 5/8/2018 5/7/2018   WBC 4.1 - 11.1 K/uL      ANC 1.8 - 8.0 K/UL      HGB 12.1 - 17.0 g/dL       - 400 K/uL      INR 0.9 - 1.1    1.5 (H)    AST 15 - 37 U/L      ALT 12 - 78 U/L      Alk Phos 45 - 117 U/L      Bili, Total 0.2 - 1.0 MG/DL      Albumin 3.5 - 5.0 g/dL      BUN 6 - 20 MG/DL 22 (H) 24 (H) 39 (H)   BUN (iSTAT) 9 - 20 mg/dL      Creat 0.70 - 1.30 MG/DL 1.38 (H) 1.65 (H) 1.93 (H)   Creat (iSTAT) 0.6 - 1.3 mg/dL      Na 136 - 145 mmol/L 144 144 143   K 3.5 - 5.1 mmol/L 4.5 4.7 4.6   Cl 97 - 108 mmol/L 113 (H) 114 (H) 113 (H)   CO2 21 - 32 mmol/L 21 22 20 (L)   Glucose 65 - 100 mg/dL 166 (H) 93 137 (H)   Magnesium 1.6 - 2.4 mg/dL   2.0   Ammonia <32 UMOL/L  133 (H) 93 (H)     MICHELE Castro-BC  Liver [place] of [address] [place] [address], [address] [address]  22. [phone]

## 2018-05-09 NOTE — DISCHARGE INSTRUCTIONS
Discharge Instructions       PATIENT ID: Terrance Gregorio  MRN: [id number]   YOB: 1955    DATE OF ADMISSION: 5/6/2018  1:05 AM    DATE OF DISCHARGE: 5/9/2018    PRIMARY CARE PROVIDER: Ingrid Pinto MD       DISCHARGING PHYSICIAN: Edita Barr MD    To contact this individual call [phone] and ask the  to page. If unavailable ask to be transferred the Adult Hospitalist Department. DISCHARGE DIAGNOSES ; Hepatic encephalopathy, YANA, Liver cirrhosis,     CONSULTATIONS: IP CONSULT TO HEPATOLOGY  IP CONSULT TO NEPHROLOGY  IP CONSULT TO CARDIOLOGY    PROCEDURES/SURGERIES: * No surgery found *    PENDING TEST RESULTS:   At the time of discharge the following test results are still pending:     FOLLOW UP APPOINTMENTS:   Follow-up Information     Follow up With Details Comments 119 Markus Cadet MD   [address] [place] [address] [address] [facility]      David Obando MD In 1 week  [address] [place] [address] [phone]             ADDITIONAL CARE RECOMMENDATIONS:     DIET: Cardiac Diet    ACTIVITY: Activity as tolerated    WOUND CARE: none    EQUIPMENT needed:       DISCHARGE MEDICATIONS:   See Medication Reconciliation Form    · It is important that you take the medication exactly as they are prescribed. · Keep your medication in the bottles provided by the pharmacist and keep a list of the medication names, dosages, and times to be taken in your wallet. · Do not take other medications without consulting your doctor. NOTIFY YOUR PHYSICIAN FOR ANY OF THE FOLLOWING:   Fever over 101 degrees for 24 hours. Chest pain, shortness of breath, fever, chills, nausea, vomiting, diarrhea, change in mentation, falling, weakness, bleeding. Severe pain or pain not relieved by medications. Or, any other signs or symptoms that you may have questions about.       DISPOSITION:   x Home With:   OT  PT  [place]  RN       SNF/Inpatient Rehab/LTAC Independent/assisted living    Hospice    Other:     CDMP Checked:   Yes x       Signed:   Jess Rodriguez MD  5/9/2018  1:51 PM

## 2018-05-09 NOTE — PROGRESS NOTES
Cardiac Cath Lab Procedure Area Arrival Note:    Noemy Sandoval arrived to Cardiac Cath Lab, Procedure Area. Patient identifiers verified with NAME and DATE OF BIRTH. Procedure verified with patient. Consent forms verified. Allergies verified. Patient informed of procedure and plan of care. Questions answered with review. Patient voiced understanding of procedure and plan of care. Patient on cardiac monitor, non-invasive blood pressure, SPO2 monitor. On room air and placed on or O2 @ 2 lpm via nc. IV of ns on pump at 25 ml/hr. Patient status doing well without problems. Patient is A&Ox 3. Patient reports no pain. Patient medicated during procedure with orders obtained and verified by Dr. eJro Hung. Refer to patients Cardiac Cath Lab PROCEDURE REPORT for vital signs, assessment, status, and response during procedure, printed at end of case. Printed report on chart or scanned into chart.

## 2018-05-09 NOTE — PROGRESS NOTES
Patient tolerated procedure R/LHC well, stable, no c/o of pain or discomfort. Received 1mg of Midazolam and 25 mcg Fentanyl. Right femoral vein and artery 6 fr shaeth to be pulled in recovery room. Report given to [facility]3] RTR prior transferring patient to recovery room for further monitoring.

## 2018-05-09 NOTE — PROGRESS NOTES
TRANSFER - OUT REPORT: Pt. Transported back to room 538    Verbal report given to AMY Dunn RN on Gisele Sheehan being transferred to [facility] for routine progression of care       Report consisted of patients Situation, Background, Assessment and   Recommendations(SBAR). Information from the following report(s) Procedure Summary, MAR and Recent Results was reviewed with the receiving nurse. Opportunity for questions and clarification was provided.

## 2018-05-09 NOTE — PROGRESS NOTES
TRANSFER - IN REPORT:    Verbal report received from EHSAN Zavala RN on [employer], Procedure Cath procedure with no stent , from the Cardiac Cath lab, for routine progression of care. Report consisted of patients Situation, Background, Assessment and Recommendations(SBAR). Information from the following report(s) Procedure Summary, MAR and Recent Results was reviewed with the receiving clinician. Opportunity for questions and clarification was provided. Assessment completed upon patients arrival to [address] [place] [address] and care assumed. Cardiac Cath Lab Recovery Arrival Note:     [employer] arrived to [facility] recovery area. Patient procedure= Cath procedure with no stent. Patient on cardiac monitor, non-invasive blood pressure, Patient status doing well without problems. Patient is A&Ox 4. Patient reports no pain, no chest pain, no n/v. Procedure site without any bleeding and no hematoma.

## 2018-05-09 NOTE — PROCEDURES
Cardiac Catheterization Procedure Note   Patient: Edson Sheriff  MRN: [id number]  SSN: [id number]   YOB: 1955 Age: 60 y.o. Sex: male    Date of Procedure: 5/9/2018   Pre-procedure Diagnosis: pre liver transplant  Post-procedure Diagnosis: no significant cad  Procedure: Right Heart Cath, coronaries, no left heart or lv gram  :  Dr. Khloe Estrada MD    Assistant(s):  None  Anesthesia: Moderate Sedation   Estimated Blood Loss: Less than 10 mL   Specimens Removed: None  Findings: very mild pul htn with elevated ra pressure. Left main ok, 43% mid lad (quantitative analysis), lcx and rca mild irregs. No left heart or lv because of renal insufficiency and recent normal echo.   Complications: None   Implants:  None  Signed by:  Khloe Estrada MD  5/9/2018  10:34 AM

## 2018-05-10 ENCOUNTER — PATIENT OUTREACH (OUTPATIENT)
Dept: HEMATOLOGY | Age: 63
End: 2018-05-10

## 2018-05-10 DIAGNOSIS — K74.60 CIRRHOSIS OF LIVER WITHOUT ASCITES, UNSPECIFIED HEPATIC CIRRHOSIS TYPE (HCC): Primary | ICD-10-CM

## 2018-05-11 ENCOUNTER — PATIENT MESSAGE (OUTPATIENT)
Dept: HEMATOLOGY | Age: 63
End: 2018-05-11

## 2018-05-17 ENCOUNTER — TELEPHONE (OUTPATIENT)
Dept: CARDIOLOGY CLINIC | Age: 63
End: 2018-05-17

## 2018-05-17 NOTE — TELEPHONE ENCOUNTER
Pt's wife calling to speak with someone regarding the pt's hemoglobin levels. Dr. Lennox Campbell did a cardiac cath on this pt on 5/9 @ [facility]. I asked her if she wanted to schedule an appointment for him and she states that she wanted to speak to someone first. She can be reached @ [phone]. Thanks!

## 2018-05-17 NOTE — TELEPHONE ENCOUNTER
Called Mrs. Ratliff. Verified patient's identity with two identifiers. Mrs. Everardo Rosen stated patient's hemoglobin was low so she was contacting Dr. Anjana Zimmerman because he did cardiac cath last week. I asked about cath site. She states patient has some bruising in the area, but no bleeding, sweeling, or pain. I explained that the low hemoglobin is not from the cardiac cath and Dr. Anjana Zimmerman does not treat low hemoglobin. I advised they discuss with PCP. Mrs. Everardo Rosen states patient is scheduled for an endoscopy. I told her that is a good idea and most likely where the ordering doctor expects the problem. She denied further questions or concerns.

## 2018-05-24 ENCOUNTER — HOSPITAL ENCOUNTER (OUTPATIENT)
Dept: MAMMOGRAPHY | Age: 63
Discharge: HOME | End: 2018-05-24
Attending: NURSE PRACTITIONER
Payer: COMMERCIAL

## 2018-05-24 ENCOUNTER — OFFICE VISIT (OUTPATIENT)
Dept: HEMATOLOGY | Age: 63
End: 2018-05-24

## 2018-05-24 ENCOUNTER — APPOINTMENT (OUTPATIENT)
Dept: PULMONOLOGY | Age: 63
End: 2018-05-24
Attending: NURSE PRACTITIONER
Payer: COMMERCIAL

## 2018-05-24 VITALS
OXYGEN SATURATION: 98 % | HEART RATE: 71 BPM | SYSTOLIC BLOOD PRESSURE: 167 MMHG | BODY MASS INDEX: 39.99 KG/M2 | HEIGHT: 69 IN | WEIGHT: 270 LBS | TEMPERATURE: 96.1 F | DIASTOLIC BLOOD PRESSURE: 65 MMHG

## 2018-05-24 DIAGNOSIS — K75.81 NASH (NONALCOHOLIC STEATOHEPATITIS): ICD-10-CM

## 2018-05-24 DIAGNOSIS — K75.81 LIVER CIRRHOSIS SECONDARY TO NASH (HCC): Primary | Chronic | ICD-10-CM

## 2018-05-24 DIAGNOSIS — K76.82 HEPATIC ENCEPHALOPATHY: ICD-10-CM

## 2018-05-24 DIAGNOSIS — K21.9 GASTROESOPHAGEAL REFLUX DISEASE WITHOUT ESOPHAGITIS: Chronic | ICD-10-CM

## 2018-05-24 DIAGNOSIS — K74.60 LIVER CIRRHOSIS SECONDARY TO NASH (HCC): Primary | Chronic | ICD-10-CM

## 2018-05-24 DIAGNOSIS — K74.60 CIRRHOSIS OF LIVER WITHOUT ASCITES, UNSPECIFIED HEPATIC CIRRHOSIS TYPE (HCC): Primary | ICD-10-CM

## 2018-05-24 DIAGNOSIS — D69.6 THROMBOCYTOPENIA (HCC): Chronic | ICD-10-CM

## 2018-05-24 DIAGNOSIS — E66.01 SEVERE OBESITY (BMI 35.0-39.9) WITH COMORBIDITY (HCC): ICD-10-CM

## 2018-05-24 DIAGNOSIS — R60.0 LOWER LEG EDEMA: ICD-10-CM

## 2018-05-24 DIAGNOSIS — K74.60 CIRRHOSIS OF LIVER WITHOUT ASCITES, UNSPECIFIED HEPATIC CIRRHOSIS TYPE (HCC): ICD-10-CM

## 2018-05-24 DIAGNOSIS — G57.93 NEUROPATHY INVOLVING BOTH LOWER EXTREMITIES: ICD-10-CM

## 2018-05-24 PROCEDURE — 77080 DXA BONE DENSITY AXIAL: CPT

## 2018-05-24 RX ORDER — METFORMIN HYDROCHLORIDE 500 MG/1
500 TABLET ORAL 2 TIMES DAILY WITH MEALS
COMMUNITY
Start: 2018-04-07 | End: 2018-08-28

## 2018-05-24 RX ORDER — SPIRONOLACTONE 100 MG/1
50 TABLET, FILM COATED ORAL DAILY
COMMUNITY
Start: 2018-04-07 | End: 2018-07-05 | Stop reason: SDUPTHER

## 2018-05-24 NOTE — PROGRESS NOTES
1. Have you been to the ER, urgent care clinic since your last visit? Hospitalized since your last visit? Yes Where: yes, [facility]     2. Have you seen or consulted any other health care providers outside of the [address] [place] [address] since your last visit? Include any pap smears or colon screening. No   Chief Complaint   Patient presents with    Follow-up     Visit Vitals    /65 (BP 1 Location: Left arm, BP Patient Position: Sitting)    Pulse 71    Temp 96.1 °F (35.6 °C) (Tympanic)    Ht 5' 9\" (1.753 m)    Wt 270 lb (122.5 kg)    SpO2 98%    BMI 39.87 kg/m2     PHQ over the last two weeks 5/24/2018   Little interest or pleasure in doing things Not at all   Feeling down, depressed or hopeless Not at all   Total Score PHQ 2 0     Learning Assessment 5/24/2018   PRIMARY LEARNER Patient   BARRIERS PRIMARY LEARNER NONE   CO-LEARNER CAREGIVER No   PRIMARY LANGUAGE ENGLISH   LEARNER PREFERENCE PRIMARY LISTENING   ANSWERED BY patient    RELATIONSHIP SELF     Abuse Screening Questionnaire 5/24/2018   Do you ever feel afraid of your partner? N   Are you in a relationship with someone who physically or mentally threatens you? N   Is it safe for you to go home?  Dann Guo

## 2018-05-24 NOTE — MR AVS SNAPSHOT
[address] [facility] [place] .[id number] [address] [place] [address] 
[phone] Patient: Kayla Cook MRN: [id number] :1955 Visit Information Date & Time Provider Department Dept. Phone Encounter #  
 2018  3:00 PM Berna Guaman 96, [phone] [facility] [id number] Your Appointments 2018  9:30 AM  
Follow Up with MELVA Carbajal 75 ([facility]) Appt Note: Follow up [address] [facility] [place] .[id number] [facility] [address]  
[address] [facility] [place] [address] Upcoming Health Maintenance Date Due Hepatitis C Screening 1955 LIPID PANEL Q1 1955 FOOT EXAM Q1 1964 MICROALBUMIN Q1 1964 EYE EXAM RETINAL OR DILATED Q1 1964 Pneumococcal 19-64 Medium Risk (1 of 1 - PPSV23) 1973 DTaP/Tdap/Td series (1 - Tdap) 1975 ZOSTER VACCINE AGE 60> 10/21/2014 Influenza Age 5 to Adult 2018 HEMOGLOBIN A1C Q6M 2018 FOBT Q 1 YEAR AGE 50-75 2019 Allergies as of 2018  Review Complete On: 2018 By: Berna Lentz NP No Known Allergies Current Immunizations  Never Reviewed No immunizations on file. Not reviewed this visit Vitals BP Pulse Temp Height(growth percentile)  
 167/65 (BP 1 Location: Left arm, BP Patient Position: Sitting) 71 96.1 °F (35.6 °C) (Tympanic) 5' 9\" (1.753 m) Weight(growth percentile) SpO2 BMI Smoking Status 270 lb (122.5 kg) 98% 39.87 kg/m2 Never Smoker BMI and BSA Data Body Mass Index Body Surface Area  
 39.87 kg/m 2 2.44 m 2 Preferred Pharmacy Pharmacy Name Phone [facility] [address], [address] [phone] Your Updated Medication List  
  
   
This list is accurate as of 18  4:03 PM.  Always use your most recent med list.  
  
  
  
  
 gabapentin 300 mg capsule Commonly known as:  NEURONTIN Take 1 Cap by mouth three (3) times daily. glimepiride 4 mg tablet Commonly known as:  AMARYL Take 2 mg by mouth daily. lactulose 20 gram/30 mL Soln solution Commonly known as:  Angelica Moll Take 30 mL by mouth three (3) times daily. Adjust  dose as needed such that you have 2 loose/soft bowel movements a day without diarrhea.  
  
 levothyroxine 50 mcg tablet Commonly known as:  SYNTHROID Take 75 mcg by mouth Daily (before breakfast). metFORMIN 500 mg tablet Commonly known as:  GLUCOPHAGE Take 500 mg by mouth two (2) times daily (with meals). pantoprazole 40 mg tablet Commonly known as:  PROTONIX Take 40 mg by mouth daily. rifAXIMin 550 mg tablet Commonly known as:  Maurita Specking Take 1 Tab by mouth two (2) times a day. simvastatin 20 mg tablet Commonly known as:  ZOCOR Take 20 mg by mouth nightly. spironolactone 100 mg tablet Commonly known as:  ALDACTONE Take 50 mg by mouth daily. triamcinolone acetonide 0.1 % topical cream  
Commonly known as:  KENALOG Apply  to affected area two (2) times a day. use thin layer VITAMIN D3 2,000 unit Tab Generic drug:  cholecalciferol (vitamin D3) Take 1 Tab by mouth two (2) times a day. To-Do List   
 05/25/2018 8:00 AM  
(Arrive by 7:30 AM) Appointment with PULMONARY LAB [facility] [address] [place] [address] ([phone]) 1. Bring list of current medications or bag medicines. 2. No breathing medicines 6 hours before the procedure. 3. Patient should eat light the day of procedure. 06/07/2018 9:00 AM  
  Appointment with BSI CT 1 at [facility] at [address] [place] [address] ([phone]) CONTRAST STUDY: 1. The patient should not eat solid food four hours before the appointment but should be encouraged to drink clear liquids.  2.  If you have to drink oral contrast, please pick it up any weekday prior to your appointment, if you cannot please check in 2 hrs before appt time. 3.  The patient will require IV access for contrast administration. 4.  The patient should not take Ibuprofen (Advil, Motrin, etc.) and Naproxen Sodium (Aleve, etc.)  on the day of the exam. Stopping non-steroidal anti-inflammatory agents (NSAIDs) like Ibuprofen decreases the risk of kidney damage from the x-ray contrast (dye). 5.  [facility] films/images pertaining to the area of interest with you on the day of appointment. 6.  Bring current lab work if available (within last 90 days CMP) ***If scheduled at [facility], iSTAT is not available, labs will need to be done before appointment*** 7. Check in at registration at least 30 minutes before appt time unless you were instructed to do otherwise. Introducing [facility] SERVICES! Dear Ryan Goldberg: 
Thank you for requesting a [employer] account. Our records indicate that you already have an active [employer] account. You can access your account anytime at [email or web address] Did you know that you can access your hospital and ER discharge instructions at any time in [employer]? You can also review all of your test results from your hospital stay or ER visit. Additional Information If you have questions, please visit the Frequently Asked Questions section of the [employer] website at [email or web address] Remember, [employer] is NOT to be used for urgent needs. For medical emergencies, dial 911. Now available from your iPhone and Android! Please provide this summary of care documentation to your next provider. Your primary care clinician is listed as Fernanda Garcia. If you have any questions after today's visit, please call [phone].

## 2018-05-25 ENCOUNTER — HOSPITAL ENCOUNTER (OUTPATIENT)
Dept: PULMONOLOGY | Age: 63
Discharge: HOME | End: 2018-05-25
Attending: NURSE PRACTITIONER
Payer: COMMERCIAL

## 2018-05-25 DIAGNOSIS — K74.60 CIRRHOSIS OF LIVER WITHOUT ASCITES, UNSPECIFIED HEPATIC CIRRHOSIS TYPE (HCC): ICD-10-CM

## 2018-05-25 LAB
ALBUMIN SERPL-MCNC: 3.6 G/DL (ref 3.6–4.8)
ALP SERPL-CCNC: 140 IU/L (ref 39–117)
ALT SERPL-CCNC: 16 IU/L (ref 0–44)
ARTERIAL PATENCY WRIST A: YES
ARTERIAL PATENCY WRIST A: YES
AST SERPL-CCNC: 30 IU/L (ref 0–40)
BASE DEFICIT BLD-SCNC: 1 MMOL/L
BASE DEFICIT BLD-SCNC: 3 MMOL/L
BASOPHILS # BLD AUTO: 0.1 X10E3/UL (ref 0–0.2)
BASOPHILS NFR BLD AUTO: 3 %
BDY SITE: ABNORMAL
BDY SITE: ABNORMAL
BILIRUB DIRECT SERPL-MCNC: 0.83 MG/DL (ref 0–0.4)
BILIRUB SERPL-MCNC: 3.4 MG/DL (ref 0–1.2)
BUN SERPL-MCNC: 22 MG/DL (ref 8–27)
BUN/CREAT SERPL: 17 (ref 10–24)
CALCIUM SERPL-MCNC: 9.1 MG/DL (ref 8.6–10.2)
CHLORIDE SERPL-SCNC: 108 MMOL/L (ref 96–106)
CO2 SERPL-SCNC: 21 MMOL/L (ref 18–29)
CREAT SERPL-MCNC: 1.27 MG/DL (ref 0.76–1.27)
EOSINOPHIL # BLD AUTO: 0.1 X10E3/UL (ref 0–0.4)
EOSINOPHIL NFR BLD AUTO: 4 %
ERYTHROCYTE [DISTWIDTH] IN BLOOD BY AUTOMATED COUNT: 14.9 % (ref 12.3–15.4)
FERRITIN SERPL-MCNC: 161 NG/ML (ref 30–400)
GAS FLOW.O2 O2 DELIVERY SYS: ABNORMAL L/MIN
GAS FLOW.O2 O2 DELIVERY SYS: ABNORMAL L/MIN
GAS FLOW.O2 SETTING OXYMISER: 15 L/M
GFR SERPLBLD CREATININE-BSD FMLA CKD-EPI: 60 ML/MIN/1.73
GFR SERPLBLD CREATININE-BSD FMLA CKD-EPI: 69 ML/MIN/1.73
GLUCOSE SERPL-MCNC: 301 MG/DL (ref 65–99)
HCO3 BLD-SCNC: 20.9 MMOL/L (ref 22–26)
HCO3 BLD-SCNC: 22.8 MMOL/L (ref 22–26)
HCT VFR BLD AUTO: 23.2 % (ref 37.5–51)
HGB BLD-MCNC: 7.9 G/DL (ref 13–17.7)
HIV 1+2 AB+HIV1 P24 AG SERPL QL IA: NON REACTIVE
IMM GRANULOCYTES # BLD: 0 X10E3/UL (ref 0–0.1)
IMM GRANULOCYTES NFR BLD: 0 %
INR PPP: 1.5 (ref 0.8–1.2)
IRON SERPL-MCNC: 106 UG/DL (ref 38–169)
LYMPHOCYTES # BLD AUTO: 1 X10E3/UL (ref 0.7–3.1)
LYMPHOCYTES NFR BLD AUTO: 32 %
MCH RBC QN AUTO: 33.1 PG (ref 26.6–33)
MCHC RBC AUTO-ENTMCNC: 34.1 G/DL (ref 31.5–35.7)
MCV RBC AUTO: 97 FL (ref 79–97)
MONOCYTES # BLD AUTO: 0.3 X10E3/UL (ref 0.1–0.9)
MONOCYTES NFR BLD AUTO: 9 %
MORPHOLOGY BLD-IMP: ABNORMAL
NEUTROPHILS # BLD AUTO: 1.7 X10E3/UL (ref 1.4–7)
NEUTROPHILS NFR BLD AUTO: 52 %
O2/TOTAL GAS SETTING VFR VENT: 100 %
PCO2 BLD: 29.7 MMHG (ref 35–45)
PCO2 BLD: 30.3 MMHG (ref 35–45)
PH BLD: 7.46 [PH] (ref 7.35–7.45)
PH BLD: 7.48 [PH] (ref 7.35–7.45)
PLATELET # BLD AUTO: 58 X10E3/UL (ref 150–379)
PO2 BLD: 116 MMHG (ref 80–100)
PO2 BLD: 250 MMHG (ref 80–100)
POTASSIUM SERPL-SCNC: 4.6 MMOL/L (ref 3.5–5.2)
PROT SERPL-MCNC: 6.1 G/DL (ref 6–8.5)
PROTHROMBIN TIME: 15.4 SEC (ref 9.1–12)
PSA SERPL-MCNC: 0.5 NG/ML (ref 0–4)
RBC # BLD AUTO: 2.39 X10E6/UL (ref 4.14–5.8)
SAO2 % BLD: 100 % (ref 92–97)
SAO2 % BLD: 99 % (ref 92–97)
SODIUM SERPL-SCNC: 138 MMOL/L (ref 134–144)
SPECIMEN TYPE: ABNORMAL
SPECIMEN TYPE: ABNORMAL
T3FREE SERPL-MCNC: 1.9 PG/ML (ref 2–4.4)
T4 FREE SERPL-MCNC: 1.33 NG/DL (ref 0.82–1.77)
TSH SERPL DL<=0.005 MIU/L-ACNC: 1.56 UIU/ML (ref 0.45–4.5)
WBC # BLD AUTO: 3.2 X10E3/UL (ref 3.4–10.8)

## 2018-05-25 PROCEDURE — 36600 WITHDRAWAL OF ARTERIAL BLOOD: CPT

## 2018-05-25 PROCEDURE — 82803 BLOOD GASES ANY COMBINATION: CPT

## 2018-05-25 PROCEDURE — 94729 DIFFUSING CAPACITY: CPT

## 2018-05-25 PROCEDURE — 94010 BREATHING CAPACITY TEST: CPT

## 2018-05-26 LAB — SPECIMEN STATUS REPORT, ROLRST: NORMAL

## 2018-05-27 LAB
IRON SATN MFR SERPL: 61 % (ref 15–55)
IRON SERPL-MCNC: 107 UG/DL (ref 38–169)
TIBC SERPL-MCNC: 174 UG/DL (ref 250–450)
UIBC SERPL-MCNC: 67 UG/DL (ref 111–343)

## 2018-05-29 ENCOUNTER — TELEPHONE (OUTPATIENT)
Dept: HEMATOLOGY | Age: 63
End: 2018-05-29

## 2018-05-29 LAB
ABO GROUP BLD: NORMAL
CMV IGG SERPL IA-ACNC: <0.6 U/ML (ref 0–0.59)
CMV IGM SERPL IA-ACNC: <30 AU/ML (ref 0–29.9)
EBV EA IGG SER-ACNC: <9 U/ML (ref 0–8.9)
EBV NA IGG SER IA-ACNC: <18 U/ML (ref 0–17.9)
EBV VCA IGG SER IA-ACNC: <18 U/ML (ref 0–17.9)
EBV VCA IGM SER IA-ACNC: <36 U/ML (ref 0–35.9)
RH BLD: POSITIVE
SERVICE CMNT-IMP: NORMAL

## 2018-05-29 NOTE — PROGRESS NOTES
[address] Maryanne Rivera MD, [address], [facility], [place]       Martha Estimable, NP    KENNETH Rodriguez, YOANNAP-BC   Eligio Stephens, MELVA Trinidad NP        at [address], [address] [address]     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 9) with comorbidity ([address] E66.01       Juan Manuel Session returns to the [place] [address] regarding chronic HCV in the setting of cirrhosis. The active problem list, all pertinent past medical history, medications, radiologic findings and laboratory findings related to the liver disorder were reviewed with the patient. The patient is a 60 y.o.  male who was found to have fatty liver disease on liver biopsy in 2014. Serologic evaluation was either all negative or within the limits of normal.      Recent abdominal CT in December 2017 demonstrated changes consistent with cirrhosis with no liver masses suggestive of HCC.     An assessment of liver fibrosis with biopsy done 7/2014 showed cirrhosis and mild mixed macro- and microvesicular steatosis. LE edema. Resolved with step 1 diuretics. Patient continues on Xifaxan and lactulose daily. He has at least 2-3 BMs daily without diarrhea. He still had bouts of confusion and unsteadiness. He went to the ED in early May 2018 to address these issues. He was found to have a significantly elevated ammonia level, 294 and he was dehydrated. Patient was hospitalized for 3 days. During this hospitalization, liver transplant evaluation was initiated due to increased MELD score. Today, he presents to the clinic for a follow up appointment after hospital discharge. .    Patient feels much better today with no new complaints. His energy level is much better and he is alert and oriented. The patient has limitations in functional activities secondary to these symptoms. The patient has not experienced yellowing of the eyes or skin, pruritus, melena or hematochezia. ALLERGIES  No Known Allergies    MEDICATIONS  Current Outpatient Prescriptions   Medication Sig    metFORMIN (GLUCOPHAGE) 500 mg tablet Take 500 mg by mouth two (2) times daily (with meals).  spironolactone (ALDACTONE) 100 mg tablet Take 50 mg by mouth daily.  lactulose (CHRONULAC) 20 gram/30 mL soln solution Take 30 mL by mouth three (3) times daily. Adjust  dose as needed such that you have 2 loose/soft bowel movements a day without diarrhea.  rifAXIMin (XIFAXAN) 550 mg tablet Take 1 Tab by mouth two (2) times a day.  gabapentin (NEURONTIN) 300 mg capsule Take 1 Cap by mouth three (3) times daily. (Patient taking differently: Take 300 mg by mouth daily.)    triamcinolone acetonide (KENALOG) 0.1 % topical cream Apply  to affected area two (2) times a day. use thin layer    cholecalciferol, vitamin D3, (VITAMIN D3) 2,000 unit tab Take 1 Tab by mouth two (2) times a day.     pantoprazole (PROTONIX) 40 mg tablet Take 40 mg by mouth daily.    glimepiride (AMARYL) 4 mg tablet Take 2 mg by mouth daily.  simvastatin (ZOCOR) 20 mg tablet Take 20 mg by mouth nightly.  levothyroxine (SYNTHROID) 50 mcg tablet Take 75 mcg by mouth Daily (before breakfast). No current facility-administered medications for this visit. SYSTEM REVIEW NOT RELATED TO LIVER DISEASE OR REVIEWED ABOVE:  Constitution systems: Negative for fever, chills, weight gain, weight loss. Eyes: Negative for visual changes. ENT: Negative for sore throat, painful swallowing. Respiratory: Negative for cough, hemoptysis, SOB. Cardiology: Negative for chest pain, palpitations. GI:  Negative for constipation or diarrhea. : Negative for urinary frequency, dysuria, hematuria, nocturia. Skin: Positive for LE skin discoloration. Negative for rash. Hematology: Negative for easy bruising, blood clots. Musculo-skeletal: Legs are hot below the knees. Neurologic:  + dizziness, vertigo, memory problems related to HE. Psychology: Negative for anxiety, depression. FAMILY HISTORY:  The father  of prostate cancer. The mother  of multiple myeloma. There is no family history of liver disease. SOCIAL HISTORY:  The patient is . The patient has 3 children. The patient occasional cigar. The patient has been abstinent from alcohol since 2016. The patient is on social security. PHYSICAL EXAMINATION:  Visit Vitals    /65 (BP 1 Location: Left arm, BP Patient Position: Sitting)    Pulse 71    Temp 96.1 °F (35.6 °C) (Tympanic)    Ht 5' 9\" (1.753 m)    Wt 270 lb (122.5 kg)    SpO2 98%    BMI 39.87 kg/m2     General: No acute distress. Eyes: Sclera anicteric. ENT: No oral lesions. Nodes: No adenopathy. Skin: No spider angiomata. No jaundice. No palmar erythema. Respiratory: Lungs clear to auscultation. Cardiovascular:  Regular heart rate. No murmurs. No JVD. Abdomen: Soft non-tender.  Liver size enlarged 4 finger widths below rib cage. Spleen enlarged. No obvious ascites. Extremities: Trace LE edema bilaterally. No muscle wasting. No gross arthritic changes. Neurologic: Alert and oriented. Cranial nerves grossly intact. No asterixis. LABORATORY STUDIES:  L[facility] [address] [place] [address] & Units 5/24/2018 5/9/2018 5/8/2018   WBC 3.4 - 10.8 x10E3/uL 3.2 (L)     ANC 1.4 - 7.0 x10E3/uL 1.7     HGB 13.0 - 17.7 g/dL 7.9 (L)      - 379 x10E3/uL 58 (LL)     INR 0.8 - 1.2 1.5 (H)  1.5 (H)   AST 0 - 40 IU/L 30     ALT 0 - 44 IU/L 16     Alk Phos 39 - 117 IU/L 140 (H)     Bili, Total 0.0 - 1.2 mg/dL 3.4 (H)     Bili, Direct 0.00 - 0.40 mg/dL 0.83 (H)     Albumin 3.6 - 4.8 g/dL 3.6     BUN 8 - 27 mg/dL 22 22 (H) 24 (H)   BUN (iSTAT) 9 - 20 mg/dL      Creat 0.76 - 1.27 mg/dL 1.27 1.38 (H) 1.65 (H)   Creat (iSTAT) 0.6 - 1.3 mg/dL      Na 134 - 144 mmol/L 138 144 144   K 3.5 - 5.2 mmol/L 4.6 4.5 4.7   Cl 96 - 106 mmol/L 108 (H) 113 (H) 114 (H)   CO2 18 - 29 mmol/L 21 21 22   Glucose 65 - 99 mg/dL 301 (H) 166 (H) 93   Magnesium 1.6 - 2.4 mg/dL      Ammonia <32 UMOL/L   133 (H)     11/2017. MELD 15  12/2017. MELD 14  5/2018. MELD 18    SEROLOGIES:  Serologies Latest Ref Rng & Units 5/24/2018   Ferritin 30 - 400 ng/mL 161   Iron % Saturation 15 - 55 % 61 (H)     LIVER HISTOLOGY:  7/2014: Cirhosis with mild mixed macro- and microvesicular steatosis. ENDOSCOPIC PROCEDURES:  2/2017. EGD by Dr Pranay Crabtree. No esophageal varices. 5/2018. EGD by Dr. Yanci Freeman. Normal esophagus. RADIOLOGY:  2/2017. Ultrasound of liver. Echogenic consistent with chronic liver disease. No liver mass lesions. No dilated bile ducts. No ascites. 3/2017 CT scan of abdomen. Changes consistent with cirrhosis. No liver mass lesions. Splenomegaly. Splenorenal shunt. No ascites. 12/2017. CT scan of abdomen. Cirrhotic liver and splenomegaly, with evidence of portal hypertension. No acute findings. No suspicious liver masses.     OTHER TESTING:  Not available or performed    ASSESSMENT AND PLAN:  CAMEJO with cirrhosis. Liver transaminases are normal. Alkaline phosphate is elevated. Liver function is depressed. The platelet count is depressed. Current MELD is 18. Hepatic encephalopathy is now much better controlled on current doses of lactulose and Xifaxan. He achieves 2-3 BMs without diarrhea. Protein restriction was discussed during his last hospitalization. Encouraged patient to continue this. The patient has not developed ascites. Lower extremity edema and anasarca. Step 1 diuretics has been effective. Continue. LE discomfort/neuropathy. Neurontin improves his symptoms. Continue. The patient does not have esophageal varices by EGD in 5/2018. Next EGD will be in 5/2020. [facility] screening. CT ruled out [facility] in December 2017. He is up to date. Next imaging will be in June 2018. This has been ordered and scheduled. Liver transplant evaluation. Initiated while hospitalized. He has completed most of the required testing. He will finish dental work next week. Colonoscopy will need to be completed. His MELD is decreasing over time after recent hospitalization but still >15. His packet will be sent to [facility]'s transplant team and he will be seen by the team in the near future. Hypertension. Patient's BP is significantly elevated in the clinic today. Discussed the importance of regular follow up with their PCP to monitor/manage this. Patient verbalized understanding. There is no medication of vitamin supplements that we advocate for CAMEJO. Using glitazones in patients without diabetes mellitus has been shown to reduce fat content in the liver but has no effect on fibrosis and is associated with weight gain. Vitamin E has also been used but the data is not very good and most experts no longer advocate this. The patient was counseled regarding diet and exercise to achieve weight loss.  Encouraged patient to follow a healthy diet and make sure his other medical conditions are well-controlled. The patient was told to to consume any food products and drinks containing fructose as this enhances hepatic fat synthesis. The patient was directed to continue all current medications at the current dosages. There are no contraindications for the patient to take any medications that are necessary for treatment of other medical issues including medications for diabetes mellitus and hypercholesterolemia. The patient was counseled regarding alcohol consumption and that this could contribute to fatty liver disease. The need for vaccination against viral hepatitis A and B will be assessed with serologic and instituted as appropriate. All of the above issues were discussed with the patient. All questions were answered. The patient expressed a clear understanding of the above. [address] [place] [address] in 4-6 weeks.     Mariajose Juárez NP  [address] [place] [address], [address] [place] [address]  Ph: [phone]  Fax: [phone]

## 2018-05-30 LAB
AFP L3 MFR SERPL: 13.5 % (ref 0–9.9)
AFP SERPL-MCNC: 7.2 NG/ML (ref 0–8)
AMPHETAMINES BLD QL SCN: NEGATIVE NG/ML
BARBITURATES SERPLBLD QL: NEGATIVE UG/ML
CANNABINOIDS BLD QL SCN: NEGATIVE NG/ML
COCAINE+BZE SERPLBLD QL SCN: NEGATIVE NG/ML
OPIATES BLD QL SCN: NEGATIVE NG/ML
OXYCODONES, 738315: NEGATIVE NG/ML
PCP BLD QL SCN: NEGATIVE NG/ML

## 2018-05-30 NOTE — TELEPHONE ENCOUNTER
Received most recent EGD report and last colonoscopy from Dr. Malvin Bravo's office. Phone call placed to patient's wife, Germaine Wang. Informed her patient does not need to have a colonoscopy for transplant evaluation; it was performed in 6/2014 with recommendation to repeat in 10 years. Raisa verbalized understanding. 5/30 - Received phone call from representative with Dr. Elizabeth Feldman office. She requested most recent platelet count, and PT/INR. Provided with values and faxed results to oral surgeon's office as requested (f: [phone]).

## 2018-06-01 ENCOUNTER — TELEPHONE (OUTPATIENT)
Dept: HEMATOLOGY | Age: 63
End: 2018-06-01

## 2018-06-01 NOTE — TELEPHONE ENCOUNTER
Dr. Сергей Faith from [facility] [phone] called regarding upcoming tooth extraction for this patient. This procedure is to go onto liver transplant in the near future. Needs to know how he needs to manage platelet levels.

## 2018-06-07 ENCOUNTER — HOSPITAL ENCOUNTER (OUTPATIENT)
Dept: CT IMAGING | Age: 63
Discharge: HOME | End: 2018-06-07
Payer: COMMERCIAL

## 2018-06-07 DIAGNOSIS — K74.60 LIVER CIRRHOSIS SECONDARY TO NASH (HCC): Chronic | ICD-10-CM

## 2018-06-07 DIAGNOSIS — D69.6 THROMBOCYTOPENIA (HCC): Chronic | ICD-10-CM

## 2018-06-07 DIAGNOSIS — K75.81 LIVER CIRRHOSIS SECONDARY TO NASH (HCC): Chronic | ICD-10-CM

## 2018-06-07 DIAGNOSIS — K75.81 NASH (NONALCOHOLIC STEATOHEPATITIS): ICD-10-CM

## 2018-06-07 PROCEDURE — 74170 CT ABD WO CNTRST FLWD CNTRST: CPT

## 2018-06-07 RX ORDER — BARIUM SULFATE 20 MG/ML
900 SUSPENSION ORAL
Status: COMPLETED | OUTPATIENT
Start: 2018-06-07 | End: 2018-06-07

## 2018-06-07 RX ORDER — SODIUM CHLORIDE 9 MG/ML
50 INJECTION, SOLUTION INTRAVENOUS
Status: COMPLETED | OUTPATIENT
Start: 2018-06-07 | End: 2018-06-07

## 2018-06-07 RX ORDER — SODIUM CHLORIDE 0.9 % (FLUSH) 0.9 %
10 SYRINGE (ML) INJECTION
Status: COMPLETED | OUTPATIENT
Start: 2018-06-07 | End: 2018-06-07

## 2018-06-07 RX ADMIN — SODIUM CHLORIDE 50 ML/HR: 9 INJECTION, SOLUTION INTRAVENOUS at 09:56

## 2018-06-07 RX ADMIN — BARIUM SULFATE 900 ML: 20 SUSPENSION ORAL at 09:56

## 2018-06-07 RX ADMIN — Medication 10 ML: at 09:56

## 2018-06-08 DIAGNOSIS — K75.81 LIVER CIRRHOSIS SECONDARY TO NASH (HCC): Primary | Chronic | ICD-10-CM

## 2018-06-08 DIAGNOSIS — K74.60 LIVER CIRRHOSIS SECONDARY TO NASH (HCC): Primary | Chronic | ICD-10-CM

## 2018-06-08 NOTE — TELEPHONE ENCOUNTER
Return call placed to Dr. Lulu Ramsey. Discussed plan for tooth extraction. Dr. Lulu Ramsey reports patient needs to have one tooth extracted and he feels comfortable extracting in the office as long as platelet count is >09,448 and INR is <2.5. Dr. Lulu Ramsey has planned for extraction on 6/13. He requests that platelet and INR are checked a day prior. Per patient's wife, patient is scheduled for repeat labs on Monday, 6/11. Dr. Lulu Ramsey reports he is ok with this and asked that labs are sent to his office on Tuesday for review. Discussed the above with Dr. Jonh Reina. Phone call placed to patient's wife. Received . Left message reviewing plan. Asked that she notify NN where patient will be having labs drawn to ensure results can be obtained and forwarded to Dr. Lulu Ramsey. NN name and contact information provided.

## 2018-06-12 LAB
ERYTHROCYTE [DISTWIDTH] IN BLOOD BY AUTOMATED COUNT: 13.3 % (ref 12.3–15.4)
HCT VFR BLD AUTO: 25 % (ref 37.5–51)
HGB BLD-MCNC: 8.6 G/DL (ref 13–17.7)
INR PPP: 1.4 (ref 0.8–1.2)
MCH RBC QN AUTO: 32.6 PG (ref 26.6–33)
MCHC RBC AUTO-ENTMCNC: 34.4 G/DL (ref 31.5–35.7)
MCV RBC AUTO: 95 FL (ref 79–97)
MORPHOLOGY BLD-IMP: ABNORMAL
PLATELET # BLD AUTO: 49 X10E3/UL (ref 150–379)
PROTHROMBIN TIME: 14.7 SEC (ref 9.1–12)
RBC # BLD AUTO: 2.64 X10E6/UL (ref 4.14–5.8)
WBC # BLD AUTO: 4 X10E3/UL (ref 3.4–10.8)

## 2018-06-14 ENCOUNTER — PATIENT OUTREACH (OUTPATIENT)
Dept: HEMATOLOGY | Age: 63
End: 2018-06-14

## 2018-06-15 NOTE — PROGRESS NOTES
6/12 - Platelet result is 03,665. Discussed with Dr. Guera Gaston and Dr. Ladi Castillo. Patient's wife notified extraction planned for 6/13 has been canceled because of current platelet count. Advised recommendation is to increase platelet count prior to the procedure with the use of Doptelet. 6/14 - Faxed completed provider page of Patient Enrollment Form to [employer] ([phone]). Phone call placed to patient's wife. Advised wife to complete patient portion of enrollment form online. Wife informed that once patient consent is received by [employer], they will perform a benefit verification, determine if medication requires prior authorization, make sure patient has access to support programs, and send the prescription to the designated speciality pharmacy who will ship medication to the patient's home. Wife verbalized understanding. Received VM from wife reporting patient was unable to complete patient portion online because of a technical issue. She reports she will fax the paper enrollment form in the morning. 6/15 - Received phone call from Shante perera, care coordinator, with [employer] who confirmed receipt of enrollment application and patient consent. She will provide status updates to the office and patient during the process.

## 2018-06-21 ENCOUNTER — PATIENT OUTREACH (OUTPATIENT)
Dept: HEMATOLOGY | Age: 63
End: 2018-06-21

## 2018-06-21 DIAGNOSIS — K74.60 LIVER CIRRHOSIS SECONDARY TO NASH (HCC): Primary | Chronic | ICD-10-CM

## 2018-06-21 DIAGNOSIS — K75.81 LIVER CIRRHOSIS SECONDARY TO NASH (HCC): Primary | Chronic | ICD-10-CM

## 2018-06-28 ENCOUNTER — OFFICE VISIT (OUTPATIENT)
Dept: HEMATOLOGY | Age: 63
End: 2018-06-28

## 2018-06-28 VITALS
SYSTOLIC BLOOD PRESSURE: 155 MMHG | DIASTOLIC BLOOD PRESSURE: 63 MMHG | HEART RATE: 76 BPM | BODY MASS INDEX: 41.5 KG/M2 | TEMPERATURE: 97.5 F | WEIGHT: 281 LBS | OXYGEN SATURATION: 99 %

## 2018-06-28 DIAGNOSIS — K75.81 LIVER CIRRHOSIS SECONDARY TO NASH (HCC): Primary | Chronic | ICD-10-CM

## 2018-06-28 DIAGNOSIS — K74.60 LIVER CIRRHOSIS SECONDARY TO NASH (HCC): Primary | Chronic | ICD-10-CM

## 2018-06-28 DIAGNOSIS — D69.6 THROMBOCYTOPENIA (HCC): Chronic | ICD-10-CM

## 2018-06-28 DIAGNOSIS — K76.82 HEPATIC ENCEPHALOPATHY: ICD-10-CM

## 2018-06-28 DIAGNOSIS — K75.81 NASH (NONALCOHOLIC STEATOHEPATITIS): ICD-10-CM

## 2018-06-28 DIAGNOSIS — E11.21 TYPE 2 DIABETES WITH NEPHROPATHY (HCC): ICD-10-CM

## 2018-06-28 DIAGNOSIS — R60.0 LOWER LEG EDEMA: ICD-10-CM

## 2018-06-28 DIAGNOSIS — E66.01 SEVERE OBESITY (BMI 35.0-39.9) WITH COMORBIDITY (HCC): ICD-10-CM

## 2018-06-28 PROBLEM — R05.9 COUGH: Status: RESOLVED | Noted: 2018-01-09 | Resolved: 2018-06-28

## 2018-06-28 PROBLEM — J11.1 INFLUENZA: Status: RESOLVED | Noted: 2018-01-10 | Resolved: 2018-06-28

## 2018-06-28 PROBLEM — R41.82 ALTERED MENTAL STATUS: Status: RESOLVED | Noted: 2018-05-06 | Resolved: 2018-06-28

## 2018-06-28 NOTE — PROGRESS NOTES
1. Have you been to the ER, urgent care clinic since your last visit? Hospitalized since your last visit? No    2. Have you seen or consulted any other health care providers outside of the [address] [place] [address] since your last visit? Include any pap smears or colon screening. No   Chief Complaint   Patient presents with    Follow-up     Visit Vitals    /63 (BP 1 Location: Right arm, BP Patient Position: Sitting)    Pulse 76    Temp 97.5 °F (36.4 °C) (Tympanic)    Wt 281 lb (127.5 kg)    SpO2 99%    BMI 41.5 kg/m2     PHQ over the last two weeks 6/28/2018   Little interest or pleasure in doing things Not at all   Feeling down, depressed or hopeless Not at all   Total Score PHQ 2 0     Learning Assessment 6/28/2018   PRIMARY LEARNER Patient   BARRIERS PRIMARY LEARNER NONE   CO-LEARNER CAREGIVER No   PRIMARY LANGUAGE ENGLISH   LEARNER PREFERENCE PRIMARY LISTENING   ANSWERED BY patient    RELATIONSHIP SELF     Abuse Screening Questionnaire 6/28/2018   Do you ever feel afraid of your partner? N   Are you in a relationship with someone who physically or mentally threatens you? N   Is it safe for you to go home?  Simi Bautista

## 2018-06-28 NOTE — MR AVS SNAPSHOT
[address] [facility] .[phone] [place] [address] 
[phone] Patient: Jad Dubon MRN: [id number] :1955 Visit Information Date & Time Provider Department Dept. Phone Encounter #  
 2018  9:30 AM Berna Owen, [facility] 219 [id number] Your Appointments 2018 10:00 AM  
Follow Up with Berna Yousif NP [address] ([facility] Appt Note: Follow up [facility] .[phone] [facility] [address]  
[address] [place] [address] [place] Upcoming Health Maintenance Date Due Hepatitis C Screening 1955 FOOT EXAM Q1 1964 MICROALBUMIN Q1 1964 EYE EXAM RETINAL OR DILATED Q1 1964 Pneumococcal 19-64 Medium Risk (1 of 1 - PPSV23) 1973 DTaP/Tdap/Td series (1 - Tdap) 1975 ZOSTER VACCINE AGE 60> 10/21/2014 Influenza Age 5 to Adult 2018 HEMOGLOBIN A1C Q6M 2018 LIPID PANEL Q1 2019 FOBT Q 1 YEAR AGE 50-75 2019 Allergies as of 2018  Review Complete On: 2018 By: Berna Yousif NP No Known Allergies Current Immunizations  Never Reviewed No immunizations on file. Not reviewed this visit Vitals BP Pulse Temp Weight(growth percentile) SpO2 BMI  
 155/63 (BP 1 Location: Right arm, BP Patient Position: Sitting) 76 97.5 °F (36.4 °C) (Tympanic) 281 lb (127.5 kg) 99% 41.5 kg/m2 Smoking Status Never Smoker BMI and BSA Data Body Mass Index Body Surface Area 41.5 kg/m 2 2.49 m 2 Preferred Pharmacy Pharmacy Name Phone [facility] [address] [place], [address] [place] [address] Avenue [phone] Your Updated Medication List  
  
   
This list is accurate as of 18  9:45 AM.  Always use your most recent med list.  
  
  
  
  
 gabapentin 300 mg capsule Commonly known as:  NEURONTIN  
 Take 1 Cap by mouth three (3) times daily. glimepiride 4 mg tablet Commonly known as:  AMARYL Take 2 mg by mouth daily. lactulose 20 gram/30 mL Soln solution Commonly known as:  Joaquín Judd Take 30 mL by mouth three (3) times daily. Adjust  dose as needed such that you have 2 loose/soft bowel movements a day without diarrhea.  
  
 levothyroxine 50 mcg tablet Commonly known as:  SYNTHROID Take 75 mcg by mouth Daily (before breakfast). metFORMIN 500 mg tablet Commonly known as:  GLUCOPHAGE Take 500 mg by mouth two (2) times daily (with meals). pantoprazole 40 mg tablet Commonly known as:  PROTONIX Take 40 mg by mouth daily. rifAXIMin 550 mg tablet Commonly known as:  Callie Pong Take 1 Tab by mouth two (2) times a day. simvastatin 20 mg tablet Commonly known as:  ZOCOR Take 20 mg by mouth nightly. spironolactone 100 mg tablet Commonly known as:  ALDACTONE Take 50 mg by mouth daily. triamcinolone acetonide 0.1 % topical cream  
Commonly known as:  KENALOG Apply  to affected area two (2) times a day. use thin layer VITAMIN D3 2,000 unit Tab Generic drug:  cholecalciferol (vitamin D3) Take 1 Tab by mouth two (2) times a day. Introducing [facility]! Dear Michelle Schaffer: 
Thank you for requesting a [employer] account. Our records indicate that you already have an active [employer]Guest account. You can access your account anytime at [email or web address] Did you know that you can access your hospital and ER discharge instructions at any time in IMGuest? You can also review all of your test results from your hospital stay or ER visit. Additional Information If you have questions, please visit the Frequently Asked Questions section of the IMGuest website at [email or web address] Remember, IMGuest is NOT to be used for urgent needs. For medical emergencies, dial 911. Now available from your iPhone and Android! Please provide this summary of care documentation to your next provider. Your primary care clinician is listed as Blanco Bell. If you have any questions after today's visit, please call [phone].

## 2018-06-29 NOTE — PROGRESS NOTES
Patient's wife notified Jan Liz has been approved and speciality pharmacy will be contacting her to arrange delivery. Tooth extraction is scheduled for 7/5. Advised wife to have patient begin medication on 6/25. Advised of need for repeat labs on 7/3 to ensure platelet count is appropriate for extraction. Faxed requisition to patient's wife.

## 2018-07-04 LAB
ALBUMIN SERPL-MCNC: 2.9 G/DL (ref 3.6–4.8)
ALP SERPL-CCNC: 142 IU/L (ref 39–117)
ALT SERPL-CCNC: 20 IU/L (ref 0–44)
AST SERPL-CCNC: 47 IU/L (ref 0–40)
BILIRUB DIRECT SERPL-MCNC: 1.1 MG/DL (ref 0–0.4)
BILIRUB SERPL-MCNC: 3.3 MG/DL (ref 0–1.2)
BUN SERPL-MCNC: 18 MG/DL (ref 8–27)
BUN/CREAT SERPL: 16 (ref 10–24)
CALCIUM SERPL-MCNC: 8.7 MG/DL (ref 8.6–10.2)
CHLORIDE SERPL-SCNC: 111 MMOL/L (ref 96–106)
CO2 SERPL-SCNC: 23 MMOL/L (ref 20–29)
CREAT SERPL-MCNC: 1.12 MG/DL (ref 0.76–1.27)
ERYTHROCYTE [DISTWIDTH] IN BLOOD BY AUTOMATED COUNT: 13.7 % (ref 12.3–15.4)
GLUCOSE SERPL-MCNC: 83 MG/DL (ref 65–99)
HCT VFR BLD AUTO: 26.3 % (ref 37.5–51)
HGB BLD-MCNC: 9.1 G/DL (ref 13–17.7)
INR PPP: 1.4 (ref 0.8–1.2)
MCH RBC QN AUTO: 31.5 PG (ref 26.6–33)
MCHC RBC AUTO-ENTMCNC: 34.6 G/DL (ref 31.5–35.7)
MCV RBC AUTO: 91 FL (ref 79–97)
MORPHOLOGY BLD-IMP: ABNORMAL
PLATELET # BLD AUTO: 89 X10E3/UL (ref 150–379)
POTASSIUM SERPL-SCNC: 4.3 MMOL/L (ref 3.5–5.2)
PROT SERPL-MCNC: 6.1 G/DL (ref 6–8.5)
PROTHROMBIN TIME: 15 SEC (ref 9.1–12)
RBC # BLD AUTO: 2.89 X10E6/UL (ref 4.14–5.8)
SODIUM SERPL-SCNC: 146 MMOL/L (ref 134–144)
WBC # BLD AUTO: 3 X10E3/UL (ref 3.4–10.8)

## 2018-07-05 ENCOUNTER — PATIENT OUTREACH (OUTPATIENT)
Dept: HEMATOLOGY | Age: 63
End: 2018-07-05

## 2018-07-05 DIAGNOSIS — K75.81 LIVER CIRRHOSIS SECONDARY TO NASH (HCC): Primary | Chronic | ICD-10-CM

## 2018-07-05 DIAGNOSIS — K74.60 LIVER CIRRHOSIS SECONDARY TO NASH (HCC): Primary | Chronic | ICD-10-CM

## 2018-07-05 RX ORDER — FUROSEMIDE 40 MG/1
40 TABLET ORAL DAILY
Qty: 90 TAB | Refills: 3 | Status: SHIPPED | OUTPATIENT
Start: 2018-07-05 | End: 2018-08-20 | Stop reason: SDUPTHER

## 2018-07-05 RX ORDER — SPIRONOLACTONE 100 MG/1
100 TABLET, FILM COATED ORAL DAILY
Qty: 90 TAB | Refills: 3 | Status: SHIPPED | OUTPATIENT
Start: 2018-07-05 | End: 2018-08-20 | Stop reason: SDUPTHER

## 2018-07-05 NOTE — PROGRESS NOTES
Reviewed lab results. Faxed to Dr. Robert Rodriguez office as well as Dr. Aicha Daniels office. Wife notified platelet count is sufficient for extraction today and MELD score is 16. Wife reports worsened LE edema. Patient had been taking Aldactone 25 mg daily for edema. She reports over the past few days he has added Lasix 40 mg daily. Wife states patient reports that his legs feel better, but she does not notice a significant difference in edema. Discussed with Denise Perez NP. Return call placed to wife. Notified to increase Aldactone to 100 mg daily and continue with Lasix 40 mg daily. Advised repeat labs should be obtained in 2 weeks. Advised wife to notify NN when [facility] liver transplant evaluation appointment is scheduled as it's possible he could be seen there in about 2 weeks. Wife verbalized understanding of the above information.

## 2018-07-20 ENCOUNTER — PATIENT OUTREACH (OUTPATIENT)
Dept: HEMATOLOGY | Age: 63
End: 2018-07-20

## 2018-07-20 LAB
ALBUMIN SERPL-MCNC: 2.6 G/DL (ref 3.6–4.8)
ALBUMIN/GLOB SERPL: 0.8 {RATIO} (ref 1.2–2.2)
ALP SERPL-CCNC: 139 IU/L (ref 39–117)
ALT SERPL-CCNC: 20 IU/L (ref 0–44)
AST SERPL-CCNC: 39 IU/L (ref 0–40)
BILIRUB SERPL-MCNC: 3.2 MG/DL (ref 0–1.2)
BUN SERPL-MCNC: 18 MG/DL (ref 8–27)
BUN/CREAT SERPL: 18 (ref 10–24)
CALCIUM SERPL-MCNC: 8.5 MG/DL (ref 8.6–10.2)
CHLORIDE SERPL-SCNC: 108 MMOL/L (ref 96–106)
CO2 SERPL-SCNC: 26 MMOL/L (ref 20–29)
CREAT SERPL-MCNC: 1.01 MG/DL (ref 0.76–1.27)
GLOBULIN SER CALC-MCNC: 3.2 G/DL (ref 1.5–4.5)
GLUCOSE SERPL-MCNC: 142 MG/DL (ref 65–99)
POTASSIUM SERPL-SCNC: 4.2 MMOL/L (ref 3.5–5.2)
PROT SERPL-MCNC: 5.8 G/DL (ref 6–8.5)
SODIUM SERPL-SCNC: 144 MMOL/L (ref 134–144)

## 2018-07-23 NOTE — PROGRESS NOTES
Received VM from patient's wife regarding most recent lab results. Return call placed to wife. Received VM. Left message reviewing lab results. Wife notified patient may continue Lasix 80 mg daily and Aldactone 200 mg daily. NN name and contact information provided in case of questions.

## 2018-08-07 ENCOUNTER — PATIENT OUTREACH (OUTPATIENT)
Dept: HEMATOLOGY | Age: 63
End: 2018-08-07

## 2018-08-07 DIAGNOSIS — K75.81 LIVER CIRRHOSIS SECONDARY TO NASH (HCC): Chronic | ICD-10-CM

## 2018-08-07 DIAGNOSIS — K74.60 LIVER CIRRHOSIS SECONDARY TO NASH (HCC): Chronic | ICD-10-CM

## 2018-08-07 DIAGNOSIS — E66.01 SEVERE OBESITY (BMI 35.0-39.9) WITH COMORBIDITY (HCC): ICD-10-CM

## 2018-08-07 DIAGNOSIS — I27.20 MILD PULMONARY HYPERTENSION (HCC): Primary | ICD-10-CM

## 2018-08-09 ENCOUNTER — OFFICE VISIT (OUTPATIENT)
Dept: HEMATOLOGY | Age: 63
End: 2018-08-09

## 2018-08-09 VITALS
OXYGEN SATURATION: 98 % | SYSTOLIC BLOOD PRESSURE: 157 MMHG | HEART RATE: 73 BPM | BODY MASS INDEX: 39.93 KG/M2 | TEMPERATURE: 97.8 F | WEIGHT: 270.4 LBS | DIASTOLIC BLOOD PRESSURE: 62 MMHG

## 2018-08-09 DIAGNOSIS — D69.6 THROMBOCYTOPENIA (HCC): Chronic | ICD-10-CM

## 2018-08-09 DIAGNOSIS — E66.01 SEVERE OBESITY (BMI 35.0-39.9) WITH COMORBIDITY (HCC): ICD-10-CM

## 2018-08-09 DIAGNOSIS — I10 ESSENTIAL HYPERTENSION: ICD-10-CM

## 2018-08-09 DIAGNOSIS — K75.81 LIVER CIRRHOSIS SECONDARY TO NASH (HCC): Primary | Chronic | ICD-10-CM

## 2018-08-09 DIAGNOSIS — K75.81 NASH (NONALCOHOLIC STEATOHEPATITIS): ICD-10-CM

## 2018-08-09 DIAGNOSIS — K74.60 LIVER CIRRHOSIS SECONDARY TO NASH (HCC): Primary | Chronic | ICD-10-CM

## 2018-08-09 DIAGNOSIS — G57.93 NEUROPATHY INVOLVING BOTH LOWER EXTREMITIES: ICD-10-CM

## 2018-08-09 DIAGNOSIS — K76.82 HEPATIC ENCEPHALOPATHY: ICD-10-CM

## 2018-08-09 DIAGNOSIS — R60.0 LOWER LEG EDEMA: ICD-10-CM

## 2018-08-09 NOTE — PROGRESS NOTES
[address] Maryanne Rivera MD, [address], [place], [address] [facility]       MELVA Aranda PA-C Suan Goes, [facility]   MELVA Pablo NP        at [address], [address] [address]     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 9) with comorbidity ([address] E66.01       Burton Esparza returns to the [place] [address] regarding chronic HCV in the setting of cirrhosis. The active problem list, all pertinent past medical history, medications, radiologic findings and laboratory findings related to the liver disorder were reviewed with the patient. The patient is a 60 y.o.  male who was found to have fatty liver disease on liver biopsy in 2014. Serologic evaluation was either all negative or within the limits of normal.      Recent abdominal CT in June 2018 demonstrated changes consistent with cirrhosis with no liver masses suggestive of HCC.     An assessment of liver fibrosis with biopsy done 7/2014 showed cirrhosis and mild mixed macro- and microvesicular steatosis. LE edema. Stable with step 1 diuretics. Patient continues on Xifaxan and lactulose daily. He has at least 2-3 BMs daily without diarrhea. He still had bouts of confusion and unsteadiness but is overall stable, requiring no hospitalizations since last office visit. All testing required for liver transplant evaluation have been completed. He is currently listed for transplant. He returns to the clinic today to manage his advanced liver disease. Patient has overall done well with no new physical complaints today. The patient has limitations in functional activities secondary to these symptoms. The patient has not experienced yellowing of the eyes or skin, pruritus, melena or hematochezia. ALLERGIES  No Known Allergies    MEDICATIONS  Current Outpatient Prescriptions   Medication Sig    furosemide (LASIX) 40 mg tablet Take 1 Tab by mouth daily.  spironolactone (ALDACTONE) 100 mg tablet Take 1 Tab by mouth daily.  metFORMIN (GLUCOPHAGE) 500 mg tablet Take 500 mg by mouth two (2) times daily (with meals).  lactulose (CHRONULAC) 20 gram/30 mL soln solution Take 30 mL by mouth three (3) times daily. Adjust  dose as needed such that you have 2 loose/soft bowel movements a day without diarrhea.  rifAXIMin (XIFAXAN) 550 mg tablet Take 1 Tab by mouth two (2) times a day.  gabapentin (NEURONTIN) 300 mg capsule Take 1 Cap by mouth three (3) times daily. (Patient taking differently: Take 300 mg by mouth daily.)    triamcinolone acetonide (KENALOG) 0.1 % topical cream Apply  to affected area two (2) times a day. use thin layer    cholecalciferol, vitamin D3, (VITAMIN D3) 2,000 unit tab Take 1 Tab by mouth two (2) times a day.  pantoprazole (PROTONIX) 40 mg tablet Take 40 mg by mouth daily.  glimepiride (AMARYL) 4 mg tablet Take 2 mg by mouth daily.  simvastatin (ZOCOR) 20 mg tablet Take 20 mg by mouth nightly.     levothyroxine (SYNTHROID) 50 mcg tablet Take 75 mcg by mouth Daily (before breakfast). No current facility-administered medications for this visit. SYSTEM REVIEW NOT RELATED TO LIVER DISEASE OR REVIEWED ABOVE:  Constitution systems: Negative for fever, chills, weight gain, weight loss. Eyes: Negative for visual changes. ENT: Negative for sore throat, painful swallowing. Respiratory: Negative for cough, hemoptysis, SOB. Cardiology: Negative for chest pain, palpitations. GI:  Negative for constipation or diarrhea. : Negative for urinary frequency, dysuria, hematuria, nocturia. Skin: Positive for LE skin discoloration. Negative for rash. Hematology: Negative for easy bruising, blood clots. Musculo-skeletal: Negative for weakness. Neurologic:  No confusion. Psychology: Negative for anxiety, depression. FAMILY HISTORY:  The father  of prostate cancer. The mother  of multiple myeloma. There is no family history of liver disease. SOCIAL HISTORY:  The patient is . The patient has 3 children. The patient does not use tobacco products. The patient has been abstinent from alcohol since 2016. The patient is on social security. PHYSICAL EXAMINATION:  Visit Vitals    /62 (BP 1 Location: Left arm, BP Patient Position: Sitting)  Comment (BP 1 Location): blue cuff    Pulse 73    Temp 97.8 °F (36.6 °C) (Oral)    Wt 270 lb 6.4 oz (122.7 kg)    SpO2 98%    BMI 39.93 kg/m2     General: No acute distress. Eyes: Sclera anicteric. ENT: No oral lesions. Nodes: No adenopathy. Skin: No spider angiomata. No jaundice. No palmar erythema. Respiratory: Lungs clear to auscultation. Cardiovascular:  Regular heart rate. No murmurs. No JVD. Abdomen: Soft non-tender. Liver size enlarged 4 finger widths below rib cage. Spleen enlarged. No obvious ascites. Extremities: Trace LE edema bilaterally. No muscle wasting. No gross arthritic changes. Neurologic: Alert and oriented.  Cranial nerves grossly intact. No asterixis. LABORATORY STUDIES:  Liver [place] [facility] [address] Units 7/19/2018 7/3/2018 6/11/2018   WBC 3.4 - 10.8 x10E3/uL  3.0 (L) 4.0   ANC 1.4 - 7.0 x10E3/uL      HGB 13.0 - 17.7 g/dL  9.1 (L) 8.6 (L)    - 379 x10E3/uL  89 (LL) 49 (LL)   INR 0.8 - 1.2  1.4 (H) 1.4 (H)   AST 0 - 40 IU/L 39 47 (H)    ALT 0 - 44 IU/L 20 20    Alk Phos 39 - 117 IU/L 139 (H) 142 (H)    Bili, Total 0.0 - 1.2 mg/dL 3.2 (H) 3.3 (H)    Bili, Direct 0.00 - 0.40 mg/dL  1.10 (H)    Albumin 3.6 - 4.8 g/dL 2.6 (L) 2.9 (L)    BUN 8 - 27 mg/dL 18 18    BUN (iSTAT) 9 - 20 mg/dL      Creat 0.76 - 1.27 mg/dL 1.01 1.12    Creat (iSTAT) 0.6 - 1.3 mg/dL      Na 134 - 144 mmol/L 144 146 (H)    K 3.5 - 5.2 mmol/L 4.2 4.3    Cl 96 - 106 mmol/L 108 (H) 111 (H)    CO2 20 - 29 mmol/L 26 23    Glucose 65 - 99 mg/dL 142 (H) 83      11/2017. MELD 15  12/2017. MELD 14  5/2018. MELD 18  7/2018. MELD 15    Labs will be repeated on 7/3/2018    SEROLOGIES:  Serologies Latest Ref Rng & Units 5/24/2018   Ferritin 30 - 400 ng/mL 161   Iron % Saturation 15 - 55 % 61 (H)     LIVER HISTOLOGY:  7/2014: Cirhosis with mild mixed macro- and microvesicular steatosis. ENDOSCOPIC PROCEDURES:  2/2017. EGD by Dr Casey Ghotra. No esophageal varices. 5/2018. EGD by Dr. Karthik Forrest. Normal esophagus. RADIOLOGY:  2/2017. Ultrasound of liver. Echogenic consistent with chronic liver disease. No liver mass lesions. No dilated bile ducts. No ascites. 3/2017 CT scan of abdomen. Changes consistent with cirrhosis. No liver mass lesions. Splenomegaly. Splenorenal shunt. No ascites. 12/2017. CT scan of abdomen. Cirrhotic liver and splenomegaly, with evidence of portal hypertension. No acute findings. No suspicious liver masses. 6/2018. CT scan of abdomen. No enhancing masses are seen in the liver. OTHER TESTING:  Not available or performed    ASSESSMENT AND PLAN:  CAMEJO with cirrhosis.  Liver transaminases are normal. Alkaline phosphate is elevated. Liver function is depressed. The platelet count is depressed. Current MELD is 15. Hepatic encephalopathy is now much better controlled on current doses of lactulose and Xifaxan. He achieves 2-3 BMs without diarrhea. Protein restriction was discussed. Encouraged patient to continue this. The patient has not developed ascites. Lower extremity edema and anasarca. Step 1 diuretics has been effective. Continue. LE discomfort/neuropathy. Neurontin improves his symptoms. Continue. The patient does not have esophageal varices by EGD in 5/2018. Next EGD will be in 5/2020. [address] screening. CT ruled out [address] in June 2018. He is up to date. Next imaging will be in December 2018. Liver transplant evaluation. He has completed the required testing. He has been activated on the liver transplant list.    Hypertension. Patient's BP is significantly elevated in the clinic today. Discussed the importance of regular follow up with their PCP to monitor/manage this. Patient verbalized understanding. The patient was counseled regarding diet and exercise to achieve weight loss. Encouraged patient to follow a healthy diet and make sure his other medical conditions are well-controlled. The patient was told to to consume any food products and drinks containing fructose as this enhances hepatic fat synthesis. The patient was directed to continue all current medications at the current dosages. There are no contraindications for the patient to take any medications that are necessary for treatment of other medical issues including medications for diabetes mellitus and hypercholesterolemia. The patient was counseled regarding alcohol consumption and that this could contribute to fatty liver disease. The need for vaccination against viral hepatitis A and B will be assessed with serologic and instituted as appropriate. All of the above issues were discussed with the patient. All questions were answered.  The patient expressed a clear understanding of the above. [facility] in 6 weeks.     Torrey Miles NP  [address] [place] [address], [address] [place] [address]  Ph: [phone]  Fax: [phone]

## 2018-08-09 NOTE — PROGRESS NOTES
1. Have you been to the ER, urgent care clinic since your last visit? Hospitalized since your last visit? No    2. Have you seen or consulted any other health care providers outside of the [address] [place] [address] since your last visit? Include any pap smears or colon screening. No   Chief Complaint   Patient presents with    Follow-up     Visit Vitals    /62 (BP 1 Location: Left arm, BP Patient Position: Sitting)    Pulse 73    Temp 97.8 °F (36.6 °C) (Oral)    Wt 270 lb 6.4 oz (122.7 kg)    SpO2 98%    BMI 39.93 kg/m2     PHQ over the last two weeks 8/9/2018   Little interest or pleasure in doing things Not at all   Feeling down, depressed, irritable, or hopeless Not at all   Total Score PHQ 2 0     Learning Assessment 8/9/2018   PRIMARY LEARNER Patient   BARRIERS PRIMARY LEARNER NONE   CO-LEARNER CAREGIVER No   PRIMARY LANGUAGE ENGLISH   LEARNER PREFERENCE PRIMARY LISTENING   ANSWERED BY patient   RELATIONSHIP SELF     Abuse Screening Questionnaire 8/9/2018   Do you ever feel afraid of your partner? N   Are you in a relationship with someone who physically or mentally threatens you? N   Is it safe for you to go home?  Jovanni Blevins

## 2018-08-09 NOTE — MR AVS SNAPSHOT
[address] [facility] [phone] [address] [place] [address] 
[phone] Patient: Savage Driscoll MRN: [id number] :1955 Visit Information Date & Time Provider Department Dept. Phone Encounter #  
 2018 10:00 AM Berna HERRERA Sergio Joyda, [address] [facility] 219 [id number] Upcoming Health Maintenance Date Due Hepatitis C Screening 1955 FOOT EXAM Q1 1964 MICROALBUMIN Q1 1964 EYE EXAM RETINAL OR DILATED Q1 1964 Pneumococcal 19-64 Medium Risk (1 of 1 - PPSV23) 1973 DTaP/Tdap/Td series (1 - Tdap) 1975 ZOSTER VACCINE AGE 60> 10/21/2014 Influenza Age 5 to Adult 2018 HEMOGLOBIN A1C Q6M 2018 LIPID PANEL Q1 2019 FOBT Q 1 YEAR AGE 50-75 2019 Allergies as of 2018  Review Complete On: 2018 By: Berna Turner NP No Known Allergies Current Immunizations  Never Reviewed No immunizations on file. Not reviewed this visit You Were Diagnosed With   
  
 Codes Comments Liver cirrhosis secondary to [facility] ([facility]    -  Primary ICD-10-CM: K75.81, K74.60 ICD-9-CM: 571.8, 571.5 CAMEJO (nonalcoholic steatohepatitis)     ICD-10-CM: Y04.51 ICD-9-CM: 571.8 Hepatic encephalopathy ([facility]     ICD-10-CM: K72.90 ICD-9-CM: 572.2 Thrombocytopenia ([facility] [facility]     ICD-10-CM: D69.6 ICD-9-CM: 287.5 Lower leg edema     ICD-10-CM: R60.0 ICD-9-CM: 782.3 Neuropathy involving both lower extremities     ICD-10-CM: G57.93 
ICD-9-CM: 356.9 Severe obesity (BMI 35.0-39. 9) with comorbidity ([facility] [facility]     ICD-10-CM: E66.01 
ICD-9-CM: 278.01 Vitals BP Pulse Temp Weight(growth percentile) SpO2 BMI  
 157/62 (BP 1 Location: Left arm, BP Patient Position: Sitting) 73 97.8 °F (36.6 °C) (Oral) 270 lb 6.4 oz (122.7 kg) 98% 39.93 kg/m2 Smoking Status Never Smoker BMI and BSA Data Body Mass Index Body Surface Area 39.93 kg/m 2 2.44 m 2 Preferred Pharmacy Pharmacy Name Phone [facility] [address], [facility] [facility] [address] [phone] Your Updated Medication List  
  
   
This list is accurate as of 8/9/18 10:51 AM.  Always use your most recent med list.  
  
  
  
  
 furosemide 40 mg tablet Commonly known as:  LASIX Take 1 Tab by mouth daily. gabapentin 300 mg capsule Commonly known as:  NEURONTIN Take 1 Cap by mouth three (3) times daily. glimepiride 4 mg tablet Commonly known as:  AMARYL Take 2 mg by mouth daily. lactulose 20 gram/30 mL Soln solution Commonly known as:  Tonna Puja Take 30 mL by mouth three (3) times daily. Adjust  dose as needed such that you have 2 loose/soft bowel movements a day without diarrhea.  
  
 levothyroxine 50 mcg tablet Commonly known as:  SYNTHROID Take 75 mcg by mouth Daily (before breakfast). metFORMIN 500 mg tablet Commonly known as:  GLUCOPHAGE Take 500 mg by mouth two (2) times daily (with meals). pantoprazole 40 mg tablet Commonly known as:  PROTONIX Take 40 mg by mouth daily. rifAXIMin 550 mg tablet Commonly known as:  [place] Vick Take 1 Tab by mouth two (2) times a day. simvastatin 20 mg tablet Commonly known as:  ZOCOR Take 20 mg by mouth nightly. spironolactone 100 mg tablet Commonly known as:  ALDACTONE Take 1 Tab by mouth daily. triamcinolone acetonide 0.1 % topical cream  
Commonly known as:  KENALOG Apply  to affected area two (2) times a day. use thin layer VITAMIN D3 2,000 unit Tab Generic drug:  cholecalciferol (vitamin D3) Take 1 Tab by mouth two (2) times a day. Introducing [facility]! Dear Ishan Sagastume: 
Thank you for requesting a [employer] account. Our records indicate that you already have an active [employer] account. You can access your account anytime at [email or web address] Did you know that you can access your hospital and ER discharge instructions at any time in [employer]? You can also review all of your test results from your hospital stay or ER visit. Additional Information If you have questions, please visit the Frequently Asked Questions section of the [employer] website at [email or web address] Remember, Seam[employer] is NOT to be used for urgent needs. For medical emergencies, dial 911. Now available from your iPhone and Android! Please provide this summary of care documentation to your next provider. Your primary care clinician is listed as Deniz Ruiz. If you have any questions after today's visit, please call [phone].

## 2018-08-20 RX ORDER — SPIRONOLACTONE 100 MG/1
100 TABLET, FILM COATED ORAL DAILY
Qty: 90 TAB | Refills: 3 | Status: SHIPPED | OUTPATIENT
Start: 2018-08-20 | End: 2018-09-19 | Stop reason: SDUPTHER

## 2018-08-20 RX ORDER — FUROSEMIDE 40 MG/1
40 TABLET ORAL DAILY
Qty: 90 TAB | Refills: 3 | Status: SHIPPED | OUTPATIENT
Start: 2018-08-20 | End: 2018-11-29 | Stop reason: ALTCHOICE

## 2018-08-27 ENCOUNTER — HOSPITAL ENCOUNTER (OUTPATIENT)
Age: 63
Setting detail: OBSERVATION
Discharge: HOME | End: 2018-08-28
Attending: EMERGENCY MEDICINE | Admitting: FAMILY MEDICINE
Payer: COMMERCIAL

## 2018-08-27 ENCOUNTER — APPOINTMENT (OUTPATIENT)
Dept: CT IMAGING | Age: 63
End: 2018-08-27
Attending: EMERGENCY MEDICINE
Payer: COMMERCIAL

## 2018-08-27 ENCOUNTER — APPOINTMENT (OUTPATIENT)
Dept: GENERAL RADIOLOGY | Age: 63
End: 2018-08-27
Attending: FAMILY MEDICINE
Payer: COMMERCIAL

## 2018-08-27 ENCOUNTER — PATIENT OUTREACH (OUTPATIENT)
Dept: HEMATOLOGY | Age: 63
End: 2018-08-27

## 2018-08-27 DIAGNOSIS — D64.9 ANEMIA, UNSPECIFIED TYPE: ICD-10-CM

## 2018-08-27 DIAGNOSIS — R27.0 ATAXIA: Primary | ICD-10-CM

## 2018-08-27 DIAGNOSIS — K76.82 HEPATIC ENCEPHALOPATHY: ICD-10-CM

## 2018-08-27 DIAGNOSIS — D69.6 THROMBOCYTOPENIA (HCC): Chronic | ICD-10-CM

## 2018-08-27 DIAGNOSIS — R60.0 LOWER LEG EDEMA: ICD-10-CM

## 2018-08-27 DIAGNOSIS — K74.60 CIRRHOSIS OF LIVER WITHOUT ASCITES, UNSPECIFIED HEPATIC CIRRHOSIS TYPE (HCC): ICD-10-CM

## 2018-08-27 DIAGNOSIS — K75.81 NASH (NONALCOHOLIC STEATOHEPATITIS): ICD-10-CM

## 2018-08-27 LAB
ALBUMIN SERPL-MCNC: 2.4 G/DL (ref 3.5–5)
ALBUMIN/GLOB SERPL: 0.6 {RATIO} (ref 1.1–2.2)
ALP SERPL-CCNC: 199 U/L (ref 45–117)
ALT SERPL-CCNC: 33 U/L (ref 12–78)
AMMONIA PLAS-SCNC: 170 UMOL/L
ANION GAP SERPL CALC-SCNC: 9 MMOL/L (ref 5–15)
APPEARANCE UR: CLEAR
AST SERPL-CCNC: 48 U/L (ref 15–37)
ATRIAL RATE: 73 BPM
BACTERIA URNS QL MICRO: NEGATIVE /HPF
BASOPHILS # BLD: 0.1 K/UL (ref 0–0.1)
BASOPHILS NFR BLD: 3 % (ref 0–1)
BILIRUB SERPL-MCNC: 3 MG/DL (ref 0.2–1)
BILIRUB UR QL: NEGATIVE
BUN SERPL-MCNC: 19 MG/DL (ref 6–20)
BUN/CREAT SERPL: 17 (ref 12–20)
CALCIUM SERPL-MCNC: 8.5 MG/DL (ref 8.5–10.1)
CALCULATED P AXIS, ECG09: 55 DEGREES
CALCULATED R AXIS, ECG10: 16 DEGREES
CALCULATED T AXIS, ECG11: 16 DEGREES
CHLORIDE SERPL-SCNC: 108 MMOL/L (ref 97–108)
CO2 SERPL-SCNC: 23 MMOL/L (ref 21–32)
COLOR UR: ABNORMAL
COMMENT, HOLDF: NORMAL
CREAT SERPL-MCNC: 1.15 MG/DL (ref 0.7–1.3)
DIAGNOSIS, 93000: NORMAL
DIFFERENTIAL METHOD BLD: ABNORMAL
EOSINOPHIL # BLD: 0.2 K/UL (ref 0–0.4)
EOSINOPHIL NFR BLD: 4 % (ref 0–7)
EPITH CASTS URNS QL MICRO: ABNORMAL /LPF
ERYTHROCYTE [DISTWIDTH] IN BLOOD BY AUTOMATED COUNT: 15.5 % (ref 11.5–14.5)
EST. AVERAGE GLUCOSE BLD GHB EST-MCNC: NORMAL MG/DL
GLOBULIN SER CALC-MCNC: 3.9 G/DL (ref 2–4)
GLUCOSE BLD STRIP.AUTO-MCNC: 173 MG/DL (ref 65–100)
GLUCOSE BLD STRIP.AUTO-MCNC: 269 MG/DL (ref 65–100)
GLUCOSE SERPL-MCNC: 226 MG/DL (ref 65–100)
GLUCOSE UR STRIP.AUTO-MCNC: NEGATIVE MG/DL
HBA1C MFR BLD: 4.9 % (ref 4.2–6.3)
HCT VFR BLD AUTO: 28.3 % (ref 36.6–50.3)
HGB BLD-MCNC: 9.6 G/DL (ref 12.1–17)
HGB UR QL STRIP: ABNORMAL
HYALINE CASTS URNS QL MICRO: ABNORMAL /LPF (ref 0–5)
IMM GRANULOCYTES # BLD: 0 K/UL (ref 0–0.04)
IMM GRANULOCYTES NFR BLD AUTO: 0 % (ref 0–0.5)
INR PPP: 1.5 (ref 0.9–1.1)
KETONES UR QL STRIP.AUTO: NEGATIVE MG/DL
LEUKOCYTE ESTERASE UR QL STRIP.AUTO: NEGATIVE
LIPASE SERPL-CCNC: 311 U/L (ref 73–393)
LYMPHOCYTES # BLD: 1.7 K/UL (ref 0.8–3.5)
LYMPHOCYTES NFR BLD: 38 % (ref 12–49)
MCH RBC QN AUTO: 31.9 PG (ref 26–34)
MCHC RBC AUTO-ENTMCNC: 33.9 G/DL (ref 30–36.5)
MCV RBC AUTO: 94 FL (ref 80–99)
MONOCYTES # BLD: 0.5 K/UL (ref 0–1)
MONOCYTES NFR BLD: 12 % (ref 5–13)
NEUTS SEG # BLD: 2 K/UL (ref 1.8–8)
NEUTS SEG NFR BLD: 43 % (ref 32–75)
NITRITE UR QL STRIP.AUTO: NEGATIVE
NRBC # BLD: 0 K/UL (ref 0–0.01)
NRBC BLD-RTO: 0 PER 100 WBC
P-R INTERVAL, ECG05: 174 MS
PH UR STRIP: 5.5 [PH] (ref 5–8)
PLATELET # BLD AUTO: 53 K/UL (ref 150–400)
PMV BLD AUTO: 10.1 FL (ref 8.9–12.9)
POTASSIUM SERPL-SCNC: 4.5 MMOL/L (ref 3.5–5.1)
PROT SERPL-MCNC: 6.3 G/DL (ref 6.4–8.2)
PROT UR STRIP-MCNC: 30 MG/DL
PROTHROMBIN TIME: 15.1 SEC (ref 9–11.1)
Q-T INTERVAL, ECG07: 406 MS
QRS DURATION, ECG06: 98 MS
QTC CALCULATION (BEZET), ECG08: 447 MS
RBC # BLD AUTO: 3.01 M/UL (ref 4.1–5.7)
RBC #/AREA URNS HPF: ABNORMAL /HPF (ref 0–5)
RBC MORPH BLD: ABNORMAL
SAMPLES BEING HELD,HOLD: NORMAL
SERVICE CMNT-IMP: ABNORMAL
SERVICE CMNT-IMP: ABNORMAL
SODIUM SERPL-SCNC: 140 MMOL/L (ref 136–145)
SP GR UR REFRACTOMETRY: 1.02 (ref 1–1.03)
TROPONIN I SERPL-MCNC: <0.05 NG/ML
UROBILINOGEN UR QL STRIP.AUTO: 4 EU/DL (ref 0.2–1)
VENTRICULAR RATE, ECG03: 73 BPM
WBC # BLD AUTO: 4.5 K/UL (ref 4.1–11.1)
WBC URNS QL MICRO: ABNORMAL /HPF (ref 0–4)

## 2018-08-27 PROCEDURE — 74011250636 HC RX REV CODE- 250/636: Performed by: FAMILY MEDICINE

## 2018-08-27 PROCEDURE — 96361 HYDRATE IV INFUSION ADD-ON: CPT

## 2018-08-27 PROCEDURE — 96360 HYDRATION IV INFUSION INIT: CPT

## 2018-08-27 PROCEDURE — 99284 EMERGENCY DEPT VISIT MOD MDM: CPT

## 2018-08-27 PROCEDURE — 94760 N-INVAS EAR/PLS OXIMETRY 1: CPT

## 2018-08-27 PROCEDURE — 85610 PROTHROMBIN TIME: CPT | Performed by: FAMILY MEDICINE

## 2018-08-27 PROCEDURE — 82962 GLUCOSE BLOOD TEST: CPT

## 2018-08-27 PROCEDURE — 72100 X-RAY EXAM L-S SPINE 2/3 VWS: CPT

## 2018-08-27 PROCEDURE — 70450 CT HEAD/BRAIN W/O DYE: CPT

## 2018-08-27 PROCEDURE — 74011250637 HC RX REV CODE- 250/637: Performed by: PHYSICIAN ASSISTANT

## 2018-08-27 PROCEDURE — 74011250637 HC RX REV CODE- 250/637: Performed by: FAMILY MEDICINE

## 2018-08-27 PROCEDURE — 65660000000 HC RM CCU STEPDOWN

## 2018-08-27 PROCEDURE — 82140 ASSAY OF AMMONIA: CPT | Performed by: PHYSICIAN ASSISTANT

## 2018-08-27 PROCEDURE — 81001 URINALYSIS AUTO W/SCOPE: CPT | Performed by: PHYSICIAN ASSISTANT

## 2018-08-27 PROCEDURE — 80053 COMPREHEN METABOLIC PANEL: CPT | Performed by: PHYSICIAN ASSISTANT

## 2018-08-27 PROCEDURE — 74011250636 HC RX REV CODE- 250/636: Performed by: EMERGENCY MEDICINE

## 2018-08-27 PROCEDURE — 99218 HC RM OBSERVATION: CPT

## 2018-08-27 PROCEDURE — 72072 X-RAY EXAM THORAC SPINE 3VWS: CPT

## 2018-08-27 PROCEDURE — 83690 ASSAY OF LIPASE: CPT | Performed by: PHYSICIAN ASSISTANT

## 2018-08-27 PROCEDURE — 85025 COMPLETE CBC W/AUTO DIFF WBC: CPT | Performed by: PHYSICIAN ASSISTANT

## 2018-08-27 PROCEDURE — 93005 ELECTROCARDIOGRAM TRACING: CPT

## 2018-08-27 PROCEDURE — 73030 X-RAY EXAM OF SHOULDER: CPT

## 2018-08-27 PROCEDURE — 84484 ASSAY OF TROPONIN QUANT: CPT | Performed by: PHYSICIAN ASSISTANT

## 2018-08-27 PROCEDURE — 83036 HEMOGLOBIN GLYCOSYLATED A1C: CPT | Performed by: FAMILY MEDICINE

## 2018-08-27 PROCEDURE — 36415 COLL VENOUS BLD VENIPUNCTURE: CPT | Performed by: FAMILY MEDICINE

## 2018-08-27 PROCEDURE — 77030032490 HC SLV COMPR SCD KNE COVD -B

## 2018-08-27 RX ORDER — GABAPENTIN 300 MG/1
300 CAPSULE ORAL
Status: DISCONTINUED | OUTPATIENT
Start: 2018-08-27 | End: 2018-08-28 | Stop reason: HOSPADM

## 2018-08-27 RX ORDER — LEVOTHYROXINE SODIUM 75 UG/1
75 TABLET ORAL
COMMUNITY

## 2018-08-27 RX ORDER — LEVOTHYROXINE SODIUM 150 UG/1
75 TABLET ORAL
Status: DISCONTINUED | OUTPATIENT
Start: 2018-08-28 | End: 2018-08-28 | Stop reason: HOSPADM

## 2018-08-27 RX ORDER — SODIUM CHLORIDE 0.9 % (FLUSH) 0.9 %
5-10 SYRINGE (ML) INJECTION AS NEEDED
Status: DISCONTINUED | OUTPATIENT
Start: 2018-08-27 | End: 2018-08-28 | Stop reason: HOSPADM

## 2018-08-27 RX ORDER — GABAPENTIN 300 MG/1
300 CAPSULE ORAL
COMMUNITY
End: 2018-08-28

## 2018-08-27 RX ORDER — SODIUM CHLORIDE 9 MG/ML
125 INJECTION, SOLUTION INTRAVENOUS CONTINUOUS
Status: DISCONTINUED | OUTPATIENT
Start: 2018-08-27 | End: 2018-08-28 | Stop reason: HOSPADM

## 2018-08-27 RX ORDER — INSULIN LISPRO 100 [IU]/ML
INJECTION, SOLUTION INTRAVENOUS; SUBCUTANEOUS
Status: DISCONTINUED | OUTPATIENT
Start: 2018-08-27 | End: 2018-08-28 | Stop reason: HOSPADM

## 2018-08-27 RX ORDER — ASPIRIN 81 MG/1
81 TABLET ORAL DAILY
Status: DISCONTINUED | OUTPATIENT
Start: 2018-08-28 | End: 2018-08-28 | Stop reason: HOSPADM

## 2018-08-27 RX ORDER — GLIMEPIRIDE 1 MG/1
1 TABLET ORAL
COMMUNITY
End: 2018-11-29 | Stop reason: ALTCHOICE

## 2018-08-27 RX ORDER — MAGNESIUM SULFATE 100 %
4 CRYSTALS MISCELLANEOUS AS NEEDED
Status: DISCONTINUED | OUTPATIENT
Start: 2018-08-27 | End: 2018-08-28 | Stop reason: HOSPADM

## 2018-08-27 RX ORDER — PANTOPRAZOLE SODIUM 40 MG/1
40 TABLET, DELAYED RELEASE ORAL DAILY
Status: DISCONTINUED | OUTPATIENT
Start: 2018-08-28 | End: 2018-08-28 | Stop reason: HOSPADM

## 2018-08-27 RX ORDER — ASPIRIN 81 MG/1
81 TABLET ORAL DAILY
COMMUNITY

## 2018-08-27 RX ORDER — SODIUM CHLORIDE 9 MG/ML
50 INJECTION, SOLUTION INTRAVENOUS CONTINUOUS
Status: DISPENSED | OUTPATIENT
Start: 2018-08-27 | End: 2018-08-28

## 2018-08-27 RX ORDER — DEXTROSE 50 % IN WATER (D50W) INTRAVENOUS SYRINGE
25-50 AS NEEDED
Status: DISCONTINUED | OUTPATIENT
Start: 2018-08-27 | End: 2018-08-28 | Stop reason: HOSPADM

## 2018-08-27 RX ORDER — GABAPENTIN 300 MG/1
300 CAPSULE ORAL
COMMUNITY
End: 2019-01-11 | Stop reason: SDUPTHER

## 2018-08-27 RX ORDER — SODIUM CHLORIDE 0.9 % (FLUSH) 0.9 %
5-10 SYRINGE (ML) INJECTION EVERY 8 HOURS
Status: DISCONTINUED | OUTPATIENT
Start: 2018-08-27 | End: 2018-08-28 | Stop reason: HOSPADM

## 2018-08-27 RX ADMIN — RIFAXIMIN 550 MG: 550 TABLET ORAL at 23:02

## 2018-08-27 RX ADMIN — LACTULOSE 20 G: 20 SOLUTION ORAL at 16:28

## 2018-08-27 RX ADMIN — SODIUM CHLORIDE 50 ML/HR: 900 INJECTION, SOLUTION INTRAVENOUS at 23:02

## 2018-08-27 RX ADMIN — SODIUM CHLORIDE 1000 ML: 900 INJECTION, SOLUTION INTRAVENOUS at 18:41

## 2018-08-27 RX ADMIN — GABAPENTIN 300 MG: 300 CAPSULE ORAL at 23:02

## 2018-08-27 RX ADMIN — Medication 10 ML: at 23:03

## 2018-08-27 RX ADMIN — LACTULOSE 20 G: 20 SOLUTION ORAL at 23:01

## 2018-08-27 NOTE — ED TRIAGE NOTES
Pt has hx of hepatic encephalopathy. Spouse called Dr. Bernal Class office today and was referred to ED for increasing confusion; reports that the pt's symptoms are the same as past events. Pt reports feeling hot, notes increasing confusion/ changes in balance began when he woke up around 0700 AM, unsure if he felt this way when he went to bed. Spouse reports that he is better than he has been in the past, but they do not want to get to that point. No code S called at this time per [facility].

## 2018-08-27 NOTE — IP AVS SNAPSHOT
2700 [facility] [address] [place] [address] 
[phone] Patient: Kay Hummel MRN: [id number] :1955 About your hospitalization You were admitted on:  2018 You last received care in the:  [facility] 6S NEURO-SCI TELE You were discharged on:  2018 Why you were hospitalized Your primary diagnosis was:  Hepatic Encephalopathy (Hcc) Follow-up Information Follow up With Details Comments Contact Info Fredis Bucio MD   [address] [facility] [address] 
[phone] AT [address] PT and OT/Please call this agency when you get home. [address] [place], [place] [id number] 
[phone] Your Scheduled Appointments 2018  3:30 PM EDT Follow Up with MELVA Strickland 75 ([address]) 200 [facility] [phone] [address] [place] [address]  
[phone] 2018  9:00 AM EDT New Patient with Bhanu Miranda MD  
3240 [facility] ([address]) [address] [id number] [phone] Discharge Orders None A check fede indicates which time of day the medication should be taken. My Medications CHANGE how you take these medications Instructions Each Dose to Equal  
 Morning Noon Evening Bedtime  
 gabapentin 300 mg capsule Commonly known as:  NEURONTIN What changed:  Another medication with the same name was removed. Continue taking this medication, and follow the directions you see here. Your last dose was: Your next dose is: Take 300 mg by mouth nightly. 300 mg  
    
   
   
   
  
 lactulose 20 gram/30 mL Soln solution Commonly known as:  Gabino Sensing What changed:   
- when to take this 
- additional instructions Your last dose was: Your next dose is: Take 30 mL by mouth three (3) times daily. Adjust  dose as needed such that you have 2 loose/soft bowel movements a day without diarrhea. 20 g CONTINUE taking these medications Instructions Each Dose to Equal  
 Morning Noon Evening Bedtime  
 aspirin delayed-release 81 mg tablet Your last dose was: Your next dose is: Take 81 mg by mouth daily. 81 mg  
    
   
   
   
  
 furosemide 40 mg tablet Commonly known as:  LASIX Your last dose was: Your next dose is: Take 1 Tab by mouth daily. 40 mg  
    
   
   
   
  
 glimepiride 1 mg tablet Commonly known as:  AMARYL Your last dose was: Your next dose is: Take 1 mg by mouth every morning. 1 mg  
    
   
   
   
  
 levothyroxine 75 mcg tablet Commonly known as:  SYNTHROID Your last dose was: Your next dose is: Take 75 mcg by mouth Daily (before breakfast). 75 mcg  
    
   
   
   
  
 pantoprazole 40 mg tablet Commonly known as:  PROTONIX Your last dose was: Your next dose is: Take 40 mg by mouth daily. 40 mg  
    
   
   
   
  
 rifAXIMin 550 mg tablet Commonly known as:  Toñito Began Your last dose was: Your next dose is: Take 1 Tab by mouth two (2) times a day. 550 mg  
    
   
   
   
  
 simvastatin 20 mg tablet Commonly known as:  ZOCOR Your last dose was: Your next dose is: Take 20 mg by mouth daily. 20 mg  
    
   
   
   
  
 spironolactone 100 mg tablet Commonly known as:  ALDACTONE Your last dose was: Your next dose is: Take 1 Tab by mouth daily. 100 mg  
    
   
   
   
  
 VITAMIN D3 2,000 unit Tab Generic drug:  cholecalciferol (vitamin D3) Your last dose was: Your next dose is: Take 1 Tab by mouth two (2) times a day. 1 Tab STOP taking these medications   
 metFORMIN 500 mg tablet Commonly known as:  GLUCOPHAGE Discharge Instructions Discharge Instructions PATIENT ID: Chriss Sandhoff MRN: [id number] YOB: 1955 DATE OF ADMISSION: 8/27/2018  4:09 PM   
DATE OF DISCHARGE: 8/28/2018 PRIMARY CARE PROVIDER: Cathie German MD  
 
ATTENDING PHYSICIAN: Benito Finley MD 
DISCHARGING PROVIDER: Benito Finley MD   
To contact this individual call [phone] and ask the  to page. If unavailable ask to be transferred the Adult Hospitalist Department. DISCHARGE DIAGNOSES Hepatic encephalopathy CONSULTATIONS: IP CONSULT TO HOSPITALIST 
IP CONSULT TO HEPATOLOGY PROCEDURES/SURGERIES: * No surgery found * PENDING TEST RESULTS:  
At the time of discharge the following test results are still pending: None FOLLOW UP APPOINTMENTS:  
Follow-up Information Follow up With Details Comments Contact Info Cathie German MD   [address] [place] [address] 
[phone] ADDITIONAL CARE RECOMMENDATIONS:  
 
DIET: Resume previous diet ACTIVITY: Activity as tolerated DISCHARGE MEDICATIONS: 
 See Medication Reconciliation Form · It is important that you take the medication exactly as they are prescribed. · Keep your medication in the bottles provided by the pharmacist and keep a list of the medication names, dosages, and times to be taken in your wallet. · Do not take other medications without consulting your doctor. NOTIFY YOUR PHYSICIAN FOR ANY OF THE FOLLOWING:  
Fever over 101 degrees for 24 hours. Chest pain, shortness of breath, fever, chills, nausea, vomiting, diarrhea, change in mentation, falling, weakness, bleeding. Severe pain or pain not relieved by medications. Or, any other signs or symptoms that you may have questions about.  
 
 
DISPOSITION: 
 x Home With: 
 OT  PT x [facility]  RN  
  
 SNF/Inpatient Rehab/LTAC Independent/assisted living Hospice Other: CDMP Checked:  
Yes x PROBLEM LIST Updated: 
Yes x Signed:  
Emiliano Mcneal MD 
8/28/2018 
3:13 PM 
 
  
  
  
[employer] Announcement We are excited to announce that we are making your provider's discharge notes available to you in [employer]. You will see these notes when they are completed and signed by the physician that discharged you from your recent hospital stay. If you have any questions or concerns about any information you see in [employer], please call the Health Information Department where you were seen or reach out to your Primary Care Provider for more information about your plan of care. Introducing [facility]! Dear Arabella Sams: 
Thank you for requesting a [employer] account. Our records indicate that you already have an active [employer] account. You can access your account anytime at [email or web address] Did you know that you can access your hospital and ER discharge instructions at any time in [employer]? You can also review all of your test results from your hospital stay or ER visit. Additional Information If you have questions, please visit the Frequently Asked Questions section of the [employer] website at [email or web address] Remember, [employer] is NOT to be used for urgent needs. For medical emergencies, dial 911. Now available from your iPhone and Android! Introducing Ray Stiles As a [facility] patient, I wanted to make you aware of our electronic visit tool called Ray Stiles. [facility] 24/7 allows you to connect within minutes with a medical provider 24 hours a day, seven days a week via a mobile device or tablet or logging into a secure website from your computer. You can access Ray Stiles from anywhere in the [place]. A virtual visit might be right for you when you have a simple condition and feel like you just dont want to get out of bed, or cant get away from work for an appointment, when your regular [facility] provider is not available (evenings, weekends or holidays), or when youre out of town and need minor care. Electronic visits cost only $49 and if the [facility] 24/7 provider determines a prescription is needed to treat your condition, one can be electronically transmitted to a nearby pharmacy*. Please take a moment to enroll today if you have not already done so. The enrollment process is free and takes just a few minutes. To enroll, please download the SEAT 4a/[employer] robb to your tablet or phone, or visit [email or web address] to enroll on your computer. And, as an [address] [place] [address] patient with a [employer] account, the results of your visits will be scanned into your electronic medical record and your primary care provider will be able to view the scanned results. We urge you to continue to see your regular [facility] provider for your ongoing medical care. And while your primary care provider may not be the one available when you seek a Ray Stiles virtual visit, the peace of mind you get from getting a real diagnosis real time can be priceless. For more information on Ray Stiles, view our Frequently Asked Questions (FAQs) at [email or web address]. Sincerely, 
 
Solitario Brush MD 
Chief Medical Officer Sera Lisy Hansen *:  certain medications cannot be prescribed via [facility] Providers Seen During Your Hospitalization Provider Specialty Primary office phone Cristie Litten, DO Emergency Medicine [phone] Natalie Saunders MD Hospitalist [phone] Reagan Rudd MD Internal Medicine [phone] Your Primary Care Physician (PCP) Primary Care Physician Office Phone Office Fax [address] [address] [phone] You are allergic to the following No active allergies Recent Documentation Height Weight BMI Smoking Status 1.803 m 124.6 kg 38.33 kg/m2 Never Smoker Emergency Contacts Name Discharge Info Relation Home Work Mobile [address] [facility] CAREGIVER [3] Spouse [3] [phone] Patient Belongings The following personal items are in your possession at time of discharge: 
  Dental Appliances: None  Visual Aid: Glasses, With patient      Home Medications: None   Jewelry: None  Clothing: At bedside    Other Valuables: None Please provide this summary of care documentation to your next provider. Signatures-by signing, you are acknowledging that this After Visit Summary has been reviewed with you and you have received a copy. Patient Signature:  ____________________________________________________________ Date:  ____________________________________________________________  
  
Deisy Diana Provider Signature:  ____________________________________________________________ Date:  ____________________________________________________________

## 2018-08-27 NOTE — PROGRESS NOTES
Admission Medication Reconciliation:    Information obtained from: Wife provided medication list and history    Significant PMH/Disease States:   Past Medical History:   Diagnosis Date    Arthritis     Autoimmune disease ([facility] [address] [facility]     ITP    CAD (coronary artery disease)     enlarged heart ?  Cancer ([facility] [address]     skin ca removed from forehead    Chronic kidney disease     kidney stones    Coagulation disorder ([facility] [address] [facility]     low plts    Diabetes (HCC)     type 2    GERD (gastroesophageal reflux disease)     Hepatic encephalopathy ([facility] [address] [facility]     Hypertension     Ill-defined condition     obesity per pt    Ill-defined condition     anemia    Liver disease     elevated liver enzymes    Liver transplant candidate     Pt is on the list for a liver transplant as of 8/2018    PUD (peptic ulcer disease)     stomach ulcers       Chief Complaint for this Admission: Fatigue     Allergies:  Review of patient's allergies indicates no known allergies. Prior to Admission Medications:   Prior to Admission Medications   Prescriptions Last Dose Informant Patient Reported? Taking?   aspirin delayed-release 81 mg tablet 8/27/2018 at Unknown time  Yes Yes   Sig: Take 81 mg by mouth daily. cholecalciferol, vitamin D3, (VITAMIN D3) 2,000 unit tab 8/27/2018 at Unknown time  Yes Yes   Sig: Take 1 Tab by mouth two (2) times a day. furosemide (LASIX) 40 mg tablet 8/27/2018 at Unknown time  No Yes   Sig: Take 1 Tab by mouth daily. gabapentin (NEURONTIN) 300 mg capsule 8/26/2018 at Unknown time  Yes Yes   Sig: Take 300 mg by mouth nightly.   gabapentin (NEURONTIN) 300 mg capsule 8/27/2018 at 1100  Yes Yes   Sig: Take 300 mg by mouth daily as needed for Other (Restless legs, anxiety). glimepiride (AMARYL) 1 mg tablet 8/27/2018 at Unknown time  Yes Yes   Sig: Take 1 mg by mouth every morning. lactulose (CHRONULAC) 20 gram/30 mL soln solution 8/27/2018 at Unknown time  No Yes   Sig: Take 30 mL by mouth three (3) times daily.  Adjust dose as needed such that you have 2 loose/soft bowel movements a day without diarrhea. Patient taking differently: Take 20 g by mouth daily. Adjust  dose as needed such that you have 2 loose/soft bowel movements a day without diarrhea.   levothyroxine (SYNTHROID) 75 mcg tablet 8/27/2018 at Unknown time  Yes Yes   Sig: Take 75 mcg by mouth Daily (before breakfast). metFORMIN (GLUCOPHAGE) 500 mg tablet 8/27/2018 at Unknown time  Yes Yes   Sig: Take 500 mg by mouth two (2) times daily (with meals). pantoprazole (PROTONIX) 40 mg tablet 8/27/2018 at Unknown time  Yes Yes   Sig: Take 40 mg by mouth daily. rifAXIMin (XIFAXAN) 550 mg tablet 8/27/2018 at Unknown time  No Yes   Sig: Take 1 Tab by mouth two (2) times a day. simvastatin (ZOCOR) 20 mg tablet 8/27/2018 at Unknown time  Yes Yes   Sig: Take 20 mg by mouth daily. spironolactone (ALDACTONE) 100 mg tablet 8/27/2018 at Unknown time  No Yes   Sig: Take 1 Tab by mouth daily. Facility-Administered Medications: None         Comments/Recommendations: Patient was very pleasant gentleman, deferred entire history to wife. Allergies were reviewed, no changes. Added:  1. Baby ASA    Revised:  1. Gabapentin: ordered TID, takes once nightly and occasionally takes one extra (as he did today) for restless legs, anxiety, \"feeling funny\" (per wife)  2. Glimepiride  3. Lactulose: ordered TID, usually takes just once daily (QAM)  4. Levothyroxine dose increased  5. Simvastatin to am (taking at night \"makes his legs feel funny\")    Deleted:  1. Kenalog cream    Thank you for allowing me to participate in the care of your patient.     Misa Gonzales PharmD, RN #[id number]

## 2018-08-27 NOTE — PROGRESS NOTES
Received phone call from patient's wife, Nata Haksins, regarding mental status changes. She reports she is at work and received a phone call from patient who reported he was deciding between calling her or 911. Patient told wife it took him an hour to figure out how to call her before he did. Patient reportedly has been having several bowel movements per day, last one this morning. Wife reports she has noticed a decline during the proceeding weeks: slowed speech, difficulty getting around, and reports patient fell in bathroom. Discussed potential causes of changes in mental status including dehydration, electrolyte imbalance, or infection if patient is having adequate BMs. Patient reports he's drinking enough, but wife is unsure if this is accurate. Wife has not observed fever, chills, or other signs of infection. Wife reports she has asked for her son to check on patient and she will leave work within the hour to check on him as well. She will bring him to the ED if warranted.

## 2018-08-27 NOTE — IP AVS SNAPSHOT
[address] 
[phone] Patient: Kris Johnson MRN: [id number] :1955 A check fede indicates which time of day the medication should be taken. My Medications CHANGE how you take these medications Instructions Each Dose to Equal  
 Morning Noon Evening Bedtime  
 gabapentin 300 mg capsule Commonly known as:  NEURONTIN What changed:  Another medication with the same name was removed. Continue taking this medication, and follow the directions you see here. Your last dose was: Your next dose is: Take 300 mg by mouth nightly. 300 mg  
    
   
   
   
  
 lactulose 20 gram/30 mL Soln solution Commonly known as:  Sumi Tolliver What changed:   
- when to take this 
- additional instructions Your last dose was: Your next dose is: Take 30 mL by mouth three (3) times daily. Adjust  dose as needed such that you have 2 loose/soft bowel movements a day without diarrhea. 20 g CONTINUE taking these medications Instructions Each Dose to Equal  
 Morning Noon Evening Bedtime  
 aspirin delayed-release 81 mg tablet Your last dose was: Your next dose is: Take 81 mg by mouth daily. 81 mg  
    
   
   
   
  
 furosemide 40 mg tablet Commonly known as:  LASIX Your last dose was: Your next dose is: Take 1 Tab by mouth daily. 40 mg  
    
   
   
   
  
 glimepiride 1 mg tablet Commonly known as:  AMARYL Your last dose was: Your next dose is: Take 1 mg by mouth every morning. 1 mg  
    
   
   
   
  
 levothyroxine 75 mcg tablet Commonly known as:  SYNTHROID Your last dose was: Your next dose is: Take 75 mcg by mouth Daily (before breakfast). 75 mcg  
    
   
   
   
  
 pantoprazole 40 mg tablet Commonly known as:  PROTONIX Your last dose was: Your next dose is: Take 40 mg by mouth daily. 40 mg  
    
   
   
   
  
 rifAXIMin 550 mg tablet Commonly known as:  Laura Castaneda Your last dose was: Your next dose is: Take 1 Tab by mouth two (2) times a day. 550 mg  
    
   
   
   
  
 simvastatin 20 mg tablet Commonly known as:  ZOCOR Your last dose was: Your next dose is: Take 20 mg by mouth daily. 20 mg  
    
   
   
   
  
 spironolactone 100 mg tablet Commonly known as:  ALDACTONE Your last dose was: Your next dose is: Take 1 Tab by mouth daily. 100 mg  
    
   
   
   
  
 VITAMIN D3 2,000 unit Tab Generic drug:  cholecalciferol (vitamin D3) Your last dose was: Your next dose is: Take 1 Tab by mouth two (2) times a day. 1 Tab STOP taking these medications   
 metFORMIN 500 mg tablet Commonly known as:  GLUCOPHAGE

## 2018-08-27 NOTE — ED NOTES
1953:  Report received from Olivia and Jose phipps RN. Patient is in bed, A&O X3, skin is warm and dry, respirations are even and unlabored. Family at bedside, call bell within reach, will continue to monitor. 2114:  Patient is stable, being transported by TONI Lucas at this time. Food for dinner to be sent with patient, floor nurse aware.

## 2018-08-27 NOTE — IP AVS SNAPSHOT
Summary of Care Report The Summary of Care report has been created to help improve care coordination. Users with access to [employer] or [address] (Web-based application) may access additional patient information including the Discharge Summary. If you are not currently a [address] user and need more information, please call the number listed below in the [place] [address] section and ask to be connected with Medical Records. Facility Information Name Address Phone [address] [id number] [phone] Patient Information Patient Name Sex  Reggie Pineda ([id number]) Male 1955 Discharge Information Admitting Provider Service Area Unit Joanne Ayoub MD / [address] [place] [address] [facility] / [phone] Discharge Provider Discharge Date/Time Discharge Disposition Destination (none) 2018 Afternoon (Pending) AHR (none) Patient Language Language ENGLISH [13] Hospital Problems as of 2018  Reviewed: 2018  3:11 PM by Anahi Mcghee MD  
 None Non-Hospital Problems as of 2018  Reviewed: 2018  3:11 PM by Anahi Mcghee MD  
  
  
  
 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) (Chronic)  2016 - Present 2016 by Asha Bonner MD  
  Entered by Devin Elliott MD  
  Thrombocytopenia [facility]) (Chronic)  2016 - Present 2016 by Asha Bonner MD  
  Entered by Devin Elliott MD  
  GERD (gastroesophageal reflux disease) (Chronic)  2016 - Present 2016 by Asha Bonner MD  
  Entered by Devin Elliott MD  
  CAD (coronary artery disease) (Chronic)  2016 - Present 2016 by Asha Bonner MD  
  Entered by Devin Elliott MD  
  DM type 2 (diabetes mellitus, type 2) ([facility] (Chronic)  2016 - Present 2/23/2016 by Didier Fermin MD  
  Entered by Bhavana Paul MD  
  Lower leg edema  11/2/2017 - Present 11/2/2017 by April Inder Members, NP Entered by April Inder Members, NP  
  CAMEJO (nonalcoholic steatohepatitis)  11/2/2017 - Present 11/2/2017 by April Inder Members, NP Entered by April Inder Members, NP Neuropathy involving both lower extremities  1/2/2018 - Present 1/2/2018 by April Inder Members, NP Entered by April Inder Members, NP Type 2 diabetes with nephropathy ([facility]  3/29/2018 - Present 3/29/2018 by April Inder Members, NP Entered by April Inder Members, NP Severe obesity (BMI 35.0-39. 9) with comorbidity ([address]  5/24/2018 - Present 5/24/2018 by April Inder Members, NP Entered by April Inder Members, NP You are allergic to the following No active allergies Current Discharge Medication List  
  
CONTINUE these medications which have CHANGED Dose & Instructions Dispensing Information Comments  
 gabapentin 300 mg capsule Commonly known as:  NEURONTIN What changed:  Another medication with the same name was removed. Continue taking this medication, and follow the directions you see here. Dose:  300 mg Take 300 mg by mouth nightly. Refills:  0  
   
 lactulose 20 gram/30 mL Soln solution Commonly known as:  Eual Carina What changed:   
- when to take this 
- additional instructions Dose:  20 g Take 30 mL by mouth three (3) times daily. Adjust  dose as needed such that you have 2 loose/soft bowel movements a day without diarrhea. Quantity:  1800 mL Refills:  1 CONTINUE these medications which have NOT CHANGED Dose & Instructions Dispensing Information Comments  
 aspirin delayed-release 81 mg tablet Dose:  81 mg Take 81 mg by mouth daily. Refills:  0  
   
 furosemide 40 mg tablet Commonly known as:  LASIX Dose:  40 mg Take 1 Tab by mouth daily. Quantity:  90 Tab Refills:  3  
   
 glimepiride 1 mg tablet Commonly known as:  AMARYL Dose:  1 mg Take 1 mg by mouth every morning. Refills:  0  
   
 levothyroxine 75 mcg tablet Commonly known as:  SYNTHROID Dose:  75 mcg Take 75 mcg by mouth Daily (before breakfast). Refills:  0  
   
 pantoprazole 40 mg tablet Commonly known as:  PROTONIX Dose:  40 mg Take 40 mg by mouth daily. Refills:  0  
   
 rifAXIMin 550 mg tablet Commonly known as:  [facility] Dose:  550 mg Take 1 Tab by mouth two (2) times a day. Quantity:  60 Tab Refills:  11  
   
 simvastatin 20 mg tablet Commonly known as:  ZOCOR Dose:  20 mg Take 20 mg by mouth daily. Refills:  0  
   
 spironolactone 100 mg tablet Commonly known as:  ALDACTONE Dose:  100 mg Take 1 Tab by mouth daily. Quantity:  90 Tab Refills:  3 VITAMIN D3 2,000 unit Tab Generic drug:  cholecalciferol (vitamin D3) Dose:  1 Tab Take 1 Tab by mouth two (2) times a day. Refills:  0 STOP taking these medications Comments  
 metFORMIN 500 mg tablet Commonly known as:  GLUCOPHAGE Follow-up Information Follow up With Details Comments Contact Info Yehuda Quintero MD   [address] [place] [id number] AT [address] PT and OT/Please call this agency when you get home. [address] [place] [address] 
[phone] Discharge Instructions Discharge Instructions PATIENT ID: Kris Johnson MRN: [id number] YOB: 1955 DATE OF ADMISSION: 8/27/2018  4:09 PM   
DATE OF DISCHARGE: 8/28/2018 PRIMARY CARE PROVIDER: Yehuda Quintero MD  
 
ATTENDING PHYSICIAN: Daniel Sheehan MD 
DISCHARGING PROVIDER: Daniel Sheehan MD   
To contact this individual call [phone] and ask the  to page. If unavailable ask to be transferred the Adult Hospitalist Department. DISCHARGE DIAGNOSES Hepatic encephalopathy CONSULTATIONS: IP CONSULT TO HOSPITALIST IP CONSULT TO HEPATOLOGY PROCEDURES/SURGERIES: * No surgery found * PENDING TEST RESULTS:  
At the time of discharge the following test results are still pending: None FOLLOW UP APPOINTMENTS:  
Follow-up Information Follow up With Details Comments Contact Patricia Ruiz MD   [address] [facility] [address] [facility] 
[phone] ADDITIONAL CARE RECOMMENDATIONS:  
 
DIET: Resume previous diet ACTIVITY: Activity as tolerated DISCHARGE MEDICATIONS: 
 See Medication Reconciliation Form · It is important that you take the medication exactly as they are prescribed. · Keep your medication in the bottles provided by the pharmacist and keep a list of the medication names, dosages, and times to be taken in your wallet. · Do not take other medications without consulting your doctor. NOTIFY YOUR PHYSICIAN FOR ANY OF THE FOLLOWING:  
Fever over 101 degrees for 24 hours. Chest pain, shortness of breath, fever, chills, nausea, vomiting, diarrhea, change in mentation, falling, weakness, bleeding. Severe pain or pain not relieved by medications. Or, any other signs or symptoms that you may have questions about. DISPOSITION: 
 x Home With: 
 OT  PT x HH  RN  
  
 SNF/Inpatient Rehab/LTAC Independent/assisted living Hospice Other: CDMP Checked:  
Yes x PROBLEM LIST Updated: 
Yes x Signed:  
Cristhian Schmid MD 
8/28/2018 
3:13 PM 
 
Chart Review Routing History Recipient Method Report Sent By Chandrika Lanza MD  
Fax: [phone] Phone: [phone] Fax Perla Tejada MD NOTES AUTO ROUTING REPORT Thanh Briceno MD [[id number]] 2/22/2016  7:35 PM 02/22/2016 Laya Lanza MD  
Fax: [phone] Phone: [phone] Fax Perla Tejada MD NOTES AUTO ROUTING REPORT Bhavya Malloy MD [[id number]] 2/25/2016  5:56 AM 02/25/2016 Mignon Paris MD  
Phone: [phone]  In [employer] IP Auto Routed GLORIA Messer MD [[id number]] 2/25/2016 9:36 AM 02/25/2016 Adela Gtz MD  
Fax: [phone] Phone: [phone] Fax Maria E Jauregui MD NOTES AUTO ROUTING REPORT Nora Daniels MD [[id number]] 1/14/2018  5:54 PM 01/14/2018 Adela Gtz MD  
Fax: [phone] Phone: [phone] Fax PAULETTE MAHAJAN MD NOTES AUTO ROUTING REPORT Zaid Patel MD [[id number]] 1/21/2018  3:06 PM 01/21/2018 Adela Gtz MD  
Fax: [phone] Phone: [phone] Fax Maria E Jauregui MD NOTES AUTO ROUTING REPORT Namita Valentin MD [[id number]] 5/6/2018 11:48 AM 05/06/2018 Shell Smith MD  
Phone: [phone] In Basket Notes Report Eli Kennedy MD [[id number]] 5/9/2018 10:38 AM 5/9/2018 Adela Gtz MD  
Fax: [phone] Phone: [phone] Fax Maria E Jauregui MD NOTES AUTO ROUTING REPORT Wong Squires MD [[id number]] 5/21/2018  6:10 AM 05/21/2018 Adela Gtz MD  
Fax: [phone] Phone: [phone] Fax Maria E Jauregui MD NOTES AUTO ROUTING REPORT Wong Suqires MD [[id number]] 5/21/2018  6:12 AM 05/21/2018 Adela Gtz MD  
Fax: [phone] Phone: [phone] Fax Maria E Jauregui MD NOTES AUTO ROUTING REPORT Max Hwang MD [[id number]] 8/28/2018  3:18 PM 08/28/2018

## 2018-08-27 NOTE — ED PROVIDER NOTES
HPI Comments: 60 y.o. male with past medical history significant for HTN, CAD, CM, ITP, CKD, arthritis, hepatic encephalopathy who presents via private vehicle with chief complaint of altered mental status. Pt c/o worsening slurred speech, confusion and trouble ambulating since 0700 this AM. Pt reports hx of similar sx when he was admitted for hepatic encephalopathy. Pt reports feeling fatigued yesterday, but otherwise normal. Pt has had two GLF in the past week, one hitting his back and the other hitting his knees. Denies hitting his head. Denies hx of UTI. Pt takes XIFAXAN and lactulose daily, which he took this AM. Pt also took an extra dose of gabapentin this AM. Pt denies fever. There are no other acute medical concerns at this time. Full history, physical exam, and ROS unable to be obtained due to:  confusion. Social hx: denies tobacco use, denies EtOH consumption    PCP: Deniz Ruiz MD    Old chart reviewed -Pt is a pt at the liver clinic - last visit 8/9. Pt has chronic HCV with cirrhosis. Pt is on the liver transplant list.  Pt was admitted for 3 days in May for hepatic encephalopathy and had YANA. Note written by Uche Erickson, as dictated by Nirmala Toth, DO 5:19 PM      The history is provided by the patient and the spouse. No  was used. Past Medical History:   Diagnosis Date    Arthritis     Autoimmune disease ([address]     ITP    CAD (coronary artery disease)     enlarged heart ?     Cancer ([address]     skin ca removed from forehead    Chronic kidney disease     kidney stones    Coagulation disorder (HCC)     low plts    Diabetes (HCC)     type 2    GERD (gastroesophageal reflux disease)     Hepatic encephalopathy ([address]     Hypertension     Ill-defined condition     obesity per pt    Ill-defined condition     anemia    Liver disease     elevated liver enzymes    Liver transplant candidate     Pt is on the list for a liver transplant as of 8/2018    PUD (peptic ulcer disease)     stomach ulcers       Past Surgical History:   Procedure Laterality Date    ABDOMEN SURGERY PROC UNLISTED      ana    HX APPENDECTOMY      HX OTHER SURGICAL      mohs procedure for skin ca.  HX UROLOGICAL      vasectomy         Family History:   Problem Relation Age of Onset    Cancer Mother      multiple myeloma    Cancer Father      prostate    MS Sister     Cancer Maternal Grandmother      ?where    Cancer Maternal Grandfather      ?where    Heart Failure Paternal Grandmother     Cancer Paternal Grandfather      ? where       Social History     Social History    Marital status:      Spouse name: N/A    Number of children: N/A    Years of education: N/A     Occupational History    Not on file. Social History Main Topics    Smoking status: Never Smoker    Smokeless tobacco: Never Used    Alcohol use No    Drug use: No    Sexual activity: Not on file     Other Topics Concern    Not on file     Social History Narrative         ALLERGIES: Review of patient's allergies indicates no known allergies. Review of Systems   Constitutional: Positive for fatigue. Negative for chills and fever. HENT: Negative for ear pain and sore throat. Eyes: Negative for pain. Respiratory: Negative for chest tightness and shortness of breath. Cardiovascular: Negative for chest pain and leg swelling. Gastrointestinal: Negative for abdominal pain, nausea and vomiting. Genitourinary: Negative for dysuria and flank pain. Musculoskeletal: Negative for back pain. Skin: Negative for rash. Neurological: Positive for speech difficulty. Negative for headaches. Psychiatric/Behavioral: Positive for confusion. All other systems reviewed and are negative.       Vitals:    08/27/18 1514 08/27/18 1625 08/27/18 1630   BP: 188/72  183/50   Pulse: 76     Resp: 16     Temp: 98.2 °F (36.8 °C)     SpO2: 98% 99% 99%   Weight: 124.6 kg (274 lb 9.6 oz) Height: 5' 11\" (1.803 m)              Physical Exam      Constitutional: Pt is awake and alert. Pt appears well-developed and well-nourished. NAD. HENT:   Head: Normocephalic and atraumatic. Nose: Nose normal.   Mouth/Throat: Oropharynx is clear and moist. No oropharyngeal exudate. Eyes: Conjunctivae and extraocular motions are normal. Pupils are equal, round, and reactive to light. Right eye exhibits no discharge. Left eye exhibits no discharge. No scleral icterus. Neck: No tracheal deviation present. Supple neck. Cardiovascular: Normal rate, regular rhythm, normal heart sounds and intact distal pulses. Exam reveals no gallop and no friction rub. No murmur heard. Pulmonary/Chest: Effort normal and breath sounds normal.  Pt  has no wheezes. Pt  has no rales. Abdominal: Soft. Pt  exhibits no distension and no mass. No tenderness. Pt  has no rebound and no guarding. Musculoskeletal:  Pt  exhibits no edema and no tenderness. Ext: Normal ROM in all four extremities; not tender to palpation; distal pulses are normal, no edema. Neurological:  Pt is alert. nonfocal neuro exam.  No pronator or leg drift. No sensory deficit. Slight confusion  Skin: Skin is warm and dry. Pt  is not diaphoretic. Healing bruise on back at T9-T10 region, L of center. Psychiatric:  Pt  has a normal mood and affect. Behavior is normal.   Note written by Uche Jacobs, as dictated by Cristie Litten, DO 5:46 PM      [facility]      ED Course       Procedures  ED EKG interpretation:  Rhythm: normal sinus rhythm; and regular . Rate (approx.): 73; Axis: normal; ST/T wave: normal.  Note written by Uche Jacobs, as dictated by Cristie Litten, DO 5:47 PM    PROGRESS NOTE:  6:20 PM  Nurses tried to ambulate the pt, but he is very ataxic and unsteady and unable to go home.    Will consult the hospitalist.          Labs Reviewed   AMMONIA - Abnormal; Notable for the following:        Result Value Ammonia 170 (*)     All other components within normal limits   CBC WITH AUTOMATED DIFF - Abnormal; Notable for the following:     RBC 3.01 (*)     HGB 9.6 (*)     HCT 28.3 (*)     RDW 15.5 (*)     PLATELET 53 (*)     BASOPHILS 3 (*)     All other components within normal limits   METABOLIC PANEL, COMPREHENSIVE - Abnormal; Notable for the following:     Glucose 226 (*)     Bilirubin, total 3.0 (*)     AST (SGOT) 48 (*)     Alk.  phosphatase 199 (*)     Protein, total 6.3 (*)     Albumin 2.4 (*)     A-G Ratio 0.6 (*)     All other components within normal limits   URINALYSIS W/MICROSCOPIC - Abnormal; Notable for the following:     Protein 30 (*)     Blood MODERATE (*)     Urobilinogen 4.0 (*)     All other components within normal limits   GLUCOSE, POC - Abnormal; Notable for the following:     Glucose (POC) 269 (*)     All other components within normal limits   LIPASE   SAMPLES BEING HELD   TROPONIN I     Consulted Dr Jing Scott - will admit

## 2018-08-28 ENCOUNTER — APPOINTMENT (OUTPATIENT)
Dept: ULTRASOUND IMAGING | Age: 63
End: 2018-08-28
Attending: FAMILY MEDICINE
Payer: COMMERCIAL

## 2018-08-28 VITALS
SYSTOLIC BLOOD PRESSURE: 164 MMHG | TEMPERATURE: 97.8 F | OXYGEN SATURATION: 98 % | WEIGHT: 274.8 LBS | HEART RATE: 69 BPM | HEIGHT: 71 IN | RESPIRATION RATE: 10 BRPM | BODY MASS INDEX: 38.47 KG/M2 | DIASTOLIC BLOOD PRESSURE: 63 MMHG

## 2018-08-28 PROBLEM — K76.82 HEPATIC ENCEPHALOPATHY: Status: RESOLVED | Noted: 2018-03-29 | Resolved: 2018-08-28

## 2018-08-28 LAB
ALBUMIN SERPL-MCNC: 2 G/DL (ref 3.5–5)
ALBUMIN/GLOB SERPL: 0.6 {RATIO} (ref 1.1–2.2)
ALP SERPL-CCNC: 154 U/L (ref 45–117)
ALT SERPL-CCNC: 30 U/L (ref 12–78)
ANION GAP SERPL CALC-SCNC: 9 MMOL/L (ref 5–15)
AST SERPL-CCNC: 45 U/L (ref 15–37)
BASOPHILS # BLD: 0 K/UL (ref 0–0.1)
BASOPHILS NFR BLD: 0 % (ref 0–1)
BILIRUB DIRECT SERPL-MCNC: 1.3 MG/DL (ref 0–0.2)
BILIRUB SERPL-MCNC: 2.9 MG/DL (ref 0.2–1)
BUN SERPL-MCNC: 15 MG/DL (ref 6–20)
BUN/CREAT SERPL: 15 (ref 12–20)
CALCIUM SERPL-MCNC: 8.4 MG/DL (ref 8.5–10.1)
CHLORIDE SERPL-SCNC: 113 MMOL/L (ref 97–108)
CO2 SERPL-SCNC: 22 MMOL/L (ref 21–32)
CREAT SERPL-MCNC: 0.99 MG/DL (ref 0.7–1.3)
DIFFERENTIAL METHOD BLD: ABNORMAL
EOSINOPHIL # BLD: 0.1 K/UL (ref 0–0.4)
EOSINOPHIL NFR BLD: 4 % (ref 0–7)
ERYTHROCYTE [DISTWIDTH] IN BLOOD BY AUTOMATED COUNT: 15.5 % (ref 11.5–14.5)
GLOBULIN SER CALC-MCNC: 3.5 G/DL (ref 2–4)
GLUCOSE BLD STRIP.AUTO-MCNC: 149 MG/DL (ref 65–100)
GLUCOSE BLD STRIP.AUTO-MCNC: 209 MG/DL (ref 65–100)
GLUCOSE BLD STRIP.AUTO-MCNC: 232 MG/DL (ref 65–100)
GLUCOSE SERPL-MCNC: 168 MG/DL (ref 65–100)
HCT VFR BLD AUTO: 25.1 % (ref 36.6–50.3)
HGB BLD-MCNC: 8.4 G/DL (ref 12.1–17)
IMM GRANULOCYTES # BLD: 0 K/UL
IMM GRANULOCYTES NFR BLD AUTO: 0 %
INR PPP: 1.5 (ref 0.9–1.1)
LYMPHOCYTES # BLD: 1.8 K/UL (ref 0.8–3.5)
LYMPHOCYTES NFR BLD: 51 % (ref 12–49)
MCH RBC QN AUTO: 31.7 PG (ref 26–34)
MCHC RBC AUTO-ENTMCNC: 33.5 G/DL (ref 30–36.5)
MCV RBC AUTO: 94.7 FL (ref 80–99)
MONOCYTES # BLD: 0.3 K/UL (ref 0–1)
MONOCYTES NFR BLD: 8 % (ref 5–13)
NEUTS BAND NFR BLD MANUAL: 1 % (ref 0–6)
NEUTS SEG # BLD: 1.3 K/UL (ref 1.8–8)
NEUTS SEG NFR BLD: 36 % (ref 32–75)
NRBC # BLD: 0 K/UL (ref 0–0.01)
NRBC BLD-RTO: 0 PER 100 WBC
PLATELET # BLD AUTO: 46 K/UL (ref 150–400)
PLATELET COMMENTS,PCOM: ABNORMAL
PMV BLD AUTO: 9.9 FL (ref 8.9–12.9)
POTASSIUM SERPL-SCNC: 4.2 MMOL/L (ref 3.5–5.1)
PROT SERPL-MCNC: 5.5 G/DL (ref 6.4–8.2)
PROTHROMBIN TIME: 15.1 SEC (ref 9–11.1)
RBC # BLD AUTO: 2.65 M/UL (ref 4.1–5.7)
RBC MORPH BLD: ABNORMAL
SERVICE CMNT-IMP: ABNORMAL
SODIUM SERPL-SCNC: 144 MMOL/L (ref 136–145)
WBC # BLD AUTO: 3.5 K/UL (ref 4.1–11.1)

## 2018-08-28 PROCEDURE — 97116 GAIT TRAINING THERAPY: CPT

## 2018-08-28 PROCEDURE — G8978 MOBILITY CURRENT STATUS: HCPCS

## 2018-08-28 PROCEDURE — 82962 GLUCOSE BLOOD TEST: CPT

## 2018-08-28 PROCEDURE — 74011250637 HC RX REV CODE- 250/637: Performed by: FAMILY MEDICINE

## 2018-08-28 PROCEDURE — 97161 PT EVAL LOW COMPLEX 20 MIN: CPT

## 2018-08-28 PROCEDURE — 77030032490 HC SLV COMPR SCD KNE COVD -B

## 2018-08-28 PROCEDURE — 80048 BASIC METABOLIC PNL TOTAL CA: CPT | Performed by: FAMILY MEDICINE

## 2018-08-28 PROCEDURE — 74011636637 HC RX REV CODE- 636/637: Performed by: FAMILY MEDICINE

## 2018-08-28 PROCEDURE — 80076 HEPATIC FUNCTION PANEL: CPT | Performed by: FAMILY MEDICINE

## 2018-08-28 PROCEDURE — 85025 COMPLETE CBC W/AUTO DIFF WBC: CPT | Performed by: FAMILY MEDICINE

## 2018-08-28 PROCEDURE — 97535 SELF CARE MNGMENT TRAINING: CPT

## 2018-08-28 PROCEDURE — 94760 N-INVAS EAR/PLS OXIMETRY 1: CPT

## 2018-08-28 PROCEDURE — 36415 COLL VENOUS BLD VENIPUNCTURE: CPT | Performed by: FAMILY MEDICINE

## 2018-08-28 PROCEDURE — 85610 PROTHROMBIN TIME: CPT | Performed by: FAMILY MEDICINE

## 2018-08-28 PROCEDURE — 99218 HC RM OBSERVATION: CPT

## 2018-08-28 PROCEDURE — G8979 MOBILITY GOAL STATUS: HCPCS

## 2018-08-28 PROCEDURE — 97165 OT EVAL LOW COMPLEX 30 MIN: CPT

## 2018-08-28 RX ADMIN — LACTULOSE 20 G: 20 SOLUTION ORAL at 10:48

## 2018-08-28 RX ADMIN — ASPIRIN 81 MG: 81 TABLET, COATED ORAL at 08:32

## 2018-08-28 RX ADMIN — LEVOTHYROXINE SODIUM 75 MCG: 150 TABLET ORAL at 07:32

## 2018-08-28 RX ADMIN — LACTULOSE 20 G: 20 SOLUTION ORAL at 07:32

## 2018-08-28 RX ADMIN — INSULIN LISPRO 2 UNITS: 100 INJECTION, SOLUTION INTRAVENOUS; SUBCUTANEOUS at 08:31

## 2018-08-28 RX ADMIN — RIFAXIMIN 550 MG: 550 TABLET ORAL at 08:32

## 2018-08-28 RX ADMIN — Medication 10 ML: at 07:32

## 2018-08-28 RX ADMIN — INSULIN LISPRO 3 UNITS: 100 INJECTION, SOLUTION INTRAVENOUS; SUBCUTANEOUS at 12:19

## 2018-08-28 RX ADMIN — LACTULOSE 20 G: 20 SOLUTION ORAL at 03:00

## 2018-08-28 RX ADMIN — PANTOPRAZOLE SODIUM 40 MG: 40 TABLET, DELAYED RELEASE ORAL at 08:31

## 2018-08-28 NOTE — PROGRESS NOTES
Bedside shift change report given to Tana Hook RN (oncoming nurse) by Blank Mitchell RN (offgoing nurse). Report included the following information SBAR, Kardex, ED Summary, Intake/Output, MAR, Accordion, Recent Results, Med Rec Status, Cardiac Rhythm NSR, 1st degree AVB, Alarm Parameters , Pre Procedure Checklist, Procedure Verification and Quality Measures.

## 2018-08-28 NOTE — CDMP QUERY
There is noted documentation of Hepatic Encephalopathy on this record. Could this be further clarified as acuity =>Acute Hepatic Encephalopathy in the setting of cirrhosis  requiring lactulose/ Hepatology consult/xifaxan  
=>Subacute Hepatic Encephalopathy in the setting of cirrhosis  requiring lactulose/ Hepatology consult/xifaxan  
=>Other Explanation of clinical findings =>Clinically Undetermined (no explanation for clinical findings) The medical record reflects the following clinical findings, treatment, and risk factors: 
 
Risk Factors: liver cirrhosis Clinical Indicators: H/P-History of liver cirrhosis with nonalcoholic steatohepatitis. Hepatology consult has been requested. Treatment: Hepatology consult, lactulose 20, xifaxan  550 Please clarify and document your clinical opinion in the progress notes and discharge summary including the definitive and/or presumptive diagnosis, (suspected or probable), related to the above clinical findings. Please include clinical findings supporting your diagnosis Thank you, 
       Artemio Lizama [facility] / [facility], [address] [facility]

## 2018-08-28 NOTE — PROGRESS NOTES
Problem: Self Care Deficits Care Plan (Adult) Goal: *Acute Goals and Plan of Care (Insert Text) Occupational Therapy Goals Initiated 8/28/2018 1. Patient will perform grooming standing at sink with no LOB with independence within 7 day(s). 2.  Patient will perform upper body ADLs with independence within 7 day(s). 3.  Patient will perform lower body ADLs with independence within 7 day(s). 4.  Patient will perform toilet transfers with independence within 7 day(s). 5.  Patient will perform all aspects of toileting with independence within 7 day(s). 6.  Patient will participate in upper extremity therapeutic exercise/activities with independence for 5 minutes within 7 day(s). 7.  Patient will utilize energy conservation techniques during functional activities with verbal cues within 7 day(s). Occupational Therapy EVALUATION Patient: Burton Esparza (89 y.o. male) Date: 8/28/2018 Primary Diagnosis: Hepatic encephalopathy ([facility] [address] Precautions:  Fall ASSESSMENT : 
Based on the objective data described below, the patient presents with overall CGA for functional mobility and supervision-IND for ADLs s/p admission for AMS. Patient ADLs limited by altered mental status, impaired balance, abnormal, decreased functional activity tolerance, decreased safety awareness, and questionable insight into deficits. Patient wife present for assessment. She and patient report although he continues to be unsteady- his presentation has significantly improved since yesterday. Patient reports there are times where he uses a cane. Anticipate patient would benefit from [facility] for home safety evaluation in order to maximize safety and ensure a safe transition to home environment.   
 
Recommend with nursing patient to complete as able in order to maintain strength, endurance and independence: ADLs with supervision/setup, OOB to chair 3x/day and mobilizing to the bathroom for toileting with 1 assist (with gait belt and HHA). Thank you for your assistance. Patient will benefit from skilled intervention to address the above impairments. Patients rehabilitation potential is considered to be Good Factors which may influence rehabilitation potential include:  
[]             None noted []             Mental ability/status []             Medical condition []             Home/family situation and support systems []             Safety awareness []             Pain tolerance/management 
[]             Other: PLAN : 
Recommendations and Planned Interventions: 
[x]               Self Care Training                  [x]        Therapeutic Activities [x]               Functional Mobility Training    []        Cognitive Retraining 
[x]               Therapeutic Exercises           [x]        Endurance Activities [x]               Balance Training                   []        Neuromuscular Re-Education []               Visual/Perceptual Training     [x]   Home Safety Training 
[x]               Patient Education                 [x]        Family Training/Education []               Other (comment): Frequency/Duration: Patient will be followed by occupational therapy 3 times a week to address goals. Discharge Recommendations: Home Health OT Further Equipment Recommendations for Discharge: TBD - likely none SUBJECTIVE:  
Patient stated I fell off the wagon.  OBJECTIVE DATA SUMMARY:  
HISTORY:  
Past Medical History:  
Diagnosis Date  Arthritis  Autoimmune disease ([facility] [address] ITP  CAD (coronary artery disease)   
 enlarged heart ?  Cancer ([facility] [address] skin ca removed from forehead  Chronic kidney disease   
 kidney stones  Coagulation disorder (HCC)   
 low plts  Diabetes ([facility] [address] type 2  
 GERD (gastroesophageal reflux disease)  Hepatic encephalopathy ([address]  Hypertension  Ill-defined condition   
 obesity per pt  Ill-defined condition   
 anemia  Liver disease   
 elevated liver enzymes  Liver transplant candidate Pt is on the list for a liver transplant as of 8/2018  PUD (peptic ulcer disease)   
 stomach ulcers Past Surgical History:  
Procedure Laterality Date  ABDOMEN SURGERY PROC UNLISTED    
 ana  HX APPENDECTOMY  HX OTHER SURGICAL    
 mohs procedure for skin ca.  HX UROLOGICAL    
 vasectomy Prior Level of Function/Environment/Context: Patient lives in 2 level home with wife, mother in law and son. Relationship with MIL is strained. Patient reports being IND/mod I with ADLs and functional mobility PTA. Patient has a shower chair (does not use it) and high rise toilets. Patient reporting friend will be installing grab bars in next few weeks. Home Situation Home Environment: Private residence # Steps to Enter: 0 Wheelchair Ramp: Yes One/Two Story Residence: Two story # of Interior Steps: 15 Interior Rails: Left Lift Chair Available: No 
Living Alone: No 
Support Systems: Child(niyah), Family member(s), Rastafarian / re community, Friends \ neighbors Patient Expects to be Discharged to[de-identified] Private residence Current DME Used/Available at Home: Cane, straight, Shower chair, Grab bars, Other (comment) (high rise toilets) Tub or Shower Type: Tub Hand dominance: Right EXAMINATION OF PERFORMANCE DEFICITS: 
Cognitive/Behavioral Status: 
Neurologic State: Alert Orientation Level: Oriented X4 Cognition: Appropriate for age attention/concentration; Follows commands Perception: Appears intact Perseveration: No perseveration noted Safety/Judgement: Awareness of environment; Fall prevention Skin: Appears grossly intact Edema: Mild in BUEs Hearing: Auditory Auditory Impairment: None Vision/Perceptual:   
Tracking: Able to track stimulus in all quadrants w/o difficulty Diplopia: No   
Acuity: Able to read clock/calendar on wall without difficulty; Able to read employee name hiwot without difficulty Corrective Lenses: Glasses Range of Motion: In [address] [facility] [address] AROM: Within functional limits Strength: In [address] [facility] [address]  equal 5/5 Strength: Within functional limits Coordination: 
Coordination: Within functional limits Fine Motor Skills-Upper: Left Intact; Right Intact Gross Motor Skills-Upper: Left Intact; Right Intact Tone & Sensation: In [address] [facility] [address] Tone: Normal 
Sensation: Intact Balance: 
Sitting: Intact Standing: Impaired Standing - Static: Good Standing - Dynamic : Fair Functional Mobility and Transfers for ADLs: 
Bed Mobility: 
Rolling:  (NT - received in chair) Transfers: 
Sit to Stand: Contact guard assistance Stand to Sit: Contact guard assistance Toilet Transfer : Contact guard assistance (Infer per obs of functional mob, balance and strength) ADL Assessment: 
Feeding: Independent Oral Facial Hygiene/Grooming: Supervision* Bathing: Supervision* Upper Body Dressing: Independent* Lower Body Dressing: Supervision Toileting: Supervision* 
 
*Infer per obs of functional mobility, functional reach, BUE ROM and cognition (safety awareness, insight, etc). ADL Intervention and task modifications: 
Lower Body Dressing Assistance Shoes with Velcro: Supervision/set-up Leg Crossed Method Used: No 
Position Performed: Seated in chair Cues: Rosa Reardon 
 
Cognitive Retraining Safety/Judgement: Awareness of environment; Fall prevention Functional Measure: 
Barthel Index: 
 
Bathin Bladder: 10 Bowels: 10 
Groomin Dressing: 10 Feeding: 10 Mobility: 5 Stairs: 0 Toilet Use: 10 Transfer (Bed to Chair and Back): 10 Total: 75 Barthel and G-code impairment scale: 
Percentage of impairment CH 
0% CI 
1-19% CJ 
20-39% CK 
40-59% CL 
60-79% CM 
80-99% CN 
100% Barthel Score 0-100 100 99-80 79-60 59-40 20-39 1-19 
 0 Barthel Score 0-20 20 17-19 13-16 9-12 5-8 1-4 0 The Barthel ADL Index: Guidelines 1. The index should be used as a record of what a patient does, not as a record of what a patient could do. 2. The main aim is to establish degree of independence from any help, physical or verbal, however minor and for whatever reason. 3. The need for supervision renders the patient not independent. 4. A patient's performance should be established using the best available evidence. Asking the patient, friends/relatives and nurses are the usual sources, but direct observation and common sense are also important. However direct testing is not needed. 5. Usually the patient's performance over the preceding 24-48 hours is important, but occasionally longer periods will be relevant. 6. Middle categories imply that the patient supplies over 50 per cent of the effort. 7. Use of aids to be independent is allowed. Luciano Cordero., Barthel, DMAYRA. (8960). Functional evaluation: the Barthel Index. [address] [facility] (14)2. Pattie Calderon justin SHOAIB ForteF, Mica Multani., Amilcar Ledezma., [facility], [address] [facility] (1999). Measuring the change indisability after inpatient rehabilitation; comparison of the responsiveness of the Barthel Index and Functional [place] Measure. Journal of Neurology, Neurosurgery, and Psychiatry, 66(4), 291-245. Julissa Pruett, N.J.A, NOAH SullivanJ.BREA, & Elizabeth Lazar MALE. (2004.) Assessment of post-stroke quality of life in cost-effectiveness studies: The usefulness of the Barthel Index and the EuroQoL-5D. [facility], 13, 138-50 G codes: In compliance with CMSs Claims Based Outcome Reporting, the following G-code set was chosen for this patient based on their primary functional limitation being treated: The outcome measure chosen to determine the severity of the functional limitation was the Barthel Index with a score of 75/100 which was correlated with the impairment scale. ? Self Care:  
  - CURRENT STATUS: CJ - 20%-39% impaired, limited or restricted  - GOAL STATUS: CI - 1%-19% impaired, limited or restricted  - D/C STATUS:  ---------------To be determined--------------- Occupational Therapy Evaluation Charge Determination History Examination Decision-Making LOW Complexity : Brief history review  LOW Complexity : 1-3 performance deficits relating to physical, cognitive , or psychosocial skils that result in activity limitations and / or participation restrictions  LOW Complexity : No comorbidities that affect functional and no verbal or physical assistance needed to complete eval tasks Based on the above components, the patient evaluation is determined to be of the following complexity level: LOW Pain: 
Pain Scale 1: Numeric (0 - 10) Pain Intensity 1: 0 Activity Tolerance:  
Fair, VSS Please refer to the flowsheet for vital signs taken during this treatment. After treatment:  
[x] Patient left in no apparent distress sitting up in chair 
[] Patient left in no apparent distress in bed 
[x] Call bell left within reach [x] Nursing notified 
[x] Wife present 
[] Bed alarm activated COMMUNICATION/EDUCATION:  
The patients plan of care was discussed with: Physical Therapist and Registered Nurse. [x] Home safety education was provided and the patient/caregiver indicated understanding. [x] Patient/family have participated as able in goal setting and plan of care. [] Patient/family agree to work toward stated goals and plan of care. [] Patient understands intent and goals of therapy, but is neutral about his/her participation. [] Patient is unable to participate in goal setting and plan of care. This patients plan of care is appropriate for delegation to [facility]. Thank you for this referral. 
Naima Hemphill OT Time Calculation: 25 mins

## 2018-08-28 NOTE — PROGRESS NOTES
Primary Nurse Carola Ferraro and Bryn Paul RN performed a dual skin assessment on this patient No impairment noted  Norman score is 22

## 2018-08-28 NOTE — DISCHARGE INSTRUCTIONS
Discharge Instructions       PATIENT ID: Juan Yanes  MRN: [id number]   YOB: 1955    DATE OF ADMISSION: 8/27/2018  4:09 PM    DATE OF DISCHARGE: 8/28/2018    PRIMARY CARE PROVIDER: Julia Orr MD     ATTENDING PHYSICIAN: Darryl Crisostomo MD  DISCHARGING PROVIDER: Darryl Crisostomo MD    To contact this individual call [phone] and ask the  to page. If unavailable ask to be transferred the Adult Hospitalist Department. DISCHARGE DIAGNOSES   Hepatic encephalopathy    CONSULTATIONS: IP CONSULT TO HOSPITALIST  IP CONSULT TO HEPATOLOGY    PROCEDURES/SURGERIES: * No surgery found *    PENDING TEST RESULTS:   At the time of discharge the following test results are still pending: None    FOLLOW UP APPOINTMENTS:   Follow-up Information     Follow up With Details Comments 119 Markus Cadet MD   [address] [place] [address] [facility]  [phone]             ADDITIONAL CARE RECOMMENDATIONS:     DIET: Resume previous diet      ACTIVITY: Activity as tolerated        DISCHARGE MEDICATIONS:   See Medication Reconciliation Form    · It is important that you take the medication exactly as they are prescribed. · Keep your medication in the bottles provided by the pharmacist and keep a list of the medication names, dosages, and times to be taken in your wallet. · Do not take other medications without consulting your doctor. NOTIFY YOUR PHYSICIAN FOR ANY OF THE FOLLOWING:   Fever over 101 degrees for 24 hours. Chest pain, shortness of breath, fever, chills, nausea, vomiting, diarrhea, change in mentation, falling, weakness, bleeding. Severe pain or pain not relieved by medications. Or, any other signs or symptoms that you may have questions about.       DISPOSITION:   x Home With:   OT  PT x HH  RN       SNF/Inpatient Rehab/LTAC    Independent/assisted living    Hospice    Other:     CDMP Checked:   Yes x     PROBLEM LIST Updated:  Yes x       Signed:   Darryl Crisostomo MD  8/28/2018  3:13 PM

## 2018-08-28 NOTE — H&P
[address]   HISTORY AND PHYSICAL      Vickie Castaneda  MR#: [id number]  : 1955  ACCOUNT #: [de-identified]   ADMIT DATE: 2018    CHIEF COMPLAINT:  Altered mental status. 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 The family reports the patient has had \"multiple slips\" and reported they were not  the patient is unsteady on his feet and just slides or sits down to avoid an actual fall. The family reports the patient is also having some slurred speech, confusion and trouble ambulating since 9:00 this morning. The patient reports that he has had 2 ground level \"slips\" in the past 1 week, one hitting his back and one hitting his knee. The patient reports he has a little knee pain currently. He has a bruise on the right side of is back and has pain in the left shoulder. He has history of hypertension, coronary artery disease. The patient denies any other complaints or problems. Currently he is alert x4. Denies any headache, blurry vision, sore throat, trouble swallowing, trouble with speech, any chest pain, any shortness of breath, cough, fever, chills, abdominal pain, constipation, diarrhea, urinary symptoms, focal or generalized neurological weakness except for what is mentioned above. Denies recent travel, sick contacts all besides what is mentioned above. Denies any hematemesis, melena, hemoptysis or any other concerns or problems.       The patient does feel that he has been taking his medications on a regular basis. PAST MEDICAL HISTORY:  See above. HOME MEDICATIONS:  Currently the patient is on:  Metformin 500 mg b.i.d., lactulose daily, Xifaxan 550 mg daily, cholecalciferol, pantoprazole 40 mg daily, simvastatin 12 mg daily, aspirin 81 mg daily, gabapentin 300 mg nightly, glimepiride 1 mg every morning, levothyroxine 75 mcg daily, furosemide 20 mg daily, spironolactone 100 mg daily. SOCIAL HISTORY:  Denies tobacco abuse, no alcohol. No IV drug abuse. FAMILY HISTORY:  Mother has history of multiple myeloma. Father has history of prostate cancer. Sister is  . Paternal grandmother has a history of heart failure. PHYSICAL EXAMINATION:  VITAL SIGNS:  Temperature 98.2, pulse 76, respiratory rate 16, blood pressure 183/50, pulse oximetry 99% on room air. GENERAL:  Alert x3, awake. Very confused pleasant gentleman who appears to be stated age. HEENT:  Pupils equal, reactive to light. Dry mucous membranes. Tympanic membranes clear. NECK:  Supple. CHEST:  Decreased breath sounds. HEART:  S1, S2 heard. ABDOMEN:  Soft, nontender, nondistended. Bowel sounds are physiologic. EXTREMITIES:  No clubbing, cyanosis, edema. NEUROPSYCHIATRIC:  Normal mood and affect. Cranial nerves II-XII are grossly intact. Sensory is within normal limits. DTRs 1+/4. Strength 5/5. SKIN:  Warm. MUSCULOSKELETAL:  There is a small bruise present on the right midback in the paraspinal area around T10. Mild tenderness associated with the same. There is tenderness in the posterior left shoulder. No paraspinal or spinal tenderness. LABORATORY:  White count 4.5, hemoglobin 5.6, hematocrit 28.3, platelets 77,100. Urine showed no signs of infection. Sodium 140, potassium 4.5, chloride 108, bicarb 23, anion gap 90, glucose 226, BUN 19, creatinine 1.15, calcium 8.5. Bilirubin total 3.308, ALT 33, AST 48, alkaline phosphatase 199, lipase 301, troponin less than 0.04, ammonia 170.       CT of the head does not show any acute abnormalities. EKG shows normal sinus rhythm with nonspecific ST changes. ASSESSMENT AND PLAN:  1. Hepatic encephalopathy. The patient will be admitted on a telemetry bed. We will start the patient on lactulose every 4 hours. We will try to do vascular checks. CT of the head does not show any acute pathology. Continue Xifaxan. Hepatology consult has been requested for intervention during hospital course, and continue to closely monitor. We will trend liver enzymes and may get an abdominal ultrasound. Further intervention will be per hospital course. Reassess as needed. Continue to monitor. We will also get an INR. 2.  History of hypertension. Continue home medications and continue to monitor. Further intervention will be per hospital course. 3.  Diabetes mellitus type 2, poorly controlled. We will hold oral antihyperglycemics. We will start the patient on sliding scale insulin. We will order Accu-Cheks, diet control, and close monitoring. Further intervention will be per hospital course. 4.  History of thrombocytopenia. Continue to trend. The patient will be on bleeding precautions. We will reassess as needed. No obvious signs of bleeding. Hemoglobin and hematocrit are stable. 5.  Anemia. Continue to closely monitor. 6.  History of liver cirrhosis with nonalcoholic steatohepatitis. Hepatology consult has been requested. 7.  Gastrointestinal and deep vein thrombosis prophylaxis. The patient will be on SCDs.       Shawanda Chambers MD MM/BRADLY  D: 08/27/2018 19:39     T: 08/27/2018 20:37  JOB #: [id number]

## 2018-08-28 NOTE — PROGRESS NOTES
Problem: Mobility Impaired (Adult and Pediatric) Goal: *Acute Goals and Plan of Care (Insert Text) Physical Therapy Goals Initiated 8/28/2018 1. Patient will move from supine to sit and sit to supine , scoot up and down and roll side to side in bed with independence within 7 day(s). 2.  Patient will transfer from bed to chair and chair to bed with modified independence using the least restrictive device within 7 day(s). 3.  Patient will perform sit to stand with modified independence within 7 day(s). 4.  Patient will ambulate with modified independence for 200 feet with the least restrictive device within 7 day(s). 5.  Patient will ascend/descend 12 stairs with 1 handrail(s) with modified independence within 7 day(s). physical Therapy EVALUATION Patient: Savage Driscoll (57 y.o. male) Date: 8/28/2018 Primary Diagnosis: Hepatic encephalopathy ([facility] Precautions:   Fall ASSESSMENT : 
Based on the objective data described below, the patient presents with decreased independence with mobility limited by altered mental status, impaired balance, abnormal gait, decreased safety awareness, and questionable insight into deficits. Patient wife present for assessment. She and patient report although he continues to be unsteady- his presentation has significantly improved since yesterday. Patient overall CGA/min A for mobility assessment and benefits from additional UE support. He admits there are times where he uses a cane. Feel patient would benefit from HHPT at d/c for safety and to improve balance. Patient will benefit from skilled intervention to address the above impairments. Patients rehabilitation potential is considered to be Fair Factors which may influence rehabilitation potential include:  
[]         None noted 
[x]         Mental ability/status []         Medical condition 
[]         Home/family situation and support systems 
[x]         Safety awareness []         Pain tolerance/management 
[]         Other: PLAN : 
Recommendations and Planned Interventions: 
[x]           Bed Mobility Training             []    Neuromuscular Re-Education 
[x]           Transfer Training                   []    Orthotic/Prosthetic Training 
[x]           Gait Training                         []    Modalities [x]           Therapeutic Exercises           []    Edema Management/Control 
[x]           Therapeutic Activities            [x]    Patient and Family Training/Education 
[]           Other (comment): Frequency/Duration: Patient will be followed by physical therapy  3 times a week to address goals. Discharge Recommendations: Home Health Further Equipment Recommendations for Discharge: Use cane at all times SUBJECTIVE:  
Patient stated I yelled out for help the last time I fell.  OBJECTIVE DATA SUMMARY:  
HISTORY:   
Past Medical History:  
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   
 enlarged heart ?  Cancer ([facility] skin ca removed from forehead  Chronic kidney disease   
 kidney stones  Coagulation disorder (HCC)   
 low plts  Diabetes ([facility] type 2  
 GERD (gastroesophageal reflux disease)  Hepatic encephalopathy ([facility]  Hypertension  Ill-defined condition   
 obesity per pt  Ill-defined condition   
 anemia  Liver disease   
 elevated liver enzymes  Liver transplant candidate Pt is on the list for a liver transplant as of 8/2018  PUD (peptic ulcer disease)   
 stomach ulcers Past Surgical History:  
Procedure Laterality Date  ABDOMEN SURGERY PROC UNLISTED    
 ana  HX APPENDECTOMY  HX OTHER SURGICAL    
 mohs procedure for skin ca.  HX UROLOGICAL    
 vasectomy Prior Level of Function/Home Situation: mod I with cane Personal factors and/or comorbidities impacting plan of care: AMS Home Situation Home Environment: Private residence # Steps to Enter: 0 Wheelchair Ramp: Yes One/Two Story Residence: Two story # of Interior Steps: 15 Interior Rails: Left Lift Chair Available: No 
Living Alone: No 
Support Systems: Child(niyah), Family member(s), [employer] / re community, Friends \ neighbors Patient Expects to be Discharged to[de-identified] Private residence Current DME Used/Available at Home: Cane, straight, Shower chair, Grab bars, Other (comment) (high rise toilets) Tub or Shower Type: Tub EXAMINATION/PRESENTATION/DECISION MAKING:  
Critical Behavior: 
Neurologic State: Alert Orientation Level: Oriented X4 Cognition: Appropriate for age attention/concentration, Follows commands Safety/Judgement: Awareness of environment, Fall prevention Hearing: Auditory Auditory Impairment: None Range Of Motion: 
AROM: Within functional limits Strength:   
Strength: Within functional limits Tone & Sensation:  
Tone: Normal 
  
  
  
  
Sensation: Intact Coordination: 
Coordination: Within functional limits Vision:  
Tracking: Able to track stimulus in all quadrants w/o difficulty Diplopia: No 
Acuity: Able to read clock/calendar on wall without difficulty; Able to read employee name badge without difficulty Corrective Lenses: Glasses Functional Mobility: 
Bed Mobility: 
  
  
  
  
Transfers: 
  
  
     
  
     
  
  
Balance:  
Sitting: Intact Ambulation/Gait Training: 
Distance (ft): 100 Feet (ft) Assistive Device: Gait belt (B HHA) Ambulation - Level of Assistance: Contact guard assistance;Minimal assistance Gait Description (WDL): Exceptions to [facility] Gait Abnormalities: Decreased step clearance Base of Support: Narrowed; Center of gravity altered Speed/Ania: Fluctuations Step Length: Right shortened;Left shortened Stairs: 
Number of Stairs Trained: 8 Stairs - Level of Assistance: Minimum assistance Rail Use: Both Functional Measure: 
Tinetti test: 
 
Sitting Balance: 1 Arises: 2 Attempts to Rise: 2 Immediate Standing Balance: 2 Standing Balance: 1 Nudged: 1 Eyes Closed: 0 Turn 360 Degrees - Continuous/Discontinuous: 0 Turn 360 Degrees - Steady/Unsteady: 0 Sitting Down: 1 Balance Score: 10 Indication of Gait: 1 
R Step Length/Height: 1 L Step Length/Height: 1 
R Foot Clearance: 1 L Foot Clearance: 1 Step Symmetry: 1 Step Continuity: 0 Path: 0 Trunk: 0 Walking Time: 0 Gait Score: 6 Total Score: 16 Tinetti Test and G-code impairment scale: 
Percentage of Impairment CH 
 
0% 
 CI 
 
1-19% CJ 
 
20-39% CK 
 
40-59% CL 
 
60-79% CM 
 
80-99% CN  
 
100% Tinetti Score 0-28 28 23-27 17-22 12-16 6-11 1-5 0 Tinetti Tool Score Risk of Falls 
<19 = High Fall Risk 19-24 = Moderate Fall Risk 25-28 = Low Fall Risk Tinetti ME. Performance-Oriented Assessment of Mobility Problems in Elderly Patients. [address] [address]; [id number]. (Scoring Description: PT Bulletin Feb. 10, 1993) Older adults: Janna Sanford et al, 2009; n = [address] [facility] elderly evaluated with ABC, SCOUT, ADL, and IADL) · Mean SCOUT score for males aged 69-68 years = 26.21(3.40) · Mean SOCUT score for females age 69-68 years = 25.16(4.30) · Mean SCOUT score for males over 80 years = 23.29(6.02) · Mean SCOUT score for females over 80 years = 17.20(8.32) G codes: In compliance with CMSs Claims Based Outcome Reporting, the following G-code set was chosen for this patient based on their primary functional limitation being treated: The outcome measure chosen to determine the severity of the functional limitation was the Tinetti with a score of 16/28 which was correlated with the impairment scale. ? Mobility - Walking and Moving Around:  
  - CURRENT STATUS: CK - 40%-59% impaired, limited or restricted  - GOAL STATUS: CJ - 20%-39% impaired, limited or restricted  - D/C STATUS:  ---------------To be determined--------------- Physical Therapy Evaluation Charge Determination History Examination Presentation Decision-Making HIGH Complexity :3+ comorbidities / personal factors will impact the outcome/ POC  MEDIUM Complexity : 3 Standardized tests and measures addressing body structure, function, activity limitation and / or participation in recreation  LOW Complexity : Stable, uncomplicated  MEDIUM Complexity : FOTO score of 26-74 Based on the above components, the patient evaluation is determined to be of the following complexity level: LOW Pain: 
Pain Scale 1: Numeric (0 - 10) Pain Intensity 1: 0 Activity Tolerance: VSS Please refer to the flowsheet for vital signs taken during this treatment. After treatment:  
[x]         Patient left in no apparent distress sitting up in chair 
[]         Patient left in no apparent distress in bed 
[x]         Call bell left within reach [x]         Nursing notified 
[x]         Caregiver present [x]         Bed alarm activated COMMUNICATION/EDUCATION:  
The patients plan of care was discussed with: Occupational Therapist and Registered Nurse. [x]         Fall prevention education was provided and the patient/caregiver indicated understanding. [x]         Patient/family have participated as able in goal setting and plan of care. [x]         Patient/family agree to work toward stated goals and plan of care. []         Patient understands intent and goals of therapy, but is neutral about his/her participation. []         Patient is unable to participate in goal setting and plan of care. Thank you for this referral. 
Melissa Lo, PT ,DPT Time Calculation: 20 mins

## 2018-08-28 NOTE — PROGRESS NOTES
Zenon spoke with Meryle Bolder from [facility]. They have accepted this pt for home care. Rajesh Pabon

## 2018-08-28 NOTE — PROGRESS NOTES
Problem: Falls - Risk of 
Goal: *Absence of Falls Document Jesenia Martines Fall Risk and appropriate interventions in the flowsheet. Outcome: Progressing Towards Goal 
Fall Risk Interventions: 
Mobility Interventions: Assess mobility with egress test, OT consult for ADLs, Patient to call before getting OOB, PT Consult for mobility concerns Mentation Interventions: Door open when patient unattended Medication Interventions: Assess postural VS orthostatic hypotension, Patient to call before getting OOB, Teach patient to arise slowly, Bed/chair exit alarm Elimination Interventions: Bed/chair exit alarm, Call light in reach, Patient to call for help with toileting needs, Toileting schedule/hourly rounds, Urinal in reach History of Falls Interventions: Bed/chair exit alarm, Consult care management for discharge planning, Evaluate medications/consider consulting pharmacy, Room close to nurse's station

## 2018-08-28 NOTE — ROUTINE PROCESS
IDR Summary Patient: Don Mcrae MRN: [id number]    Age: 60 y.o. YOB: 1955 Room/Bed: [facility] Admit Diagnosis: Hepatic encephalopathy ([facility]  Principal Diagnosis: Hepatic encephalopathy ([facility] [place]: Attending [address] [facility]  PCP: Dede Webb MD 
 
Readmission: NO Testing due for pt today? NO Discharge plan for the day: lactulose, Hepatology consult Discharge plan for the stay: monitor until return to baseline Discharge plan for the way: wife to transport home when medically ready Signed:  
 
Patrick Pinto 8/28/2018 
5:02 PM

## 2018-08-28 NOTE — PROGRESS NOTES
Hospitalist Progress Note Galdino Sagastume MD 
Answering service: [phone] from in house phone Date of Service:  2018 NAME:  Burton Esparza :  1955 MRN:  [id number] Admission Summary:  
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 Hepatic encephalopathy improving with increased lactulose use causing frequent liquid bowel movements. Hepatology to follow. 2.  HTN:  Continue home medications. 3.  Diabetes mellitus type 2: resume oral agents at discharge. 4.  thrombocytopenia. Stable. 5.  Anemia. Stable 6.  liver cirrhosis with nonalcoholic steatohepatitis. Hepatology consult has been requested. Code status: Full DVT prophylaxis: OOB Care Plan discussed with: Patient/Family Disposition: Home w/Family and TBD Hospital Problems  Date Reviewed: 2018 Codes Class Noted POA * (Principal)Hepatic encephalopathy ([facility] [address] ICD-10-CM: K72.90 ICD-9-CM: 572.2  3/29/2018 Unknown Review of Systems:  
Not required Vital Signs:  
 Last 24hrs VS reviewed since prior progress note. Most recent are: 
Visit Vitals  /59 (BP 1 Location: Left arm, BP Patient Position: At rest;Head of bed elevated (Comment degrees))  Pulse 72  Temp 97.7 °F (36.5 °C)  Resp 15  Ht 5' 11\" (1.803 m)  Wt 124.6 kg (274 lb 12.8 oz)  SpO2 97%  BMI 38.33 kg/m2 Intake/Output Summary (Last 24 hours) at 18 1114 Last data filed at 18 4141 Gross per 24 hour Intake              150 ml Output              850 ml Net             -700 ml Physical Examination:  
 
 
     
Constitutional:  No acute distress, cooperative, pleasant   
ENT: No thyromegaly Resp:  CTA bilaterally. No wheezing/rhonchi/rales. No accessory muscle use CV:  Regular rhythm, normal rate, no murmurs, gallops, rubs GI:  Soft, non distended, non tender. normoactive bowel sounds, no hepatosplenomegaly Musculoskeletal:  No edema, warm, Neurologic:  . AAOx3, Psych:  Good insight, Not anxious nor agitated. Data Review:  
 Review and/or order of clinical lab test 
 
 
Labs:  
 
Recent Labs  
   08/28/18 
 0342  08/27/18 
 1532 WBC  3.5*  4.5 HGB  8.4*  9.6* HCT  25.1*  28.3*  
PLT  46*  53* Recent Labs  
   08/28/18 
 0342  08/27/18 
 1532 NA  144  140  
K  4.2  4.5  
CL  113*  108 CO2  22  23 BUN  15  19 CREA  0.99  1.15  
GLU  168*  226* CA  8.4*  8.5 Recent Labs  
   08/28/18 
 0342  08/27/18 
 1532 SGOT  45*  48* ALT  30  33 AP  154*  199* TBILI  2.9*  3.0*  
TP  5.5*  6.3* ALB  2.0*  2.4*  
GLOB  3.5  3.9 LPSE   --   311 Recent Labs  
   08/28/18 
 0342  08/27/18 
 2055 INR  1.5*  1.5* PTP  15.1*  15.1* No results for input(s): FE, TIBC, PSAT, FERR in the last 72 hours. No results found for: FOL, RBCF No results for input(s): PH, PCO2, PO2 in the last 72 hours. Recent Labs  
   08/27/18 
 1532 TROIQ  <0.05 No results found for: CHOL, CHOLX, CHLST, CHOLV, HDL, LDL, LDLC, DLDLP, TGLX, TRIGL, TRIGP, CHHD, CHHDX Lab Results Component Value Date/Time Glucose (POC) 149 (H) 08/28/2018 07:35 AM  
 Glucose (POC) 173 (H) 08/27/2018 11:09 PM  
 Glucose (POC) 269 (H) 08/27/2018 03:14 PM  
 Glucose (POC) 148 (H) 05/09/2018 12:55 PM  
 Glucose (POC) 124 (H) 05/09/2018 06:23 AM  
 
Lab Results Component Value Date/Time  Color DARK YELLOW 08/27/2018 05:21 PM  
 Appearance CLEAR 08/27/2018 05:21 PM  
 Specific gravity 1.016 08/27/2018 05:21 PM  
 pH (UA) 5.5 08/27/2018 05:21 PM  
 Protein 30 (A) 08/27/2018 05:21 PM  
 Glucose NEGATIVE  08/27/2018 05:21 PM  
 Ketone NEGATIVE  08/27/2018 05:21 PM  
 Bilirubin NEGATIVE  08/27/2018 05:21 PM  
 Urobilinogen 4.0 (H) 08/27/2018 05:21 PM  
 Nitrites NEGATIVE  08/27/2018 05:21 PM  
 Leukocyte Esterase NEGATIVE  08/27/2018 05:21 PM  
 Epithelial cells FEW 08/27/2018 05:21 PM  
 Bacteria NEGATIVE  08/27/2018 05:21 PM  
 WBC 0-4 08/27/2018 05:21 PM  
 RBC 5-10 08/27/2018 05:21 PM  
 
 
 
Medications Reviewed:  
 
Current Facility-Administered Medications Medication Dose Route Frequency  0.9% sodium chloride infusion  125 mL/hr IntraVENous CONTINUOUS  
 aspirin delayed-release tablet 81 mg  81 mg Oral DAILY  gabapentin (NEURONTIN) capsule 300 mg  300 mg Oral QHS  levothyroxine (SYNTHROID) tablet 75 mcg  75 mcg Oral ACB  pantoprazole (PROTONIX) tablet 40 mg  40 mg Oral DAILY  rifAXIMin (XIFAXAN) tablet 550 mg  550 mg Oral BID  sodium chloride (NS) flush 5-10 mL  5-10 mL IntraVENous Q8H  
 sodium chloride (NS) flush 5-10 mL  5-10 mL IntraVENous PRN  
 lactulose (CHRONULAC) solution 20 g  30 mL Oral Q4H  
 glucose chewable tablet 16 g  4 Tab Oral PRN  
 dextrose (D50W) injection syrg 12.5-25 g  25-50 mL IntraVENous PRN  
 glucagon (GLUCAGEN) injection 1 mg  1 mg IntraMUSCular PRN  
 insulin lispro (HUMALOG) injection   SubCUTAneous AC&HS  
 
______________________________________________________________________ EXPECTED LENGTH OF STAY: 2d 7h 
ACTUAL LENGTH OF STAY:          1 Gail Cary MD

## 2018-08-28 NOTE — DISCHARGE SUMMARY
Discharge Summary       PATIENT ID: Chriss Sandhoff  MRN: [id number]   YOB: 1955    DATE OF ADMISSION: 8/27/2018  4:09 PM    DATE OF DISCHARGE: 8/28/2018  PRIMARY CARE PROVIDER: Cathie German MD       DISCHARGING PROVIDER: Benito Finley MD    To contact this individual call [phone] and ask the  to page. If unavailable ask to be transferred the Adult Hospitalist Department. 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 Acute  Hepatic encephalopathy improving with increased lactulose use causing frequent liquid bowel movements. Hepatology input appreciated. Cleared for discharge. 2.  HTN:  Continue home medications. 3.  Diabetes mellitus type 2: resume oral agents at discharge except metformin because of cirrhosis. 4.  thrombocytopenia.  Stable. 5. Evangelina Sunny. Outpatient follow up with GI  6.  liver cirrhosis with nonalcoholic steatohepatitis.   Outpatient followup with hepatology       PENDING TEST RESULTS:   At the time of discharge the following test results are still pending: None    FOLLOW UP APPOINTMENTS:    Follow-up Information     Follow up With Details Comments 119 Markus Cadet MD   [address] [place] [address]  [phone]               DIET: Resume previous diet      ACTIVITY: Activity as tolerated        DISCHARGE MEDICATIONS:  Current Discharge Medication List      CONTINUE these medications which have NOT CHANGED    Details   aspirin delayed-release 81 mg tablet Take 81 mg by mouth daily. gabapentin (NEURONTIN) 300 mg capsule Take 300 mg by mouth nightly. glimepiride (AMARYL) 1 mg tablet Take 1 mg by mouth every morning. levothyroxine (SYNTHROID) 75 mcg tablet Take 75 mcg by mouth Daily (before breakfast). furosemide (LASIX) 40 mg tablet Take 1 Tab by mouth daily. Qty: 90 Tab, Refills: 3      spironolactone (ALDACTONE) 100 mg tablet Take 1 Tab by mouth daily. Qty: 90 Tab, Refills: 3      lactulose (CHRONULAC) 20 gram/30 mL soln solution Take 30 mL by mouth three (3) times daily. Adjust  dose as needed such that you have 2 loose/soft bowel movements a day without diarrhea. Qty: 1800 mL, Refills: 1    Associated Diagnoses: Hepatic encephalopathy (HCC)      rifAXIMin (XIFAXAN) 550 mg tablet Take 1 Tab by mouth two (2) times a day. Qty: 60 Tab, Refills: 11      cholecalciferol, vitamin D3, (VITAMIN D3) 2,000 unit tab Take 1 Tab by mouth two (2) times a day. pantoprazole (PROTONIX) 40 mg tablet Take 40 mg by mouth daily. simvastatin (ZOCOR) 20 mg tablet Take 20 mg by mouth daily. STOP taking these medications       metFORMIN (GLUCOPHAGE) 500 mg tablet Comments:   Reason for Stopping:                 NOTIFY YOUR PHYSICIAN FOR ANY OF THE FOLLOWING:   Fever over 101 degrees for 24 hours. Chest pain, shortness of breath, fever, chills, nausea, vomiting, diarrhea, change in mentation, falling, weakness, bleeding. Severe pain or pain not relieved by medications. Or, any other signs or symptoms that you may have questions about.     DISPOSITION:  x  Home With:   OT  PT x HH  RN       Long term SNF/Inpatient Rehab    Independent/assisted living    Hospice    Other:       PATIENT CONDITION AT DISCHARGE:     Functional status    Poor    x Deconditioned     Independent      Cognition   x  Lucid     Forgetful     Dementia      Catheters/lines (plus indication)    Sharma     PICC     PEG    x None      Code status   x  Full code     DNR PHYSICAL EXAMINATION AT DISCHARGE:  Patient see and examined on the day of discharge. Patient is not in any distress. Lungs are clear. Patient is stable for discharge. CHRONIC MEDICAL DIAGNOSES:  Problem List as of 8/28/2018  Date Reviewed: 8/28/2018          Codes Class Noted - Resolved    Severe obesity (BMI 35.0-39. 9) with comorbidity ([facility] ICD-10-CM: E66.01  ICD-9-CM: 278.01  5/24/2018 - Present        Type 2 diabetes with nephropathy ([facility] ICD-10-CM: E11.21  ICD-9-CM: 250.40, 583.81  3/29/2018 - Present        Neuropathy involving both lower extremities ICD-10-CM: G57.93  ICD-9-CM: 356.9  1/2/2018 - Present        Lower leg edema ICD-10-CM: R60.0  ICD-9-CM: 782.3  11/2/2017 - Present        CAMEJO (nonalcoholic steatohepatitis) ICD-10-CM: K75.81  ICD-9-CM: 571.8  11/2/2017 - Present        Liver cirrhosis secondary to CAMEJO [facility]) (Chronic) ICD-10-CM: K75.81, K74.60  ICD-9-CM: 571.8, 571.5  2/22/2016 - Present        Thrombocytopenia (HCC) (Chronic) ICD-10-CM: D69.6  ICD-9-CM: 287.5  2/22/2016 - Present        GERD (gastroesophageal reflux disease) (Chronic) ICD-10-CM: K21.9  ICD-9-CM: 530.81  2/22/2016 - Present        CAD (coronary artery disease) (Chronic) ICD-10-CM: I25.10  ICD-9-CM: 414.00  2/22/2016 - Present        DM type 2 (diabetes mellitus, type 2) (HCC) (Chronic) ICD-10-CM: E11.9  ICD-9-CM: 250.00  2/22/2016 - Present        RESOLVED: Altered mental status ICD-10-CM: R41.82  ICD-9-CM: 780.97  5/6/2018 - 6/28/2018        * (Principal)RESOLVED: Hepatic encephalopathy ([facility] ICD-10-CM: K72.90  ICD-9-CM: 572.2  3/29/2018 - 8/28/2018        RESOLVED: Influenza ICD-10-CM: J11.1  ICD-9-CM: 487.1  1/10/2018 - 6/28/2018        RESOLVED: Cough ICD-10-CM: R05  ICD-9-CM: 786.2  1/9/2018 - 6/28/2018        RESOLVED: Hepatic encephalopathy ([facility] ICD-10-CM: K72.90  ICD-9-CM: 572.2  2/22/2016 - 2/23/2016              Greater than 30 minutes were spent with the patient on counseling and coordination of care    Signed:   Tavo Brown MD  8/28/2018  3:15 PM

## 2018-08-28 NOTE — PROGRESS NOTES
CM obtained home health orders and sent a referral [facility] Eva Aj

## 2018-08-28 NOTE — ROUTINE PROCESS
Bedside RN performed patient education and medication education. Discharge concerns initiated and discussed with patient and his wife, including clarification on \"who\" assists the patient at their home and instructions for when the home going patient should call their provider after discharge. Opportunity for questions and clarification was provided. Patient receptive to education: YES Patient stated: \"I have an appointment with Dr. Jing Evangelista in 3 weeks\" Barriers to Education: none Diagnosis Education given:  YES Length of stay: 1 Expected Day of Discharge: 1 Ask if they have \"Help at Home\" & add to white board? YES Education Day #: 1 Medication Education Given:  YES 
M in the box Medication name: lactulose, simavastatin Pt aware of [facility] survey: YES

## 2018-08-28 NOTE — ROUTINE PROCESS
TRANSFER - OUT REPORT:    Verbal report given to Adam RN(name) on Yuki Ibarra  being transferred to NSTU(unit) for routine progression of care       Report consisted of patients Situation, Background, Assessment and   Recommendations(SBAR). Information from the following report(s) SBAR, Kardex, ED Summary and Recent Results was reviewed with the receiving nurse. Lines:   Peripheral IV 08/27/18 Right Forearm (Active)   Site Assessment Clean, dry, & intact 8/27/2018  3:38 PM   Phlebitis Assessment 0 8/27/2018  3:38 PM   Infiltration Assessment 0 8/27/2018  3:38 PM   Dressing Status Clean, dry, & intact 8/27/2018  3:38 PM   Dressing Type Transparent 8/27/2018  3:38 PM   Hub Color/Line Status Pink;Flushed;Patent 8/27/2018  3:38 PM   Action Taken Blood drawn 8/27/2018  3:38 PM        Opportunity for questions and clarification was provided.       Patient transported with:   [employer]

## 2018-08-28 NOTE — CONSULTS
70 Stacie Wylie MD, FACP, [place] Shankar Jimenez, [place]       Dorina Begum, KENNETH Liang, [facility]   Kary Clifford, MELVA Callahan, MELVA Weaver [facility] 136    at [address]    [address] [place] [address], [address] Marlee Boo  22.    [phone]    FAX: [address] [place] [address] Avenue    at [address], [address]    [phone]    FAX: [phone]       HEPATOLOGY CONSULT NOTE  The patient is well known to me and regularly cared for at [address] [address]. 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. Complications of cirrhosis have included lower edema and HE. He has not developed ascites. The last EGD in 5/2018 demonstrated that he did not have esophageal varices. He developed worsening confusion yesterday and was brought to the ED. I have reviewed the Emergency room note, Hospital admission note, Notes by all other physicians who have seen the patient during this hospitalization to date. I have reviewed the problem list and the reason for this hospitalization. I have reviewed the allergies and the medications the patient was taking at home prior to this hospitalization. Today his mental status is back to baseline. He is alert and oriented. He converses wirhout any slurring of speech or lethargy. I think he can go home today. Will will see him for follow-up in the office in 2 weeks. ASSESSMENT AND PLAN:  Cirrhosis  This is secondary to CAMEJO. The CTP is 7. Child class B. The MELD score is 15. CAMEJO  Limit dietary charbs. Hepatic encephalopathy   This is now controlled on current doses of lactulose and Xifaxan.   Will continue the current dose of lactulose and xifaxan at [facility].  It is unclear what tipped him over and led to HE requiring hospitalization. In reviewing his most recnet CT can there is a spontaneous spleno-renal shunt and this may be contributing to this. If he has another episode of HE will imaging the shunt and consider embolization. Lower edema  This has resolved on the current dose of diuretics. Continue the current dose of diuretics. Anemia   This is due to multifactorial causes including portal hypertension with chronic GI blood loss,   Will obtain iron panel to assess for iron stores. The last EGD to assess for UGI blood loss demonstrated no esophageal varices. Will refer to GI for colonoscopy and or Pill Cam to assess for GI blood loss. Thrombocytopenia   This is secondary to cirrhosis. There is no evidence of overt bleeding. No treatment is required. The platelet count is adequate for the patient to undergo procedures without the need for platelet transfusion or platelet growth factors. SYSTEM REVIEW:  Constitution systems: Negative for fever, chills, weight gain, weight loss. Eyes: Negative for visual changes. ENT: Negative for sore throat, painful swallowing. Respiratory: Negative for cough, hemoptysis, SOB. Cardiology: Negative for chest pain, palpitations. GI:  Negative for constipation or diarrhea. : Negative for urinary frequency, dysuria, hematuria, nocturia. Skin: Negative for rash. Hematology: Negative for easy bruising, blood clots. Musculo-skelatal: Negative for back pain, muscle pain, weakness. Neurologic: Negative for headaches, dizziness, vertigo, memory problems not related to HE. Psychology: Negative for anxiety, depression. FAMILY HISTORY:  The father  of prostate cancer. The mother  of multiple myeloma. There is no family history of liver disease.       SOCIAL HISTORY:  The patient is . The patient has 3 children. The patient does not use tobacco products.      The patient has been abstinent from alcohol since 4/2016. The patient is on social security.       PHYSICAL EXAMINATION:  VS: per nursing note  General:  No acute distress. Eyes:  Sclera anicteric. ENT:  No oral lesions. Thyroid normal.  Nodes:  No adenopathy. Skin:  Spider angiomata. No jaundice. Respiratory:  Lungs clear to auscultation. Cardiovascular:  Regular heart rate. Abdomen:  Soft non-tender, No obvious ascites. Extremities:  No lower extremity edema. No muscle wasting. Neurologic:  Alert and oriented. Cranial nerves grossly intact. No asterixis. LABORATORY:  Results for Nathanael Rist (MRN [id number]) as of 8/28/2018 14:09   Ref. Range 8/27/2018 15:32 8/27/2018 20:55 8/28/2018 03:42   WBC Latest Ref Range: 4.1 - 11.1 K/uL 4.5  3.5 (L)   HGB Latest Ref Range: 12.1 - 17.0 g/dL 9.6 (L)  8.4 (L)   PLATELET Latest Ref Range: 150 - 400 K/uL 53 (L)  46 (LL)   INR Latest Ref Range: 0.9 - 1.1    1.5 (H) 1.5 (H)   Sodium Latest Ref Range: 136 - 145 mmol/L 140  144   Potassium Latest Ref Range: 3.5 - 5.1 mmol/L 4.5  4.2   Chloride Latest Ref Range: 97 - 108 mmol/L 108  113 (H)   CO2 Latest Ref Range: 21 - 32 mmol/L 23  22   Glucose Latest Ref Range: 65 - 100 mg/dL 226 (H)  168 (H)   BUN Latest Ref Range: 6 - 20 MG/DL 19  15   Creatinine Latest Ref Range: 0.70 - 1.30 MG/DL 1.15  0.99   Bilirubin, total Latest Ref Range: 0.2 - 1.0 MG/DL 3.0 (H)  2.9 (H)   Albumin Latest Ref Range: 3.5 - 5.0 g/dL 2.4 (L)  2.0 (L)   ALT (SGPT) Latest Ref Range: 12 - 78 U/L 33  30   AST Latest Ref Range: 15 - 37 U/L 48 (H)  45 (H)   Alk.  phosphatase Latest Ref Range: 45 - 117 U/L 199 (H)  154 (H)   Ammonia Latest Ref Range: <32 UMOL/L 170 (H)         MD Fariba Brody [address] [place] [address] [address]  EdwardMarlee rosado  22.  [phone]

## 2018-08-28 NOTE — PROGRESS NOTES
Problem: Falls - Risk of  Goal: *Absence of Falls  Document Jeet Fall Risk and appropriate interventions in the flowsheet. Outcome: Progressing Towards Goal  Fall Risk Interventions:  Mobility Interventions: Assess mobility with egress test, Bed/chair exit alarm, Communicate number of staff needed for ambulation/transfer, OT consult for ADLs, Patient to call before getting OOB, PT Consult for mobility concerns, PT Consult for assist device competence, Strengthening exercises (ROM-active/passive)    Mentation Interventions: Adequate sleep, hydration, pain control, Bed/chair exit alarm, Door open when patient unattended, Evaluate medications/consider consulting pharmacy, Gait belt with transfers/ambulation, Reorient patient, Room close to nurse's station, Self-releasing belt, Update white board    Medication Interventions: Assess postural VS orthostatic hypotension, Bed/chair exit alarm, Evaluate medications/consider consulting pharmacy, Patient to call before getting OOB    Elimination Interventions: Bed/chair exit alarm, Call light in reach, Toileting schedule/hourly rounds, Toilet paper/wipes in reach, Patient to call for help with toileting needs    History of Falls Interventions: Bed/chair exit alarm, Consult care management for discharge planning, Door open when patient unattended, Evaluate medications/consider consulting pharmacy, Investigate reason for fall, Room close to nurse's station        Problem: Pressure Injury - Risk of  Goal: *Prevention of pressure injury  Document Norman Scale and appropriate interventions in the flowsheet. Outcome: Progressing Towards Goal  Pressure Injury Interventions:                 Mobility Interventions: Assess need for specialty bed, HOB 30 degrees or less, Pressure redistribution bed/mattress (bed type), PT/OT evaluation, Turn and reposition approx.  every two hours(pillow and wedges)

## 2018-08-28 NOTE — PROGRESS NOTES
SALLY spoke with Sigifredo Mccarthy from [facility] and they cannot accept this referral now. They do not have the OT staff for this case. SALLY sent a referral to At [address] [place] [address] health. Dorcas Sancal

## 2018-08-28 NOTE — PROGRESS NOTES
Reason for Admission:  The patient presented with ataxia RRAT Score:  28 Resources/supports as identified by patient/family:  The patient endorses having familial support at home Top Challenges facing patient (as identified by patient/family and CM): Finances/Medication cost?  The patient denied difficulty affording or accessing medications prior to hospitalization Transportation? The family will provide transportation home upon discharge Support system or lack thereof? The patient's spouse is present at bedside- patient endorses having support Living arrangements? Patient lives with spouse in own home Self-care/ADLs/Cognition? Patient endorses that overall he is able to complete ADLs and mobility- has \"good and bad days\" has cane at home and uses PRN. Current Advanced Directive/Advance Care Plan:  Full Code, none on file Plan for utilizing home health:  TBD- CM will await PT/OT evaluations Likelihood of readmission: High 
              
Transition of Care Plan: TBD- anticipate home with spouse The CM met with patient and spouse at bedside in order to introduce role and assess for patient needs. The patient lives at home with his wife- family verified insurance and demographic information. The patient verified PCP as Dr. Shyla Funez. The patient reports being overall independent with ADLs and mobility, at baseline requires some assistance on his \"bad days\" and the patient has a cane PRN. The patient denied difficulty affording or accessing medications prior to hospitalization. The patient endorses having a supportive family, also has support from spiritual community Leta Garza came to visit patient earlier today). The family will provide transport for patient upon discharge.  CM will await PT/OT evaluation for services needed upon discharge. DEBBIE James 
 
CM met with patient and spouse at bedside- agreeable for home health PT/OT- would like referral first sent to [facility], then no preference if agency cannot accept. Care Management Interventions PCP Verified by CM: Yes (PCP verified by patient/spouse- Dr. Dasha RAMOS ) Mode of Transport at Discharge: Other (see comment) (Family will provide transportation home upon discharge) Transition of Care Consult (CM Consult): Discharge Planning MyChart Signup: Yes Discharge Durable Medical Equipment: No 
Health Maintenance Reviewed: Yes Physical Therapy Consult: Yes Occupational Therapy Consult: Yes Speech Therapy Consult: No 
Current Support Network: Lives with Spouse, Own Home Confirm Follow Up Transport: Family Plan discussed with Pt/Family/Caregiver: Yes [employer] Information Provided?: No 
Discharge Location Discharge Placement: Home (CM will await PT/OT evaluation )

## 2018-08-28 NOTE — PROGRESS NOTES
Pt had US of abdomen ordered by admitting provider. Transport came and pt refused, reporting \"Ultrasounds never show anything\". His wife, at bedside then called his hepatologist Dr. Checo Baum, who advised also that neither an US nor a CT of the abdomen was necessary, information that both patient and his wife relayed to RN. I spoke with the hospitalist, who advised that we hold off on testing until hepatology sees the pt here in the hospital.  Dr. Checo Baum has been consulted. Pt is eating and drinking. The pt has been advised.

## 2018-08-29 NOTE — PROGRESS NOTES
CM received notice from [facility] that they have accepted this pt for home health. Richard Kussmaul

## 2018-09-11 ENCOUNTER — TELEPHONE (OUTPATIENT)
Dept: HEMATOLOGY | Age: 63
End: 2018-09-11

## 2018-09-11 NOTE — TELEPHONE ENCOUNTER
Received phone call from patient's wife regarding Xifaxan that requires prior authorization. Phone call placed to insurance. Initiated PA request and received immediate approval: [id number], valid 9/11/18-9/11/19. Return phone call placed to patient's wife. Notified of approval and verification of paid claim with $0 copay.

## 2018-09-19 ENCOUNTER — OFFICE VISIT (OUTPATIENT)
Dept: HEMATOLOGY | Age: 63
End: 2018-09-19

## 2018-09-19 VITALS
DIASTOLIC BLOOD PRESSURE: 58 MMHG | WEIGHT: 254 LBS | BODY MASS INDEX: 35.43 KG/M2 | TEMPERATURE: 97.7 F | OXYGEN SATURATION: 98 % | HEART RATE: 79 BPM | SYSTOLIC BLOOD PRESSURE: 145 MMHG

## 2018-09-19 DIAGNOSIS — G57.93 NEUROPATHY INVOLVING BOTH LOWER EXTREMITIES: ICD-10-CM

## 2018-09-19 DIAGNOSIS — K75.81 LIVER CIRRHOSIS SECONDARY TO NASH (HCC): Primary | Chronic | ICD-10-CM

## 2018-09-19 DIAGNOSIS — E66.01 SEVERE OBESITY (BMI 35.0-39.9) WITH COMORBIDITY (HCC): ICD-10-CM

## 2018-09-19 DIAGNOSIS — E11.9 TYPE 2 DIABETES MELLITUS WITHOUT COMPLICATION, WITHOUT LONG-TERM CURRENT USE OF INSULIN (HCC): Chronic | ICD-10-CM

## 2018-09-19 DIAGNOSIS — D69.6 THROMBOCYTOPENIA (HCC): Chronic | ICD-10-CM

## 2018-09-19 DIAGNOSIS — K76.6 PORTAL HYPERTENSION (HCC): ICD-10-CM

## 2018-09-19 DIAGNOSIS — K74.60 LIVER CIRRHOSIS SECONDARY TO NASH (HCC): Primary | Chronic | ICD-10-CM

## 2018-09-19 DIAGNOSIS — R60.0 LOWER LEG EDEMA: ICD-10-CM

## 2018-09-19 DIAGNOSIS — K76.82 HEPATIC ENCEPHALOPATHY: ICD-10-CM

## 2018-09-19 RX ORDER — SPIRONOLACTONE 100 MG/1
100 TABLET, FILM COATED ORAL DAILY
Qty: 90 TAB | Refills: 3 | Status: SHIPPED | OUTPATIENT
Start: 2018-09-19 | End: 2018-11-29 | Stop reason: ALTCHOICE

## 2018-09-19 NOTE — MR AVS SNAPSHOT
[address] [facility] [place] .[phone] [address] [place] [address] 
[phone] Patient: Mary Davison MRN: [id number] :1955 Visit Information Date & Time Provider Department Dept. Phone Encounter #  
 2018  3:30 PM Berna Lai, [address] [facility] [place] [address] [id number] Your Appointments 2018  9:00 AM  
New Patient with Namrata Blake MD  
[address] [facility] [place] ([facility]) Appt Note: NP refd by Frank Chavez RN to r/o ILDEFONSO  
 [address] [facility] [address] [place] [id number] [phone]  
  
   
 [address] [place] [address] [facility] [phone]  
  
    
 10/31/2018  9:00 AM  
Follow Up with Berna Martinez NP [address] ([facility]) Appt Note: Follow up [address] [facility] [place] .[phone] [facility] [address]  
[address] [facility] [place] [address] Upcoming Health Maintenance Date Due Hepatitis C Screening 1955 FOOT EXAM Q1 1964 MICROALBUMIN Q1 1964 EYE EXAM RETINAL OR DILATED Q1 1964 Pneumococcal 19-64 Medium Risk (1 of 1 - PPSV23) 1973 DTaP/Tdap/Td series (1 - Tdap) 1975 ZOSTER VACCINE AGE 60> 10/21/2014 Influenza Age 5 to Adult 2018 HEMOGLOBIN A1C Q6M 2019 LIPID PANEL Q1 2019 FOBT Q 1 YEAR AGE 50-75 2019 Allergies as of 2018  Review Complete On: 2018 By: Berna Martinez NP No Known Allergies Current Immunizations  Never Reviewed No immunizations on file. Not reviewed this visit You Were Diagnosed With   
  
 Codes Comments Liver cirrhosis secondary to CAMEJO ([facility]    -  Primary ICD-10-CM: K75.81, K74.60 ICD-9-CM: 571.8, 571.5 Thrombocytopenia ([address]     ICD-10-CM: D69.6 ICD-9-CM: 287.5 Type 2 diabetes mellitus without complication, without long-term current use of insulin (HCC)     ICD-10-CM: E11.9 ICD-9-CM: 250.00 Severe obesity (BMI 35.0-39. 9) with comorbidity ([facility]     ICD-10-CM: E66.01 
ICD-9-CM: 278.01 Neuropathy involving both lower extremities     ICD-10-CM: G57.93 
ICD-9-CM: 356.9 Lower leg edema     ICD-10-CM: R60.0 ICD-9-CM: 782.3 Hepatic encephalopathy ([facility].)     ICD-10-CM: K72.90 ICD-9-CM: 572.2 Vitals BP Pulse Temp Weight(growth percentile) SpO2 BMI  
 145/58 (BP 1 Location: Left arm, BP Patient Position: Sitting) 79 97.7 °F (36.5 °C) (Tympanic) 254 lb (115.2 kg) 98% 35.43 kg/m2 Smoking Status Never Smoker BMI and BSA Data Body Mass Index Body Surface Area  
 35.43 kg/m 2 2.4 m 2 Preferred Pharmacy Pharmacy Name Phone Alan Gary, [facility] [phone] Your Updated Medication List  
  
   
This list is accurate as of 9/19/18  3:51 PM.  Always use your most recent med list.  
  
  
  
  
 aspirin delayed-release 81 mg tablet Take 81 mg by mouth daily. furosemide 40 mg tablet Commonly known as:  LASIX Take 1 Tab by mouth daily. gabapentin 300 mg capsule Commonly known as:  NEURONTIN Take 300 mg by mouth nightly. glimepiride 1 mg tablet Commonly known as:  AMARYL Take 1 mg by mouth every morning. lactulose 20 gram/30 mL Soln solution Commonly known as:  Zak Frost Take 30 mL by mouth three (3) times daily. Adjust  dose as needed such that you have 2 loose/soft bowel movements a day without diarrhea.  
  
 levothyroxine 75 mcg tablet Commonly known as:  SYNTHROID Take 75 mcg by mouth Daily (before breakfast). pantoprazole 40 mg tablet Commonly known as:  PROTONIX Take 40 mg by mouth daily. rifAXIMin 550 mg tablet Commonly known as:  Wilhemina Sieving Take 1 Tab by mouth two (2) times a day. simvastatin 20 mg tablet Commonly known as:  ZOCOR Take 20 mg by mouth daily. spironolactone 100 mg tablet Commonly known as:  ALDACTONE Take 1 Tab by mouth daily. VITAMIN D3 2,000 unit Tab Generic drug:  cholecalciferol (vitamin D3) Take 1 Tab by mouth two (2) times a day. Prescriptions Sent to Pharmacy Refills  
 spironolactone (ALDACTONE) 100 mg tablet 3 Sig: Take 1 Tab by mouth daily. Class: Normal  
 Pharmacy: [address] [place] [address], [address] [place] [address] #: [phone] Route: Oral  
  
We Performed the Following AMMONIA [id number] CPT(R)] CBC W/O DIFF [54715 CPT(R)] METABOLIC PANEL, COMPREHENSIVE [75344 CPT(R)] PROTHROMBIN TIME + INR [62001 CPT(R)] Introducing [facility] SERVICES! Dear Rose Person: 
Thank you for requesting a [employer] account. Our records indicate that you already have an active [employer] account. You can access your account anytime at [email or web address] Did you know that you can access your hospital and ER discharge instructions at any time in [employer]? You can also review all of your test results from your hospital stay or ER visit. Additional Information If you have questions, please visit the Frequently Asked Questions section of the [employer] website at [email or web address] Remember, [employer] is NOT to be used for urgent needs. For medical emergencies, dial 911. Now available from your iPhone and Android! Please provide this summary of care documentation to your next provider. Your primary care clinician is listed as Katya Gilliam. If you have any questions after today's visit, please call [phone].

## 2018-09-19 NOTE — PROGRESS NOTES
[address] Maryanne Rivera MD, [address], [facility], [place]       Genevieve Johnson, KENNETH Angel, [facility]   MELVA Nunez NP        at [address], [address] [facility], [address]     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 9) with comorbidity ([address] E66.01       Karl Kessler returns to the [place] [address] regarding chronic HCV in the setting of cirrhosis. The active problem list, all pertinent past medical history, medications, radiologic findings and laboratory findings related to the liver disorder were reviewed with the patient. The patient is a 60 y.o.  male who was found to have fatty liver disease on liver biopsy in 2014. Serologic evaluation was either all negative or within the limits of normal.      Recent abdominal CT in June 2018 demonstrated changes consistent with cirrhosis with no liver masses suggestive of HCC.     An assessment of liver fibrosis with biopsy done 7/2014 showed cirrhosis and mild mixed macro- and microvesicular steatosis. LE edema. Stable with step 1 diuretics. Patient was recently hospitalized for HE exacerbation. He admitted to missing some doses of lactulose. Patient continues on Xifaxan and lactulose daily. He now has at least 2-3 BMs daily without diarrhea. All testing required for liver transplant evaluation have been completed. He is currently listed for transplant. He returns to the clinic today to manage his advanced liver disease after hospital discharge. Patient is overall feeling well today but continues to have severe fatigue. He has limitations in functional activities secondary to these symptoms. The patient has not experienced yellowing of the eyes or skin, pruritus, melena or hematochezia. ALLERGIES  No Known Allergies    MEDICATIONS  Current Outpatient Prescriptions   Medication Sig    rifAXIMin (XIFAXAN) 550 mg tablet Take 1 Tab by mouth two (2) times a day.  aspirin delayed-release 81 mg tablet Take 81 mg by mouth daily.  gabapentin (NEURONTIN) 300 mg capsule Take 300 mg by mouth nightly.  glimepiride (AMARYL) 1 mg tablet Take 1 mg by mouth every morning.  levothyroxine (SYNTHROID) 75 mcg tablet Take 75 mcg by mouth Daily (before breakfast).  furosemide (LASIX) 40 mg tablet Take 1 Tab by mouth daily.  spironolactone (ALDACTONE) 100 mg tablet Take 1 Tab by mouth daily.  lactulose (CHRONULAC) 20 gram/30 mL soln solution Take 30 mL by mouth three (3) times daily. Adjust  dose as needed such that you have 2 loose/soft bowel movements a day without diarrhea. (Patient taking differently: Take 20 g by mouth daily. Adjust  dose as needed such that you have 2 loose/soft bowel movements a day without diarrhea.)    cholecalciferol, vitamin D3, (VITAMIN D3) 2,000 unit tab Take 1 Tab by mouth two (2) times a day.  pantoprazole (PROTONIX) 40 mg tablet Take 40 mg by mouth daily.  simvastatin (ZOCOR) 20 mg tablet Take 20 mg by mouth daily.      No current facility-administered medications for this visit. SYSTEM REVIEW NOT RELATED TO LIVER DISEASE OR REVIEWED ABOVE:  Constitution systems: Negative for fever, chills, weight gain, weight loss. Eyes: Negative for visual changes. ENT: Negative for sore throat, painful swallowing. Respiratory: Negative for cough, hemoptysis, SOB. Cardiology: Negative for chest pain, palpitations. GI:  Negative for constipation or diarrhea. : Negative for urinary frequency, dysuria, hematuria, nocturia. Skin: Positive for LE skin discoloration. Negative for rash. Hematology: Negative for easy bruising, blood clots. Musculo-skeletal: Negative for weakness. Neurologic:  No confusion. Psychology: Negative for anxiety, depression. FAMILY HISTORY:  The father  of prostate cancer. The mother  of multiple myeloma. There is no family history of liver disease. SOCIAL HISTORY:  The patient is . The patient has 3 children. The patient does not use tobacco products. The patient has been abstinent from alcohol since 2016. The patient is on social security. PHYSICAL EXAMINATION:  Visit Vitals    /58 (BP 1 Location: Left arm, BP Patient Position: Sitting)    Pulse 79    Temp 97.7 °F (36.5 °C) (Tympanic)    Wt 254 lb (115.2 kg)    SpO2 98%    BMI 35.43 kg/m2     General: No acute distress. Eyes: Sclera anicteric. ENT: No oral lesions. Nodes: No adenopathy. Skin: No spider angiomata. No jaundice. No palmar erythema. Respiratory: Lungs clear to auscultation. Cardiovascular:  Regular heart rate. No murmurs. No JVD. Abdomen: Soft non-tender. Liver size enlarged 4 finger widths below rib cage. Spleen enlarged. No obvious ascites. Extremities: Trace LE edema bilaterally. No muscle wasting. No gross arthritic changes. Neurologic: Alert and oriented. Cranial nerves grossly intact. No asterixis.     LABORATORY STUDIES:  Liver [place] of [address] [place] [address] & Units 9/19/2018 8/28/2018   WBC 3.4 - 10.8 x10E3/uL 5.4 3.5 (L)   ANC 1.8 - 8.0 K/UL  1.3 (L)   HGB 13.0 - 17.7 g/dL 9.8 (L) 8.4 (L)    - 379 x10E3/uL 67 (LL) 46 (LL)   INR 0.8 - 1.2 1.4 (H) 1.5 (H)   AST 0 - 40 IU/L 43 (H) 45 (H)   ALT 0 - 44 IU/L 22 30   Alk Phos 39 - 117 IU/L 156 (H) 154 (H)   Bili, Total 0.0 - 1.2 mg/dL 3.2 (H) 2.9 (H)   Bili, Direct 0.0 - 0.2 MG/DL  1.3 (H)   Albumin 3.6 - 4.8 g/dL 2.5 (L) 2.0 (L)   BUN 8 - 27 mg/dL 24 15   BUN (iSTAT) 9 - 20 mg/dL     Creat 0.76 - 1.27 mg/dL 1.46 (H) 0.99   Creat (iSTAT) 0.6 - 1.3 mg/dL     Na 134 - 144 mmol/L 140 144   K 3.5 - 5.2 mmol/L 4.5 4.2   Cl 96 - 106 mmol/L 106 113 (H)   CO2 20 - 29 mmol/L 24 22   Glucose 65 - 99 mg/dL 203 (H) 168 (H)   Magnesium 1.6 - 2.4 mg/dL     Ammonia ug/dL CANCELED      11/2017. MELD 15  12/2017. MELD 14  5/2018. MELD 18  7/2018. MELD 15  9/2018. MELD 18    Labs will be repeated on 7/3/2018    SEROLOGIES:  Serologies Latest Ref Rng & Units 5/24/2018   Ferritin 30 - 400 ng/mL 161   Iron % Saturation 15 - 55 % 61 (H)     LIVER HISTOLOGY:  7/2014: Cirhosis with mild mixed macro- and microvesicular steatosis. ENDOSCOPIC PROCEDURES:  2/2017. EGD by Dr Tyler Matthew. No esophageal varices. 5/2018. EGD by Dr. Nima Jones. Normal esophagus. RADIOLOGY:  2/2017. Ultrasound of liver. Echogenic consistent with chronic liver disease. No liver mass lesions. No dilated bile ducts. No ascites. 3/2017 CT scan of abdomen. Changes consistent with cirrhosis. No liver mass lesions. Splenomegaly. Splenorenal shunt. No ascites. 12/2017. CT scan of abdomen. Cirrhotic liver and splenomegaly, with evidence of portal hypertension. No acute findings. No suspicious liver masses. 6/2018. CT scan of abdomen. No enhancing masses are seen in the liver. OTHER TESTING:  Not available or performed    ASSESSMENT AND PLAN:  CAMEJO with cirrhosis. Liver transaminases are normal. Alkaline phosphate is elevated. Liver function is depressed.  The platelet count is depressed. Current MELD is 18    Hepatic encephalopathy is now much better controlled on current doses of lactulose and Xifaxan. He achieves 2-3 BMs without diarrhea. Protein restriction was discussed. Encouraged patient to take each dose as directed. The patient has not developed ascites. Lower extremity edema and anasarca. Decreased patient down to 1/2 step 1 diuretics due to renal insufficiency. LE discomfort/neuropathy. Neurontin improves his symptoms. Continue. The patient does not have esophageal varices by EGD in 5/2018. Next EGD will be in 5/2020. [facility] [address] screening. CT ruled out [address] in June 2018. He is up to date. Next imaging will be in December 2018. Liver transplant evaluation. He has completed the required testing. He has been activated on the liver transplant list.    Hypertension. Patient's BP is significantly elevated in the clinic today. Discussed the importance of regular follow up with their PCP to monitor/manage this. Patient verbalized understanding. The patient was counseled regarding diet and exercise to achieve weight loss. Encouraged patient to follow a healthy diet and make sure his other medical conditions are well-controlled. The patient was told to to consume any food products and drinks containing fructose as this enhances hepatic fat synthesis. The patient was directed to continue all current medications at the current dosages. There are no contraindications for the patient to take any medications that are necessary for treatment of other medical issues including medications for diabetes mellitus and hypercholesterolemia. The patient was counseled regarding alcohol consumption and that this could contribute to fatty liver disease. The need for vaccination against viral hepatitis A and B will be assessed with serologic and instituted as appropriate. All of the above issues were discussed with the patient. All questions were answered.  The patient expressed a clear understanding of the above. [facility] 87 in 6 weeks[place]  [address] [facility], [facility], [address] [place] [address], [address] [place] [address]  Ph: [phone]  Fax: [phone]

## 2018-09-19 NOTE — PROGRESS NOTES
1. Have you been to the ER, urgent care clinic since your last visit? Hospitalized since your last visit? Yes Where: yes pt was seen at [facility] he was very confused    2. Have you seen or consulted any other health care providers outside of the [address] [place] [address] since your last visit? Include any pap smears or colon screening. No   Chief Complaint   Patient presents with    Follow-up     Visit Vitals    /58 (BP 1 Location: Left arm, BP Patient Position: Sitting)    Pulse 79    Temp 97.7 °F (36.5 °C) (Tympanic)    Wt 254 lb (115.2 kg)    SpO2 98%    BMI 35.43 kg/m2     PHQ over the last two weeks 9/19/2018   Little interest or pleasure in doing things Not at all   Feeling down, depressed, irritable, or hopeless Not at all   Total Score PHQ 2 0     Learning Assessment 9/19/2018   PRIMARY LEARNER Patient   BARRIERS PRIMARY LEARNER NONE   CO-LEARNER CAREGIVER No   PRIMARY LANGUAGE ENGLISH   LEARNER PREFERENCE PRIMARY LISTENING   ANSWERED BY patient   RELATIONSHIP SELF     Abuse Screening Questionnaire 9/19/2018   Do you ever feel afraid of your partner? N   Are you in a relationship with someone who physically or mentally threatens you? N   Is it safe for you to go home?  Carolyne Vargas

## 2018-09-20 ENCOUNTER — PATIENT OUTREACH (OUTPATIENT)
Dept: HEMATOLOGY | Age: 63
End: 2018-09-20

## 2018-09-20 LAB
ALBUMIN SERPL-MCNC: 2.5 G/DL (ref 3.6–4.8)
ALBUMIN/GLOB SERPL: 0.7 {RATIO} (ref 1.2–2.2)
ALP SERPL-CCNC: 156 IU/L (ref 39–117)
ALT SERPL-CCNC: 22 IU/L (ref 0–44)
AMMONIA PLAS-MCNC: NORMAL UG/DL (ref 27–102)
AST SERPL-CCNC: 43 IU/L (ref 0–40)
BILIRUB SERPL-MCNC: 3.2 MG/DL (ref 0–1.2)
BUN SERPL-MCNC: 24 MG/DL (ref 8–27)
BUN/CREAT SERPL: 16 (ref 10–24)
CALCIUM SERPL-MCNC: 8.7 MG/DL (ref 8.6–10.2)
CHLORIDE SERPL-SCNC: 106 MMOL/L (ref 96–106)
CO2 SERPL-SCNC: 24 MMOL/L (ref 20–29)
CREAT SERPL-MCNC: 1.46 MG/DL (ref 0.76–1.27)
ERYTHROCYTE [DISTWIDTH] IN BLOOD BY AUTOMATED COUNT: 16.5 % (ref 12.3–15.4)
GLOBULIN SER CALC-MCNC: 3.4 G/DL (ref 1.5–4.5)
GLUCOSE SERPL-MCNC: 203 MG/DL (ref 65–99)
HCT VFR BLD AUTO: 27.3 % (ref 37.5–51)
HGB BLD-MCNC: 9.8 G/DL (ref 13–17.7)
INR PPP: 1.4 (ref 0.8–1.2)
MCH RBC QN AUTO: 32.3 PG (ref 26.6–33)
MCHC RBC AUTO-ENTMCNC: 35.9 G/DL (ref 31.5–35.7)
MCV RBC AUTO: 90 FL (ref 79–97)
MORPHOLOGY BLD-IMP: ABNORMAL
PLATELET # BLD AUTO: 67 X10E3/UL (ref 150–379)
POTASSIUM SERPL-SCNC: 4.5 MMOL/L (ref 3.5–5.2)
PROT SERPL-MCNC: 5.9 G/DL (ref 6–8.5)
PROTHROMBIN TIME: 14.3 SEC (ref 9.1–12)
RBC # BLD AUTO: 3.03 X10E6/UL (ref 4.14–5.8)
SODIUM SERPL-SCNC: 140 MMOL/L (ref 134–144)
WBC # BLD AUTO: 5.4 X10E3/UL (ref 3.4–10.8)

## 2018-09-20 RX ORDER — LISINOPRIL 20 MG/1
20 TABLET ORAL DAILY
Qty: 90 TAB | Refills: 3 | Status: SHIPPED | OUTPATIENT
Start: 2018-09-20 | End: 2018-11-29 | Stop reason: ALTCHOICE

## 2018-09-20 NOTE — PROGRESS NOTES
Reviewed labs with Sid Blair NP. Current MELD score is 18. Received verbal order to decrease diuretics from Lasix 40 mg daily and Aldactone 100 mg daily to Lasix 20 mg daily and Aldactone 50 mg daily. Phone call placed to patient's wife. Updated on current MELD score. Reviewed labs, including elevated creatinine. Wife notified to decrease diuretics. Wife will notify NN if edema returns on half dose diuretics. Labs sent to [facility] wait list coordinator for update.

## 2018-09-20 NOTE — TELEPHONE ENCOUNTER
Received phone call from patient's wife asking for new prescription for Xifaxan to be sent to [facility].

## 2018-09-24 PROBLEM — K76.6 PORTAL HYPERTENSION (HCC): Status: ACTIVE | Noted: 2018-09-24

## 2018-09-28 ENCOUNTER — HOSPITAL ENCOUNTER (OUTPATIENT)
Dept: SLEEP MEDICINE | Age: 63
Discharge: HOME | End: 2018-09-28
Payer: COMMERCIAL

## 2018-09-28 ENCOUNTER — OFFICE VISIT (OUTPATIENT)
Dept: SLEEP MEDICINE | Age: 63
End: 2018-09-28

## 2018-09-28 VITALS
WEIGHT: 255 LBS | HEIGHT: 71 IN | HEART RATE: 85 BPM | SYSTOLIC BLOOD PRESSURE: 115 MMHG | OXYGEN SATURATION: 99 % | BODY MASS INDEX: 35.7 KG/M2 | DIASTOLIC BLOOD PRESSURE: 67 MMHG

## 2018-09-28 DIAGNOSIS — I10 ESSENTIAL HYPERTENSION: ICD-10-CM

## 2018-09-28 DIAGNOSIS — G47.33 OSA (OBSTRUCTIVE SLEEP APNEA): Primary | ICD-10-CM

## 2018-09-28 PROCEDURE — 95806 SLEEP STUDY UNATT&RESP EFFT: CPT | Performed by: INTERNAL MEDICINE

## 2018-09-28 NOTE — PROGRESS NOTES
[address] [facility] [place]. [place], [address]  Tel.  [phone]  Fax. 100 [facility] 60  [place], [address] [facility] [address]  Tel.  [phone]  Fax. [phone] [address] [facility] [address]  Tel.  [phone]  Fax. [phone]         Subjective:      Chelsie Smith is an 60 y.o. male referred for evaluation for a sleep disorder. He complains of snoring associated with periods of not breathing, excessive daytime sleepiness. Symptoms were present several years ago, but have improved since that time following about 70 lb weight. He is due to have a liver transplant at [facility] and a sleep test is request for assessment of ILDEFONSO. He usually can fall asleep in 5 minutes to an hour. Family or house members have previously noted snoring. He denies completely or partially paralyzed while falling asleep or waking up. Chelsie Smith does not wake up frequently at night. He is not bothered by waking up too early and left unable to get back to sleep. He actually sleeps about 7 hours at night and wakes up about 3 times during the night. He does not work shifts:  .   Leticia Duarte indicates he does not get too little sleep at night. His bedtime is 2200. He awakens at 0700. He does take naps. He takes 5 naps a week lasting 2, Hour(s). He has the following observed behaviors:  ;  .  Other remarks:  Patient reports of experiencing difficulties when sleeping on his back typically sleeps on his side or recliner. He use a pillow when sleeping in his bed and may take a Tylenol PM when experiencing difficulties sleeping. [place] Sleepiness Score: 18 which reflect severe daytime drowsiness. No Known Allergies      Current Outpatient Prescriptions:     lisinopril (PRINIVIL, ZESTRIL) 20 mg tablet, Take 1 Tab by mouth daily. , Disp: 90 Tab, Rfl: 3    spironolactone (ALDACTONE) 100 mg tablet, Take 1 Tab by mouth daily.  (Patient taking differently: Take 50 mg by mouth daily.), Disp: 90 Tab, Rfl: 3   rifAXIMin (XIFAXAN) 550 mg tablet, Take 1 Tab by mouth two (2) times a day., Disp: 60 Tab, Rfl: 11    aspirin delayed-release 81 mg tablet, Take 81 mg by mouth daily. , Disp: , Rfl:     gabapentin (NEURONTIN) 300 mg capsule, Take 300 mg by mouth nightly., Disp: , Rfl:     glimepiride (AMARYL) 1 mg tablet, Take 1 mg by mouth every morning., Disp: , Rfl:     levothyroxine (SYNTHROID) 75 mcg tablet, Take 75 mcg by mouth Daily (before breakfast). , Disp: , Rfl:     furosemide (LASIX) 40 mg tablet, Take 1 Tab by mouth daily. (Patient taking differently: Take 20 mg by mouth daily.), Disp: 90 Tab, Rfl: 3    lactulose (CHRONULAC) 20 gram/30 mL soln solution, Take 30 mL by mouth three (3) times daily. Adjust  dose as needed such that you have 2 loose/soft bowel movements a day without diarrhea. (Patient taking differently: Take 20 g by mouth daily. Adjust  dose as needed such that you have 2 loose/soft bowel movements a day without diarrhea.), Disp: 1800 mL, Rfl: 1    cholecalciferol, vitamin D3, (VITAMIN D3) 2,000 unit tab, Take 1 Tab by mouth two (2) times a day., Disp: , Rfl:     pantoprazole (PROTONIX) 40 mg tablet, Take 40 mg by mouth daily. , Disp: , Rfl:     simvastatin (ZOCOR) 20 mg tablet, Take 20 mg by mouth daily. , Disp: , Rfl:      He  has a past medical history of Arthritis; Autoimmune disease ([facility] [address] CAD (coronary artery disease); Cancer ([facility] [address] Chronic kidney disease; Coagulation disorder ([facility] [address] Diabetes ([facility] [address] GERD (gastroesophageal reflux disease); Hepatic encephalopathy ([facility] [address] Hypertension; Ill-defined condition; Ill-defined condition; Liver disease; Liver transplant candidate; and PUD (peptic ulcer disease). He also has no past medical history of Adverse effect of anesthesia or Sleep apnea.     He  has a past surgical history that includes pr abdomen surgery proc unlisted; hx appendectomy; hx urological; and hx other surgical.    He family history includes Cancer in his father, maternal grandfather, maternal grandmother, mother, and paternal grandfather; Heart Failure in his paternal grandmother; MS in his sister. He  reports that he has never smoked. He has never used smokeless tobacco. He reports that he does not drink alcohol or use illicit drugs. Review of Systems:  Constitutional:  No significant weight loss or weight gain  Eyes:  No blurred vision  CVS:  No significant chest pain  Pulm:  No significant shortness of breath  GI:  No significant nausea or vomiting  :  No significant nocturia  Musculoskeletal:  No significant joint pain at night  Skin:  No significant rashes  Neuro:  No significant dizziness   Psych:  No active mood issues    Sleep Review of Systems: notable for no difficulty falling asleep; infrequent awakenings at night;  regular dreaming noted; no nightmares ; no early morning headaches; no memory problems; no concentration issues; no history of any automobile or occupational accidents due to daytime drowsiness. Objective:     Visit Vitals    /67    Pulse 85    Ht 5' 11\" (1.803 m)    Wt 255 lb (115.7 kg)    SpO2 99%    BMI 35.57 kg/m2         General:   Not in acute distress   Eyes:  Anicteric sclerae, no obvious strabismus   Nose:  No obvious nasal septum deviation    Oropharynx:   Class 4 oropharyngeal outlet, thick tongue base, uvula could not be seen due to low-lying soft palate, narrow tonsilo-pharyngeal pilars   Tonsils:   tonsils are not seen due to low-lying soft palate   Neck:   Neck circ. in \"inches\": 16; midline trachea   Chest/Lungs:  Equal lung expansion, clear on auscultation    CVS:  Normal rate, regular rhythm; no JVD   Skin:  Warm to touch; no obvious rashes   Neuro:  No focal deficits ; no obvious tremor    Psych:  Normal affect,  normal countenance;          Assessment:       ICD-10-CM ICD-9-CM    1. ILDEFONSO (obstructive sleep apnea) G47.33 327.23 SLEEP STUDY UNATTENDED, 4 CHANNEL   2. Essential hypertension I10 401.9    3.  BMI 35.0-35.9,adult Z68.35 V85.35          Plan:     * The patient currently has a High Risk for having sleep apnea. STOP-BANG score 7.  * Sleep testing was ordered for initial evaluation. * He was provided information on sleep apnea including coresponding risk factors and the importance of proper treatment. * Treatment options if indicated were reviewed today. Patient agrees to a trial of PAP therapy if indicated. * Counseling was provided regarding proper sleep hygiene (including effect of light on sleep), paradoxical intention, stimulus control, sleep environment safety and safe driving. * Effect of sleep disturbance on weight was reviewed. We have recommended a dedicated weight loss through appropriate diet and an exercise regiment as significant weight reduction has been shown to reduce severity of obstructive sleep apnea. * Patient agrees to telephone [phone]  follow-up by myself or lead sleep technologist shortly after sleep study to review results and plan final management.     (patient has given permission for a message to be left regarding test results and further management if patient cannot be cannot be reached directly). Thank you for allowing us to participate in your patient's medical care. We'll keep you updated on these investigations. Howard Moqsueda MD, FAASM  Electronically signed.  09/28/18

## 2018-09-28 NOTE — MR AVS SNAPSHOT
[address] [address] 
[phone] Patient: Juan Yanes MRN: [id number] :1955 Visit Information Date & Time Provider Department Dept. Phone Encounter #  
 2018  9:00 AM Srinivasan Cuevas MD [address] [address] [id number] Follow-up Instructions Return if symptoms worsen or fail to improve. Follow-up and Disposition History Your Appointments 10/31/2018  9:00 AM  
Follow Up with April Saratha Epley, NP [address] ([address] [facility]) Appt Note: Follow up  [place] [facility] [phone] [facility] [id number]  
59 [facility] [address] Upcoming Health Maintenance Date Due Hepatitis C Screening 1955 FOOT EXAM Q1 1964 MICROALBUMIN Q1 1964 EYE EXAM RETINAL OR DILATED Q1 1964 Pneumococcal 19-64 Medium Risk (1 of 1 - PPSV23) 1973 DTaP/Tdap/Td series (1 - Tdap) 1975 Shingrix Vaccine Age 50> (1 of 2) 2004 Influenza Age 5 to Adult 2018 HEMOGLOBIN A1C Q6M 2019 LIPID PANEL Q1 2019 FOBT Q 1 YEAR AGE 50-75 2019 Allergies as of 2018  Review Complete On: 2018 By: Srinivasan Cuevas MD  
 No Known Allergies Current Immunizations  Never Reviewed No immunizations on file. Not reviewed this visit You Were Diagnosed With   
  
 Codes Comments ILDEFONSO (obstructive sleep apnea)    -  Primary ICD-10-CM: G47.33 
ICD-9-CM: 327.23 Essential hypertension     ICD-10-CM: I10 
ICD-9-CM: 401.9 BMI 35.0-35.9,adult     ICD-10-CM: X50.26 ICD-9-CM: V85.35 Vitals BP Pulse Height(growth percentile) Weight(growth percentile) SpO2 BMI  
 115/67 85 5' 11\" (1.803 m) 255 lb (115.7 kg) 99% 35.57 kg/m2 Smoking Status Never Smoker Vitals History BMI and BSA Data Body Mass Index Body Surface Area 35.57 kg/m 2 2.41 m 2 Preferred Pharmacy Pharmacy Name Phone [id number] [facility] [place] [phone] Your Updated Medication List  
  
   
This list is accurate as of 9/28/18 10:07 AM.  Always use your most recent med list.  
  
  
  
  
 aspirin delayed-release 81 mg tablet Take 81 mg by mouth daily. furosemide 40 mg tablet Commonly known as:  LASIX Take 1 Tab by mouth daily. gabapentin 300 mg capsule Commonly known as:  NEURONTIN Take 300 mg by mouth nightly. glimepiride 1 mg tablet Commonly known as:  AMARYL Take 1 mg by mouth every morning. lactulose 20 gram/30 mL Soln solution Commonly known as:  Orval Crafts Take 30 mL by mouth three (3) times daily. Adjust  dose as needed such that you have 2 loose/soft bowel movements a day without diarrhea.  
  
 levothyroxine 75 mcg tablet Commonly known as:  SYNTHROID Take 75 mcg by mouth Daily (before breakfast). lisinopril 20 mg tablet Commonly known as:  Bill Economy Take 1 Tab by mouth daily. pantoprazole 40 mg tablet Commonly known as:  PROTONIX Take 40 mg by mouth daily. rifAXIMin 550 mg tablet Commonly known as:  Toñito Began Take 1 Tab by mouth two (2) times a day. simvastatin 20 mg tablet Commonly known as:  ZOCOR Take 20 mg by mouth daily. spironolactone 100 mg tablet Commonly known as:  ALDACTONE Take 1 Tab by mouth daily. VITAMIN D3 2,000 unit Tab Generic drug:  cholecalciferol (vitamin D3) Take 1 Tab by mouth two (2) times a day. We Performed the Following SLEEP STUDY UNATTENDED, 4 CHANNEL [id number] CPT(R)] Follow-up Instructions Return if symptoms worsen or fail to improve. Patient Instructions [address] [facility] [place]. [address] [facility], [address] Tel.  [phone] Fax. [phone] [address] [place]. [address], [address] [facility] Tel.  [phone] Fax. [phone] [address] Tel.  [phone] Fax. [phone] Sleep Apnea: After Your Visit Your Care Instructions Sleep apnea occurs when you frequently stop breathing for 10 seconds or longer during sleep. It can be mild to severe, based on the number of times per hour that you stop breathing or have slowed breathing. Blocked or narrowed airways in your nose, mouth, or throat can cause sleep apnea. Your airway can become blocked when your throat muscles and tongue relax during sleep. Sleep apnea is common, occurring in 1 out of 20 individuals. Individuals having any of the following characteristics should be evaluated and treated right away due to high risk and detrimental consequences from untreated sleep apnea: 
1. Obesity 2. Congestive Heart failure 3. Atrial Fibrillation 4. Uncontrolled Hypertension 5. Type II Diabetes 6. Night-time Arrhythmias 7. Stroke 8. Pulmonary Hypertension 9. High-risk Driving Populations (pilots, truck drivers, etc.) 10. Patients Considering Weight-loss Surgery How do you know you have sleep apnea? You probably have sleep apnea if you answer 'yes' to 3 or more of the following questions: S - Have you been told that you Snore? T - Are you often Tired during the day? O - Has anyone Observed you stop breathing while sleeping? P- Do you have (or are being treated for) high blood Pressure? B - Are you obese (Body Mass Index > 35)? A - Is your Age 48years old or older? N - Is your Neck size greater than 16 inches? G - Are you male Gender? A sleep physician can prescribe a breathing device that prevents tissues in the throat from blocking your airway. Or your doctor may recommend using a dental device (oral breathing device) to help keep your airway open.  In some cases, surgery may be needed to remove enlarged tissues in the throat. Follow-up care is a key part of your treatment and safety. Be sure to make and go to all appointments, and call your doctor if you are having problems. It's also a good idea to know your test results and keep a list of the medicines you take. How can you care for yourself at home? · Lose weight, if needed. It may reduce the number of times you stop breathing or have slowed breathing. · Go to bed at the same time every night. · Sleep on your side. It may stop mild apnea. If you tend to roll onto your back, sew a pocket in the back of your paNewco LS15 top. Put a tennis ball into the pocket, and stitch the pocket shut. This will help keep you from sleeping on your back. · Avoid alcohol and medicines such as sleeping pills and sedatives before bed. · Do not smoke. Smoking can make sleep apnea worse. If you need help quitting, talk to your doctor about stop-smoking programs and medicines. These can increase your chances of quitting for good. · Prop up the head of your bed 4 to 6 inches by putting bricks under the legs of the bed. · Treat breathing problems, such as a stuffy nose, caused by a cold or allergies. · Use a continuous positive airway pressure (CPAP) breathing machine if lifestyle changes do not help your apnea and your doctor recommends it. The machine keeps your airway from closing when you sleep. · If CPAP does not help you, ask your doctor whether you should try other breathing machines. A bilevel positive airway pressure machine has two types of air pressureâone for breathing in and one for breathing out. Another device raises or lowers air pressure as needed while you breathe. · If your nose feels dry or bleeds when using one of these machines, talk with your doctor about increasing moisture in the air. A humidifier may help. · If your nose is runny or stuffy from using a breathing machine, talk with your doctor about using decongestants or a corticosteroid nasal spray. When should you call for help? Watch closely for changes in your health, and be sure to contact your doctor if: 
· You still have sleep apnea even though you have made lifestyle changes. · You are thinking of trying a device such as CPAP. · You are having problems using a CPAP or similar machine. Where can you learn more? Go to [employer]. Enter R259 in the search box to learn more about \"Sleep Apnea: After Your Visit. \"  
© 0175-1672 Healthwise, [employer]orporated. Care instructions adapted under license by Coleen Toro (which disclaims liability or warranty for this information). This care instruction is for use with your licensed healthcare professional. If you have questions about a medical condition or this instruction, always ask your healthcare professional. [employer] any warranty or liability for your use of this information. PROPER SLEEP HYGIENE What to avoid · Do not have drinks with caffeine, such as coffee or black tea, for 8 hours before bed. · Do not smoke or use other types of tobacco near bedtime. Nicotine is a stimulant and can keep you awake. · Avoid drinking alcohol late in the evening, because it can cause you to wake in the middle of the night. · Do not eat a big meal close to bedtime. If you are hungry, eat a light snack. · Do not drink a lot of water close to bedtime, because the need to urinate may wake you up during the night. · Do not read or watch TV in bed. Use the bed only for sleeping and sexual activity. What to try · Go to bed at the same time every night, and wake up at the same time every morning. Do not take naps during the day. · Keep your bedroom quiet, dark, and cool. · Get regular exercise, but not within 3 to 4 hours of your bedtime. Barby Au · Sleep on a comfortable pillow and mattress. · If watching the clock makes you anxious, turn it facing away from you so you cannot see the time. · If you worry when you lie down, start a worry book. Well before bedtime, write down your worries, and then set the book and your concerns aside. · Try meditation or other relaxation techniques before you go to bed. · If you cannot fall asleep, get up and go to another room until you feel sleepy. Do something relaxing. Repeat your bedtime routine before you go to bed again. · Make your house quiet and calm about an hour before bedtime. Turn down the lights, turn off the TV, log off the computer, and turn down the volume on music. This can help you relax after a busy day. Drowsy Driving The [employer] cites drowsiness as a causing factor in more than 991,489 police reported crashes annually, resulting in 76,000 injuries and 1,500 deaths. Other surveys suggest 55% of people polled have driven while drowsy in the past year, 23% had fallen asleep but not crashed, 3% crashed, and 2% had and accident due to drowsy driving. Who is at risk? Young Drivers: One study of drowsy driving accidents states that 55% of the drivers were under 25 years. Of those, 75% were male. Shift Workers and Travelers: People who work overnight or travel across time zones frequently are at higher risk of experiencing Circadian Rhythm Disorders. They are trying to work and function when their body is programed to sleep. Sleep Deprived: Lack of sleep has a serious impact on your ability to pay attention or focus on a task. Consistently getting less than the average of 8 hours your body needs creates partial or cumulative sleep deprivation. Untreated Sleep Disorders: Sleep Apnea, Narcolepsy, R.L.S., and other sleep disorders (untreated) prevent a person from getting enough restful sleep. This leads to excessive daytime sleepiness and increases the risk for drowsy driving accidents by up to 7 times.  
Medications / Alcohol: Even over the counter medications can cause drowsiness. Medications that impair a drivers attention should have a warning label. Alcohol naturally makes you sleepy and on its own can cause accidents. Combined with excessive drowsiness its effects are amplified. Signs of Drowsy Driving: * You don't remember driving the last few miles * You may drift out of your hi * You are unable to focus and your thoughts wander * You may yawn more often than normal 
 * You have difficulty keeping your eyes open / nodding off * Missing traffic signs, speeding, or tailgating Prevention-  
Good sleep hygiene, lifestyle and behavioral choices have the most impact on drowsy driving. There is no substitute for sleep and the average person requires 8 hours nightly. If you find yourself driving drowsy, stop and sleep. Consider the sleep hygiene tips provided during your visit as well. Medication Refill Policy: Refills for all medications require 1 week advance notice. Please have your pharmacy fax a refill request. We are unable to fax, or call in \"controled substance\" medications and you will need to pick these prescriptions up from our office. Eligible Activation Thank you for requesting access to Eligible. Please follow the instructions below to securely access and download your online medical record. Eligible allows you to send messages to your doctor, view your test results, renew your prescriptions, schedule appointments, and more. How Do I Sign Up? 1. In your internet browser, go to [email or web address]. 2. Click on the First Time User? Click Here link in the Sign In box. You will see the New Member Sign Up page. 3. Enter your Eligible Access Code exactly as it appears below. You will not need to use this code after youve completed the sign-up process. If you do not sign up before the expiration date, you must request a new code. Eligible Access Code: Activation code not generated Current DataContact Status: Active (This is the date your DataContact access code will ) 4. Enter the last four digits of your Social Security Number (xxxx) and Date of Birth (mm/dd/yyyy) as indicated and click Submit. You will be taken to the next sign-up page. 5. Create a Intiot ID. This will be your DataContact login ID and cannot be changed, so think of one that is secure and easy to remember. 6. Create a DataContact password. You can change your password at any time. 7. Enter your Password Reset Question and Answer. This can be used at a later time if you forget your password. 8. Enter your e-mail address. You will receive e-mail notification when new information is available in [address] 9. Click Sign Up. You can now view and download portions of your medical record. 10. Click the Download Summary menu link to download a portable copy of your medical information. Additional Information If you have questions, please call [phone]. Remember, DataContact is NOT to be used for urgent needs. For medical emergencies, dial 911. Introducing [facility]! Dear Marylene Course: 
Thank you for requesting a DataContact account. Our records indicate that you already have an active DataContact account. You can access your account anytime at [email or web address] Did you know that you can access your hospital and ER discharge instructions at any time in DataContact? You can also review all of your test results from your hospital stay or ER visit. Additional Information If you have questions, please visit the Frequently Asked Questions section of the DataContact website at [email or web address] Remember, DataContact is NOT to be used for urgent needs. For medical emergencies, dial 911. Now available from your iPhone and Android! Please provide this summary of care documentation to your next provider. Your primary care clinician is listed as Mikhail Echeverria. If you have any questions after today's visit, please call [phone].

## 2018-09-28 NOTE — PATIENT INSTRUCTIONS
[address] [facility] [place]. [place], [address]  Tel.  [phone]  Fax. 100 [facility] 60  [place], [address] [facility]  Tel.  [phone]  Fax. [phone] [address]  Tel.  [phone]  Fax. [phone]     Sleep Apnea: After Your Visit  Your Care Instructions  Sleep apnea occurs when you frequently stop breathing for 10 seconds or longer during sleep. It can be mild to severe, based on the number of times per hour that you stop breathing or have slowed breathing. Blocked or narrowed airways in your nose, mouth, or throat can cause sleep apnea. Your airway can become blocked when your throat muscles and tongue relax during sleep. Sleep apnea is common, occurring in 1 out of 20 individuals. Individuals having any of the following characteristics should be evaluated and treated right away due to high risk and detrimental consequences from untreated sleep apnea:  1. Obesity  2. Congestive Heart failure  3. Atrial Fibrillation  4. Uncontrolled Hypertension  5. Type II Diabetes  6. Night-time Arrhythmias  7. Stroke  8. Pulmonary Hypertension  9. High-risk Driving Populations (pilots, truck drivers, etc.)  10. Patients Considering Weight-loss Surgery    How do you know you have sleep apnea? You probably have sleep apnea if you answer 'yes' to 3 or more of the following questions:  S - Have you been told that you Snore? T - Are you often Tired during the day? O - Has anyone Observed you stop breathing while sleeping? P- Do you have (or are being treated for) high blood Pressure? B - Are you obese (Body Mass Index > 35)? A - Is your Age 48years old or older? N - Is your Neck size greater than 16 inches? G - Are you male Gender? A sleep physician can prescribe a breathing device that prevents tissues in the throat from blocking your airway.  Or your doctor may recommend using a dental device (oral breathing device) to help keep your airway open. In some cases, surgery may be needed to remove enlarged tissues in the throat. Follow-up care is a key part of your treatment and safety. Be sure to make and go to all appointments, and call your doctor if you are having problems. It's also a good idea to know your test results and keep a list of the medicines you take. How can you care for yourself at home? · Lose weight, if needed. It may reduce the number of times you stop breathing or have slowed breathing. · Go to bed at the same time every night. · Sleep on your side. It may stop mild apnea. If you tend to roll onto your back, sew a pocket in the back of your pajama top. Put a tennis ball into the pocket, and stitch the pocket shut. This will help keep you from sleeping on your back. · Avoid alcohol and medicines such as sleeping pills and sedatives before bed. · Do not smoke. Smoking can make sleep apnea worse. If you need help quitting, talk to your doctor about stop-smoking programs and medicines. These can increase your chances of quitting for good. · Prop up the head of your bed 4 to 6 inches by putting bricks under the legs of the bed. · Treat breathing problems, such as a stuffy nose, caused by a cold or allergies. · Use a continuous positive airway pressure (CPAP) breathing machine if lifestyle changes do not help your apnea and your doctor recommends it. The machine keeps your airway from closing when you sleep. · If CPAP does not help you, ask your doctor whether you should try other breathing machines. A bilevel positive airway pressure machine has two types of air pressureâone for breathing in and one for breathing out. Another device raises or lowers air pressure as needed while you breathe. · If your nose feels dry or bleeds when using one of these machines, talk with your doctor about increasing moisture in the air. A humidifier may help.   · If your nose is runny or stuffy from using a breathing machine, talk with your doctor about using decongestants or a corticosteroid nasal spray. When should you call for help? Watch closely for changes in your health, and be sure to contact your doctor if:  · You still have sleep apnea even though you have made lifestyle changes. · You are thinking of trying a device such as CPAP. · You are having problems using a CPAP or similar machine. Where can you learn more? Go to [employer]. Enter B456 in the search box to learn more about \"Sleep Apnea: After Your Visit. \"   © 3060-7677 Healthwise, Incorporated. Care instructions adapted under license by [facility] [employer] (which disclaims liability or warranty for this information). This care instruction is for use with your licensed healthcare professional. If you have questions about a medical condition or this instruction, always ask your healthcare professional. Lucinda Lee any warranty or liability for your use of this information. PROPER SLEEP HYGIENE    What to avoid  · Do not have drinks with caffeine, such as coffee or black tea, for 8 hours before bed. · Do not smoke or use other types of tobacco near bedtime. Nicotine is a stimulant and can keep you awake. · Avoid drinking alcohol late in the evening, because it can cause you to wake in the middle of the night. · Do not eat a big meal close to bedtime. If you are hungry, eat a light snack. · Do not drink a lot of water close to bedtime, because the need to urinate may wake you up during the night. · Do not read or watch TV in bed. Use the bed only for sleeping and sexual activity. What to try  · Go to bed at the same time every night, and wake up at the same time every morning. Do not take naps during the day. · Keep your bedroom quiet, dark, and cool. · Get regular exercise, but not within 3 to 4 hours of your bedtime. .  · Sleep on a comfortable pillow and mattress.   · If watching the clock makes you anxious, turn it facing away from you so you cannot see the time. · If you worry when you lie down, start a worry book. Well before bedtime, write down your worries, and then set the book and your concerns aside. · Try meditation or other relaxation techniques before you go to bed. · If you cannot fall asleep, get up and go to another room until you feel sleepy. Do something relaxing. Repeat your bedtime routine before you go to bed again. · Make your house quiet and calm about an hour before bedtime. Turn down the lights, turn off the TV, log off the computer, and turn down the volume on music. This can help you relax after a busy day. Drowsy Driving  The [address] [place] [address] Road Traffic Safety Administration cites drowsiness as a causing factor in more than 084,397 police reported crashes annually, resulting in 76,000 injuries and 1,500 deaths. Other surveys suggest 55% of people polled have driven while drowsy in the past year, 23% had fallen asleep but not crashed, 3% crashed, and 2% had and accident due to drowsy driving. Who is at risk? Young Drivers: One study of drowsy driving accidents states that 55% of the drivers were under 25 years. Of those, 75% were male. Shift Workers and Travelers: People who work overnight or travel across time zones frequently are at higher risk of experiencing Circadian Rhythm Disorders. They are trying to work and function when their body is programed to sleep. Sleep Deprived: Lack of sleep has a serious impact on your ability to pay attention or focus on a task. Consistently getting less than the average of 8 hours your body needs creates partial or cumulative sleep deprivation. Untreated Sleep Disorders: Sleep Apnea, Narcolepsy, R.L.S., and other sleep disorders (untreated) prevent a person from getting enough restful sleep. This leads to excessive daytime sleepiness and increases the risk for drowsy driving accidents by up to 7 times.   Medications / Alcohol: Even over the counter medications can cause drowsiness. Medications that impair a drivers attention should have a warning label. Alcohol naturally makes you sleepy and on its own can cause accidents. Combined with excessive drowsiness its effects are amplified. Signs of Drowsy Driving:   * You don't remember driving the last few miles   * You may drift out of your hi   * You are unable to focus and your thoughts wander   * You may yawn more often than normal   * You have difficulty keeping your eyes open / nodding off   * Missing traffic signs, speeding, or tailgating  Prevention-   Good sleep hygiene, lifestyle and behavioral choices have the most impact on drowsy driving. There is no substitute for sleep and the average person requires 8 hours nightly. If you find yourself driving drowsy, stop and sleep. Consider the sleep hygiene tips provided during your visit as well. Medication Refill Policy: Refills for all medications require 1 week advance notice. Please have your pharmacy fax a refill request. We are unable to fax, or call in \"controled substance\" medications and you will need to pick these prescriptions up from our office. Callisto[employer] Activation    Thank you for requesting access to [employer]. Please follow the instructions below to securely access and download your online medical record. Callisto[employer] allows you to send messages to your doctor, view your test results, renew your prescriptions, schedule appointments, and more. How Do I Sign Up? 1. In your internet browser, go to [email or web address]. 2. Click on the First Time User? Click Here link in the Sign In box. You will see the New Member Sign Up page. 3. Enter your Callisto[employer] Access Code exactly as it appears below. You will not need to use this code after youve completed the sign-up process. If you do not sign up before the expiration date, you must request a new code. Callisto[employer] Access Code:  Activation code not generated  Current Callisto[employer] Status: Active (This is the date your [email or web address] access code will )    4. Enter the last four digits of your Social Security Number (xxxx) and Date of Birth (mm/dd/yyyy) as indicated and click Submit. You will be taken to the next sign-up page. 5. Create a EcoloCapt ID. This will be your [email or web address] login ID and cannot be changed, so think of one that is secure and easy to remember. 6. Create a Brand[employer]co password. You can change your password at any time. 7. Enter your Password Reset Question and Answer. This can be used at a later time if you forget your password. 8. Enter your e-mail address. You will receive e-mail notification when new information is available in [address] 9. Click Sign Up. You can now view and download portions of your medical record. 10. Click the Download Summary menu link to download a portable copy of your medical information. Additional Information    If you have questions, please call [phone]. Remember, [email or web address] is NOT to be used for urgent needs. For medical emergencies, dial 911.

## 2018-09-28 NOTE — PROGRESS NOTES
[address] [facility] [address] [place]. [place], [address]  Tel.  [phone]  Fax. 100 [facility] 60  [place], [address]  Tel.  [phone]  Fax. [phone] [address] [place] [address]  Tel.  [phone]  Fax. [phone]       S>Louis Wray is a 60 y.o. male seen today to receive a home sleep testing unit (HST). · Patient was educated on proper hookup and operation of the HST. · Instruction forms and documentation were reviewed and signed. · The patient demonstrated good understanding of the HST. O>    Visit Vitals    /67    Pulse 85    Ht 5' 11\" (1.803 m)    Wt 255 lb (115.7 kg)    SpO2 99%    BMI 35.57 kg/m2    Neck circ. in \"inches\": 16    A>  1. ILDEFONSO (obstructive sleep apnea)    2. Essential hypertension    3. BMI 35.0-35.9,adult          P>  · General information regarding operations and maintenance of the device was provided. · He was provided information on sleep apnea including coresponding risk factors and the importance of proper treatment. · Follow-up appointment was made to return the HST. He will be contacted once the results have been reviewed. · He was asked to contact our office for any problems regarding his home sleep test study.

## 2018-10-01 ENCOUNTER — DOCUMENTATION ONLY (OUTPATIENT)
Dept: SLEEP MEDICINE | Age: 63
End: 2018-10-01

## 2018-10-02 ENCOUNTER — TELEPHONE (OUTPATIENT)
Dept: SLEEP MEDICINE | Age: 63
End: 2018-10-02

## 2018-10-02 DIAGNOSIS — G47.33 OSA (OBSTRUCTIVE SLEEP APNEA): Primary | ICD-10-CM

## 2018-10-02 NOTE — TELEPHONE ENCOUNTER
[facility]    Date of Study: 9/28/18    The following information was gathered from the patients study log:    · Lights off: 11P  · Estimated sleep onset: 2A    · Awakened a total of 3 times  · The patient felt they slept 5 hours  · Patient took a sleeping aid before starting the test  · Sleep quality was worse compared to a usual nights sleep. Further information provided: \"Much more difficulty sleeping due to symptoms of liver disease and attempting to sleep on back. \"

## 2018-10-02 NOTE — TELEPHONE ENCOUNTER
Katya Forrest is to be contacted by lead sleep technologist regarding results of Sleep Testing which was indicative of an average AHI of 0.9 per hour with an SpO2 bert of 89% and SpO2 of < 88% being 0 minutes. Patient should return for repeat testing due to presence of significant risk factors for Obstructive Sleep Apnea - STOP- BANG 7. Encounter Diagnoses   Name Primary?     ILDEFONSO (obstructive sleep apnea) Yes    BMI 35.0-35.9,adult        Orders Placed This Encounter    POLYSOMNOGRAPHY 1 NIGHT     Standing Status:   Future     Standing Expiration Date:   4/2/2019     Scheduling Instructions:      Perform testing with patient sleeping in supine position, patient to take sleeping aid before test.     Order Specific Question:   Reason for Exam     Answer:   ILDEFONSO

## 2018-10-09 ENCOUNTER — HOSPITAL ENCOUNTER (OUTPATIENT)
Dept: SLEEP MEDICINE | Age: 63
Discharge: HOME | End: 2018-10-09
Payer: COMMERCIAL

## 2018-10-09 VITALS
HEART RATE: 80 BPM | WEIGHT: 254.5 LBS | DIASTOLIC BLOOD PRESSURE: 70 MMHG | BODY MASS INDEX: 35.63 KG/M2 | SYSTOLIC BLOOD PRESSURE: 131 MMHG | HEIGHT: 71 IN | OXYGEN SATURATION: 98 %

## 2018-10-09 DIAGNOSIS — G47.33 OSA (OBSTRUCTIVE SLEEP APNEA): ICD-10-CM

## 2018-10-09 PROCEDURE — 95810 POLYSOM 6/> YRS 4/> PARAM: CPT | Performed by: INTERNAL MEDICINE

## 2018-10-09 NOTE — TELEPHONE ENCOUNTER
Spoke with patient and discussed the need to avoid Ambien and Melatonin use in individuals with hepatic impairment. He agreed to get Benadryl containing OTC Sleep Aid to be used during testing.

## 2018-10-09 NOTE — TELEPHONE ENCOUNTER
Reviewed sleep study results with patient. He expressed understanding and is willing to proceed with a PSG study. Please attended study.     Thanks

## 2018-10-11 ENCOUNTER — TELEPHONE (OUTPATIENT)
Dept: SLEEP MEDICINE | Age: 63
End: 2018-10-11

## 2018-10-11 DIAGNOSIS — G47.33 OSA (OBSTRUCTIVE SLEEP APNEA): Primary | ICD-10-CM

## 2018-10-12 NOTE — TELEPHONE ENCOUNTER
Results of sleep testing reviewed with Ms. Marleen Roman (patient's spouse on HIPAA). Explained to her that the overall average AHI is 1.9 per hour of sleep. Respiratory disturbance was noted to be concentrated in Supine REM related Sleep with AHI being 28 per hour. Events are prolonged and associated with severe desaturations up to 14 percent. The patient is currently sleeping in non-supine position at home but will very likely be sleeping only in supine position postoperatively (liver transplant). Since in this scenario the prolonged hypopneas associated with deep desaturations noted on current study may trigger a cardiovascular or cerebrovascular event PAP therapy is advised. Orders Placed This Encounter    SLEEP LAB (PAP TITRATION)     Standing Status:   Future     Standing Expiration Date:   4/12/2019     Order Specific Question:   Reason for Exam     Answer:   ILDEFONSO       Ms. Mary Steel expressed understanding and agreed to have her  return for PAP titration. She also expressed an interest in having the results sent to Dr. Kit Puentes.

## 2018-10-26 ENCOUNTER — PATIENT OUTREACH (OUTPATIENT)
Dept: HEMATOLOGY | Age: 63
End: 2018-10-26

## 2018-10-26 NOTE — PROGRESS NOTES
Received VM from patient's wife who wanted to make sure our office was aware that the patient received an organ offer. Patient has been instructed to report to [facility] with plans to undergo liver transplant, pending organ quality. Requested chart update through [address] [place] [address]. Noted patient underwent transplant. Phone call placed to patient's wife. Offered words of congratulations and support. Wife informed patient will be followed by [facility] until transferred back to the care of Dr. Gracie Winter.

## 2018-11-20 ENCOUNTER — OFFICE VISIT (OUTPATIENT)
Dept: HEMATOLOGY | Age: 63
End: 2018-11-20

## 2018-11-20 VITALS
HEIGHT: 71 IN | DIASTOLIC BLOOD PRESSURE: 65 MMHG | BODY MASS INDEX: 35.59 KG/M2 | SYSTOLIC BLOOD PRESSURE: 150 MMHG | WEIGHT: 254.2 LBS | OXYGEN SATURATION: 98 % | TEMPERATURE: 96.6 F | HEART RATE: 86 BPM

## 2018-11-20 DIAGNOSIS — Z94.4 HISTORY OF LIVER TRANSPLANT (HCC): ICD-10-CM

## 2018-11-20 DIAGNOSIS — R47.9 SPEECH DISTURBANCE, UNSPECIFIED TYPE: Primary | ICD-10-CM

## 2018-11-20 RX ORDER — AMLODIPINE BESYLATE 5 MG/1
5 TABLET ORAL DAILY
COMMUNITY
End: 2018-11-29 | Stop reason: DRUGHIGH

## 2018-11-20 RX ORDER — NYSTATIN 100000 [USP'U]/ML
5 SUSPENSION ORAL 4 TIMES DAILY
COMMUNITY
End: 2018-12-21 | Stop reason: ALTCHOICE

## 2018-11-20 RX ORDER — ACYCLOVIR 200 MG/1
200 CAPSULE ORAL 2 TIMES DAILY
COMMUNITY
End: 2019-03-14

## 2018-11-20 RX ORDER — BISMUTH SUBSALICYLATE 262 MG
1 TABLET,CHEWABLE ORAL DAILY
COMMUNITY
End: 2019-10-25

## 2018-11-20 RX ORDER — MYCOPHENOLATE MOFETIL 250 MG/1
500 CAPSULE ORAL 2 TIMES DAILY
COMMUNITY
End: 2019-02-06 | Stop reason: SINTOL

## 2018-11-20 RX ORDER — SULFAMETHOXAZOLE AND TRIMETHOPRIM 400; 80 MG/1; MG/1
1 TABLET ORAL DAILY
COMMUNITY
End: 2019-10-25 | Stop reason: ALTCHOICE

## 2018-11-20 RX ORDER — PREDNISONE 5 MG/1
5 TABLET ORAL DAILY
COMMUNITY
End: 2019-02-06 | Stop reason: ALTCHOICE

## 2018-11-20 RX ORDER — TACROLIMUS 1 MG/1
1 CAPSULE ORAL
COMMUNITY
End: 2019-06-14

## 2018-11-20 NOTE — PROGRESS NOTES
LT 10/2018  4 days. DCD short ischemic time. Incidental HCC. Last CT was negative prior to LT. Keyon Baker Slow monotone speaking - 1 week post-LT.     [address], MD, [address], [facility], [place]       Param Reyes, MELVA Baum, PADARIN Perez, [facility]   Neal Mancilla, MELVA Abel, MELVA Latham [facility] 136    at [address], [address] [place] [address], [facility] 22.    [phone]    FAX: [address] [place] [address] Avenue    at [address], [address]    [phone]    FAX: [phone]       Patient Care Team:  Siria Leroy MD as PCP - [facility])  Esther Ojeda MD (Nephrology)  Fernande Litten, MD (Gastroenterology)  Suad Frey MD as Physician (Internal Medicine)      Problem List  Date Reviewed: 12/1/2018          Codes Class Noted    H/O liver transplant [facility]) ICD-10-CM: Z94.4  ICD-9-CM: V42.7  12/1/2018        Long-term use of immunosuppressant medication ICD-10-CM: Z79.899  ICD-9-CM: V58.69  12/1/2018        Liver transplant complication [facility]) [id number]: T86.40  ICD-9-CM: 996.82  12/1/2018        Slow rate of speech ICD-10-CM: R47.89  ICD-9-CM: 784.59  12/1/2018        Hepatic encephalopathy ([facility] ICD-10-CM: K72.90  ICD-9-CM: 572.2  9/19/2018        Type 2 diabetes with nephropathy ([facility] ICD-10-CM: E11.21  ICD-9-CM: 250.40, 583.81  3/29/2018        Neuropathy involving both lower extremities ICD-10-CM: G57.93  ICD-9-CM: 356.9  1/2/2018        CAMEJO (nonalcoholic steatohepatitis) ICD-10-CM: K75.81  ICD-9-CM: 571.8  11/2/2017        GERD (gastroesophageal reflux disease) (Chronic) ICD-10-CM: K21.9  ICD-9-CM: 530.81  2/22/2016        CAD (coronary artery disease) (Chronic) ICD-10-CM: I25.10  ICD-9-CM: 414.00  2/22/2016 DM type 2 (diabetes mellitus, type 2) (HCC) (Chronic) ICD-10-CM: E11.9  ICD-9-CM: 250.00  2/22/2016                Kyler Barrett returns to the [address] for management of liver transplant graft function, to monitor and adjust immune suppression and to assess for recurrence of the primary liver disease. The active problem list, all pertinent past medical history, medications, liver histology, endoscopic studies, radiologic findings and laboratory findings related to the liver disorder were reviewed with the patient. The patient underwent a liver transplant at [facility] [place] in 10/2018. He was found to have a small [address] in his liver once this was removed. The last imaing of his liver prior to LT was a CT scan. This did ont demonstrate any suspicious mass lesions. He remained in the hospital only 4 days. He is now 3 weeks post-LT. This is the first appointment back at [facility] since undergoing liver transplantation. He comes in today because the wife has noted a change in his speech pattern. Very slow and robotic like. The patient is taking the following for immune suppression: Tacrolimus, cellcept, prednisone    The patient notes slowness in his speech. He otherwsie feels much better than prior to his LT. The patient has not experienced fevers, chills,     The patient has limitations in functional activities which can be attributed to the recent transplant surgery and prior liver disease. 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 An incidental hepatocellular carcinoma was found in the liver explant. The most recent CT scan to screen for [address] prior to LT was in 6/2018. NO suspicious liver mass was noted.     Liver transaminases are normal.  ALP is normal.  Liver function is normal.  The platelet count is normal.      Immune Suppression  The patient is receiving immune suppression with tacrolimus which is being well tolerated with no side effects. The immune suppression blood level is too high. Will need to check and see if he took TAC prior to blood work. Cellcept is being tolerated well with no side effects   Will continue at current dose     Prednisone will be tapered and discontinued per the liver transplant center protocol. Slow Robot speech  The speech is similar to what it was prior to LT. At that time we attribituted this to this HE  The wife states his speech improved to normal afte the LT but has sudedenly changed to this pattern  Will get an MRI of the head with and without contrast to see if any white matter disease of CVA. Prevention of infections  The patient is taking Nystatin swish and swallow to prevent thrush. 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 This will continue for 3 months post-LT. The patient is taking bactrim to prevent PCP pneumonia. This will continue for 6 months post-LT. Acute and chronic kidney injury   This is a common adverse event of immune suppression. The Screat is normal.  NSAIDs should be avoided since these agents can worsen renal insufficiency. Hypercholesterolemia   This can be caused by immune suppression. Serum cholesterol will be monitored at periodic intervals. Hypertension   This is a common side effect of immune suppression. Blood pressure is well controlled on the current treatment. Low serum magnesium   This is a common side effect of immune suppression. The patient is taking a magnesium supplement. The patient appears to be tolerating the current dose of oral magnesium without side effects. Laboratory studies demonstrate serum magnesium level is only slightly low. Osteoporosis   Osteoporosis is commin in patients with cirhrosis prior to liver transplant. The patient had a normal bone density prior to undergoing liver transplant.     Monitoring for skin Cancer  The patient was counseled regarding increased risk of skin cancer in transplant recipients and need to have any new skin lesions evaluated by dermatology and removed if suspicious. The patient was instructed to see Dermatology annually examination. Vaccinations  Routine vaccinations against other bacterial and viral agents can be performed as long as this is with attentuatted virus. Live virus vaccines should not be administered. Annual flu vaccination should be administered. ALLERGIES  No Known Allergies    MEDICATIONS  Current Outpatient Medications   Medication Sig    tacrolimus (PROGRAF) 1 mg capsule Take 3 mg by mouth every twelve (12) hours.  mycophenolate mofetil (CELLCEPT) 250 mg capsule Take 500 mg by mouth two (2) times a day.  predniSONE (DELTASONE) 5 mg tablet Take 5 mg by mouth. Two daily    nystatin (MYCOSTATIN) 100,000 unit/mL suspension Take 5 mL by mouth four (4) times daily.  acyclovir (ZOVIRAX) 200 mg capsule Take 200 mg by mouth two (2) times a day.  multivitamin (ONE A DAY) tablet Take 1 Tab by mouth daily.  trimethoprim-sulfamethoxazole (BACTRIM)  mg per tablet Take 1 Tab by mouth daily.  aspirin delayed-release 81 mg tablet Take 81 mg by mouth daily.  gabapentin (NEURONTIN) 300 mg capsule Take 300 mg by mouth nightly.  levothyroxine (SYNTHROID) 75 mcg tablet Take 75 mcg by mouth Daily (before breakfast).  cholecalciferol, vitamin D3, (VITAMIN D3) 2,000 unit tab Take 1 Tab by mouth two (2) times a day.  pantoprazole (PROTONIX) 40 mg tablet Take 40 mg by mouth daily.  magnesium oxide (MAG-OX) 400 mg tablet Take 1 Tab by mouth two (2) times a day.  amLODIPine (NORVASC) 10 mg tablet Take 1 Tab by mouth daily.  insulin detemir U-100 (LEVEMIR FLEXTOUCH) 100 unit/mL (3 mL) inpn 20 Units by SubCUTAneous route nightly.     insulin lispro (HUMALOG KWIKPEN INSULIN) 100 unit/mL kwikpen 6 Units by SubCUTAneous route three (3) times daily (with meals). In addition, if WC=163-208 give 1u, if 200-249 give 3u, if 250-299 give 5u, 300-349 give 7u, if >349 give 8u    triamcinolone acetonide (KENALOG) 0.1 % topical cream Apply  to affected area two (2) times a day. use thin layer     No current facility-administered medications for this visit. SYSTEM REVIEW NOT RELATED TO LIVER DISEASE OR REVIEWED ABOVE:  Constitution systems: Negative for fever, chills, weight gain, weight loss. Eyes: Negative for visual changes. ENT: Negative for sore throat, painful swallowing. Respiratory: Negative for cough, hemoptysis, SOB. Cardiology: Negative for chest pain, palpitations. GI:  Negative for constipation or diarrhea. : Negative for urinary frequency, dysuria, hematuria, nocturia. Skin: Negative for rash. Hematology: Negative for easy bruising, blood clots. Musculo-skelatal: Negative for back pain, muscle pain, weakness. Neurologic: Slow robot speech pattern. Psychology: Negative for anxiety, depression. FAMILY HISTORY:  The father  of prostate cancer. The mother  of multiple myeloma. There is no family history of liver disease.       SOCIAL HISTORY:  The patient is . The patient has 3 children. The patient occasional cigar. The patient has been abstinent from alcohol since 2016. The patient does not work, he is on social security. PHYSICAL EXAMINATION:  Visit Vitals  /65 (BP 1 Location: Left arm, BP Patient Position: Sitting)   Pulse 86   Temp 96.6 °F (35.9 °C) (Tympanic)   Ht 5' 11\" (1.803 m)   Wt 254 lb 3.2 oz (115.3 kg)   SpO2 98%   BMI 35.45 kg/m²     General: No acute distress. Eyes: Sclera anicteric. ENT: No oral lesions. Thyroid normal.  Nodes: No adenopathy. Skin: No spider angiomata. No jaundice. No palmar erythema. Respiratory: Lungs clear to auscultation. Cardiovascular: Regular heart rate. No murmurs. No JVD.   Abdomen: Normal liver transplant incision that is healing   Soft non-tender. Liver size normal to percussion/palpation. Spleen not palpable. No obvious ascites. Extremities: No edema. Muscle wasting. Neurologic: Alert and oriented. Cranial nerves grossly intact. LABORATORY STUDIES:  Liver [place] of [address] [place] [address] Units 11/26/2018   WBC 3.4 - 10.8 x10E3/uL 7.6   ANC 1.4 - 7.0 x10E3/uL 5.3   HGB 13.0 - 17.7 g/dL 9.3 (L)    - 379 x10E3/uL 174   INR 0.8 - 1.2 1.1   AST 0 - 40 IU/L 11   ALT 0 - 44 IU/L 10   Alk Phos 39 - 117 IU/L 103   Bili, Total 0.0 - 1.2 mg/dL 0.9   Bili, Direct 0.0 - 0.2 MG/DL    Albumin 3.6 - 4.8 g/dL 3.6   BUN 8 - 27 mg/dL 14   BUN (iSTAT) 9 - 20 mg/dL    Creat 0.76 - 1.27 mg/dL 1.11   Creat (iSTAT) 0.6 - 1.3 mg/dL    Na 134 - 144 mmol/L 142   K 3.5 - 5.2 mmol/L 3.9   Cl 96 - 106 mmol/L 106   CO2 20 - 29 mmol/L 22   Glucose 65 - 99 mg/dL 98   Magnesium 1.6 - 2.3 mg/dL 1.2 (L)   Ammonia ug/dL    Qs/Qt % for Liver Shunt Study     Liver Virology and Transplant Immune Suppression Latest Ref Rng & Units 11/26/2018   Tacrolimus Level 2.0 - 20.0 ng/mL 23.2 (HH)     SEROLOGIES:  Not available or performed. Testing was performed today. LIVER HISTOLOGY:  7/2014: Cirhosis with mild mixed macro- and microvesicular steatosis. ENDOSCOPIC PROCEDURES:  Not available or performed    RADIOLOGY:  Not available or performed    OTHER TESTING:  Not available or performed    FOLLOW-UP:  All of the issues listed above in the Assessment and Plan were discussed with the patient. All questions were answered. The patient expressed a clear understanding of the above. Laboratory studies should be performed twice weekly to assess liver graft function and blood levels of immune suppression. The patient has a follow-up appointment at the liver transplant center in 1 months.     Follow-up Diallo Murphy 32 1 week to review resultss of MRI    MD Fariba Trimble 13 of [address] [address] Leno Akhtar, [facility].  201 [facility]

## 2018-11-23 ENCOUNTER — HOSPITAL ENCOUNTER (OUTPATIENT)
Dept: MRI IMAGING | Age: 63
Discharge: HOME | End: 2018-11-23
Attending: INTERNAL MEDICINE
Payer: COMMERCIAL

## 2018-11-23 VITALS — BODY MASS INDEX: 34.45 KG/M2 | WEIGHT: 247 LBS

## 2018-11-23 DIAGNOSIS — R47.9 SPEECH DISTURBANCE, UNSPECIFIED TYPE: ICD-10-CM

## 2018-11-23 DIAGNOSIS — Z94.4 HISTORY OF LIVER TRANSPLANT (HCC): ICD-10-CM

## 2018-11-23 PROCEDURE — A9575 INJ GADOTERATE MEGLUMI 0.1ML: HCPCS | Performed by: INTERNAL MEDICINE

## 2018-11-23 PROCEDURE — 74011250636 HC RX REV CODE- 250/636: Performed by: INTERNAL MEDICINE

## 2018-11-23 PROCEDURE — 70553 MRI BRAIN STEM W/O & W/DYE: CPT

## 2018-11-23 RX ORDER — GADOTERATE MEGLUMINE 376.9 MG/ML
20 INJECTION INTRAVENOUS ONCE
Status: COMPLETED | OUTPATIENT
Start: 2018-11-23 | End: 2018-11-23

## 2018-11-23 RX ADMIN — GADOTERATE MEGLUMINE 20 ML: 376.9 INJECTION INTRAVENOUS at 10:58

## 2018-11-27 ENCOUNTER — TELEPHONE (OUTPATIENT)
Dept: SLEEP MEDICINE | Age: 63
End: 2018-11-27

## 2018-11-27 ENCOUNTER — OFFICE VISIT (OUTPATIENT)
Dept: HEMATOLOGY | Age: 63
End: 2018-11-27

## 2018-11-27 VITALS
DIASTOLIC BLOOD PRESSURE: 65 MMHG | HEART RATE: 72 BPM | WEIGHT: 249.5 LBS | SYSTOLIC BLOOD PRESSURE: 152 MMHG | TEMPERATURE: 96.5 F | OXYGEN SATURATION: 99 % | HEIGHT: 71 IN | BODY MASS INDEX: 34.93 KG/M2

## 2018-11-27 DIAGNOSIS — C22.0 HEPATOCELLULAR CARCINOMA (HCC): ICD-10-CM

## 2018-11-27 DIAGNOSIS — Z94.4 H/O LIVER TRANSPLANT (HCC): Primary | ICD-10-CM

## 2018-11-27 LAB
ALBUMIN SERPL-MCNC: 3.6 G/DL (ref 3.6–4.8)
ALBUMIN/GLOB SERPL: 1.4 {RATIO} (ref 1.2–2.2)
ALP SERPL-CCNC: 103 IU/L (ref 39–117)
ALT SERPL-CCNC: 10 IU/L (ref 0–44)
AST SERPL-CCNC: 11 IU/L (ref 0–40)
BASOPHILS # BLD AUTO: 0.2 X10E3/UL (ref 0–0.2)
BASOPHILS NFR BLD AUTO: 3 %
BILIRUB SERPL-MCNC: 0.9 MG/DL (ref 0–1.2)
BUN SERPL-MCNC: 14 MG/DL (ref 8–27)
BUN/CREAT SERPL: 13 (ref 10–24)
CALCIUM SERPL-MCNC: 8.9 MG/DL (ref 8.6–10.2)
CHLORIDE SERPL-SCNC: 106 MMOL/L (ref 96–106)
CO2 SERPL-SCNC: 22 MMOL/L (ref 20–29)
CREAT SERPL-MCNC: 1.11 MG/DL (ref 0.76–1.27)
EOSINOPHIL # BLD AUTO: 0.4 X10E3/UL (ref 0–0.4)
EOSINOPHIL NFR BLD AUTO: 5 %
ERYTHROCYTE [DISTWIDTH] IN BLOOD BY AUTOMATED COUNT: 16 % (ref 12.3–15.4)
GLOBULIN SER CALC-MCNC: 2.6 G/DL (ref 1.5–4.5)
GLUCOSE SERPL-MCNC: 98 MG/DL (ref 65–99)
HCT VFR BLD AUTO: 28.3 % (ref 37.5–51)
HGB BLD-MCNC: 9.3 G/DL (ref 13–17.7)
IMM GRANULOCYTES # BLD: 0.1 X10E3/UL (ref 0–0.1)
IMM GRANULOCYTES NFR BLD: 1 %
INR PPP: 1.1 (ref 0.8–1.2)
LYMPHOCYTES # BLD AUTO: 1.2 X10E3/UL (ref 0.7–3.1)
LYMPHOCYTES NFR BLD AUTO: 16 %
MAGNESIUM SERPL-MCNC: 1.2 MG/DL (ref 1.6–2.3)
MCH RBC QN AUTO: 29.8 PG (ref 26.6–33)
MCHC RBC AUTO-ENTMCNC: 32.9 G/DL (ref 31.5–35.7)
MCV RBC AUTO: 91 FL (ref 79–97)
MONOCYTES # BLD AUTO: 0.5 X10E3/UL (ref 0.1–0.9)
MONOCYTES NFR BLD AUTO: 7 %
NEUTROPHILS # BLD AUTO: 5.3 X10E3/UL (ref 1.4–7)
NEUTROPHILS NFR BLD AUTO: 68 %
PHOSPHATE SERPL-MCNC: 3.3 MG/DL (ref 2.5–4.5)
PLATELET # BLD AUTO: 174 X10E3/UL (ref 150–379)
POTASSIUM SERPL-SCNC: 3.9 MMOL/L (ref 3.5–5.2)
PROT SERPL-MCNC: 6.2 G/DL (ref 6–8.5)
PROTHROMBIN TIME: 11.1 SEC (ref 9.1–12)
RBC # BLD AUTO: 3.12 X10E6/UL (ref 4.14–5.8)
SODIUM SERPL-SCNC: 142 MMOL/L (ref 134–144)
TACROLIMUS, 700249: 23.2 NG/ML (ref 2–20)
URATE SERPL-MCNC: 8.4 MG/DL (ref 3.7–8.6)
WBC # BLD AUTO: 7.6 X10E3/UL (ref 3.4–10.8)

## 2018-11-27 NOTE — PROGRESS NOTES
[address] [facility], MD, [address], [place], [place]       Patricia Joshua, MELVA Nolasco, PATyronC    Sarahi Gonzales, ACNP-BC   Kush Michael, MELVA Jacobson [facility] 136    at [address], 81 [facility], [facility] 22.    [phone]    FAX: [address] [place] [address], [address], [address]    [phone]    FAX: [phone]       Patient Care Team:  Claudell Levine, MD as PCP - General (Family Practice)  Saadia Bañuelos MD (Nephrology)  Cory Schroeder MD (Gastroenterology)  Akbar Bailey MD as Physician (Internal Medicine)      Problem List  Date Reviewed: 12/1/2018          Codes Class Noted    H/O liver transplant [facility]) ICD-10-CM: Z94.4  ICD-9-CM: V42.7  12/1/2018        Long-term use of immunosuppressant medication ICD-10-CM: Z79.899  ICD-9-CM: V58.69  12/1/2018        Liver transplant complication [facility]) [id number]: T86.40  ICD-9-CM: 996.82  12/1/2018        Slow rate of speech ICD-10-CM: R47.89  ICD-9-CM: 784.59  12/1/2018        Hepatic encephalopathy ([facility] ICD-10-CM: K72.90  ICD-9-CM: 572.2  9/19/2018        Type 2 diabetes with nephropathy ([facility] ICD-10-CM: E11.21  ICD-9-CM: 250.40, 583.81  3/29/2018        Neuropathy involving both lower extremities ICD-10-CM: G57.93  ICD-9-CM: 356.9  1/2/2018        CAMEJO (nonalcoholic steatohepatitis) ICD-10-CM: K75.81  ICD-9-CM: 571.8  11/2/2017        GERD (gastroesophageal reflux disease) (Chronic) ICD-10-CM: K21.9  ICD-9-CM: 530.81  2/22/2016        CAD (coronary artery disease) (Chronic) ICD-10-CM: I25.10  ICD-9-CM: 414.00  2/22/2016        DM type 2 (diabetes mellitus, type 2) ([facility] (Chronic) ICD-10-CM: E11.9  ICD-9-CM: 250.00  2/22/2016                Mario Brigette returns to the Liver Mari Michael for management of liver transplant graft function, to monitor and adjust immune suppression and to assess for recurrence of the primary liver disease. The active problem list, all pertinent past medical history, medications, liver histology, endoscopic studies, radiologic findings and laboratory findings related to the liver disorder were reviewed with the patient. The patient underwent a liver transplant at [facility] [place] in 10/2018. He was found to have a small [address] in his liver explant. About 3 weeks after liver transplant was noted a change in his speech pattern. Very slow and robotic like. An MRI of the brain was performed in 11/2018. This demonstrated enlarged ventricles and some white matter disease. The patient is taking the following for immune suppression: Tacrolimus, cellcept, prednisone    The patient notes slowness in his speech. He otherwsie feels much better than prior to his LT. The patient has not experienced fevers, chills,     The patient has limitations in functional activities which can be attributed to the recent transplant surgery and prior liver disease. 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 An incidental hepatocellular carcinoma was found in the liver explant. The most recent CT scan to screen for [address] prior to LT was in 6/2018. NO suspicious liver mass was noted. Liver transaminases are normal.  ALP is normal.  Liver function is normal.  The platelet count is normal.      Immune Suppression  The patient is receiving immune suppression with tacrolimus which is being well tolerated with no side effects. The immune suppression blood level was high last week. But he had tooked TAC prior to blood work. Will continue t monitor TAC level.     Cellcept is being tolerated well with no side effects   Will continue at current dose     Prednisone will be tapered and discontinued per the liver transplant center protocol. Slow Robot speech  The speech is similar to what it was prior to LT. At that time we attribituted this to this HE  The wife states his speech improved to normal after the LT but has suddenly changed to this pattern 2 weeks after the LT  He walks with wide gait to keep his balance. This was going on prior to LT as well but he was ill at that time from advanced liver disease. MRI of the head from 11/2018 demonstrated enlarged ventricles. A head CT from 8/2018 did not mention enlarged ventricles. Will refer to Neurology for evaluation and possible NPH. Clerance Riis Anemia   This is due to multifactorial causes including recent surgery. The HB is stable. No evidence of ongoing GI blood loss. Will obtain FE panel to assess for iron stores. Prevention of infections  The patient is taking Nystatin swish and swallow to prevent thrush. 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 This will continue for 3 months post-LT. The patient is taking bactrim to prevent PCP pneumonia. This will continue for 6 months post-LT. Acute and chronic kidney injury   This is a common adverse event of immune suppression. The Screat is normal.  NSAIDs should be avoided since these agents can worsen renal insufficiency. Hypercholesterolemia   This can be caused by immune suppression. Serum cholesterol will be monitored at periodic intervals. Hypertension   This is a common side effect of immune suppression. Blood pressure is well controlled on the current treatment. Low serum magnesium   This is a common side effect of immune suppression. The patient is taking a magnesium supplement. The patient appears to be tolerating the current dose of oral magnesium without side effects. Laboratory studies demonstrate serum magnesium level is only slightly low.        Osteoporosis   Osteoporosis is commin in patients with cirhrosis prior to liver transplant. The patient had a normal bone density prior to undergoing liver transplant. Monitoring for skin Cancer  The patient was counseled regarding increased risk of skin cancer in transplant recipients and need to have any new skin lesions evaluated by dermatology and removed if suspicious. The patient was instructed to see Dermatology annually examination. Vaccinations  Routine vaccinations against other bacterial and viral agents can be performed as long as this is with attentuatted virus. Live virus vaccines should not be administered. Annual flu vaccination should be administered. ALLERGIES  No Known Allergies    MEDICATIONS  Current Outpatient Medications   Medication Sig    magnesium oxide (MAG-OX) 400 mg tablet Take 1 Tab by mouth two (2) times a day.  amLODIPine (NORVASC) 10 mg tablet Take 1 Tab by mouth daily.  insulin detemir U-100 (LEVEMIR FLEXTOUCH) 100 unit/mL (3 mL) inpn 20 Units by SubCUTAneous route nightly.  insulin lispro (HUMALOG KWIKPEN INSULIN) 100 unit/mL kwikpen 6 Units by SubCUTAneous route three (3) times daily (with meals). In addition, if NI=173-372 give 1u, if 200-249 give 3u, if 250-299 give 5u, 300-349 give 7u, if >349 give 8u    triamcinolone acetonide (KENALOG) 0.1 % topical cream Apply  to affected area two (2) times a day. use thin layer    tacrolimus (PROGRAF) 1 mg capsule Take 3 mg by mouth every twelve (12) hours.  mycophenolate mofetil (CELLCEPT) 250 mg capsule Take 500 mg by mouth two (2) times a day.  predniSONE (DELTASONE) 5 mg tablet Take 5 mg by mouth. Two daily    nystatin (MYCOSTATIN) 100,000 unit/mL suspension Take 5 mL by mouth four (4) times daily.  acyclovir (ZOVIRAX) 200 mg capsule Take 200 mg by mouth two (2) times a day.  multivitamin (ONE A DAY) tablet Take 1 Tab by mouth daily.  trimethoprim-sulfamethoxazole (BACTRIM)  mg per tablet Take 1 Tab by mouth daily.  aspirin delayed-release 81 mg tablet Take 81 mg by mouth daily.  gabapentin (NEURONTIN) 300 mg capsule Take 300 mg by mouth nightly.  levothyroxine (SYNTHROID) 75 mcg tablet Take 75 mcg by mouth Daily (before breakfast).  cholecalciferol, vitamin D3, (VITAMIN D3) 2,000 unit tab Take 1 Tab by mouth two (2) times a day.  pantoprazole (PROTONIX) 40 mg tablet Take 40 mg by mouth daily. No current facility-administered medications for this visit. SYSTEM REVIEW NOT RELATED TO LIVER DISEASE OR REVIEWED ABOVE:  Constitution systems: Negative for fever, chills, weight gain, weight loss. Eyes: Negative for visual changes. ENT: Negative for sore throat, painful swallowing. Respiratory: Negative for cough, hemoptysis, SOB. Cardiology: Negative for chest pain, palpitations. GI:  Negative for constipation or diarrhea. : Negative for urinary frequency, dysuria, hematuria, nocturia. Skin: Negative for rash. Hematology: Negative for easy bruising, blood clots. Musculo-skelatal: Negative for back pain, muscle pain, weakness. Neurologic: Slow robot speech pattern. Psychology: Negative for anxiety, depression. FAMILY HISTORY:  The father  of prostate cancer. The mother  of multiple myeloma. There is no family history of liver disease.       SOCIAL HISTORY:  The patient is . The patient has 3 children. The patient occasional cigar. The patient has been abstinent from alcohol since 2016. The patient does not work, he is on social security. PHYSICAL EXAMINATION:  Visit Vitals  /65 (BP 1 Location: Left arm, BP Patient Position: Sitting)   Pulse 72   Temp 96.5 °F (35.8 °C) (Tympanic)   Ht 5' 11\" (1.803 m)   Wt 249 lb 8 oz (113.2 kg)   SpO2 99%   BMI 34.80 kg/m²     General: No acute distress. Eyes: Sclera anicteric. ENT: No oral lesions. Thyroid normal.  Nodes: No adenopathy. Skin: No spider angiomata. No jaundice.   No palmar erythema. Respiratory: Lungs clear to auscultation. Cardiovascular: Regular heart rate. No murmurs. No JVD. Abdomen: Normal liver transplant incision that is healing   Soft non-tender. Liver size normal to percussion/palpation. Spleen not palpable. No obvious ascites. Extremities: No edema. Muscle wasting. Neurologic: Alert and oriented. Cranial nerves grossly intact. LABORATORY STUDIES:  Liver [place] of [address] [place] [address] 11/29/2018 11/26/2018   WBC 3.4 - 10.8 x10E3/uL 8.0 7.6   ANC 1.4 - 7.0 x10E3/uL 5.3 5.3   HGB 13.0 - 17.7 g/dL 9.7 (L) 9.3 (L)    - 379 x10E3/uL 179 174   INR 0.8 - 1.2 1.1 1.1   AST 0 - 40 IU/L 12 11   ALT 0 - 44 IU/L 8 10   Alk Phos 39 - 117 IU/L 103 103   Bili, Total 0.0 - 1.2 mg/dL 0.8 0.9   Bili, Direct 0.0 - 0.2 MG/DL     Albumin 3.6 - 4.8 g/dL 3.7 3.6   BUN 8 - 27 mg/dL 14 14   BUN (iSTAT) 9 - 20 mg/dL     Creat 0.76 - 1.27 mg/dL 1.13 1.11   Creat (iSTAT) 0.6 - 1.3 mg/dL     Na 134 - 144 mmol/L 144 142   K 3.5 - 5.2 mmol/L 4.0 3.9   Cl 96 - 106 mmol/L 106 106   CO2 20 - 29 mmol/L 23 22   Glucose 65 - 99 mg/dL 96 98   Magnesium 1.6 - 2.3 mg/dL 1.3 (L) 1.2 (L)   Ammonia ug/dL     Qs/Qt % for Liver Shunt Study        SEROLOGIES:  Not available or performed. Testing was performed today. LIVER HISTOLOGY:  7/2014: Cirhosis with mild mixed macro- and microvesicular steatosis. ENDOSCOPIC PROCEDURES:  Not available or performed    RADIOLOGY:  Not available or performed    OTHER TESTING:  Not available or performed    FOLLOW-UP:  All of the issues listed above in the Assessment and Plan were discussed with the patient. All questions were answered. The patient expressed a clear understanding of the above. Laboratory studies should be performed twice weekly to assess liver graft function and blood levels of immune suppression. The patient has a follow-up appointment at the liver transplant center in 1 months.     Follow-up Liver [place] [facility] [place] 1 week to review resultss of MRI    Veda MD Deb Becerra 1, 2000 [facility].  [phone]  [address]

## 2018-11-27 NOTE — PROGRESS NOTES
Chief Complaint   Patient presents with    Follow-up     Visit Vitals  /65 (BP 1 Location: Left arm, BP Patient Position: Sitting)   Pulse 72   Temp 96.5 °F (35.8 °C) (Tympanic)   Ht 5' 11\" (1.803 m)   Wt 249 lb 8 oz (113.2 kg)   SpO2 99%   BMI 34.80 kg/m²     PHQ over the last two weeks 11/27/2018   Little interest or pleasure in doing things Not at all   Feeling down, depressed, irritable, or hopeless Not at all   Total Score PHQ 2 0     Abuse Screening Questionnaire 11/27/2018   Do you ever feel afraid of your partner? N   Are you in a relationship with someone who physically or mentally threatens you? N   Is it safe for you to go home? Y     1. Have you been to the ER, urgent care clinic since your last visit? Hospitalized since your last visit? No    2. Have you seen or consulted any other health care providers outside of the [address] [place] [address] since your last visit? Include any pap smears or colon screening.  No

## 2018-11-29 ENCOUNTER — TELEPHONE (OUTPATIENT)
Dept: HEMATOLOGY | Age: 63
End: 2018-11-29

## 2018-11-29 ENCOUNTER — TELEPHONE (OUTPATIENT)
Dept: NEUROLOGY | Age: 63
End: 2018-11-29

## 2018-11-29 LAB
BASOPHILS # BLD AUTO: 0.3 X10E3/UL (ref 0–0.2)
BASOPHILS NFR BLD AUTO: 3 %
EOSINOPHIL # BLD AUTO: 0.3 X10E3/UL (ref 0–0.4)
EOSINOPHIL NFR BLD AUTO: 4 %
ERYTHROCYTE [DISTWIDTH] IN BLOOD BY AUTOMATED COUNT: 16.1 % (ref 12.3–15.4)
HCT VFR BLD AUTO: 29.5 % (ref 37.5–51)
HGB BLD-MCNC: 9.7 G/DL (ref 13–17.7)
IMM GRANULOCYTES # BLD: 0 X10E3/UL (ref 0–0.1)
IMM GRANULOCYTES NFR BLD: 1 %
LYMPHOCYTES # BLD AUTO: 1.6 X10E3/UL (ref 0.7–3.1)
LYMPHOCYTES NFR BLD AUTO: 20 %
MCH RBC QN AUTO: 29.8 PG (ref 26.6–33)
MCHC RBC AUTO-ENTMCNC: 32.9 G/DL (ref 31.5–35.7)
MCV RBC AUTO: 91 FL (ref 79–97)
MONOCYTES # BLD AUTO: 0.5 X10E3/UL (ref 0.1–0.9)
MONOCYTES NFR BLD AUTO: 7 %
NEUTROPHILS # BLD AUTO: 5.3 X10E3/UL (ref 1.4–7)
NEUTROPHILS NFR BLD AUTO: 65 %
PLATELET # BLD AUTO: 179 X10E3/UL (ref 150–379)
RBC # BLD AUTO: 3.26 X10E6/UL (ref 4.14–5.8)
WBC # BLD AUTO: 8 X10E3/UL (ref 3.4–10.8)

## 2018-11-29 RX ORDER — TRIAMCINOLONE ACETONIDE 1 MG/G
CREAM TOPICAL 2 TIMES DAILY
COMMUNITY
End: 2020-06-19

## 2018-11-29 RX ORDER — AMLODIPINE BESYLATE 10 MG/1
10 TABLET ORAL DAILY
Qty: 90 TAB | Refills: 3 | Status: SHIPPED | OUTPATIENT
Start: 2018-11-29 | End: 2020-01-14 | Stop reason: SDUPTHER

## 2018-11-29 RX ORDER — LANOLIN ALCOHOL/MO/W.PET/CERES
400 CREAM (GRAM) TOPICAL 2 TIMES DAILY
Qty: 180 TAB | Refills: 3 | Status: SHIPPED | OUTPATIENT
Start: 2018-11-29 | End: 2019-10-25

## 2018-11-29 RX ORDER — INSULIN LISPRO 100 [IU]/ML
10 INJECTION, SOLUTION INTRAVENOUS; SUBCUTANEOUS
COMMUNITY
End: 2018-12-21 | Stop reason: ALTCHOICE

## 2018-11-29 NOTE — TELEPHONE ENCOUNTER
----- Message from Erci Gracia sent at 11/29/2018 12:54 PM EST -----  Regarding: MELVA Gonzalez/telephone    Kyra  The  Liver Transplant Coordinator  From  Dr Dolly Gonzales office P) [phone], referred a pt to be seen for changes on MRI brain//referred by Dr. Dolly Gonzales and they are currently scheduled for a appt on  1/10/18, and  Dr Dolly Gonzales would like to know if the pt can be seen sooner that that current date of 1/10/19?

## 2018-11-30 LAB
ALBUMIN SERPL-MCNC: 3.7 G/DL (ref 3.6–4.8)
ALBUMIN/GLOB SERPL: 1.3 {RATIO} (ref 1.2–2.2)
ALP SERPL-CCNC: 103 IU/L (ref 39–117)
ALT SERPL-CCNC: 8 IU/L (ref 0–44)
AST SERPL-CCNC: 12 IU/L (ref 0–40)
BILIRUB SERPL-MCNC: 0.8 MG/DL (ref 0–1.2)
BUN SERPL-MCNC: 14 MG/DL (ref 8–27)
BUN/CREAT SERPL: 12 (ref 10–24)
CALCIUM SERPL-MCNC: 9.2 MG/DL (ref 8.6–10.2)
CHLORIDE SERPL-SCNC: 106 MMOL/L (ref 96–106)
CO2 SERPL-SCNC: 23 MMOL/L (ref 20–29)
CREAT SERPL-MCNC: 1.13 MG/DL (ref 0.76–1.27)
FERRITIN SERPL-MCNC: 411 NG/ML (ref 30–400)
GLOBULIN SER CALC-MCNC: 2.8 G/DL (ref 1.5–4.5)
GLUCOSE SERPL-MCNC: 96 MG/DL (ref 65–99)
INR PPP: 1.1 (ref 0.8–1.2)
IRON SATN MFR SERPL: 25 % (ref 15–55)
IRON SERPL-MCNC: 54 UG/DL (ref 38–169)
MAGNESIUM SERPL-MCNC: 1.3 MG/DL (ref 1.6–2.3)
PHOSPHATE SERPL-MCNC: 3.4 MG/DL (ref 2.5–4.5)
POTASSIUM SERPL-SCNC: 4 MMOL/L (ref 3.5–5.2)
PROT SERPL-MCNC: 6.5 G/DL (ref 6–8.5)
PROTHROMBIN TIME: 11.1 SEC (ref 9.1–12)
SODIUM SERPL-SCNC: 144 MMOL/L (ref 134–144)
TIBC SERPL-MCNC: 216 UG/DL (ref 250–450)
UIBC SERPL-MCNC: 162 UG/DL (ref 111–343)
URATE SERPL-MCNC: 8.5 MG/DL (ref 3.7–8.6)

## 2018-11-30 NOTE — TELEPHONE ENCOUNTER
Per Dr Donya Hernandez to work in on Monday Afternoon. Worked in for 330pm. Spoke with patient wife about change in appointment who verbalized understanding.

## 2018-12-01 PROBLEM — T86.40 LIVER TRANSPLANT COMPLICATION (HCC): Status: ACTIVE | Noted: 2018-12-01

## 2018-12-01 PROBLEM — R47.89 SLOW RATE OF SPEECH: Status: ACTIVE | Noted: 2018-12-01

## 2018-12-01 PROBLEM — E66.01 SEVERE OBESITY (BMI 35.0-39.9) WITH COMORBIDITY (HCC): Status: RESOLVED | Noted: 2018-05-24 | Resolved: 2018-12-01

## 2018-12-01 PROBLEM — Z94.4 H/O LIVER TRANSPLANT (HCC): Status: ACTIVE | Noted: 2018-12-01

## 2018-12-01 PROBLEM — R60.0 LOWER LEG EDEMA: Status: RESOLVED | Noted: 2017-11-02 | Resolved: 2018-12-01

## 2018-12-01 PROBLEM — Z79.60 LONG-TERM USE OF IMMUNOSUPPRESSANT MEDICATION: Status: ACTIVE | Noted: 2018-12-01

## 2018-12-01 PROBLEM — K76.6 PORTAL HYPERTENSION (HCC): Status: RESOLVED | Noted: 2018-09-24 | Resolved: 2018-12-01

## 2018-12-01 LAB — TACROLIMUS, 700249: 8.7 NG/ML (ref 2–20)

## 2018-12-03 ENCOUNTER — APPOINTMENT (OUTPATIENT)
Dept: CT IMAGING | Age: 63
End: 2018-12-03
Attending: PHYSICIAN ASSISTANT
Payer: COMMERCIAL

## 2018-12-03 ENCOUNTER — HOSPITAL ENCOUNTER (EMERGENCY)
Age: 63
Discharge: HOME | End: 2018-12-03
Attending: EMERGENCY MEDICINE | Admitting: EMERGENCY MEDICINE
Payer: COMMERCIAL

## 2018-12-03 ENCOUNTER — OFFICE VISIT (OUTPATIENT)
Dept: NEUROLOGY | Age: 63
End: 2018-12-03

## 2018-12-03 VITALS
RESPIRATION RATE: 18 BRPM | OXYGEN SATURATION: 96 % | SYSTOLIC BLOOD PRESSURE: 186 MMHG | WEIGHT: 249 LBS | HEART RATE: 78 BPM | HEIGHT: 71 IN | DIASTOLIC BLOOD PRESSURE: 80 MMHG | BODY MASS INDEX: 34.86 KG/M2

## 2018-12-03 VITALS
HEART RATE: 97 BPM | OXYGEN SATURATION: 97 % | RESPIRATION RATE: 18 BRPM | TEMPERATURE: 98.3 F | SYSTOLIC BLOOD PRESSURE: 155 MMHG | DIASTOLIC BLOOD PRESSURE: 76 MMHG

## 2018-12-03 DIAGNOSIS — R47.89 SLOW RATE OF SPEECH: Primary | ICD-10-CM

## 2018-12-03 DIAGNOSIS — S16.1XXA STRAIN OF NECK MUSCLE, INITIAL ENCOUNTER: ICD-10-CM
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
## 2018-12-03 DIAGNOSIS — S01.81XA FACIAL LACERATION, INITIAL ENCOUNTER: ICD-10-CM

## 2018-12-03 DIAGNOSIS — G21.4 VASCULAR PARKINSONISM (HCC): ICD-10-CM

## 2018-12-03 DIAGNOSIS — R90.89 ABNORMAL FINDING ON MRI OF BRAIN: ICD-10-CM

## 2018-12-03 LAB
BASOPHILS # BLD AUTO: 0.2 X10E3/UL (ref 0–0.2)
BASOPHILS NFR BLD AUTO: 3 %
EOSINOPHIL # BLD AUTO: 0.4 X10E3/UL (ref 0–0.4)
EOSINOPHIL NFR BLD AUTO: 6 %
ERYTHROCYTE [DISTWIDTH] IN BLOOD BY AUTOMATED COUNT: 15.7 % (ref 12.3–15.4)
HCT VFR BLD AUTO: 29.7 % (ref 37.5–51)
HGB BLD-MCNC: 9.5 G/DL (ref 13–17.7)
IMM GRANULOCYTES # BLD: 0 X10E3/UL (ref 0–0.1)
IMM GRANULOCYTES NFR BLD: 0 %
INR BLD: 1.1 (ref 0.9–1.2)
LYMPHOCYTES # BLD AUTO: 1.4 X10E3/UL (ref 0.7–3.1)
LYMPHOCYTES NFR BLD AUTO: 19 %
MCH RBC QN AUTO: 29.1 PG (ref 26.6–33)
MCHC RBC AUTO-ENTMCNC: 32 G/DL (ref 31.5–35.7)
MCV RBC AUTO: 91 FL (ref 79–97)
MONOCYTES # BLD AUTO: 0.6 X10E3/UL (ref 0.1–0.9)
MONOCYTES NFR BLD AUTO: 8 %
NEUTROPHILS # BLD AUTO: 4.6 X10E3/UL (ref 1.4–7)
NEUTROPHILS NFR BLD AUTO: 64 %
PLATELET # BLD AUTO: 146 X10E3/UL (ref 150–379)
RBC # BLD AUTO: 3.27 X10E6/UL (ref 4.14–5.8)
WBC # BLD AUTO: 7.1 X10E3/UL (ref 3.4–10.8)

## 2018-12-03 PROCEDURE — 72125 CT NECK SPINE W/O DYE: CPT

## 2018-12-03 PROCEDURE — 74011000250 HC RX REV CODE- 250: Performed by: PHYSICIAN ASSISTANT

## 2018-12-03 PROCEDURE — 90715 TDAP VACCINE 7 YRS/> IM: CPT | Performed by: PHYSICIAN ASSISTANT

## 2018-12-03 PROCEDURE — 70450 CT HEAD/BRAIN W/O DYE: CPT

## 2018-12-03 PROCEDURE — 74011250636 HC RX REV CODE- 250/636: Performed by: PHYSICIAN ASSISTANT

## 2018-12-03 PROCEDURE — 90471 IMMUNIZATION ADMIN: CPT

## 2018-12-03 PROCEDURE — 99284 EMERGENCY DEPT VISIT MOD MDM: CPT

## 2018-12-03 PROCEDURE — 70486 CT MAXILLOFACIAL W/O DYE: CPT

## 2018-12-03 PROCEDURE — 75810000293 HC SIMP/SUPERF WND  RPR

## 2018-12-03 PROCEDURE — 85610 PROTHROMBIN TIME: CPT

## 2018-12-03 PROCEDURE — 77030010507 HC ADH SKN DERMBND J&J -B

## 2018-12-03 PROCEDURE — 74011250637 HC RX REV CODE- 250/637: Performed by: PHYSICIAN ASSISTANT

## 2018-12-03 RX ORDER — BACITRACIN 500 UNIT/G
1 PACKET (EA) TOPICAL
Status: COMPLETED | OUTPATIENT
Start: 2018-12-03 | End: 2018-12-03

## 2018-12-03 RX ORDER — ACETAMINOPHEN 325 MG/1
975 TABLET ORAL
Status: COMPLETED | OUTPATIENT
Start: 2018-12-03 | End: 2018-12-03

## 2018-12-03 RX ADMIN — TETANUS TOXOID, REDUCED DIPHTHERIA TOXOID AND ACELLULAR PERTUSSIS VACCINE, ADSORBED 0.5 ML: 5; 2.5; 8; 8; 2.5 SUSPENSION INTRAMUSCULAR at 21:33

## 2018-12-03 RX ADMIN — BACITRACIN 1 PACKET: 500 OINTMENT TOPICAL at 21:33

## 2018-12-03 RX ADMIN — ACETAMINOPHEN 975 MG: 325 TABLET, FILM COATED ORAL at 22:16

## 2018-12-03 NOTE — PROGRESS NOTES
Chief Complaint   Patient presents with    Neurologic Problem         HISTORY OF PRESENT ILLNESS  Lillian Elmore is a 60 y.o. male who was last seen by me in 2016 in the hospital for altered mental status and that was suspected to be related to hepatic encephalopathy. He had hepatic cirrhosis and recently had liver transplant. He was doing quite well and a few weeks after surgery he developed slowness of speech and generalized debility. This prompted an MRI scan of the brain which revealed enlargement of the ventricular size with slight progression from 2016. He has had a fall which he thinks was more accidental.  Overall his balance has been improving over the past couple of weeks. His speech rate has also gotten faster. He has occasional dribbling of urine but denies any samantha incontinence. He has issues with short-term memory but long-term memory is quite good. Denies any focal motor or sensory deficits. It is still difficult for him to quickly get out of a seated position. Past Medical History:   Diagnosis Date    Arthritis     Autoimmune disease ([address]     ITP    CAD (coronary artery disease)     enlarged heart ?  Cancer ([address]     skin ca removed from forehead    Chronic kidney disease     kidney stones    Coagulation disorder ([address]     low plts    Diabetes (HCC)     type 2    GERD (gastroesophageal reflux disease)     Hepatic encephalopathy ([address]     Hypertension     Ill-defined condition     obesity per pt    Ill-defined condition     anemia    Liver disease     elevated liver enzymes    Liver transplant candidate     Pt is on the list for a liver transplant as of 8/2018    PUD (peptic ulcer disease)     stomach ulcers     Current Outpatient Medications   Medication Sig    magnesium oxide (MAG-OX) 400 mg tablet Take 1 Tab by mouth two (2) times a day.  amLODIPine (NORVASC) 10 mg tablet Take 1 Tab by mouth daily.     insulin detemir U-100 (LEVEMIR FLEXTOUCH) 100 unit/mL (3 mL) inpn 20 Units by SubCUTAneous route nightly.  insulin lispro (HUMALOG KWIKPEN INSULIN) 100 unit/mL kwikpen 6 Units by SubCUTAneous route three (3) times daily (with meals). In addition, if IC=699-125 give 1u, if 200-249 give 3u, if 250-299 give 5u, 300-349 give 7u, if >349 give 8u    triamcinolone acetonide (KENALOG) 0.1 % topical cream Apply  to affected area two (2) times a day. use thin layer    tacrolimus (PROGRAF) 1 mg capsule Take 3 mg by mouth every twelve (12) hours.  mycophenolate mofetil (CELLCEPT) 250 mg capsule Take 500 mg by mouth two (2) times a day.  predniSONE (DELTASONE) 5 mg tablet Take 5 mg by mouth. Two daily    nystatin (MYCOSTATIN) 100,000 unit/mL suspension Take 5 mL by mouth four (4) times daily.  acyclovir (ZOVIRAX) 200 mg capsule Take 200 mg by mouth two (2) times a day.  multivitamin (ONE A DAY) tablet Take 1 Tab by mouth daily.  trimethoprim-sulfamethoxazole (BACTRIM)  mg per tablet Take 1 Tab by mouth daily.  aspirin delayed-release 81 mg tablet Take 81 mg by mouth daily.  gabapentin (NEURONTIN) 300 mg capsule Take 300 mg by mouth nightly.  levothyroxine (SYNTHROID) 75 mcg tablet Take 75 mcg by mouth Daily (before breakfast).  cholecalciferol, vitamin D3, (VITAMIN D3) 2,000 unit tab Take 1 Tab by mouth two (2) times a day.  pantoprazole (PROTONIX) 40 mg tablet Take 40 mg by mouth daily. No current facility-administered medications for this visit.       No Known Allergies  Family History   Problem Relation Age of Onset   [facility] Cancer Mother         multiple myeloma    Cancer Father         prostate    MS Sister     Cancer Maternal Grandmother         ?where    Cancer Maternal Grandfather         ?where    Heart Failure Paternal Grandmother     Cancer Paternal Grandfather         ? where     Social History     Tobacco Use    Smoking status: Never Smoker    Smokeless tobacco: Never Used   Substance Use Topics    Alcohol use: No    Drug use: No     Past Surgical History:   Procedure Laterality Date    ABDOMEN SURGERY PROC UNLISTED      ana    HX APPENDECTOMY      HX OTHER SURGICAL      mohs procedure for skin ca.  HX UROLOGICAL      vasectomy         PHYSICAL EXAMINATION:    Visit Vitals  /80   Pulse 78   Resp 18   Ht 5' 11\" (1.803 m)   Wt 112.9 kg (249 lb)   SpO2 96%   BMI 34.73 kg/m²       NEUROLOGICAL EXAMINATION:     Mental Status:   Alert and oriented to person, place, and time with recent and remote memory intact. Attention span and concentration are normal. Speech is slow but clear. Cranial Nerves:    II, III, IV, VI:  Visual acuity grossly intact. Visual fields are normal.    Pupils are equal, round, and reactive to light and accommodation. Extra-ocular movements are full and fluid. V-XII: Hearing is grossly intact. Facial features are symmetric, with normal sensation and strength. The palate rises symmetrically and the tongue protrudes midline. Sternocleidomastoids 5/5. Motor Examination: Normal tone, bulk, and strength. 5/5 muscle strength throughout. No cogwheel rigidity or clonus present. Sensory exam:  Normal throughout to pinprick, temperature, and vibration sense. Normal proprioception. Coordination: Finger to nose and rapid arm movement testing was normal.   No resting or intention tremor    Gait and Station: Slow to stand up but fairly steady when walking. Normal arm swing. No Rhomberg or pronator drift. No muscle wasting or fasiculations noted. Reflexes:  DTRs 2+ throughout. Toes downgoing. LABS / IMAGING  MRI Results (most recent):  Results from Hospital Encounter encounter on 11/23/18   MRI BRAIN W WO CONT    Narrative EXAM: MRI BRAIN W WO CONT    TECHNIQUE: Sagittal and axial T1-weighted images axial FLAIR, T2-weighted,  diffusion weighted, gradient echo,  precontrast. Coronal and axial postcontrast  T1-weighted images.     IV CONTRAST:  20 cc Dotarem    INDICATION:  s/p liver transplant 10/2018, abnormal speech post op, mild  headaches, status post fall 4 days ago    COMPARISON:  Brain MRI of 2/22/2016, CT head of 8/27/2018    FINDINGS:  BRAIN PARENCHYMA:  No acute infarction. No masses or abnormal enhancement. Mild  patchy T2 hyperintensity in the gill, increased since 2016, likely due to  intracranial small vessel disease. No significant abnormalities in the cerebral  white matter. INTRACRANIAL HEMORRHAGE: None. CSF SPACES:  Mild to moderate prominence of the ventricular system, increased  since 2016, with a configuration consistent with cerebral volume loss. No  hydrocephalus. BASAL CISTERNS:  Patent. MIDLINE SHIFT: None. VASCULAR SYSTEM:  Normal flow voids. PARANASAL SINUSES AND MASTOID AIR CELLS:  No significant  VISUALIZED ORBITS:  No significant abnormalities. Previous right cataract  surgery. VISUALIZED UPPER CERVICAL SPINE:  No significant abnormalities. SELLA:  No enlargement or  focal abnormality. SKULL BASE:  No significant abnormalities. CALVARIUM:  Normal.        Impression IMPRESSION:    1. No acute findings. 2. Cerebral volume loss with ventriculomegaly, increased since 2016.  3. Mild patchy T2 hyperintensity in the gill likely due to chronic small vessel  ischemic change. MRI images were independently reviewed    ASSESSMENT    ICD-10-CM ICD-9-CM    1. Slow rate of speech R47.89 784.59    2. 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 Bina Jeffrey has had generalized debility, difficulty getting up from a seated position, slowed rate of speech suggesting parkinsonian symptoms. I suspect that this is secondary vascular parkinsonism which could be due to small vessel ischemic changes within the brainstem. He was likely deconditioned in the postoperative period and now seems to be doing better. He says that his walking ability, thinking and speech are all improving.    His MRI brain shows ventriculomegaly with slight progression since 2016 which I do not believe is significant  I do not think he has normal pressure hydrocephalus at this time  We will continue to follow him clinically  Repeat MRI scan of the brain in 1 year or earlier if he deteriorates  A course of PT might be beneficial    Cruz Carlton MD  Diplomate, American Board of Psychiatry & Neurology (Neurology)  Diplomate, American Board of Psychiatry & Neurology (Clinical Neurophysiology)  Diplomate, American Board of Electrodiagnostic Medicine    This note will not be viewable in [address]

## 2018-12-04 ENCOUNTER — PATIENT OUTREACH (OUTPATIENT)
Dept: HEMATOLOGY | Age: 63
End: 2018-12-04

## 2018-12-04 LAB
ALBUMIN SERPL-MCNC: 3.7 G/DL (ref 3.6–4.8)
ALBUMIN/GLOB SERPL: 1.4 {RATIO} (ref 1.2–2.2)
ALP SERPL-CCNC: 98 IU/L (ref 39–117)
ALT SERPL-CCNC: 8 IU/L (ref 0–44)
AST SERPL-CCNC: 8 IU/L (ref 0–40)
BILIRUB SERPL-MCNC: 0.7 MG/DL (ref 0–1.2)
BUN SERPL-MCNC: 18 MG/DL (ref 8–27)
BUN/CREAT SERPL: 16 (ref 10–24)
CALCIUM SERPL-MCNC: 9.3 MG/DL (ref 8.6–10.2)
CHLORIDE SERPL-SCNC: 103 MMOL/L (ref 96–106)
CO2 SERPL-SCNC: 25 MMOL/L (ref 20–29)
CREAT SERPL-MCNC: 1.16 MG/DL (ref 0.76–1.27)
GLOBULIN SER CALC-MCNC: 2.7 G/DL (ref 1.5–4.5)
GLUCOSE SERPL-MCNC: 115 MG/DL (ref 65–99)
INR PPP: 1 (ref 0.8–1.2)
MAGNESIUM SERPL-MCNC: 1.5 MG/DL (ref 1.6–2.3)
PHOSPHATE SERPL-MCNC: 3.3 MG/DL (ref 2.5–4.5)
POTASSIUM SERPL-SCNC: 4.5 MMOL/L (ref 3.5–5.2)
PROT SERPL-MCNC: 6.4 G/DL (ref 6–8.5)
PROTHROMBIN TIME: 10.4 SEC (ref 9.1–12)
SODIUM SERPL-SCNC: 143 MMOL/L (ref 134–144)
TACROLIMUS, 700249: 8.9 NG/ML (ref 2–20)
URATE SERPL-MCNC: 8.3 MG/DL (ref 3.7–8.6)

## 2018-12-04 NOTE — ED PROVIDER NOTES
60 y.o. male with past medical history significant for HTN, DMT2, ITP, five weeks status post liver transplant, presents via EMS with chief complaint of facial pain and neck pain secondary to a GLF that occurred ~30 minutes ago. Patient states that he was walking into [employer] for a [employer] group meeting this evening, when he accidentally tripped over a curb and fell face-first onto the pavement. Patient sustained a laceration to the bridge of the nose during the fall. He complains of some mild discomfort surrounding the wound, and he additionally complains of 4/10 posterior neck pain. Denies dizziness, lightheadedness, chest pain, or shortness of breath before or after the fall. He also denies LOC or current anticoagulation therapy. Wife states that patient has had blood work performed every week since his liver transplant; last INR was checked today, but those blood tests went to [employer] and patient does not yet know the results. Patient is unsure of his current tetanus status. He denies any other injuries or areas of pain, and specifically denies nausea or vomiting. There are no other acute medical concerns at this time. Social hx: Denies Tobacco use; Denies EtOH use; Denies Illicit Drug Abuse PCP: Helio Reddy MD 
  
Note written by Deion Ragsdale, as dictated by Dwight Stoner PA-C 8:59 PM  
 
 
The history is provided by the patient. No  was used. Past Medical History:  
Diagnosis Date  Arthritis  Autoimmune disease ([address] ITP  CAD (coronary artery disease)   
 enlarged heart ?  Cancer ([address] skin ca removed from forehead  Chronic kidney disease   
 kidney stones  Coagulation disorder (HCC)   
 low plts  Diabetes ([address] type 2  
 GERD (gastroesophageal reflux disease)  Hepatic encephalopathy ([address]  Hypertension  Ill-defined condition   
 obesity per pt  Ill-defined condition   
 anemia  Liver disease   
 elevated liver enzymes  Liver transplant candidate Pt is on the list for a liver transplant as of 8/2018  PUD (peptic ulcer disease)   
 stomach ulcers Past Surgical History:  
Procedure Laterality Date  ABDOMEN SURGERY PROC UNLISTED    
 ana  HX APPENDECTOMY  HX OTHER SURGICAL    
 mohs procedure for skin ca.  HX UROLOGICAL    
 vasectomy Family History:  
Problem Relation Age of Onset  Cancer Mother   
     multiple myeloma  Cancer Father   
     prostate  MS Sister  Cancer Maternal Grandmother ?where  Cancer Maternal Grandfather ?where  Heart Failure Paternal Grandmother  Cancer Paternal Grandfather ? where Social History Socioeconomic History  Marital status:  Spouse name: Not on file  Number of children: Not on file  Years of education: Not on file  Highest education level: Not on file Social Needs  Financial resource strain: Not on file  Food insecurity - worry: Not on file  Food insecurity - inability: Not on file  Transportation needs - medical: Not on file  Transportation needs - non-medical: Not on file Occupational History  Not on file Tobacco Use  Smoking status: Never Smoker  Smokeless tobacco: Never Used Substance and Sexual Activity  Alcohol use: No  
 Drug use: No  
 Sexual activity: Not on file Other Topics Concern  Not on file Social History Narrative  Not on file ALLERGIES: Patient has no known allergies. Review of Systems Constitutional: Negative. HENT: Negative for ear discharge. Eyes: Negative for photophobia, pain, discharge and visual disturbance. Respiratory: Negative for apnea, cough, chest tightness and shortness of breath. Cardiovascular: Negative for chest pain, palpitations and leg swelling.   
Gastrointestinal: Negative for abdominal distention, abdominal pain, blood in stool, nausea and vomiting. Genitourinary: Negative for difficulty urinating, dysuria, flank pain, frequency and hematuria. Musculoskeletal: Positive for neck pain. Negative for back pain, gait problem, joint swelling and myalgias. Skin: Positive for wound (face). Negative for color change and pallor. Neurological: Negative for dizziness, syncope, weakness, light-headedness, numbness and headaches. Psychiatric/Behavioral: Negative for behavioral problems and confusion. The patient is not nervous/anxious. Vitals:  
 12/03/18 2028 12/03/18 2053 BP:  155/76 Pulse: (!) 107 97 Resp:  18 Temp:  98.3 °F (36.8 °C) SpO2: 97% 97% Physical Exam  
Constitutional: He is oriented to person, place, and time. He appears well-nourished. No distress. HENT:  
Head: Normocephalic. 3 cm avulsion to the bridge of the nose, bleeding controlled with pressure. Superficial abrasion above the right eyebrow. Small puncture wound to the right of the right nare. Eyes: Pupils are equal, round, and reactive to light. Neck: Normal range of motion. Tenderness in the cerivcal spine area, but no bony tenderness. Cardiovascular: Normal rate and regular rhythm. Pulmonary/Chest: Effort normal and breath sounds normal.  
Abdominal: Soft. There is no tenderness. Musculoskeletal: Normal range of motion. He exhibits no edema or tenderness. Neurological: He is alert and oriented to person, place, and time. Chronic weakness Skin: Skin is warm and dry. Psychiatric: He has a normal mood and affect. Nursing note and vitals reviewed. Note written by Edith Guzman. Oralee Pitcher, as dictated by Saida Dumont PA-C 8:59 PM   
 
[facility] PROGRESS NOTE: 
8:59 PM  
Discussed case with attending provider, Dr. Symone Haywood. She evaluated the patient and she is in agreement with care plan. Recommends ordering POC INR and CT head/neck. Wound Closure by Adhesive Date/Time: 12/3/2018 9:43 PM 
 Performed by: YOLY Long Authorized by: aMurisio Posadas MD  
 
Consent:  
  Consent obtained:  Verbal 
  Consent given by:  Patient Risks discussed:  Infection and poor cosmetic result Alternatives discussed:  Referral 
Anesthesia (see MAR for exact dosages): Anesthesia method:  None Laceration details: Location: 3 cm avulsion to bridge of the nose Superficial abrasion above right eyebrow. Small puncture wound to the right of right nare. Repair type:  
  Repair type:  Simple Pre-procedure details: Preparation:  Imaging obtained to evaluate for foreign bodies Exploration:  
  Hemostasis achieved with:  Direct pressure Wound exploration: wound explored through full range of motion and entire depth of wound probed and visualized Treatment:  
  Area cleansed with:  Shur-Clens Amount of cleaning:  Standard Visualized foreign bodies/material removed: no   
Skin repair:  
  Repair method:  Tissue adhesive (Dermabond to all three locations) Approximation:  
  Laceration repair approximation: Skin flap overlying avulsion. Post-procedure details:  
  Patient tolerance of procedure: Tolerated well, no immediate complications Comments:  
   Note written by Pieter Bach. Lily Reyes, as dictated by Jerrica Oconnor PA-C 9:43 PM  
 
 
 
  
PROGRESS NOTE: 
10:05 PM 
Patient has been reassessed. Reviewed labs, medications and radiographics with patient. CT scans are all negative for acute abnormality. Patient ready to discharge home. Will have patient follow-up with PCP. Also gave information for plastic surgery follow-up as needed. 10:23 PM 
Patient's results have been reviewed with them.   Patient and/or family have verbally conveyed their understanding and agreement of the patient's signs, symptoms, diagnosis, treatment and prognosis and additionally agree to follow up as recommended or return to the Emergency Room should their condition change prior to follow-up. Discharge instructions have also been provided to the patient with some educational information regarding their diagnosis as well a list of reasons why they would want to return to the ER prior to their follow-up appointment should their condition change.   
YOLY Viera

## 2018-12-04 NOTE — ED NOTES
Patient has received discharge instructions from ER PA, verbalizes understanding. Discharged via wheelchair with family

## 2018-12-04 NOTE — ED TRIAGE NOTES
Patient was walking into a restaurant and tripped over a curb, has scant bleeding from bridge of nose, denies LOC, c/o neck pain.   Denies any CP/SOB, n/v

## 2018-12-04 NOTE — PROGRESS NOTES
Received notification from [facility] post-transplant coordinator regarding on-call page last night for patient fall. Noted local ED visit. Phone call received from patient's wife, Sherry Tolliver. Reviewed neuro appointment yesterday and questionable parkinsonian symptoms. Wife reports patient did not raise his leg high enough to clear the curb and face-planted the sidewalk. Discussed PT services as mentioned in neuro note. Wife states they will discuss with Dr. Marguerite Chris at the next appointment with him. Updated [facility] post-transplant coordinator.

## 2018-12-04 NOTE — TELEPHONE ENCOUNTER
Spoke with patient who indicates that he had a liver transplant about 5 weeks previously and is currently interested in cancelling the titration study. He stated he will call to schedule an out patient evaluation early next year.

## 2018-12-04 NOTE — DISCHARGE INSTRUCTIONS
Cuts Closed With Adhesives: Care Instructions  Your Care Instructions  A cut can happen anywhere on your body. The doctor used an adhesive to close the cut. When the adhesive dries, it forms a film that holds the edges of the cut together. Skin adhesives are sometimes called liquid stitches. If the cut went deep and through the skin, the doctor may have put in a layer of stitches below the adhesive. The deeper layer of stitches brings the deep part of the cut together. These stitches will dissolve and don't need to be removed. You don't see the stitches, only the adhesive. You may have a bandage. The doctor has checked you carefully, but problems can develop later. If you notice any problems or new symptoms, get medical treatment right away. Follow-up care is a key part of your treatment and safety. Be sure to make and go to all appointments, and call your doctor if you are having problems. It's also a good idea to know your test results and keep a list of the medicines you take. How can you care for yourself at home? · Keep the cut dry for the first 24 to 48 hours. After this, you can shower if your doctor okays it. Pat the cut dry. · Don't soak the cut, such as in a bathtub. Your doctor will tell you when it's safe to get the cut wet. · If your doctor told you how to care for your cut, follow your doctor's instructions. If you did not get instructions, follow this general advice:  ? Do not put any kind of ointment, cream, or lotion over the area. This can make the adhesive fall off too soon. ? After the first 24 to 48 hours, wash around the cut with clean water 2 times a day. Do not use hydrogen peroxide or alcohol, which can slow healing. ? If the doctor told you to use a bandage, put on a new bandage after cleaning the cut or if the bandage gets wet or dirty. · Prop up the sore area on a pillow anytime you sit or lie down during the next 3 days. Try to keep it above the level of your heart. This will help reduce swelling. · Leave the skin adhesive on your skin until it falls off on its own. This may take 5 to 10 days. · Do not scratch, rub, or pick at the adhesive. · Do not put the sticky part of a bandage directly on the adhesive. · Avoid any activity that could cause your cut to reopen. · Be safe with medicines. Read and follow all instructions on the label. ? If the doctor gave you a prescription medicine for pain, take it as prescribed. ? If you are not taking a prescription pain medicine, ask your doctor if you can take an over-the-counter medicine. When should you call for help? Call your doctor now or seek immediate medical care if:    · You have new pain, or your pain gets worse.     · The skin near the cut is cold or pale or changes color.     · You have tingling, weakness, or numbness near the cut.     · The cut starts to bleed.     · You have trouble moving the area near the cut.     · You have symptoms of infection, such as:  ? Increased pain, swelling, warmth, or redness around the cut.  ? Red streaks leading from the cut.  ? Pus draining from the cut.  ? A fever.    Watch closely for changes in your health, and be sure to contact your doctor if:    · The cut reopens.     · You do not get better as expected. Where can you learn more? Go to http://kenny-raz.info/. Enter P174 in the search box to learn more about \"Cuts Closed With Adhesives: Care Instructions. \"  Current as of: November 20, 2017  Content Version: 11.8  © 2646-7327 Healthwise, [employer]. Care instructions adapted under license by [employer] (which disclaims liability or warranty for this information). If you have questions about a medical condition or this instruction, always ask your healthcare professional. [place] [address] any warranty or liability for your use of this information.        Neck Strain: Care Instructions  Your Care Instructions    You have strained the muscles and ligaments in your neck. A sudden, awkward movement can strain the neck. This often occurs with falls or car accidents or during certain sports. Everyday activities like working on a computer or sleeping can also cause neck strain if they force you to hold your neck in an awkward position for a long time. It is common for neck pain to get worse for a day or two after an injury, but it should start to feel better after that. You may have more pain and stiffness for several days before it gets better. This is expected. It may take a few weeks or longer for it to heal completely. Good home treatment can help you get better faster and avoid future neck problems. Follow-up care is a key part of your treatment and safety. Be sure to make and go to all appointments, and call your doctor if you are having problems. It's also a good idea to know your test results and keep a list of the medicines you take. How can you care for yourself at home? · If you were given a neck brace (cervical collar) to limit neck motion, wear it as instructed for as many days as your doctor tells you to. Do not wear it longer than you were told to. Wearing a brace for too long can make neck stiffness worse and weaken the neck muscles. · You can try using heat or ice to see if it helps. ? Try using a heating pad on a low or medium setting for 15 to 20 minutes every 2 to 3 hours. Try a warm shower in place of one session with the heating pad. You can also buy single-use heat wraps that last up to 8 hours. ? You can also try an ice pack for 10 to 15 minutes every 2 to 3 hours. · Take pain medicines exactly as directed. ? If the doctor gave you a prescription medicine for pain, take it as prescribed. ? If you are not taking a prescription pain medicine, ask your doctor if you can take an over-the-counter medicine. · Gently rub the area to relieve pain and help with blood flow.  Do not massage the area if it hurts to do so.  · Do not do anything that makes the pain worse. Take it easy for a couple of days. You can do your usual activities if they do not hurt your neck or put it at risk for more stress or injury. · Try sleeping on a special neck pillow. Place it under your neck, not under your head. Placing a tightly rolled-up towel under your neck while you sleep will also work. If you use a neck pillow or rolled towel, do not use your regular pillow at the same time. · To prevent future neck pain, do exercises to stretch and strengthen your neck and back. Learn how to use good posture, safe lifting techniques, and proper body mechanics. When should you call for help? Call 911 anytime you think you may need emergency care. For example, call if:    · You are unable to move an arm or a leg at all.   [facility] your doctor now or seek immediate medical care if:    · You have new or worse symptoms in your arms, legs, chest, belly, or buttocks. Symptoms may include:  ? Numbness or tingling. ? Weakness. ? Pain.     · You lose bladder or bowel control.    Watch closely for changes in your health, and be sure to contact your doctor if:    · You are not getting better as expected. Where can you learn more? Go to http://kenny-raz.info/. Enter M253 in the search box to learn more about \"Neck Strain: Care Instructions. \"  Current as of: November 29, 2017  Content Version: 11.8  © 6250-2893 [employer]. Care instructions adapted under license by [employer] (which disclaims liability or warranty for this information). If you have questions about a medical condition or this instruction, always ask your healthcare professional. [place] [address] any warranty or liability for your use of this information.

## 2018-12-04 NOTE — ED TRIAGE NOTES
EMS note: Pt. Arrived via EMS after suffering a fall walking into [employer]. Laceration to bridge of nose. No LOC.

## 2018-12-07 LAB
ALBUMIN SERPL-MCNC: 3.5 G/DL (ref 3.6–4.8)
ALBUMIN/GLOB SERPL: 1.2 {RATIO} (ref 1.2–2.2)
ALP SERPL-CCNC: 94 IU/L (ref 39–117)
ALT SERPL-CCNC: 9 IU/L (ref 0–44)
AST SERPL-CCNC: 11 IU/L (ref 0–40)
BASOPHILS # BLD AUTO: 0.2 X10E3/UL (ref 0–0.2)
BASOPHILS NFR BLD AUTO: 4 %
BILIRUB SERPL-MCNC: 0.6 MG/DL (ref 0–1.2)
BUN SERPL-MCNC: 18 MG/DL (ref 8–27)
BUN/CREAT SERPL: 15 (ref 10–24)
CALCIUM SERPL-MCNC: 9.4 MG/DL (ref 8.6–10.2)
CHLORIDE SERPL-SCNC: 104 MMOL/L (ref 96–106)
CO2 SERPL-SCNC: 26 MMOL/L (ref 20–29)
CREAT SERPL-MCNC: 1.23 MG/DL (ref 0.76–1.27)
EOSINOPHIL # BLD AUTO: 0.6 X10E3/UL (ref 0–0.4)
EOSINOPHIL NFR BLD AUTO: 9 %
ERYTHROCYTE [DISTWIDTH] IN BLOOD BY AUTOMATED COUNT: 15.5 % (ref 12.3–15.4)
GLOBULIN SER CALC-MCNC: 2.9 G/DL (ref 1.5–4.5)
GLUCOSE SERPL-MCNC: 113 MG/DL (ref 65–99)
HCT VFR BLD AUTO: 29.7 % (ref 37.5–51)
HGB BLD-MCNC: 9.7 G/DL (ref 13–17.7)
IMM GRANULOCYTES # BLD: 0 X10E3/UL (ref 0–0.1)
IMM GRANULOCYTES NFR BLD: 0 %
LYMPHOCYTES # BLD AUTO: 1.2 X10E3/UL (ref 0.7–3.1)
LYMPHOCYTES NFR BLD AUTO: 20 %
MCH RBC QN AUTO: 29.4 PG (ref 26.6–33)
MCHC RBC AUTO-ENTMCNC: 32.7 G/DL (ref 31.5–35.7)
MCV RBC AUTO: 90 FL (ref 79–97)
MONOCYTES # BLD AUTO: 0.5 X10E3/UL (ref 0.1–0.9)
MONOCYTES NFR BLD AUTO: 8 %
NEUTROPHILS # BLD AUTO: 3.4 X10E3/UL (ref 1.4–7)
NEUTROPHILS NFR BLD AUTO: 59 %
PLATELET # BLD AUTO: 125 X10E3/UL (ref 150–379)
POTASSIUM SERPL-SCNC: 5.2 MMOL/L (ref 3.5–5.2)
PROT SERPL-MCNC: 6.4 G/DL (ref 6–8.5)
RBC # BLD AUTO: 3.3 X10E6/UL (ref 4.14–5.8)
SODIUM SERPL-SCNC: 145 MMOL/L (ref 134–144)
WBC # BLD AUTO: 5.9 X10E3/UL (ref 3.4–10.8)

## 2018-12-08 LAB — TACROLIMUS, 700249: 9.4 NG/ML (ref 2–20)

## 2018-12-11 LAB
ALBUMIN SERPL-MCNC: 3.8 G/DL (ref 3.6–4.8)
ALBUMIN/GLOB SERPL: 1.7 {RATIO} (ref 1.2–2.2)
ALP SERPL-CCNC: 81 IU/L (ref 39–117)
ALT SERPL-CCNC: 8 IU/L (ref 0–44)
AST SERPL-CCNC: 10 IU/L (ref 0–40)
BASOPHILS # BLD AUTO: 0.1 X10E3/UL (ref 0–0.2)
BASOPHILS NFR BLD AUTO: 3 %
BILIRUB SERPL-MCNC: 0.5 MG/DL (ref 0–1.2)
BUN SERPL-MCNC: 15 MG/DL (ref 8–27)
BUN/CREAT SERPL: 15 (ref 10–24)
CALCIUM SERPL-MCNC: 9.1 MG/DL (ref 8.6–10.2)
CHLORIDE SERPL-SCNC: 102 MMOL/L (ref 96–106)
CO2 SERPL-SCNC: 27 MMOL/L (ref 20–29)
CREAT SERPL-MCNC: 1.01 MG/DL (ref 0.76–1.27)
EOSINOPHIL # BLD AUTO: 0.6 X10E3/UL (ref 0–0.4)
EOSINOPHIL NFR BLD AUTO: 11 %
ERYTHROCYTE [DISTWIDTH] IN BLOOD BY AUTOMATED COUNT: 15.2 % (ref 12.3–15.4)
GLOBULIN SER CALC-MCNC: 2.2 G/DL (ref 1.5–4.5)
GLUCOSE SERPL-MCNC: 84 MG/DL (ref 65–99)
HCT VFR BLD AUTO: 29.1 % (ref 37.5–51)
HGB BLD-MCNC: 9.5 G/DL (ref 13–17.7)
IMM GRANULOCYTES # BLD: 0 X10E3/UL (ref 0–0.1)
IMM GRANULOCYTES NFR BLD: 0 %
INR PPP: 1 (ref 0.8–1.2)
LYMPHOCYTES # BLD AUTO: 1.3 X10E3/UL (ref 0.7–3.1)
LYMPHOCYTES NFR BLD AUTO: 23 %
MAGNESIUM SERPL-MCNC: 1.5 MG/DL (ref 1.6–2.3)
MCH RBC QN AUTO: 29.1 PG (ref 26.6–33)
MCHC RBC AUTO-ENTMCNC: 32.6 G/DL (ref 31.5–35.7)
MCV RBC AUTO: 89 FL (ref 79–97)
MONOCYTES # BLD AUTO: 0.4 X10E3/UL (ref 0.1–0.9)
MONOCYTES NFR BLD AUTO: 8 %
NEUTROPHILS # BLD AUTO: 3 X10E3/UL (ref 1.4–7)
NEUTROPHILS NFR BLD AUTO: 55 %
PHOSPHATE SERPL-MCNC: 3.3 MG/DL (ref 2.5–4.5)
PLATELET # BLD AUTO: 107 X10E3/UL (ref 150–379)
POTASSIUM SERPL-SCNC: 4.1 MMOL/L (ref 3.5–5.2)
PROT SERPL-MCNC: 6 G/DL (ref 6–8.5)
PROTHROMBIN TIME: 10.5 SEC (ref 9.1–12)
RBC # BLD AUTO: 3.26 X10E6/UL (ref 4.14–5.8)
SODIUM SERPL-SCNC: 143 MMOL/L (ref 134–144)
URATE SERPL-MCNC: 8.2 MG/DL (ref 3.7–8.6)
WBC # BLD AUTO: 5.4 X10E3/UL (ref 3.4–10.8)

## 2018-12-12 LAB — TACROLIMUS, 700249: 9.1 NG/ML (ref 2–20)

## 2018-12-13 ENCOUNTER — OFFICE VISIT (OUTPATIENT)
Dept: HEMATOLOGY | Age: 63
End: 2018-12-13

## 2018-12-13 VITALS
TEMPERATURE: 96.4 F | HEIGHT: 71 IN | BODY MASS INDEX: 34.64 KG/M2 | HEART RATE: 72 BPM | WEIGHT: 247.4 LBS | SYSTOLIC BLOOD PRESSURE: 168 MMHG | DIASTOLIC BLOOD PRESSURE: 67 MMHG | OXYGEN SATURATION: 97 %

## 2018-12-13 DIAGNOSIS — R53.1 WEAKNESS GENERALIZED: ICD-10-CM

## 2018-12-13 DIAGNOSIS — Z94.4 H/O LIVER TRANSPLANT (HCC): Primary | ICD-10-CM

## 2018-12-13 NOTE — PATIENT INSTRUCTIONS
Decrease your Prednisone to 7.5 mg daily for 2 weeks. Then, decrease to 5 mg daily for 2 weeks. Then, take 5 mg every other day for 2 weeks and stop.

## 2018-12-13 NOTE — PROGRESS NOTES
Chief Complaint   Patient presents with    Follow-up     Liver Transplant     Visit Vitals  /73 (BP 1 Location: Left arm, BP Patient Position: Sitting)   Pulse 72   Temp 96.4 °F (35.8 °C) (Tympanic)   Ht 5' 11\" (1.803 m)   Wt 247 lb 6.4 oz (112.2 kg)   SpO2 97%   BMI 34.51 kg/m²     PHQ over the last two weeks 12/3/2018   Little interest or pleasure in doing things Several days   Feeling down, depressed, irritable, or hopeless Several days   Total Score PHQ 2 2     1. Have you been to the ER, urgent care clinic since your last visit? Hospitalized since your last visit? No    2. Have you seen or consulted any other health care providers outside of the [address] [place] [address] since your last visit? Include any pap smears or colon screening.  No

## 2018-12-14 LAB
ALBUMIN SERPL-MCNC: 4 G/DL (ref 3.6–4.8)
ALBUMIN/GLOB SERPL: 1.5 {RATIO} (ref 1.2–2.2)
ALP SERPL-CCNC: 89 IU/L (ref 39–117)
ALT SERPL-CCNC: 9 IU/L (ref 0–44)
AST SERPL-CCNC: 10 IU/L (ref 0–40)
BASOPHILS # BLD AUTO: 0.1 X10E3/UL (ref 0–0.2)
BASOPHILS NFR BLD AUTO: 2 %
BILIRUB SERPL-MCNC: 0.7 MG/DL (ref 0–1.2)
BUN SERPL-MCNC: 22 MG/DL (ref 8–27)
BUN/CREAT SERPL: 16 (ref 10–24)
CALCIUM SERPL-MCNC: 9.6 MG/DL (ref 8.6–10.2)
CHLORIDE SERPL-SCNC: 102 MMOL/L (ref 96–106)
CO2 SERPL-SCNC: 30 MMOL/L (ref 20–29)
CREAT SERPL-MCNC: 1.35 MG/DL (ref 0.76–1.27)
EOSINOPHIL # BLD AUTO: 0.8 X10E3/UL (ref 0–0.4)
EOSINOPHIL NFR BLD AUTO: 13 %
ERYTHROCYTE [DISTWIDTH] IN BLOOD BY AUTOMATED COUNT: 14.9 % (ref 12.3–15.4)
GLOBULIN SER CALC-MCNC: 2.7 G/DL (ref 1.5–4.5)
GLUCOSE SERPL-MCNC: 152 MG/DL (ref 65–99)
HCT VFR BLD AUTO: 32.4 % (ref 37.5–51)
HGB BLD-MCNC: 10.5 G/DL (ref 13–17.7)
IMM GRANULOCYTES # BLD: 0 X10E3/UL (ref 0–0.1)
IMM GRANULOCYTES NFR BLD: 0 %
LYMPHOCYTES # BLD AUTO: 1.2 X10E3/UL (ref 0.7–3.1)
LYMPHOCYTES NFR BLD AUTO: 18 %
MCH RBC QN AUTO: 29.3 PG (ref 26.6–33)
MCHC RBC AUTO-ENTMCNC: 32.4 G/DL (ref 31.5–35.7)
MCV RBC AUTO: 91 FL (ref 79–97)
MONOCYTES # BLD AUTO: 0.4 X10E3/UL (ref 0.1–0.9)
MONOCYTES NFR BLD AUTO: 6 %
NEUTROPHILS # BLD AUTO: 4.1 X10E3/UL (ref 1.4–7)
NEUTROPHILS NFR BLD AUTO: 61 %
PLATELET # BLD AUTO: 121 X10E3/UL (ref 150–379)
POTASSIUM SERPL-SCNC: 4.6 MMOL/L (ref 3.5–5.2)
PROT SERPL-MCNC: 6.7 G/DL (ref 6–8.5)
RBC # BLD AUTO: 3.58 X10E6/UL (ref 4.14–5.8)
SODIUM SERPL-SCNC: 143 MMOL/L (ref 134–144)
WBC # BLD AUTO: 6.7 X10E3/UL (ref 3.4–10.8)

## 2018-12-15 LAB — TACROLIMUS, 700249: 7.6 NG/ML (ref 2–20)

## 2018-12-18 LAB
ALBUMIN SERPL-MCNC: 3.8 G/DL (ref 3.6–4.8)
ALBUMIN/GLOB SERPL: 1.5 {RATIO} (ref 1.2–2.2)
ALP SERPL-CCNC: 103 IU/L (ref 39–117)
ALT SERPL-CCNC: 19 IU/L (ref 0–44)
AST SERPL-CCNC: 16 IU/L (ref 0–40)
BASOPHILS # BLD AUTO: 0.1 X10E3/UL (ref 0–0.2)
BASOPHILS NFR BLD AUTO: 2 %
BILIRUB SERPL-MCNC: 0.6 MG/DL (ref 0–1.2)
BUN SERPL-MCNC: 24 MG/DL (ref 8–27)
BUN/CREAT SERPL: 18 (ref 10–24)
CALCIUM SERPL-MCNC: 9.3 MG/DL (ref 8.6–10.2)
CHLORIDE SERPL-SCNC: 103 MMOL/L (ref 96–106)
CO2 SERPL-SCNC: 27 MMOL/L (ref 20–29)
CREAT SERPL-MCNC: 1.32 MG/DL (ref 0.76–1.27)
EOSINOPHIL # BLD AUTO: 1.2 X10E3/UL (ref 0–0.4)
EOSINOPHIL NFR BLD AUTO: 16 %
ERYTHROCYTE [DISTWIDTH] IN BLOOD BY AUTOMATED COUNT: 14.9 % (ref 12.3–15.4)
GLOBULIN SER CALC-MCNC: 2.6 G/DL (ref 1.5–4.5)
GLUCOSE SERPL-MCNC: 136 MG/DL (ref 65–99)
HCT VFR BLD AUTO: 31.1 % (ref 37.5–51)
HGB BLD-MCNC: 10.5 G/DL (ref 13–17.7)
IMM GRANULOCYTES # BLD: 0 X10E3/UL (ref 0–0.1)
IMM GRANULOCYTES NFR BLD: 0 %
INR PPP: 1 (ref 0.8–1.2)
LYMPHOCYTES # BLD AUTO: 1.5 X10E3/UL (ref 0.7–3.1)
LYMPHOCYTES NFR BLD AUTO: 21 %
MAGNESIUM SERPL-MCNC: 1.5 MG/DL (ref 1.6–2.3)
MCH RBC QN AUTO: 29.6 PG (ref 26.6–33)
MCHC RBC AUTO-ENTMCNC: 33.8 G/DL (ref 31.5–35.7)
MCV RBC AUTO: 88 FL (ref 79–97)
MONOCYTES # BLD AUTO: 0.5 X10E3/UL (ref 0.1–0.9)
MONOCYTES NFR BLD AUTO: 7 %
NEUTROPHILS # BLD AUTO: 3.9 X10E3/UL (ref 1.4–7)
NEUTROPHILS NFR BLD AUTO: 54 %
PHOSPHATE SERPL-MCNC: 3.8 MG/DL (ref 2.5–4.5)
PLATELET # BLD AUTO: 133 X10E3/UL (ref 150–379)
POTASSIUM SERPL-SCNC: 4.8 MMOL/L (ref 3.5–5.2)
PROT SERPL-MCNC: 6.4 G/DL (ref 6–8.5)
PROTHROMBIN TIME: 10.2 SEC (ref 9.1–12)
RBC # BLD AUTO: 3.55 X10E6/UL (ref 4.14–5.8)
SODIUM SERPL-SCNC: 144 MMOL/L (ref 134–144)
TACROLIMUS, 700249: NORMAL NG/ML (ref 2–20)
URATE SERPL-MCNC: 8.9 MG/DL (ref 3.7–8.6)
WBC # BLD AUTO: 7.2 X10E3/UL (ref 3.4–10.8)

## 2018-12-20 LAB
BASOPHILS # BLD AUTO: 0.2 X10E3/UL (ref 0–0.2)
BASOPHILS NFR BLD AUTO: 3 %
EOSINOPHIL # BLD AUTO: 1 X10E3/UL (ref 0–0.4)
EOSINOPHIL NFR BLD AUTO: 14 %
ERYTHROCYTE [DISTWIDTH] IN BLOOD BY AUTOMATED COUNT: 14.8 % (ref 12.3–15.4)
HCT VFR BLD AUTO: 31 % (ref 37.5–51)
HGB BLD-MCNC: 10.3 G/DL (ref 13–17.7)
IMM GRANULOCYTES # BLD: 0 X10E3/UL (ref 0–0.1)
IMM GRANULOCYTES NFR BLD: 0 %
LYMPHOCYTES # BLD AUTO: 1.6 X10E3/UL (ref 0.7–3.1)
LYMPHOCYTES NFR BLD AUTO: 23 %
MCH RBC QN AUTO: 28.8 PG (ref 26.6–33)
MCHC RBC AUTO-ENTMCNC: 33.2 G/DL (ref 31.5–35.7)
MCV RBC AUTO: 87 FL (ref 79–97)
MONOCYTES # BLD AUTO: 0.5 X10E3/UL (ref 0.1–0.9)
MONOCYTES NFR BLD AUTO: 8 %
NEUTROPHILS # BLD AUTO: 3.7 X10E3/UL (ref 1.4–7)
NEUTROPHILS NFR BLD AUTO: 52 %
PLATELET # BLD AUTO: 153 X10E3/UL (ref 150–379)
RBC # BLD AUTO: 3.58 X10E6/UL (ref 4.14–5.8)
WBC # BLD AUTO: 6.9 X10E3/UL (ref 3.4–10.8)

## 2018-12-21 ENCOUNTER — PATIENT OUTREACH (OUTPATIENT)
Dept: HEMATOLOGY | Age: 63
End: 2018-12-21

## 2018-12-21 LAB
ALBUMIN SERPL-MCNC: 3.8 G/DL (ref 3.6–4.8)
ALBUMIN/GLOB SERPL: 1.6 {RATIO} (ref 1.2–2.2)
ALP SERPL-CCNC: 98 IU/L (ref 39–117)
ALT SERPL-CCNC: 18 IU/L (ref 0–44)
AST SERPL-CCNC: 13 IU/L (ref 0–40)
BILIRUB SERPL-MCNC: 0.5 MG/DL (ref 0–1.2)
BUN SERPL-MCNC: 30 MG/DL (ref 8–27)
BUN/CREAT SERPL: 19 (ref 10–24)
CALCIUM SERPL-MCNC: 9.1 MG/DL (ref 8.6–10.2)
CHLORIDE SERPL-SCNC: 103 MMOL/L (ref 96–106)
CO2 SERPL-SCNC: 24 MMOL/L (ref 20–29)
CREAT SERPL-MCNC: 1.59 MG/DL (ref 0.76–1.27)
GLOBULIN SER CALC-MCNC: 2.4 G/DL (ref 1.5–4.5)
GLUCOSE SERPL-MCNC: 136 MG/DL (ref 65–99)
POTASSIUM SERPL-SCNC: 4.9 MMOL/L (ref 3.5–5.2)
PROT SERPL-MCNC: 6.2 G/DL (ref 6–8.5)
SODIUM SERPL-SCNC: 144 MMOL/L (ref 134–144)

## 2018-12-21 RX ORDER — INSULIN HUMAN 100 [IU]/ML
INJECTION, SUSPENSION SUBCUTANEOUS
Refills: 2 | COMMUNITY
Start: 2018-12-20 | End: 2019-03-14

## 2018-12-21 RX ORDER — LISINOPRIL 10 MG/1
10 TABLET ORAL DAILY
COMMUNITY
End: 2019-06-14

## 2018-12-21 NOTE — PROGRESS NOTES
Phone call placed to patient. Reviewed most recent labs. Discussed elevated creatinine. Patient admits he has not been drinking as much water lately. Encouraged patient to increase water consumption. Noted tacrolimus level was not detected on 12/17. Inquired if patient missed dose the night prior. Patient reports he did not. Will await level from 12/20. Patient reports he was seen by endocrinology locally and Humalog was discontinued. Patient was started on Humulin 20 units in the morning. Patient also reports Lisinopril 10 mg daily was added by PCP for elevated blood pressure. Patient has not initiated outpatient PT services.

## 2018-12-22 LAB — TACROLIMUS, 700249: 10 NG/ML (ref 2–20)

## 2018-12-24 LAB
BASOPHILS # BLD AUTO: 0.2 X10E3/UL (ref 0–0.2)
BASOPHILS NFR BLD AUTO: 2 %
EOSINOPHIL # BLD AUTO: 0.9 X10E3/UL (ref 0–0.4)
EOSINOPHIL NFR BLD AUTO: 12 %
ERYTHROCYTE [DISTWIDTH] IN BLOOD BY AUTOMATED COUNT: 14.7 % (ref 12.3–15.4)
HCT VFR BLD AUTO: 33.2 % (ref 37.5–51)
HGB BLD-MCNC: 11.3 G/DL (ref 13–17.7)
IMM GRANULOCYTES # BLD: 0 X10E3/UL (ref 0–0.1)
IMM GRANULOCYTES NFR BLD: 0 %
LYMPHOCYTES # BLD AUTO: 1.3 X10E3/UL (ref 0.7–3.1)
LYMPHOCYTES NFR BLD AUTO: 18 %
MCH RBC QN AUTO: 29.6 PG (ref 26.6–33)
MCHC RBC AUTO-ENTMCNC: 34 G/DL (ref 31.5–35.7)
MCV RBC AUTO: 87 FL (ref 79–97)
MONOCYTES # BLD AUTO: 0.4 X10E3/UL (ref 0.1–0.9)
MONOCYTES NFR BLD AUTO: 6 %
NEUTROPHILS # BLD AUTO: 4.6 X10E3/UL (ref 1.4–7)
NEUTROPHILS NFR BLD AUTO: 62 %
PLATELET # BLD AUTO: 170 X10E3/UL (ref 150–379)
RBC # BLD AUTO: 3.82 X10E6/UL (ref 4.14–5.8)
WBC # BLD AUTO: 7.5 X10E3/UL (ref 3.4–10.8)

## 2018-12-25 LAB
ALBUMIN SERPL-MCNC: 3.6 G/DL (ref 3.6–4.8)
ALBUMIN/GLOB SERPL: 1.3 {RATIO} (ref 1.2–2.2)
ALP SERPL-CCNC: 99 IU/L (ref 39–117)
ALT SERPL-CCNC: 10 IU/L (ref 0–44)
AST SERPL-CCNC: 10 IU/L (ref 0–40)
BILIRUB SERPL-MCNC: 0.4 MG/DL (ref 0–1.2)
BUN SERPL-MCNC: 24 MG/DL (ref 8–27)
BUN/CREAT SERPL: 19 (ref 10–24)
CALCIUM SERPL-MCNC: 9.6 MG/DL (ref 8.6–10.2)
CHLORIDE SERPL-SCNC: 104 MMOL/L (ref 96–106)
CO2 SERPL-SCNC: 24 MMOL/L (ref 20–29)
CREAT SERPL-MCNC: 1.29 MG/DL (ref 0.76–1.27)
GLOBULIN SER CALC-MCNC: 2.8 G/DL (ref 1.5–4.5)
GLUCOSE SERPL-MCNC: 134 MG/DL (ref 65–99)
INR PPP: 1 (ref 0.8–1.2)
MAGNESIUM SERPL-MCNC: 1.4 MG/DL (ref 1.6–2.3)
PHOSPHATE SERPL-MCNC: 3.4 MG/DL (ref 2.5–4.5)
POTASSIUM SERPL-SCNC: 4.3 MMOL/L (ref 3.5–5.2)
PROT SERPL-MCNC: 6.4 G/DL (ref 6–8.5)
PROTHROMBIN TIME: 10.2 SEC (ref 9.1–12)
SODIUM SERPL-SCNC: 144 MMOL/L (ref 134–144)
URATE SERPL-MCNC: 8.5 MG/DL (ref 3.7–8.6)

## 2018-12-27 LAB — TACROLIMUS, 700249: 9 NG/ML (ref 2–20)

## 2018-12-28 LAB
ALBUMIN SERPL-MCNC: 3.8 G/DL (ref 3.6–4.8)
ALBUMIN/GLOB SERPL: 1.5 {RATIO} (ref 1.2–2.2)
ALP SERPL-CCNC: 94 IU/L (ref 39–117)
ALT SERPL-CCNC: 10 IU/L (ref 0–44)
AST SERPL-CCNC: 12 IU/L (ref 0–40)
BASOPHILS # BLD AUTO: 0.2 X10E3/UL (ref 0–0.2)
BASOPHILS NFR BLD AUTO: 3 %
BILIRUB SERPL-MCNC: 0.4 MG/DL (ref 0–1.2)
BUN SERPL-MCNC: 19 MG/DL (ref 8–27)
BUN/CREAT SERPL: 16 (ref 10–24)
CALCIUM SERPL-MCNC: 9.4 MG/DL (ref 8.6–10.2)
CHLORIDE SERPL-SCNC: 106 MMOL/L (ref 96–106)
CO2 SERPL-SCNC: 27 MMOL/L (ref 20–29)
CREAT SERPL-MCNC: 1.21 MG/DL (ref 0.76–1.27)
EOSINOPHIL # BLD AUTO: 0.7 X10E3/UL (ref 0–0.4)
EOSINOPHIL NFR BLD AUTO: 11 %
ERYTHROCYTE [DISTWIDTH] IN BLOOD BY AUTOMATED COUNT: 14.4 % (ref 12.3–15.4)
GLOBULIN SER CALC-MCNC: 2.5 G/DL (ref 1.5–4.5)
GLUCOSE SERPL-MCNC: 79 MG/DL (ref 65–99)
HCT VFR BLD AUTO: 34.2 % (ref 37.5–51)
HGB BLD-MCNC: 10.9 G/DL (ref 13–17.7)
IMM GRANULOCYTES # BLD: 0 X10E3/UL (ref 0–0.1)
IMM GRANULOCYTES NFR BLD: 0 %
LYMPHOCYTES # BLD AUTO: 1.6 X10E3/UL (ref 0.7–3.1)
LYMPHOCYTES NFR BLD AUTO: 24 %
MCH RBC QN AUTO: 28.6 PG (ref 26.6–33)
MCHC RBC AUTO-ENTMCNC: 31.9 G/DL (ref 31.5–35.7)
MCV RBC AUTO: 90 FL (ref 79–97)
MONOCYTES # BLD AUTO: 0.5 X10E3/UL (ref 0.1–0.9)
MONOCYTES NFR BLD AUTO: 8 %
NEUTROPHILS # BLD AUTO: 3.5 X10E3/UL (ref 1.4–7)
NEUTROPHILS NFR BLD AUTO: 54 %
PLATELET # BLD AUTO: 157 X10E3/UL (ref 150–379)
POTASSIUM SERPL-SCNC: 5.5 MMOL/L (ref 3.5–5.2)
PROT SERPL-MCNC: 6.3 G/DL (ref 6–8.5)
RBC # BLD AUTO: 3.81 X10E6/UL (ref 4.14–5.8)
SODIUM SERPL-SCNC: 144 MMOL/L (ref 134–144)
WBC # BLD AUTO: 6.4 X10E3/UL (ref 3.4–10.8)

## 2018-12-29 LAB — TACROLIMUS, 700249: 9.9 NG/ML (ref 2–20)

## 2019-01-01 LAB
ALBUMIN SERPL-MCNC: 3.7 G/DL (ref 3.6–4.8)
ALBUMIN/GLOB SERPL: 1.5 {RATIO} (ref 1.2–2.2)
ALP SERPL-CCNC: 88 IU/L (ref 39–117)
ALT SERPL-CCNC: 10 IU/L (ref 0–44)
AST SERPL-CCNC: 15 IU/L (ref 0–40)
BASOPHILS # BLD AUTO: 0.2 X10E3/UL (ref 0–0.2)
BASOPHILS NFR BLD AUTO: 3 %
BILIRUB SERPL-MCNC: 0.4 MG/DL (ref 0–1.2)
BUN SERPL-MCNC: 30 MG/DL (ref 8–27)
BUN/CREAT SERPL: 20 (ref 10–24)
CALCIUM SERPL-MCNC: 8.8 MG/DL (ref 8.6–10.2)
CHLORIDE SERPL-SCNC: 106 MMOL/L (ref 96–106)
CO2 SERPL-SCNC: 23 MMOL/L (ref 20–29)
CREAT SERPL-MCNC: 1.47 MG/DL (ref 0.76–1.27)
EOSINOPHIL # BLD AUTO: 0.8 X10E3/UL (ref 0–0.4)
EOSINOPHIL NFR BLD AUTO: 12 %
ERYTHROCYTE [DISTWIDTH] IN BLOOD BY AUTOMATED COUNT: 14.3 % (ref 12.3–15.4)
GLOBULIN SER CALC-MCNC: 2.4 G/DL (ref 1.5–4.5)
GLUCOSE SERPL-MCNC: 84 MG/DL (ref 65–99)
HCT VFR BLD AUTO: 32.3 % (ref 37.5–51)
HGB BLD-MCNC: 10.6 G/DL (ref 13–17.7)
IMM GRANULOCYTES # BLD: 0 X10E3/UL (ref 0–0.1)
IMM GRANULOCYTES NFR BLD: 0 %
INR PPP: 1 (ref 0.8–1.2)
LYMPHOCYTES # BLD AUTO: 1.3 X10E3/UL (ref 0.7–3.1)
LYMPHOCYTES NFR BLD AUTO: 20 %
MAGNESIUM SERPL-MCNC: 1.5 MG/DL (ref 1.6–2.3)
MCH RBC QN AUTO: 29 PG (ref 26.6–33)
MCHC RBC AUTO-ENTMCNC: 32.8 G/DL (ref 31.5–35.7)
MCV RBC AUTO: 88 FL (ref 79–97)
MONOCYTES # BLD AUTO: 0.5 X10E3/UL (ref 0.1–0.9)
MONOCYTES NFR BLD AUTO: 8 %
NEUTROPHILS # BLD AUTO: 3.5 X10E3/UL (ref 1.4–7)
NEUTROPHILS NFR BLD AUTO: 57 %
PHOSPHATE SERPL-MCNC: 3.8 MG/DL (ref 2.5–4.5)
PLATELET # BLD AUTO: 140 X10E3/UL (ref 150–379)
POTASSIUM SERPL-SCNC: 4.8 MMOL/L (ref 3.5–5.2)
PROT SERPL-MCNC: 6.1 G/DL (ref 6–8.5)
PROTHROMBIN TIME: 10.3 SEC (ref 9.1–12)
RBC # BLD AUTO: 3.66 X10E6/UL (ref 4.14–5.8)
SODIUM SERPL-SCNC: 141 MMOL/L (ref 134–144)
URATE SERPL-MCNC: 9 MG/DL (ref 3.7–8.6)
WBC # BLD AUTO: 6.3 X10E3/UL (ref 3.4–10.8)

## 2019-01-04 ENCOUNTER — PATIENT OUTREACH (OUTPATIENT)
Dept: HEMATOLOGY | Age: 64
End: 2019-01-04

## 2019-01-04 DIAGNOSIS — Z94.4 H/O LIVER TRANSPLANT (HCC): Primary | ICD-10-CM

## 2019-01-04 LAB
ALBUMIN SERPL-MCNC: 3.7 G/DL (ref 3.6–4.8)
ALBUMIN/GLOB SERPL: 1.5 {RATIO} (ref 1.2–2.2)
ALP SERPL-CCNC: 88 IU/L (ref 39–117)
ALT SERPL-CCNC: 9 IU/L (ref 0–44)
AST SERPL-CCNC: 14 IU/L (ref 0–40)
BASOPHILS # BLD AUTO: 0.2 X10E3/UL (ref 0–0.2)
BASOPHILS NFR BLD AUTO: 3 %
BILIRUB SERPL-MCNC: 0.4 MG/DL (ref 0–1.2)
BUN SERPL-MCNC: 22 MG/DL (ref 8–27)
BUN/CREAT SERPL: 19 (ref 10–24)
CALCIUM SERPL-MCNC: 9.2 MG/DL (ref 8.6–10.2)
CHLORIDE SERPL-SCNC: 106 MMOL/L (ref 96–106)
CO2 SERPL-SCNC: 24 MMOL/L (ref 20–29)
CREAT SERPL-MCNC: 1.18 MG/DL (ref 0.76–1.27)
EOSINOPHIL # BLD AUTO: 0.6 X10E3/UL (ref 0–0.4)
EOSINOPHIL NFR BLD AUTO: 13 %
ERYTHROCYTE [DISTWIDTH] IN BLOOD BY AUTOMATED COUNT: 14.6 % (ref 12.3–15.4)
GLOBULIN SER CALC-MCNC: 2.4 G/DL (ref 1.5–4.5)
GLUCOSE SERPL-MCNC: 67 MG/DL (ref 65–99)
HCT VFR BLD AUTO: 32.1 % (ref 37.5–51)
HGB BLD-MCNC: 10.4 G/DL (ref 13–17.7)
IMM GRANULOCYTES # BLD: 0 X10E3/UL (ref 0–0.1)
IMM GRANULOCYTES NFR BLD: 0 %
LYMPHOCYTES # BLD AUTO: 0.9 X10E3/UL (ref 0.7–3.1)
LYMPHOCYTES NFR BLD AUTO: 19 %
MCH RBC QN AUTO: 29.1 PG (ref 26.6–33)
MCHC RBC AUTO-ENTMCNC: 32.4 G/DL (ref 31.5–35.7)
MCV RBC AUTO: 90 FL (ref 79–97)
MONOCYTES # BLD AUTO: 0.5 X10E3/UL (ref 0.1–0.9)
MONOCYTES NFR BLD AUTO: 9 %
NEUTROPHILS # BLD AUTO: 2.8 X10E3/UL (ref 1.4–7)
NEUTROPHILS NFR BLD AUTO: 56 %
PLATELET # BLD AUTO: 120 X10E3/UL (ref 150–379)
POTASSIUM SERPL-SCNC: 5.1 MMOL/L (ref 3.5–5.2)
PROT SERPL-MCNC: 6.1 G/DL (ref 6–8.5)
RBC # BLD AUTO: 3.57 X10E6/UL (ref 4.14–5.8)
SODIUM SERPL-SCNC: 144 MMOL/L (ref 134–144)
TACROLIMUS, 700249: 9.4 NG/ML (ref 2–20)
WBC # BLD AUTO: 5 X10E3/UL (ref 3.4–10.8)

## 2019-01-04 NOTE — PROGRESS NOTES
Spoke to patient's wife, Courtney Hayes, regarding most recent labs. Notified to decrease Tacrolimus from 3 mg BID to 3 mg in the morning and 2 mg in the evening. She verbalized understanding.

## 2019-01-05 LAB — TACROLIMUS, 700249: 6.5 NG/ML (ref 2–20)

## 2019-01-07 LAB
BASOPHILS # BLD AUTO: 0.1 X10E3/UL (ref 0–0.2)
BASOPHILS NFR BLD AUTO: 2 %
EOSINOPHIL # BLD AUTO: 0.3 X10E3/UL (ref 0–0.4)
EOSINOPHIL NFR BLD AUTO: 6 %
ERYTHROCYTE [DISTWIDTH] IN BLOOD BY AUTOMATED COUNT: 14.5 % (ref 12.3–15.4)
HCT VFR BLD AUTO: 31.2 % (ref 37.5–51)
HGB BLD-MCNC: 10.6 G/DL (ref 13–17.7)
IMM GRANULOCYTES # BLD AUTO: 0 X10E3/UL (ref 0–0.1)
IMM GRANULOCYTES NFR BLD AUTO: 0 %
LYMPHOCYTES # BLD AUTO: 0.9 X10E3/UL (ref 0.7–3.1)
LYMPHOCYTES NFR BLD AUTO: 18 %
MCH RBC QN AUTO: 28.6 PG (ref 26.6–33)
MCHC RBC AUTO-ENTMCNC: 34 G/DL (ref 31.5–35.7)
MCV RBC AUTO: 84 FL (ref 79–97)
MONOCYTES # BLD AUTO: 0.4 X10E3/UL (ref 0.1–0.9)
MONOCYTES NFR BLD AUTO: 8 %
NEUTROPHILS # BLD AUTO: 3.3 X10E3/UL (ref 1.4–7)
NEUTROPHILS NFR BLD AUTO: 66 %
PLATELET # BLD AUTO: 116 X10E3/UL (ref 150–379)
RBC # BLD AUTO: 3.7 X10E6/UL (ref 4.14–5.8)
WBC # BLD AUTO: 5 X10E3/UL (ref 3.4–10.8)

## 2019-01-08 LAB
ALBUMIN SERPL-MCNC: 3.9 G/DL (ref 3.6–4.8)
ALBUMIN/GLOB SERPL: 1.5 {RATIO} (ref 1.2–2.2)
ALP SERPL-CCNC: 88 IU/L (ref 39–117)
ALT SERPL-CCNC: 15 IU/L (ref 0–44)
AST SERPL-CCNC: 16 IU/L (ref 0–40)
BILIRUB SERPL-MCNC: 0.4 MG/DL (ref 0–1.2)
BUN SERPL-MCNC: 22 MG/DL (ref 8–27)
BUN/CREAT SERPL: 17 (ref 10–24)
CALCIUM SERPL-MCNC: 9.3 MG/DL (ref 8.6–10.2)
CHLORIDE SERPL-SCNC: 107 MMOL/L (ref 96–106)
CO2 SERPL-SCNC: 24 MMOL/L (ref 20–29)
CREAT SERPL-MCNC: 1.28 MG/DL (ref 0.76–1.27)
GLOBULIN SER CALC-MCNC: 2.6 G/DL (ref 1.5–4.5)
GLUCOSE SERPL-MCNC: 67 MG/DL (ref 65–99)
INR PPP: 1 (ref 0.8–1.2)
MAGNESIUM SERPL-MCNC: 1.6 MG/DL (ref 1.6–2.3)
PHOSPHATE SERPL-MCNC: 4.1 MG/DL (ref 2.5–4.5)
POTASSIUM SERPL-SCNC: 4.8 MMOL/L (ref 3.5–5.2)
PROT SERPL-MCNC: 6.5 G/DL (ref 6–8.5)
PROTHROMBIN TIME: 10.5 SEC (ref 9.1–12)
SODIUM SERPL-SCNC: 144 MMOL/L (ref 134–144)
URATE SERPL-MCNC: 8.1 MG/DL (ref 3.7–8.6)

## 2019-01-09 LAB — TACROLIMUS, 700249: 10.8 NG/ML (ref 2–20)

## 2019-01-10 DIAGNOSIS — Z94.4 H/O LIVER TRANSPLANT (HCC): Primary | ICD-10-CM

## 2019-01-10 DIAGNOSIS — Z94.4 H/O LIVER TRANSPLANT (HCC): ICD-10-CM

## 2019-01-10 LAB
BASOPHILS # BLD AUTO: 0.1 X10E3/UL (ref 0–0.2)
BASOPHILS NFR BLD AUTO: 3 %
EOSINOPHIL # BLD AUTO: 0.2 X10E3/UL (ref 0–0.4)
EOSINOPHIL NFR BLD AUTO: 5 %
ERYTHROCYTE [DISTWIDTH] IN BLOOD BY AUTOMATED COUNT: 14.6 % (ref 12.3–15.4)
HCT VFR BLD AUTO: 33.1 % (ref 37.5–51)
HGB BLD-MCNC: 11.1 G/DL (ref 13–17.7)
IMM GRANULOCYTES # BLD AUTO: 0 X10E3/UL (ref 0–0.1)
IMM GRANULOCYTES NFR BLD AUTO: 0 %
LYMPHOCYTES # BLD AUTO: 1 X10E3/UL (ref 0.7–3.1)
LYMPHOCYTES NFR BLD AUTO: 22 %
MCH RBC QN AUTO: 28.8 PG (ref 26.6–33)
MCHC RBC AUTO-ENTMCNC: 33.5 G/DL (ref 31.5–35.7)
MCV RBC AUTO: 86 FL (ref 79–97)
MONOCYTES # BLD AUTO: 0.4 X10E3/UL (ref 0.1–0.9)
MONOCYTES NFR BLD AUTO: 8 %
NEUTROPHILS # BLD AUTO: 2.7 X10E3/UL (ref 1.4–7)
NEUTROPHILS NFR BLD AUTO: 62 %
PLATELET # BLD AUTO: 113 X10E3/UL (ref 150–379)
RBC # BLD AUTO: 3.86 X10E6/UL (ref 4.14–5.8)
WBC # BLD AUTO: 4.4 X10E3/UL (ref 3.4–10.8)

## 2019-01-11 ENCOUNTER — OFFICE VISIT (OUTPATIENT)
Dept: HEMATOLOGY | Age: 64
End: 2019-01-11

## 2019-01-11 VITALS
OXYGEN SATURATION: 98 % | BODY MASS INDEX: 34.55 KG/M2 | TEMPERATURE: 97.3 F | DIASTOLIC BLOOD PRESSURE: 69 MMHG | HEIGHT: 71 IN | WEIGHT: 246.8 LBS | SYSTOLIC BLOOD PRESSURE: 157 MMHG | HEART RATE: 72 BPM

## 2019-01-11 DIAGNOSIS — C22.0 HEPATOCELLULAR CARCINOMA (HCC): ICD-10-CM

## 2019-01-11 DIAGNOSIS — Z94.4 H/O LIVER TRANSPLANT (HCC): Primary | ICD-10-CM

## 2019-01-11 LAB
ALBUMIN SERPL-MCNC: 3.8 G/DL (ref 3.6–4.8)
ALBUMIN/GLOB SERPL: 1.5 {RATIO} (ref 1.2–2.2)
ALP SERPL-CCNC: 86 IU/L (ref 39–117)
ALT SERPL-CCNC: 12 IU/L (ref 0–44)
AST SERPL-CCNC: 16 IU/L (ref 0–40)
BILIRUB SERPL-MCNC: 0.4 MG/DL (ref 0–1.2)
BUN SERPL-MCNC: 21 MG/DL (ref 8–27)
BUN/CREAT SERPL: 17 (ref 10–24)
CALCIUM SERPL-MCNC: 9.1 MG/DL (ref 8.6–10.2)
CHLORIDE SERPL-SCNC: 104 MMOL/L (ref 96–106)
CO2 SERPL-SCNC: 24 MMOL/L (ref 20–29)
CREAT SERPL-MCNC: 1.23 MG/DL (ref 0.76–1.27)
GLOBULIN SER CALC-MCNC: 2.6 G/DL (ref 1.5–4.5)
GLUCOSE SERPL-MCNC: 65 MG/DL (ref 65–99)
INR PPP: 1 (ref 0.8–1.2)
MAGNESIUM SERPL-MCNC: 1.6 MG/DL (ref 1.6–2.3)
PHOSPHATE SERPL-MCNC: 4.1 MG/DL (ref 2.5–4.5)
POTASSIUM SERPL-SCNC: 4.9 MMOL/L (ref 3.5–5.2)
PROT SERPL-MCNC: 6.4 G/DL (ref 6–8.5)
PROTHROMBIN TIME: 10.4 SEC (ref 9.1–12)
SODIUM SERPL-SCNC: 142 MMOL/L (ref 134–144)
URATE SERPL-MCNC: 7.9 MG/DL (ref 3.7–8.6)

## 2019-01-11 RX ORDER — GABAPENTIN 300 MG/1
300 CAPSULE ORAL
Qty: 90 CAP | Refills: 3 | Status: SHIPPED | OUTPATIENT
Start: 2019-01-11 | End: 2020-06-19

## 2019-01-11 NOTE — PROGRESS NOTES
[address] [facility] [facility] [address] [facility] Nita Guevara MD, Temo Carroll, [facility] MELVA Vega PA-C Marylin Arm, [facility]   MELVA Durand NP [facility] 
  at [address] 
  [address] [place] [address], [address] [address]  22. 
  [phone] FAX: [address] 
  [facility] 
  [address], [address] [facility], [address] 
  [phone] FAX: [phone] Patient Care Team: 
John Quintero MD as PCP - [facility]) Kelly Claire MD (Nephrology) Mortimer Helena, MD (Gastroenterology) Guillermina Salmeron MD as Physician (Internal Medicine) Jimenez Matthews MD as Physician (Neurology) Problem List  Date Reviewed: 3/16/2019 Codes Class Noted H/O liver transplant ([facility].) ICD-10-CM: Z94.4 ICD-9-CM: V42.7  12/1/2018 Long-term use of immunosuppressant medication ICD-10-CM: Z79.899 ICD-9-CM: V58.69  12/1/2018 Liver transplant complication (HCC) Y-89-QA: T86.40 ICD-9-CM: 996.82  12/1/2018 Slow rate of speech ICD-10-CM: R47.89 ICD-9-CM: 784.59  12/1/2018 Hepatic encephalopathy ([facility].) ICD-10-CM: K72.90 ICD-9-CM: 572.2  9/19/2018 Type 2 diabetes with nephropathy ([facility].) ICD-10-CM: E11.21 
ICD-9-CM: 250.40, 583.81  3/29/2018 Neuropathy involving both lower extremities ICD-10-CM: G57.93 
ICD-9-CM: 356.9  1/2/2018 CAMEJO (nonalcoholic steatohepatitis) ICD-10-CM: E36.22 ICD-9-CM: 571.8  11/2/2017 GERD (gastroesophageal reflux disease) (Chronic) ICD-10-CM: K21.9 ICD-9-CM: 530.81  2/22/2016 CAD (coronary artery disease) (Chronic) ICD-10-CM: I25.10 ICD-9-CM: 414.00  2/22/2016 DM type 2 (diabetes mellitus, type 2) (HCC) (Chronic) ICD-10-CM: E11.9 ICD-9-CM: 250.00  2/22/2016 Allison Abreu returns to the [place] [address] for management of liver transplant graft function, to monitor and adjust immune suppression and to assess for recurrence of the primary liver disease. The active problem list, all pertinent past medical history, medications, liver histology, endoscopic studies, radiologic findings and laboratory findings related to the liver disorder were reviewed with the patient. The patient underwent a liver transplant at [facility] [place] in 10/2018. He was found to have a small [address] in his liver explant. About 3 weeks after liver transplant was noted a change in his speech pattern. Very slow and robotic like. An MRI of the brain was performed in 11/2018. This demonstrated enlarged ventricles and some white matter disease. The patient is taking the following for immune suppression: Tacrolimus, cellcept, prednisone The patient notes great improvement in speech pattern. Engergy and activity are improving. The patient has not experienced fevers, chills, The patient has limitations in functional activities which can be attributed to the recent transplant surgery and prior liver disease. ASSESSMENT AND PLAN: 
Liver transplant This was for cirrhosis secondary to CAMEJO. An incidental hepatocellular carcinoma was found in the liver explant. The most recent CT scan to screen for [address] prior to LT was in 6/2018. NO suspicious liver mass was noted. Liver transaminases are normal.  ALP is normal.  Liver function is normal.  The platelet count is normal.   
 
Immune Suppression The patient is receiving immune suppression with tacrolimus which is being well tolerated with no side effects. The immune suppression blood level was high last week. But he had tooked TAC prior to blood work. Will continue t monitor TAC level. 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 MRI of the head from 11/2018 demonstrated enlarged ventricles. A head CT from 8/2018 did not mention enlarged ventricles. He has seen Neurology and they do not feel therre ae any issues. They will see him back for follow-up Anemia This is due to multifactorial causes including recent surgery. The HB is stable. No evidence of ongoing GI blood loss. Will obtain FE panel to assess for iron stores. Prevention of infections The patient is taking Nystatin swish and swallow to prevent thrush. 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 This will continue for 3 months post-LT. The patient is taking bactrim to prevent PCP pneumonia. This will continue for 6 months post-LT. Acute and chronic kidney injury This is a common adverse event of immune suppression. The Screat is normal. 
NSAIDs should be avoided since these agents can worsen renal insufficiency. Hypercholesterolemia This can be caused by immune suppression. Serum cholesterol will be monitored at periodic intervals. Hypertension This is a common side effect of immune suppression. Blood pressure is well controlled on the current treatment. Low serum magnesium This is a common side effect of immune suppression. The patient is taking a magnesium supplement. The patient appears to be tolerating the current dose of oral magnesium without side effects. Laboratory studies demonstrate serum magnesium level is only slightly low. Osteoporosis Osteoporosis is commin in patients with cirhrosis prior to liver transplant. The patient had a normal bone density prior to undergoing liver transplant. Monitoring for skin Cancer The patient was counseled regarding increased risk of skin cancer in transplant recipients and need to have any new skin lesions evaluated by dermatology and removed if suspicious. The patient was instructed to see Dermatology annually examination. Vaccinations Routine vaccinations against other bacterial and viral agents can be performed as long as this is with attentuatted virus. Live virus vaccines should not be administered. Annual flu vaccination should be administered. ALLERGIES No Known Allergies MEDICATIONS Current Outpatient Medications Medication Sig  
 gabapentin (NEURONTIN) 300 mg capsule Take 1 Cap by mouth nightly.  lisinopril (PRINIVIL, ZESTRIL) 10 mg tablet Take 10 mg by mouth daily.  magnesium oxide (MAG-OX) 400 mg tablet Take 1 Tab by mouth two (2) times a day.  amLODIPine (NORVASC) 10 mg tablet Take 1 Tab by mouth daily.  insulin detemir U-100 (LEVEMIR FLEXTOUCH) 100 unit/mL (3 mL) inpn 20 Units by SubCUTAneous route nightly.  tacrolimus (PROGRAF) 1 mg capsule Take 2 mg by mouth two (2) times a day.  multivitamin (ONE A DAY) tablet Take 1 Tab by mouth daily.  trimethoprim-sulfamethoxazole (BACTRIM)  mg per tablet Take 1 Tab by mouth daily.  aspirin delayed-release 81 mg tablet Take 81 mg by mouth daily.  levothyroxine (SYNTHROID) 75 mcg tablet Take 75 mcg by mouth Daily (before breakfast).  cholecalciferol, vitamin D3, (VITAMIN D3) 2,000 unit tab Take 1 Tab by mouth two (2) times a day.  pantoprazole (PROTONIX) 40 mg tablet Take 40 mg by mouth daily.  metFORMIN (GLUCOPHAGE) 500 mg tablet Take  by mouth two (2) times daily (with meals).  triamcinolone acetonide (KENALOG) 0.1 % topical cream Apply  to affected area two (2) times a day. use thin layer No current facility-administered medications for this visit. SYSTEM REVIEW NOT RELATED TO LIVER DISEASE OR REVIEWED ABOVE: 
Constitution systems: Negative for fever, chills, weight gain, weight loss. Eyes: Negative for visual changes. ENT: Negative for sore throat, painful swallowing. Respiratory: Negative for cough, hemoptysis, SOB. Cardiology: Negative for chest pain, palpitations. GI:  Negative for constipation or diarrhea. : Negative for urinary frequency, dysuria, hematuria, nocturia. Skin: Negative for rash. Hematology: Negative for easy bruising, blood clots. Musculo-skelatal: Negative for back pain, muscle pain, weakness. Neurologic: Negative for headaches, dizziness, vertigo, memory problems not related to HE. Psychology: Negative for anxiety, depression. FAMILY HISTORY: 
The father  of prostate cancer. The mother  of multiple myeloma. There is no family history of liver disease.   
  
SOCIAL HISTORY: 
The patient is . The patient has 3 children. The patient occasional cigar. The patient has been abstinent from alcohol since 2016. The patient does not work, he is on social security. PHYSICAL EXAMINATION: 
Visit Vitals /69 (BP 1 Location: Left arm, BP Patient Position: Sitting) Pulse 72 Temp 97.3 °F (36.3 °C) (Tympanic) Ht 5' 11\" (1.803 m) Wt 246 lb 12.8 oz (111.9 kg) SpO2 98% BMI 34.42 kg/m² General: No acute distress. Eyes: Sclera anicteric. ENT: No oral lesions. Thyroid normal. 
Nodes: No adenopathy. Skin: No spider angiomata. No jaundice. No palmar erythema. Respiratory: Lungs clear to auscultation. Cardiovascular: Regular heart rate. No murmurs. No JVD. Abdomen: Normal liver transplant incision that is healed. Soft non-tender. Liver size normal to percussion/palpation. Spleen not palpable. No obvious ascites. Extremities: No edema. Muscle wasting. Neurologic: Alert and oriented. Cranial nerves grossly intact. LABORATORY STUDIES: 
Liver [place] [address] Units 2018 WBC 3.4 - 10.8 x10E3/uL 8.0 7.6 ANC 1.4 - 7.0 x10E3/uL 5.3 5.3 HGB 13.0 - 17.7 g/dL 9.7 (L) 9.3 (L)  - 379 x10E3/uL 179 174 INR 0.8 - 1.2 1.1 1.1 AST 0 - 40 IU/L 12 11 ALT 0 - 44 IU/L 8 10 Alk Phos 39 - 117 IU/L 103 103 Bili, Total 0.0 - 1.2 mg/dL 0.8 0.9 Bili, Direct 0.0 - 0.2 MG/DL Albumin 3.6 - 4.8 g/dL 3.7 3.6 BUN 8 - 27 mg/dL 14 14 BUN (iSTAT) 9 - 20 mg/dL Creat 0.76 - 1.27 mg/dL 1.13 1.11 Creat (iSTAT) 0.6 - 1.3 mg/dL Na 134 - 144 mmol/L 144 142  
K 3.5 - 5.2 mmol/L 4.0 3.9 Cl 96 - 106 mmol/L 106 106 CO2 20 - 29 mmol/L 23 22 Glucose 65 - 99 mg/dL 96 98 Magnesium 1.6 - 2.3 mg/dL 1.3 (L) 1.2 (L) Ammonia ug/dL Qs/Qt % for Liver Shunt Study SEROLOGIES: 
7/2018. HAV total positive, HBsurface antigne negative, anti-HBcore negative, anti-HBsurface negative, anti-HCV negative, Anti-HIV negative, CNV IgG negative, HSV IgG positive LIVER HISTOLOGY: 
7/2014: Cirhosis with mild mixed macro- and microvesicular steatosis. ENDOSCOPIC PROCEDURES: 
Not available or performed RADIOLOGY: 
Not available or performed OTHER TESTING: 
Not available or performed FOLLOW-UP: 
All of the issues listed above in the Assessment and Plan were discussed with the patient. All questions were answered. The patient expressed a clear understanding of the above. Laboratory studies should be performed twice weekly to assess liver graft function and blood levels of immune suppression. The patient has a follow-up appointment at the liver transplant center in 1 months. Follow-up Diallo Jonatan Murphy 32 1 week to review resultss of MRI MD Yeison Orozco Deputado Ender De Castañeda [address] [place] [address], [address] [facility], [facility] 22. 
[phone] [address] [place] [address]

## 2019-01-11 NOTE — PROGRESS NOTES
Chief Complaint Patient presents with  Follow-up Visit Vitals /69 (BP 1 Location: Left arm, BP Patient Position: Sitting) Pulse 72 Temp 97.3 °F (36.3 °C) (Tympanic) Ht 5' 11\" (1.803 m) Wt 246 lb 12.8 oz (111.9 kg) SpO2 98% BMI 34.42 kg/m² PHQ over the last two weeks 12/3/2018 Little interest or pleasure in doing things Several days Feeling down, depressed, irritable, or hopeless Several days Total Score PHQ 2 2 1. Have you been to the ER, urgent care clinic since your last visit? Hospitalized since your last visit? No 
 
2. Have you seen or consulted any other health care providers outside of the [address] [place] [address] since your last visit? Include any pap smears or colon screening.  No

## 2019-01-13 LAB — TACROLIMUS, 700249: 5.3 NG/ML (ref 2–20)

## 2019-01-24 ENCOUNTER — DOCUMENTATION ONLY (OUTPATIENT)
Dept: HEMATOLOGY | Age: 64
End: 2019-01-24

## 2019-01-24 ENCOUNTER — HOSPITAL ENCOUNTER (OUTPATIENT)
Dept: CT IMAGING | Age: 64
Discharge: HOME | End: 2019-01-24
Attending: INTERNAL MEDICINE
Payer: COMMERCIAL

## 2019-01-24 DIAGNOSIS — Z94.4 H/O LIVER TRANSPLANT (HCC): ICD-10-CM

## 2019-01-24 DIAGNOSIS — C22.0 HEPATOCELLULAR CARCINOMA (HCC): ICD-10-CM

## 2019-01-24 PROCEDURE — 74178 CT ABD&PLV WO CNTR FLWD CNTR: CPT

## 2019-01-24 PROCEDURE — 74011000258 HC RX REV CODE- 258: Performed by: INTERNAL MEDICINE

## 2019-01-24 PROCEDURE — 74011636320 HC RX REV CODE- 636/320: Performed by: INTERNAL MEDICINE

## 2019-01-24 PROCEDURE — 71260 CT THORAX DX C+: CPT

## 2019-01-24 RX ORDER — SODIUM CHLORIDE 0.9 % (FLUSH) 0.9 %
10 SYRINGE (ML) INJECTION ONCE
Status: COMPLETED | OUTPATIENT
Start: 2019-01-24 | End: 2019-01-24

## 2019-01-24 RX ADMIN — SODIUM CHLORIDE 50 ML: 900 INJECTION, SOLUTION INTRAVENOUS at 16:34

## 2019-01-24 RX ADMIN — Medication 10 ML: at 16:34

## 2019-01-24 RX ADMIN — IOPAMIDOL 100 ML: 755 INJECTION, SOLUTION INTRAVENOUS at 16:34

## 2019-01-24 NOTE — PROGRESS NOTES
Received phone call from [facility] at [facility][note 3] regarding outpatient CTs. He inquired if chest CT without contrast could be changed to a chest CT with contrast due to indication and conjunction with contrasted abdominal study. Phone call placed to Adonis Akhtar team. Notified of CPT change with chest CT and asked for insurance to be notified for authorization purposes. Team will let office know if further action is warranted after speaking with insurance.

## 2019-01-28 ENCOUNTER — TELEPHONE (OUTPATIENT)
Dept: HEMATOLOGY | Age: 64
End: 2019-01-28

## 2019-01-28 NOTE — TELEPHONE ENCOUNTER
Dr. Sheila Mills notified of critical lab via [address]. Received instructions to hold Cellcept. Also received order to begin Lasix 40 mg daily for ascites and pleural effusion noted on CT scans from 1/24/2019. Phone call placed to patient. Received VM. Left message notifying patient to hold Cellcept until advised otherwise. Advised patient to begin diuretic due to fluid noted on imaging. Asked that patient notify NN if he needs a prescription to be sent to the pharmacy.

## 2019-02-06 ENCOUNTER — PATIENT OUTREACH (OUTPATIENT)
Dept: HEMATOLOGY | Age: 64
End: 2019-02-06

## 2019-02-06 DIAGNOSIS — Z94.4 H/O LIVER TRANSPLANT (HCC): Primary | ICD-10-CM

## 2019-02-06 NOTE — PROGRESS NOTES
Reviewed most recent labs with Dr. Geovanni Delgado. Received VO to decrease Tacrolimus to 2 mg BID. Phone call placed to patient. Reviewed labs with him. Advised patient to decrease Tacrolimus from 3 mg in the morning to 2 mg in the morning (continue with 2 mg in the evening). Also advised him to stop Acyclovir. Patient will continue with weekly labs. [employer] message sent to patient regarding changes.

## 2019-02-12 LAB
ALBUMIN SERPL-MCNC: 3.8 G/DL (ref 3.6–4.8)
ALP SERPL-CCNC: 85 IU/L (ref 39–117)
ALT SERPL-CCNC: 10 IU/L (ref 0–44)
AST SERPL-CCNC: 13 IU/L (ref 0–40)
BASOPHILS # BLD AUTO: 0.1 X10E3/UL (ref 0–0.2)
BASOPHILS NFR BLD AUTO: 5 %
BILIRUB DIRECT SERPL-MCNC: 0.16 MG/DL (ref 0–0.4)
BILIRUB SERPL-MCNC: 0.6 MG/DL (ref 0–1.2)
BUN SERPL-MCNC: 25 MG/DL (ref 8–27)
BUN/CREAT SERPL: 18 (ref 10–24)
CALCIUM SERPL-MCNC: 9 MG/DL (ref 8.6–10.2)
CHLORIDE SERPL-SCNC: 104 MMOL/L (ref 96–106)
CO2 SERPL-SCNC: 25 MMOL/L (ref 20–29)
CREAT SERPL-MCNC: 1.38 MG/DL (ref 0.76–1.27)
EOSINOPHIL # BLD AUTO: 0.2 X10E3/UL (ref 0–0.4)
EOSINOPHIL NFR BLD AUTO: 10 %
ERYTHROCYTE [DISTWIDTH] IN BLOOD BY AUTOMATED COUNT: 14.1 % (ref 12.3–15.4)
GLUCOSE SERPL-MCNC: 84 MG/DL (ref 65–99)
HCT VFR BLD AUTO: 32.8 % (ref 37.5–51)
HGB BLD-MCNC: 11.4 G/DL (ref 13–17.7)
IMMATURE CELLS, 115398: ABNORMAL
INR PPP: 1 (ref 0.8–1.2)
LYMPHOCYTES # BLD AUTO: 0.8 X10E3/UL (ref 0.7–3.1)
LYMPHOCYTES NFR BLD AUTO: 42 %
MAGNESIUM SERPL-MCNC: 1.7 MG/DL (ref 1.6–2.3)
MCH RBC QN AUTO: 28.7 PG (ref 26.6–33)
MCHC RBC AUTO-ENTMCNC: 34.8 G/DL (ref 31.5–35.7)
MCV RBC AUTO: 83 FL (ref 79–97)
METAMYELOCYTES NFR BLD: 2 %
MONOCYTES # BLD AUTO: 0.2 X10E3/UL (ref 0.1–0.9)
MONOCYTES NFR BLD AUTO: 13 %
MORPHOLOGY BLD-IMP: ABNORMAL
NEUTROPHILS # BLD AUTO: 0.5 X10E3/UL (ref 1.4–7)
NEUTROPHILS NFR BLD AUTO: 28 %
PLATELET # BLD AUTO: 91 X10E3/UL (ref 150–379)
POTASSIUM SERPL-SCNC: 4.7 MMOL/L (ref 3.5–5.2)
PROT SERPL-MCNC: 6.1 G/DL (ref 6–8.5)
PROTHROMBIN TIME: 10.4 SEC (ref 9.1–12)
RBC # BLD AUTO: 3.97 X10E6/UL (ref 4.14–5.8)
SODIUM SERPL-SCNC: 144 MMOL/L (ref 134–144)
TACROLIMUS, 700249: 8.3 NG/ML (ref 2–20)
WBC # BLD AUTO: 1.9 X10E3/UL (ref 3.4–10.8)

## 2019-02-18 DIAGNOSIS — Z94.4 H/O LIVER TRANSPLANT (HCC): Primary | ICD-10-CM

## 2019-02-18 LAB
BASOPHILS # BLD AUTO: 0.1 X10E3/UL (ref 0–0.2)
BASOPHILS NFR BLD AUTO: 2 %
EOSINOPHIL # BLD AUTO: 0.1 X10E3/UL (ref 0–0.4)
EOSINOPHIL NFR BLD AUTO: 4 %
ERYTHROCYTE [DISTWIDTH] IN BLOOD BY AUTOMATED COUNT: 14.6 % (ref 12.3–15.4)
HCT VFR BLD AUTO: 32.5 % (ref 37.5–51)
HGB BLD-MCNC: 11.4 G/DL (ref 13–17.7)
LYMPHOCYTES # BLD AUTO: 0.8 X10E3/UL (ref 0.7–3.1)
LYMPHOCYTES NFR BLD AUTO: 28 %
MCH RBC QN AUTO: 29.5 PG (ref 26.6–33)
MCHC RBC AUTO-ENTMCNC: 35.1 G/DL (ref 31.5–35.7)
MCV RBC AUTO: 84 FL (ref 79–97)
MONOCYTES # BLD AUTO: 0.4 X10E3/UL (ref 0.1–0.9)
MONOCYTES NFR BLD AUTO: 15 %
MORPHOLOGY BLD-IMP: ABNORMAL
NEUTROPHILS # BLD AUTO: 1.4 X10E3/UL (ref 1.4–7)
NEUTROPHILS NFR BLD AUTO: 51 %
PLATELET # BLD AUTO: 97 X10E3/UL (ref 150–379)
RBC # BLD AUTO: 3.86 X10E6/UL (ref 4.14–5.8)
WBC # BLD AUTO: 2.7 X10E3/UL (ref 3.4–10.8)

## 2019-02-19 LAB
25(OH)D3+25(OH)D2 SERPL-MCNC: 45.7 NG/ML (ref 30–100)
ALBUMIN SERPL-MCNC: 3.8 G/DL (ref 3.6–4.8)
ALBUMIN/GLOB SERPL: 1.4 {RATIO} (ref 1.2–2.2)
ALP SERPL-CCNC: 102 IU/L (ref 39–117)
ALT SERPL-CCNC: 19 IU/L (ref 0–44)
AST SERPL-CCNC: 16 IU/L (ref 0–40)
BILIRUB SERPL-MCNC: 0.6 MG/DL (ref 0–1.2)
BUN SERPL-MCNC: 24 MG/DL (ref 8–27)
BUN/CREAT SERPL: 18 (ref 10–24)
CALCIUM SERPL-MCNC: 9.5 MG/DL (ref 8.6–10.2)
CHLORIDE SERPL-SCNC: 104 MMOL/L (ref 96–106)
CHOLEST SERPL-MCNC: 145 MG/DL (ref 100–199)
CO2 SERPL-SCNC: 26 MMOL/L (ref 20–29)
CREAT SERPL-MCNC: 1.3 MG/DL (ref 0.76–1.27)
EST. AVERAGE GLUCOSE BLD GHB EST-MCNC: 91 MG/DL
GLOBULIN SER CALC-MCNC: 2.7 G/DL (ref 1.5–4.5)
GLUCOSE SERPL-MCNC: 129 MG/DL (ref 65–99)
HBA1C MFR BLD: 4.8 % (ref 4.8–5.6)
HDLC SERPL-MCNC: 33 MG/DL
INR PPP: 1 (ref 0.8–1.2)
LDLC SERPL CALC-MCNC: 93 MG/DL (ref 0–99)
MAGNESIUM SERPL-MCNC: 1.9 MG/DL (ref 1.6–2.3)
POTASSIUM SERPL-SCNC: 5.4 MMOL/L (ref 3.5–5.2)
PROT SERPL-MCNC: 6.5 G/DL (ref 6–8.5)
PROTHROMBIN TIME: 10 SEC (ref 9.1–12)
SODIUM SERPL-SCNC: 143 MMOL/L (ref 134–144)
T4 FREE SERPL-MCNC: 1.23 NG/DL (ref 0.82–1.77)
TACROLIMUS, 700249: 6.5 NG/ML (ref 2–20)
TRIGL SERPL-MCNC: 93 MG/DL (ref 0–149)
TSH SERPL DL<=0.005 MIU/L-ACNC: 5.14 UIU/ML (ref 0.45–4.5)
VLDLC SERPL CALC-MCNC: 19 MG/DL (ref 5–40)

## 2019-02-26 LAB
ALBUMIN SERPL-MCNC: 3.7 G/DL (ref 3.6–4.8)
ALBUMIN/GLOB SERPL: 1.4 {RATIO} (ref 1.2–2.2)
ALP SERPL-CCNC: 103 IU/L (ref 39–117)
ALT SERPL-CCNC: 13 IU/L (ref 0–44)
AST SERPL-CCNC: 17 IU/L (ref 0–40)
BASOPHILS # BLD AUTO: NORMAL 10*3/UL
BILIRUB SERPL-MCNC: 0.4 MG/DL (ref 0–1.2)
BUN SERPL-MCNC: 39 MG/DL (ref 8–27)
BUN/CREAT SERPL: 25 (ref 10–24)
CALCIUM SERPL-MCNC: 9.1 MG/DL (ref 8.6–10.2)
CHLORIDE SERPL-SCNC: 105 MMOL/L (ref 96–106)
CO2 SERPL-SCNC: 26 MMOL/L (ref 20–29)
CREAT SERPL-MCNC: 1.58 MG/DL (ref 0.76–1.27)
EOSINOPHIL # BLD AUTO: NORMAL 10*3/UL
EOSINOPHIL NFR BLD AUTO: NORMAL %
GLOBULIN SER CALC-MCNC: 2.6 G/DL (ref 1.5–4.5)
GLUCOSE SERPL-MCNC: 88 MG/DL (ref 65–99)
HCT VFR BLD AUTO: NORMAL %
HGB BLD-MCNC: NORMAL G/DL
INR PPP: NORMAL
LYMPHOCYTES # BLD AUTO: NORMAL 10*3/UL
LYMPHOCYTES NFR BLD AUTO: NORMAL %
MAGNESIUM SERPL-MCNC: 2 MG/DL (ref 1.6–2.3)
MONOCYTES NFR BLD AUTO: NORMAL %
NEUTROPHILS NFR BLD AUTO: NORMAL %
PLATELET # BLD AUTO: NORMAL 10*3/UL
POTASSIUM SERPL-SCNC: 5.6 MMOL/L (ref 3.5–5.2)
PROT SERPL-MCNC: 6.3 G/DL (ref 6–8.5)
PROTHROMBIN TIME: NORMAL S
RBC # BLD AUTO: NORMAL 10*6/UL
SODIUM SERPL-SCNC: 145 MMOL/L (ref 134–144)
TACROLIMUS, 700249: NORMAL NG/ML
WBC # BLD AUTO: NORMAL X10E3/UL

## 2019-03-05 LAB
ALBUMIN SERPL-MCNC: 3.8 G/DL (ref 3.6–4.8)
ALBUMIN/GLOB SERPL: 1.7 {RATIO} (ref 1.2–2.2)
ALP SERPL-CCNC: 86 IU/L (ref 39–117)
ALT SERPL-CCNC: 12 IU/L (ref 0–44)
AST SERPL-CCNC: 13 IU/L (ref 0–40)
BASOPHILS # BLD AUTO: 0.2 X10E3/UL (ref 0–0.2)
BASOPHILS NFR BLD AUTO: 4 %
BILIRUB SERPL-MCNC: 0.4 MG/DL (ref 0–1.2)
BUN SERPL-MCNC: 27 MG/DL (ref 8–27)
BUN/CREAT SERPL: 18 (ref 10–24)
CALCIUM SERPL-MCNC: 9.2 MG/DL (ref 8.6–10.2)
CHLORIDE SERPL-SCNC: 106 MMOL/L (ref 96–106)
CO2 SERPL-SCNC: 27 MMOL/L (ref 20–29)
CREAT SERPL-MCNC: 1.49 MG/DL (ref 0.76–1.27)
EOSINOPHIL # BLD AUTO: 0.1 X10E3/UL (ref 0–0.4)
EOSINOPHIL NFR BLD AUTO: 3 %
ERYTHROCYTE [DISTWIDTH] IN BLOOD BY AUTOMATED COUNT: 15.1 % (ref 12.3–15.4)
GLOBULIN SER CALC-MCNC: 2.2 G/DL (ref 1.5–4.5)
GLUCOSE SERPL-MCNC: 97 MG/DL (ref 65–99)
HCT VFR BLD AUTO: 31.8 % (ref 37.5–51)
HGB BLD-MCNC: 10.6 G/DL (ref 13–17.7)
IMM GRANULOCYTES # BLD AUTO: 0 X10E3/UL (ref 0–0.1)
IMM GRANULOCYTES NFR BLD AUTO: 0 %
INR PPP: 1 (ref 0.8–1.2)
LYMPHOCYTES # BLD AUTO: 1.4 X10E3/UL (ref 0.7–3.1)
LYMPHOCYTES NFR BLD AUTO: 36 %
MAGNESIUM SERPL-MCNC: 1.7 MG/DL (ref 1.6–2.3)
MCH RBC QN AUTO: 28.5 PG (ref 26.6–33)
MCHC RBC AUTO-ENTMCNC: 33.3 G/DL (ref 31.5–35.7)
MCV RBC AUTO: 86 FL (ref 79–97)
MONOCYTES # BLD AUTO: 0.4 X10E3/UL (ref 0.1–0.9)
MONOCYTES NFR BLD AUTO: 11 %
NEUTROPHILS # BLD AUTO: 1.8 X10E3/UL (ref 1.4–7)
NEUTROPHILS NFR BLD AUTO: 46 %
PLATELET # BLD AUTO: 125 X10E3/UL (ref 150–379)
POTASSIUM SERPL-SCNC: 5.6 MMOL/L (ref 3.5–5.2)
PROT SERPL-MCNC: 6 G/DL (ref 6–8.5)
PROTHROMBIN TIME: 10.8 SEC (ref 9.1–12)
RBC # BLD AUTO: 3.72 X10E6/UL (ref 4.14–5.8)
SODIUM SERPL-SCNC: 143 MMOL/L (ref 134–144)
TACROLIMUS, 700249: 7.2 NG/ML (ref 2–20)
WBC # BLD AUTO: 3.9 X10E3/UL (ref 3.4–10.8)

## 2019-03-08 ENCOUNTER — PATIENT MESSAGE (OUTPATIENT)
Dept: HEMATOLOGY | Age: 64
End: 2019-03-08

## 2019-03-08 DIAGNOSIS — Z94.4 H/O LIVER TRANSPLANT (HCC): Primary | ICD-10-CM

## 2019-03-12 LAB
ALBUMIN SERPL-MCNC: 4.1 G/DL (ref 3.6–4.8)
ALBUMIN/GLOB SERPL: 1.5 {RATIO} (ref 1.2–2.2)
ALP SERPL-CCNC: 89 IU/L (ref 39–117)
ALT SERPL-CCNC: 9 IU/L (ref 0–44)
AST SERPL-CCNC: 15 IU/L (ref 0–40)
BILIRUB SERPL-MCNC: 0.5 MG/DL (ref 0–1.2)
BUN SERPL-MCNC: 35 MG/DL (ref 8–27)
BUN/CREAT SERPL: 22 (ref 10–24)
CALCIUM SERPL-MCNC: 9.5 MG/DL (ref 8.6–10.2)
CHLORIDE SERPL-SCNC: 104 MMOL/L (ref 96–106)
CO2 SERPL-SCNC: 24 MMOL/L (ref 20–29)
CREAT SERPL-MCNC: 1.56 MG/DL (ref 0.76–1.27)
FERRITIN SERPL-MCNC: 177 NG/ML (ref 30–400)
GLOBULIN SER CALC-MCNC: 2.7 G/DL (ref 1.5–4.5)
GLUCOSE SERPL-MCNC: 120 MG/DL (ref 65–99)
INR PPP: 1 (ref 0.8–1.2)
IRON SATN MFR SERPL: 40 % (ref 15–55)
IRON SERPL-MCNC: 96 UG/DL (ref 38–169)
MAGNESIUM SERPL-MCNC: 1.7 MG/DL (ref 1.6–2.3)
POTASSIUM SERPL-SCNC: 6.1 MMOL/L (ref 3.5–5.2)
PROT SERPL-MCNC: 6.8 G/DL (ref 6–8.5)
PROTHROMBIN TIME: 10.3 SEC (ref 9.1–12)
SODIUM SERPL-SCNC: 144 MMOL/L (ref 134–144)
TIBC SERPL-MCNC: 240 UG/DL (ref 250–450)
UIBC SERPL-MCNC: 144 UG/DL (ref 111–343)

## 2019-03-13 LAB
BASOPHILS # BLD AUTO: 0.1 X10E3/UL (ref 0–0.2)
BASOPHILS NFR BLD AUTO: 3 %
EOSINOPHIL # BLD AUTO: 0.2 X10E3/UL (ref 0–0.4)
EOSINOPHIL NFR BLD AUTO: 4 %
ERYTHROCYTE [DISTWIDTH] IN BLOOD BY AUTOMATED COUNT: 15.5 % (ref 12.3–15.4)
HCT VFR BLD AUTO: 35.3 % (ref 37.5–51)
HGB BLD-MCNC: 11.8 G/DL (ref 13–17.7)
IMM GRANULOCYTES # BLD AUTO: 0 X10E3/UL (ref 0–0.1)
IMM GRANULOCYTES NFR BLD AUTO: 1 %
LYMPHOCYTES # BLD AUTO: 1.2 X10E3/UL (ref 0.7–3.1)
LYMPHOCYTES NFR BLD AUTO: 27 %
MCH RBC QN AUTO: 29.3 PG (ref 26.6–33)
MCHC RBC AUTO-ENTMCNC: 33.4 G/DL (ref 31.5–35.7)
MCV RBC AUTO: 88 FL (ref 79–97)
MONOCYTES # BLD AUTO: 0.3 X10E3/UL (ref 0.1–0.9)
MONOCYTES NFR BLD AUTO: 8 %
MORPHOLOGY BLD-IMP: ABNORMAL
NEUTROPHILS # BLD AUTO: 2.5 X10E3/UL (ref 1.4–7)
NEUTROPHILS NFR BLD AUTO: 57 %
PLATELET # BLD AUTO: 99 X10E3/UL (ref 150–379)
RBC # BLD AUTO: 4.03 X10E6/UL (ref 4.14–5.8)
TACROLIMUS, 700249: 9.1 NG/ML (ref 2–20)
WBC # BLD AUTO: 4.3 X10E3/UL (ref 3.4–10.8)

## 2019-03-14 ENCOUNTER — OFFICE VISIT (OUTPATIENT)
Dept: HEMATOLOGY | Age: 64
End: 2019-03-14

## 2019-03-14 VITALS
WEIGHT: 247.2 LBS | SYSTOLIC BLOOD PRESSURE: 128 MMHG | HEART RATE: 70 BPM | HEIGHT: 71 IN | OXYGEN SATURATION: 98 % | TEMPERATURE: 97.7 F | BODY MASS INDEX: 34.61 KG/M2 | DIASTOLIC BLOOD PRESSURE: 62 MMHG

## 2019-03-14 DIAGNOSIS — Z94.4 H/O LIVER TRANSPLANT (HCC): Primary | ICD-10-CM

## 2019-03-14 RX ORDER — METFORMIN HYDROCHLORIDE 1000 MG/1
1000 TABLET ORAL 2 TIMES DAILY
COMMUNITY

## 2019-03-14 NOTE — PROGRESS NOTES
[facility], MD, [address], [place], [place]       MELVA Meyer PA-C Alveda Spikes, [facility]   MELVA Brandt NP [address] [facility] 136    at [address], [address] [place] [address], [facility] 22.    [phone]    FAX: [address] [place] [address] Drive, [address], [address]    [phone]    FAX: [phone]       Patient Care Team:  Yadiel Meyer MD as PCP - [facility])  Ellen Bonner MD (Nephrology)  Laura Cruz MD (Gastroenterology)  Charis Shelby MD as Physician (Internal Medicine)  Zainab Castillo MD as Physician (Neurology)      Problem List  Date Reviewed: 3/16/2019          Codes Class Noted    H/O liver transplant [facility]) ICD-10-CM: Z94.4  ICD-9-CM: V42.7  12/1/2018        Long-term use of immunosuppressant medication ICD-10-CM: Z79.899  ICD-9-CM: V58.69  12/1/2018        Liver transplant complication [facility]) MSO-19-HU: T86.40  ICD-9-CM: 996.82  12/1/2018        Slow rate of speech ICD-10-CM: R47.89  ICD-9-CM: 784.59  12/1/2018        Hepatic encephalopathy ([facility] ICD-10-CM: K72.90  ICD-9-CM: 572.2  9/19/2018        Type 2 diabetes with nephropathy ([facility] ICD-10-CM: E11.21  ICD-9-CM: 250.40, 583.81  3/29/2018        Neuropathy involving both lower extremities ICD-10-CM: G57.93  ICD-9-CM: 356.9  1/2/2018        CAMEJO (nonalcoholic steatohepatitis) ICD-10-CM: L70.49  ICD-9-CM: 571.8  11/2/2017        GERD (gastroesophageal reflux disease) (Chronic) ICD-10-CM: K21.9  ICD-9-CM: 530.81  2/22/2016        CAD (coronary artery disease) (Chronic) ICD-10-CM: I25.10  ICD-9-CM: 414.00  2/22/2016        DM type 2 (diabetes mellitus, type 2) ([facility] 75.) (Chronic) ICD-10-CM: E11.9  ICD-9-CM: 250.00 2/22/2016                Arnold Correia returns to the [address] for management of liver transplant graft function, to monitor and adjust immune suppression and to assess for recurrence of the primary liver disease. The active problem list, all pertinent past medical history, medications, liver histology, endoscopic studies, radiologic findings and laboratory findings related to the liver disorder were reviewed with the patient. The patient underwent a liver transplant at [facility] [place] in 10/2018. He was found to have a small [address] in his liver explant. The patient is taking the following for immune suppression: Tacrolimus, cellcept, prednisone    The patient has no symptoms which can be attributed to the liver disorder. He feels great and has a lot of energy. The patient has not experienced fatigue, fevers, chills, pain in the right side over the liver,     The patient completes all daily activities without any functional limitations. 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 An incidental hepatocellular carcinoma was found in the liver explant. The most recent CT scan to screen for [address] prior to LT was in 6/2018. NO suspicious liver mass was noted. Liver transaminases are normal.  ALP is normal.  Liver function is normal.  The platelet count is normal.      Hepatocellular carcinoma   This was present in native liver prior to undergoing liver transplant. There was viable HCC in the liver explant. There has been no recurrence of HCC to date   The last triple phase CT was performed in 1/2019. Will repeat MRI to assess for recurrence of [address] in 4/2019. Immune Suppression  The patient is receiving immune suppression with tacrolimus which is being well tolerated with no side effects. The immune suppression blood level was high last week. The SCr was up a bit  Will reduce TAC to 1 mg BID.     Cellcept is being tolerated well with no side effects   Will continue at current dose     Slow Robot speech  This has completely resolved. MRI of the head from 11/2018 demonstrated enlarged ventricles. A head CT from 8/2018 did not mention enlarged ventricles. He should follow-up with Neurology as scheduled. Anemia   This is due to multifactorial causes including recent surgery. The HB is slowly improving. No evidence of ongoing GI blood loss. Prevention of infections  The patient is taking bactrim to prevent PCP pneumonia. This will continue for 6 months post-LT. Acute and chronic kidney injury   This is a common adverse event of immune suppression. The Screat is up to 1.56. The K is up to 6  Will reduce the dose of TAC to 1 mg BID  NSAIDs should be avoided since these agents can worsen renal insufficiency. Hypercholesterolemia   This can be caused by immune suppression. Serum cholesterol will be monitored at periodic intervals. Hypertension   This is a common side effect of immune suppression. Blood pressure is well controlled on the current treatment. Low serum magnesium   This is a common side effect of immune suppression. The patient is taking a magnesium supplement. The patient appears to be tolerating the current dose of oral magnesium without side effects. Laboratory studies demonstrate serum magnesium level is only slightly low. Osteoporosis   Osteoporosis is commin in patients with cirhrosis prior to liver transplant. The patient had a normal bone density prior to undergoing liver transplant. Monitoring for skin Cancer  The patient was counseled regarding increased risk of skin cancer in transplant recipients and need to have any new skin lesions evaluated by dermatology and removed if suspicious. The patient was instructed to see Dermatology annually examination.     Vaccinations  Routine vaccinations against other bacterial and viral agents can be performed as long as this is with attentuatted virus. Live virus vaccines should not be administered. Annual flu vaccination should be administered. ALLERGIES  No Known Allergies    MEDICATIONS  Current Outpatient Medications   Medication Sig    metFORMIN (GLUCOPHAGE) 500 mg tablet Take  by mouth two (2) times daily (with meals).  gabapentin (NEURONTIN) 300 mg capsule Take 1 Cap by mouth nightly.  lisinopril (PRINIVIL, ZESTRIL) 10 mg tablet Take 10 mg by mouth daily.  magnesium oxide (MAG-OX) 400 mg tablet Take 1 Tab by mouth two (2) times a day.  amLODIPine (NORVASC) 10 mg tablet Take 1 Tab by mouth daily.  insulin detemir U-100 (LEVEMIR FLEXTOUCH) 100 unit/mL (3 mL) inpn 20 Units by SubCUTAneous route nightly.  triamcinolone acetonide (KENALOG) 0.1 % topical cream Apply  to affected area two (2) times a day. use thin layer    tacrolimus (PROGRAF) 1 mg capsule Take 2 mg by mouth two (2) times a day.  multivitamin (ONE A DAY) tablet Take 1 Tab by mouth daily.  trimethoprim-sulfamethoxazole (BACTRIM)  mg per tablet Take 1 Tab by mouth daily.  aspirin delayed-release 81 mg tablet Take 81 mg by mouth daily.  levothyroxine (SYNTHROID) 75 mcg tablet Take 75 mcg by mouth Daily (before breakfast).  cholecalciferol, vitamin D3, (VITAMIN D3) 2,000 unit tab Take 1 Tab by mouth two (2) times a day.  pantoprazole (PROTONIX) 40 mg tablet Take 40 mg by mouth daily. No current facility-administered medications for this visit. SYSTEM REVIEW NOT RELATED TO LIVER DISEASE OR REVIEWED ABOVE:  Constitution systems: Negative for fever, chills, weight gain, weight loss. Eyes: Negative for visual changes. ENT: Negative for sore throat, painful swallowing. Respiratory: Negative for cough, hemoptysis, SOB. Cardiology: Negative for chest pain, palpitations. GI:  Negative for constipation or diarrhea. : Negative for urinary frequency, dysuria, hematuria, nocturia.    Skin: Negative for rash.  Hematology: Negative for easy bruising, blood clots. Musculo-skelatal: Negative for back pain, muscle pain, weakness. Neurologic: Negative for headaches, dizziness, vertigo, memory problems not related to HE. Psychology: Negative for anxiety, depression. FAMILY HISTORY:  The father  of prostate cancer. The mother  of multiple myeloma. There is no family history of liver disease.       SOCIAL HISTORY:  The patient is . The patient has 3 children. The patient occasional cigar. The patient has been abstinent from alcohol since 2016. The patient does not work, he is on social security. PHYSICAL EXAMINATION:  Visit Vitals  /62 (BP 1 Location: Left arm, BP Patient Position: Sitting)   Pulse 70   Temp 97.7 °F (36.5 °C) (Tympanic)   Ht 5' 11\" (1.803 m)   Wt 247 lb 3.2 oz (112.1 kg)   SpO2 98%   BMI 34.48 kg/m²     General: No acute distress. Eyes: Sclera anicteric. ENT: No oral lesions. Thyroid normal.  Nodes: No adenopathy. Skin: No spider angiomata. No jaundice. No palmar erythema. Respiratory: Lungs clear to auscultation. Cardiovascular: Regular heart rate. No murmurs. No JVD. Abdomen: Normal liver transplant incision that is healed. Soft non-tender. Liver size normal to percussion/palpation. Spleen not palpable. No obvious ascites. Extremities: No edema. Muscle wasting. Neurologic: Alert and oriented. Cranial nerves grossly intact.       LABORATORY STUDIES:  Liver [place] of [address] [place] [address] 3/11/2019 3/4/2019   WBC 3.4 - 10.8 x10E3/uL 4.3 3.9   ANC 1.4 - 7.0 x10E3/uL 2.5 1.8   HGB 13.0 - 17.7 g/dL 11.8 (L) 10.6 (L)    - 379 x10E3/uL 99 (LL) 125 (L)   INR 0.8 - 1.2 1.0 1.0   AST 0 - 40 IU/L 15 13   ALT 0 - 44 IU/L 9 12   Alk Phos 39 - 117 IU/L 89 86   Bili, Total 0.0 - 1.2 mg/dL 0.5 0.4   Bili, Direct 0.00 - 0.40 mg/dL     Albumin 3.6 - 4.8 g/dL 4.1 3.8   BUN 8 - 27 mg/dL 35 (H) 27   Creat 0.76 - 1.27 mg/dL 1.56 (H) 1.49 (H)   Na 134 - 144 mmol/L 144 143   K 3.5 - 5.2 mmol/L 6.1 (H) 5.6 (H)   Cl 96 - 106 mmol/L 104 106   CO2 20 - 29 mmol/L 24 27   Glucose 65 - 99 mg/dL 120 (H) 97   Magnesium 1.6 - 2.3 mg/dL 1.7 1.7     SEROLOGIES:  7/2018. HAV total positive, HBsurface antigne negative, anti-HBcore negative, anti-HBsurface negative, anti-HCV negative, Anti-HIV negative, CNV IgG negative, HSV IgG positive    Serologies Latest Ref Rng & Units 3/11/2019 11/29/2018 5/24/2018   Ferritin 30 - 400 ng/mL 177 411 (H) 161   Iron % Saturation 15 - 55 % 40 25 61 (H)     LIVER HISTOLOGY:  7/2014: Cirhosis with mild mixed macro- and microvesicular steatosis. ENDOSCOPIC PROCEDURES:  Not available or performed    RADIOLOGY:  1/2019. CT scan abdomen with and without IV contrast.  Normal appearing liver. No liver mass lesions. Normal spleen. No ascites. 1/2019. CT scan chest.  No evidence of metastatic disease. OTHER TESTING:  Not available or performed    FOLLOW-UP:  All of the issues listed above in the Assessment and Plan were discussed with the patient. All questions were answered. The patient expressed a clear understanding of the above. Laboratory studies are being preformed weekly to assess liver graft function and blood levels of immune suppression. The patient has a follow-up appointment at the liver transplant center in 6 weeks. [address] [place] [address] 87 3 months.       Ángela Roach MD  [address], [address] [place] [address] 22.  [phone]  [address] [place] [address]

## 2019-03-14 NOTE — PROGRESS NOTES
Chief Complaint   Patient presents with    Follow-up     Visit Vitals  /62 (BP 1 Location: Left arm, BP Patient Position: Sitting)   Pulse 70   Temp 97.7 °F (36.5 °C) (Tympanic)   Ht 5' 11\" (1.803 m)   Wt 247 lb 3.2 oz (112.1 kg)   SpO2 98%   BMI 34.48 kg/m²     3 most recent PHQ Screens 3/14/2019   Little interest or pleasure in doing things Not at all   Feeling down, depressed, irritable, or hopeless Not at all   Total Score PHQ 2 0

## 2019-03-16 NOTE — PROGRESS NOTES
[facility] [facility], MD, [address], [facility], [place]       Lashell Lr, KENNETH Merino, [facility]   Temo Lopez, MELVA Raines [facility] 136    at [address], [address] [place] [address], [facility] 22.    [phone]    FAX: [address] [place] [address] Drive, [address], [address]    [phone]    FAX: [phone]       Patient Care Team:  Mao Warren MD as PCP - [facility])  Alejandro Ellis MD (Nephrology)  Bertha Busby MD (Gastroenterology)  Sherry Wright MD as Physician (Internal Medicine)  Rosa Frias MD as Physician (Neurology)      Problem List  Date Reviewed: 12/1/2018          Codes Class Noted    H/O liver transplant [facility]) ICD-10-CM: Z94.4  ICD-9-CM: V42.7  12/1/2018        Long-term use of immunosuppressant medication ICD-10-CM: Z79.899  ICD-9-CM: V58.69  12/1/2018        Liver transplant complication [facility]) OPZ-77-WE: T86.40  ICD-9-CM: 996.82  12/1/2018        Slow rate of speech ICD-10-CM: R47.89  ICD-9-CM: 784.59  12/1/2018        Hepatic encephalopathy ([facility] 75.) ICD-10-CM: K72.90  ICD-9-CM: 572.2  9/19/2018        Type 2 diabetes with nephropathy ([facility] ICD-10-CM: E11.21  ICD-9-CM: 250.40, 583.81  3/29/2018        Neuropathy involving both lower extremities ICD-10-CM: G57.93  ICD-9-CM: 356.9  1/2/2018        CAMEJO (nonalcoholic steatohepatitis) ICD-10-CM: I84.87  ICD-9-CM: 571.8  11/2/2017        GERD (gastroesophageal reflux disease) (Chronic) ICD-10-CM: K21.9  ICD-9-CM: 530.81  2/22/2016        CAD (coronary artery disease) (Chronic) ICD-10-CM: I25.10  ICD-9-CM: 414.00  2/22/2016        DM type 2 (diabetes mellitus, type 2) ([facility] 75.) (Chronic) ICD-10-CM: E11.9  ICD-9-CM: 250.00 2/22/2016                Rashid Bell [facility] for management of liver transplant graft function, to monitor and adjust immune suppression and to assess for recurrence of the primary liver disease. The active problem list, all pertinent past medical history, medications, liver histology, endoscopic studies, radiologic findings and laboratory findings related to the liver disorder were reviewed with the patient. The patient underwent a liver transplant at [facility] [place] in 10/2018. He was found to have a small [address] in his liver explant. The patient is taking the following for immune suppression: Tacrolimus, cellcept, prednisone    The patient notes fatigue, weakness in his legs. The patient has not experienced fevers, chills,     The patient has limitations in functional activities which can be attributed to the recent transplant surgery and prior liver disease. 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 An incidental hepatocellular carcinoma was found in the liver explant. The most recent CT scan to screen for [address] prior to LT was in 6/2018. NO suspicious liver mass was noted. Liver transaminases are normal.  ALP is normal.  Liver function is normal.  The platelet count is normal.      Immune Suppression  The patient is receiving immune suppression with tacrolimus which is being well tolerated with no side effects. The immune suppression blood level was high last week. But he had tooked TAC prior to blood work. Will continue t monitor TAC level. Cellcept is being tolerated well with no side effects   Will continue at current dose     Prednisone will be tapered and discontinued per the liver transplant center protocol. Slow Robot speech  The slow speech has resolved. MRI of the head from 11/2018 demonstrated enlarged ventricles.   A head CT from 8/2018 did not mention enlarged ventricles. He has seen Neurology and they do not feel there is anything going on and will see him back for follow-up. Anemia   This is due to multifactorial causes including recent surgery. The HB is stable. No evidence of ongoing GI blood loss. Will obtain FE panel to assess for iron stores. Prevention of infections  The patient is taking bactrim to prevent PCP pneumonia. This will continue for 6 months post-LT. Acute and chronic kidney injury   This is a common adverse event of immune suppression. The Screat is normal.  NSAIDs should be avoided since these agents can worsen renal insufficiency. Hypercholesterolemia   This can be caused by immune suppression. Serum cholesterol will be monitored at periodic intervals. Hypertension   This is a common side effect of immune suppression. Blood pressure is well controlled on the current treatment. Low serum magnesium   This is a common side effect of immune suppression. The patient is taking a magnesium supplement. The patient appears to be tolerating the current dose of oral magnesium without side effects. Laboratory studies demonstrate serum magnesium level is only slightly low. Osteoporosis   Osteoporosis is commin in patients with cirhrosis prior to liver transplant. The patient had a normal bone density prior to undergoing liver transplant. Monitoring for skin Cancer  The patient was counseled regarding increased risk of skin cancer in transplant recipients and need to have any new skin lesions evaluated by dermatology and removed if suspicious. The patient was instructed to see Dermatology annually examination. Vaccinations  Routine vaccinations against other bacterial and viral agents can be performed as long as this is with attentuatted virus. Live virus vaccines should not be administered. Annual flu vaccination should be administered.       ALLERGIES  No Known Allergies    MEDICATIONS  Current Outpatient Medications   Medication Sig    magnesium oxide (MAG-OX) 400 mg tablet Take 1 Tab by mouth two (2) times a day.  amLODIPine (NORVASC) 10 mg tablet Take 1 Tab by mouth daily.  insulin detemir U-100 (LEVEMIR FLEXTOUCH) 100 unit/mL (3 mL) inpn 20 Units by SubCUTAneous route nightly.  triamcinolone acetonide (KENALOG) 0.1 % topical cream Apply  to affected area two (2) times a day. use thin layer    tacrolimus (PROGRAF) 1 mg capsule Take 2 mg by mouth two (2) times a day.  trimethoprim-sulfamethoxazole (BACTRIM)  mg per tablet Take 1 Tab by mouth daily.  aspirin delayed-release 81 mg tablet Take 81 mg by mouth daily.  levothyroxine (SYNTHROID) 75 mcg tablet Take 75 mcg by mouth Daily (before breakfast).  cholecalciferol, vitamin D3, (VITAMIN D3) 2,000 unit tab Take 1 Tab by mouth two (2) times a day.  pantoprazole (PROTONIX) 40 mg tablet Take 40 mg by mouth daily.  metFORMIN (GLUCOPHAGE) 500 mg tablet Take  by mouth two (2) times daily (with meals).  gabapentin (NEURONTIN) 300 mg capsule Take 1 Cap by mouth nightly.  lisinopril (PRINIVIL, ZESTRIL) 10 mg tablet Take 10 mg by mouth daily.  multivitamin (ONE A DAY) tablet Take 1 Tab by mouth daily. No current facility-administered medications for this visit. SYSTEM REVIEW NOT RELATED TO LIVER DISEASE OR REVIEWED ABOVE:  Constitution systems: Negative for fever, chills, weight gain, weight loss. Eyes: Negative for visual changes. ENT: Negative for sore throat, painful swallowing. Respiratory: Negative for cough, hemoptysis, SOB. Cardiology: Negative for chest pain, palpitations. GI:  Negative for constipation or diarrhea. : Negative for urinary frequency, dysuria, hematuria, nocturia. Skin: Negative for rash. Hematology: Negative for easy bruising, blood clots. Musculo-skelatal: Negative for back pain, muscle pain, weakness. Neurologic: Slow robot speech pattern.     Psychology: Negative for anxiety, depression. FAMILY HISTORY:  The father  of prostate cancer. The mother  of multiple myeloma. There is no family history of liver disease.       SOCIAL HISTORY:  The patient is . The patient has 3 children. The patient occasional cigar. The patient has been abstinent from alcohol since 2016. The patient does not work, he is on social security. PHYSICAL EXAMINATION:  Visit Vitals  /67 (BP 1 Location: Right arm, BP Patient Position: Sitting)   Pulse 72   Temp 96.4 °F (35.8 °C) (Tympanic)   Ht 5' 11\" (1.803 m)   Wt 247 lb 6.4 oz (112.2 kg)   SpO2 97%   BMI 34.51 kg/m²     General: No acute distress. Eyes: Sclera anicteric. ENT: No oral lesions. Thyroid normal.  Nodes: No adenopathy. Skin: No spider angiomata. No jaundice. No palmar erythema. Respiratory: Lungs clear to auscultation. Cardiovascular: Regular heart rate. No murmurs. No JVD. Abdomen: Normal liver transplant incision that is healing   Soft non-tender. Liver size normal to percussion/palpation. Spleen not palpable. No obvious ascites. Extremities: No edema. Muscle wasting. Neurologic: Alert and oriented. Cranial nerves grossly intact. LABORATORY STUDIES:  Liver [place] of [address] Units 2018 12/10/2018   WBC 3.4 - 10.8 x10E3/uL 6.7 5.4   ANC 1.4 - 7.0 x10E3/uL 4.1 3.0   HGB 13.0 - 17.7 g/dL 10.5 (L) 9.5 (L)    - 379 x10E3/uL 121 (L) 107 (L)   INR 0.8 - 1.2  1.0   AST 0 - 40 IU/L 10 10   ALT 0 - 44 IU/L 9 8   Alk Phos 39 - 117 IU/L 89 81   Bili, Total 0.0 - 1.2 mg/dL 0.7 0.5   Bili, Direct 0.00 - 0.40 mg/dL     Albumin 3.6 - 4.8 g/dL 4.0 3.8   BUN 8 - 27 mg/dL 22 15   Creat 0.76 - 1.27 mg/dL 1.35 (H) 1.01   Na 134 - 144 mmol/L 143 143   K 3.5 - 5.2 mmol/L 4.6 4.1   Cl 96 - 106 mmol/L 102 102   CO2 20 - 29 mmol/L 30 (H) 27   Glucose 65 - 99 mg/dL 152 (H) 84   Magnesium 1.6 - 2.3 mg/dL  1.5 (L)     SEROLOGIES:  2018.   HAV total positive, HBsurface antigne negative, anti-HBcore negative, anti-HBsurface negative, anti-HCV negative, Anti-HIV negative, CNV IgG negative, HSV IgG positive    Serologies Latest Ref Rng & Units 11/29/2018   Ferritin 30 - 400 ng/mL 411 (H)   Iron % Saturation 15 - 55 % 25     LIVER HISTOLOGY:  7/2014: Cirhosis with mild mixed macro- and microvesicular steatosis. ENDOSCOPIC PROCEDURES:  Not available or performed    RADIOLOGY:  10/2018. Ultrasound of liver. Normal appearing liver. No liver mass lesions. PV patent. NA patent with good flow. OTHER TESTING:  Not available or performed    FOLLOW-UP:  All of the issues listed above in the Assessment and Plan were discussed with the patient. All questions were answered. The patient expressed a clear understanding of the above. Laboratory studies should be performed twice weekly to assess liver graft function and blood levels of immune suppression. The patient has a follow-up appointment at the liver transplant center in 1 months.     [address] [place] [address] 1 month       Joy Friday, MD  [facility] 5818 [facility] [address] [facility], [address] [place] [address] 22.  [phone]  [address] [facility]

## 2019-03-19 LAB
ALBUMIN SERPL-MCNC: 4.1 G/DL (ref 3.6–4.8)
ALBUMIN/GLOB SERPL: 2 {RATIO} (ref 1.2–2.2)
ALP SERPL-CCNC: 85 IU/L (ref 39–117)
ALT SERPL-CCNC: 13 IU/L (ref 0–44)
AST SERPL-CCNC: 20 IU/L (ref 0–40)
BASOPHILS # BLD AUTO: 0.1 X10E3/UL (ref 0–0.2)
BASOPHILS NFR BLD AUTO: 2 %
BILIRUB SERPL-MCNC: 0.4 MG/DL (ref 0–1.2)
BUN SERPL-MCNC: 33 MG/DL (ref 8–27)
BUN/CREAT SERPL: 24 (ref 10–24)
CALCIUM SERPL-MCNC: 9.2 MG/DL (ref 8.6–10.2)
CHLORIDE SERPL-SCNC: 105 MMOL/L (ref 96–106)
CO2 SERPL-SCNC: 27 MMOL/L (ref 20–29)
CREAT SERPL-MCNC: 1.39 MG/DL (ref 0.76–1.27)
EOSINOPHIL # BLD AUTO: 0.2 X10E3/UL (ref 0–0.4)
EOSINOPHIL NFR BLD AUTO: 4 %
ERYTHROCYTE [DISTWIDTH] IN BLOOD BY AUTOMATED COUNT: 15 % (ref 12.3–15.4)
GLOBULIN SER CALC-MCNC: 2.1 G/DL (ref 1.5–4.5)
GLUCOSE SERPL-MCNC: 137 MG/DL (ref 65–99)
HCT VFR BLD AUTO: 34.3 % (ref 37.5–51)
HGB BLD-MCNC: 11.5 G/DL (ref 13–17.7)
IMM GRANULOCYTES # BLD AUTO: 0 X10E3/UL (ref 0–0.1)
IMM GRANULOCYTES NFR BLD AUTO: 0 %
INR PPP: 1 (ref 0.8–1.2)
LYMPHOCYTES # BLD AUTO: 1.4 X10E3/UL (ref 0.7–3.1)
LYMPHOCYTES NFR BLD AUTO: 28 %
MAGNESIUM SERPL-MCNC: 1.7 MG/DL (ref 1.6–2.3)
MCH RBC QN AUTO: 29.4 PG (ref 26.6–33)
MCHC RBC AUTO-ENTMCNC: 33.5 G/DL (ref 31.5–35.7)
MCV RBC AUTO: 88 FL (ref 79–97)
MONOCYTES # BLD AUTO: 0.4 X10E3/UL (ref 0.1–0.9)
MONOCYTES NFR BLD AUTO: 9 %
MORPHOLOGY BLD-IMP: ABNORMAL
NEUTROPHILS # BLD AUTO: 2.7 X10E3/UL (ref 1.4–7)
NEUTROPHILS NFR BLD AUTO: 57 %
PLATELET # BLD AUTO: 75 X10E3/UL (ref 150–379)
POTASSIUM SERPL-SCNC: 5 MMOL/L (ref 3.5–5.2)
PROT SERPL-MCNC: 6.2 G/DL (ref 6–8.5)
PROTHROMBIN TIME: 10.4 SEC (ref 9.1–12)
RBC # BLD AUTO: 3.91 X10E6/UL (ref 4.14–5.8)
SODIUM SERPL-SCNC: 143 MMOL/L (ref 134–144)
TACROLIMUS, 700249: 4.8 NG/ML (ref 2–20)
WBC # BLD AUTO: 4.8 X10E3/UL (ref 3.4–10.8)

## 2019-03-22 ENCOUNTER — PATIENT OUTREACH (OUTPATIENT)
Dept: HEMATOLOGY | Age: 64
End: 2019-03-22

## 2019-03-22 DIAGNOSIS — C22.0 HEPATOCELLULAR CARCINOMA (HCC): ICD-10-CM

## 2019-03-22 DIAGNOSIS — Z94.4 H/O LIVER TRANSPLANT (HCC): Primary | ICD-10-CM

## 2019-03-22 NOTE — PROGRESS NOTES
Phone call placed to patient. Reviewed labs with him. Advised him to continue Tacrolimus 1 mg BID and weekly labs. Patient verbalized understanding.

## 2019-03-25 DIAGNOSIS — Z94.4 H/O LIVER TRANSPLANT (HCC): ICD-10-CM

## 2019-03-26 LAB
ALBUMIN SERPL-MCNC: 3.9 G/DL (ref 3.6–4.8)
ALBUMIN/GLOB SERPL: 1.4 {RATIO} (ref 1.2–2.2)
ALP SERPL-CCNC: 90 IU/L (ref 39–117)
ALT SERPL-CCNC: 13 IU/L (ref 0–44)
AST SERPL-CCNC: 20 IU/L (ref 0–40)
BASOPHILS # BLD AUTO: 0.1 X10E3/UL (ref 0–0.2)
BASOPHILS NFR BLD AUTO: 3 %
BILIRUB SERPL-MCNC: 0.5 MG/DL (ref 0–1.2)
BUN SERPL-MCNC: 34 MG/DL (ref 8–27)
BUN/CREAT SERPL: 23 (ref 10–24)
CALCIUM SERPL-MCNC: 9.3 MG/DL (ref 8.6–10.2)
CHLORIDE SERPL-SCNC: 106 MMOL/L (ref 96–106)
CO2 SERPL-SCNC: 22 MMOL/L (ref 20–29)
CREAT SERPL-MCNC: 1.46 MG/DL (ref 0.76–1.27)
EOSINOPHIL # BLD AUTO: 0.3 X10E3/UL (ref 0–0.4)
EOSINOPHIL NFR BLD AUTO: 6 %
ERYTHROCYTE [DISTWIDTH] IN BLOOD BY AUTOMATED COUNT: 15.5 % (ref 12.3–15.4)
GLOBULIN SER CALC-MCNC: 2.7 G/DL (ref 1.5–4.5)
GLUCOSE SERPL-MCNC: 103 MG/DL (ref 65–99)
HCT VFR BLD AUTO: 31.6 % (ref 37.5–51)
HGB BLD-MCNC: 11 G/DL (ref 13–17.7)
IMM GRANULOCYTES # BLD AUTO: 0 X10E3/UL (ref 0–0.1)
IMM GRANULOCYTES NFR BLD AUTO: 0 %
INR PPP: 1 (ref 0.8–1.2)
LYMPHOCYTES # BLD AUTO: 1.1 X10E3/UL (ref 0.7–3.1)
LYMPHOCYTES NFR BLD AUTO: 25 %
MAGNESIUM SERPL-MCNC: 2 MG/DL (ref 1.6–2.3)
MCH RBC QN AUTO: 29.3 PG (ref 26.6–33)
MCHC RBC AUTO-ENTMCNC: 34.8 G/DL (ref 31.5–35.7)
MCV RBC AUTO: 84 FL (ref 79–97)
MONOCYTES # BLD AUTO: 0.5 X10E3/UL (ref 0.1–0.9)
MONOCYTES NFR BLD AUTO: 11 %
MORPHOLOGY BLD-IMP: ABNORMAL
NEUTROPHILS # BLD AUTO: 2.5 X10E3/UL (ref 1.4–7)
NEUTROPHILS NFR BLD AUTO: 55 %
PLATELET # BLD AUTO: 77 X10E3/UL (ref 150–379)
POTASSIUM SERPL-SCNC: 4.9 MMOL/L (ref 3.5–5.2)
PROT SERPL-MCNC: 6.6 G/DL (ref 6–8.5)
PROTHROMBIN TIME: 10 SEC (ref 9.1–12)
RBC # BLD AUTO: 3.75 X10E6/UL (ref 4.14–5.8)
SODIUM SERPL-SCNC: 144 MMOL/L (ref 134–144)
TACROLIMUS, 700249: 3.8 NG/ML (ref 2–20)
WBC # BLD AUTO: 4.4 X10E3/UL (ref 3.4–10.8)

## 2019-04-02 LAB
ALBUMIN SERPL-MCNC: 4 G/DL (ref 3.6–4.8)
ALBUMIN/GLOB SERPL: 1.5 {RATIO} (ref 1.2–2.2)
ALP SERPL-CCNC: 89 IU/L (ref 39–117)
ALT SERPL-CCNC: 14 IU/L (ref 0–44)
AST SERPL-CCNC: 16 IU/L (ref 0–40)
BASOPHILS # BLD AUTO: 0.1 X10E3/UL (ref 0–0.2)
BASOPHILS NFR BLD AUTO: 3 %
BILIRUB SERPL-MCNC: 0.5 MG/DL (ref 0–1.2)
BUN SERPL-MCNC: 34 MG/DL (ref 8–27)
BUN/CREAT SERPL: 22 (ref 10–24)
CALCIUM SERPL-MCNC: 9.4 MG/DL (ref 8.6–10.2)
CHLORIDE SERPL-SCNC: 105 MMOL/L (ref 96–106)
CO2 SERPL-SCNC: 24 MMOL/L (ref 20–29)
CREAT SERPL-MCNC: 1.53 MG/DL (ref 0.76–1.27)
EOSINOPHIL # BLD AUTO: 0.2 X10E3/UL (ref 0–0.4)
EOSINOPHIL NFR BLD AUTO: 4 %
ERYTHROCYTE [DISTWIDTH] IN BLOOD BY AUTOMATED COUNT: 15.1 % (ref 12.3–15.4)
GLOBULIN SER CALC-MCNC: 2.7 G/DL (ref 1.5–4.5)
GLUCOSE SERPL-MCNC: 123 MG/DL (ref 65–99)
HCT VFR BLD AUTO: 33.4 % (ref 37.5–51)
HGB BLD-MCNC: 11.4 G/DL (ref 13–17.7)
IMM GRANULOCYTES # BLD AUTO: 0 X10E3/UL (ref 0–0.1)
IMM GRANULOCYTES NFR BLD AUTO: 0 %
INR PPP: 1 (ref 0.8–1.2)
LYMPHOCYTES # BLD AUTO: 1.2 X10E3/UL (ref 0.7–3.1)
LYMPHOCYTES NFR BLD AUTO: 27 %
MAGNESIUM SERPL-MCNC: 1.9 MG/DL (ref 1.6–2.3)
MCH RBC QN AUTO: 30.2 PG (ref 26.6–33)
MCHC RBC AUTO-ENTMCNC: 34.1 G/DL (ref 31.5–35.7)
MCV RBC AUTO: 88 FL (ref 79–97)
MONOCYTES # BLD AUTO: 0.4 X10E3/UL (ref 0.1–0.9)
MONOCYTES NFR BLD AUTO: 9 %
NEUTROPHILS # BLD AUTO: 2.4 X10E3/UL (ref 1.4–7)
NEUTROPHILS NFR BLD AUTO: 57 %
PLATELET # BLD AUTO: 111 X10E3/UL (ref 150–379)
POTASSIUM SERPL-SCNC: 5.4 MMOL/L (ref 3.5–5.2)
PROT SERPL-MCNC: 6.7 G/DL (ref 6–8.5)
PROTHROMBIN TIME: 9.9 SEC (ref 9.1–12)
RBC # BLD AUTO: 3.78 X10E6/UL (ref 4.14–5.8)
SODIUM SERPL-SCNC: 143 MMOL/L (ref 134–144)
TACROLIMUS, 700249: 4.9 NG/ML (ref 2–20)
WBC # BLD AUTO: 4.3 X10E3/UL (ref 3.4–10.8)

## 2019-04-03 DIAGNOSIS — C22.0 HEPATOCELLULAR CARCINOMA (HCC): ICD-10-CM

## 2019-04-03 DIAGNOSIS — Z94.4 H/O LIVER TRANSPLANT (HCC): Primary | ICD-10-CM

## 2019-04-09 LAB
ALBUMIN SERPL-MCNC: 4 G/DL (ref 3.6–4.8)
ALBUMIN/GLOB SERPL: 1.6 {RATIO} (ref 1.2–2.2)
ALP SERPL-CCNC: 82 IU/L (ref 39–117)
ALT SERPL-CCNC: 15 IU/L (ref 0–44)
AST SERPL-CCNC: 14 IU/L (ref 0–40)
BASOPHILS # BLD AUTO: 0.1 X10E3/UL (ref 0–0.2)
BASOPHILS NFR BLD AUTO: 3 %
BILIRUB SERPL-MCNC: 0.6 MG/DL (ref 0–1.2)
BUN SERPL-MCNC: 36 MG/DL (ref 8–27)
BUN/CREAT SERPL: 22 (ref 10–24)
CALCIUM SERPL-MCNC: 9 MG/DL (ref 8.6–10.2)
CHLORIDE SERPL-SCNC: 106 MMOL/L (ref 96–106)
CO2 SERPL-SCNC: 23 MMOL/L (ref 20–29)
CREAT SERPL-MCNC: 1.67 MG/DL (ref 0.76–1.27)
EOSINOPHIL # BLD AUTO: 0.2 X10E3/UL (ref 0–0.4)
EOSINOPHIL NFR BLD AUTO: 5 %
ERYTHROCYTE [DISTWIDTH] IN BLOOD BY AUTOMATED COUNT: 15.3 % (ref 12.3–15.4)
GLOBULIN SER CALC-MCNC: 2.5 G/DL (ref 1.5–4.5)
GLUCOSE SERPL-MCNC: 106 MG/DL (ref 65–99)
HCT VFR BLD AUTO: 32.1 % (ref 37.5–51)
HGB BLD-MCNC: 11 G/DL (ref 13–17.7)
IMM GRANULOCYTES # BLD AUTO: 0 X10E3/UL (ref 0–0.1)
IMM GRANULOCYTES NFR BLD AUTO: 1 %
INR PPP: 1 (ref 0.8–1.2)
LYMPHOCYTES # BLD AUTO: 1 X10E3/UL (ref 0.7–3.1)
LYMPHOCYTES NFR BLD AUTO: 32 %
MAGNESIUM SERPL-MCNC: 1.9 MG/DL (ref 1.6–2.3)
MCH RBC QN AUTO: 30.1 PG (ref 26.6–33)
MCHC RBC AUTO-ENTMCNC: 34.3 G/DL (ref 31.5–35.7)
MCV RBC AUTO: 88 FL (ref 79–97)
MONOCYTES # BLD AUTO: 0.4 X10E3/UL (ref 0.1–0.9)
MONOCYTES NFR BLD AUTO: 13 %
NEUTROPHILS # BLD AUTO: 1.4 X10E3/UL (ref 1.4–7)
NEUTROPHILS NFR BLD AUTO: 46 %
PLATELET # BLD AUTO: 102 X10E3/UL (ref 150–379)
POTASSIUM SERPL-SCNC: 5.6 MMOL/L (ref 3.5–5.2)
PROT SERPL-MCNC: 6.5 G/DL (ref 6–8.5)
PROTHROMBIN TIME: 10.2 SEC (ref 9.1–12)
RBC # BLD AUTO: 3.66 X10E6/UL (ref 4.14–5.8)
SODIUM SERPL-SCNC: 142 MMOL/L (ref 134–144)
WBC # BLD AUTO: 3 X10E3/UL (ref 3.4–10.8)

## 2019-04-10 ENCOUNTER — HOSPITAL ENCOUNTER (OUTPATIENT)
Dept: CT IMAGING | Age: 64
Discharge: HOME | End: 2019-04-10
Attending: INTERNAL MEDICINE
Payer: COMMERCIAL

## 2019-04-10 DIAGNOSIS — Z94.4 H/O LIVER TRANSPLANT (HCC): ICD-10-CM

## 2019-04-10 DIAGNOSIS — C22.0 HEPATOCELLULAR CARCINOMA (HCC): ICD-10-CM

## 2019-04-10 LAB — TACROLIMUS, 700249: 3.4 NG/ML (ref 2–20)

## 2019-04-10 PROCEDURE — 71250 CT THORAX DX C-: CPT

## 2019-04-10 PROCEDURE — 74150 CT ABDOMEN W/O CONTRAST: CPT

## 2019-04-12 ENCOUNTER — PATIENT OUTREACH (OUTPATIENT)
Dept: HEMATOLOGY | Age: 64
End: 2019-04-12

## 2019-04-12 DIAGNOSIS — C22.0 HEPATOCELLULAR CARCINOMA (HCC): ICD-10-CM

## 2019-04-12 DIAGNOSIS — Z94.4 H/O LIVER TRANSPLANT (HCC): Primary | ICD-10-CM

## 2019-04-15 NOTE — PROGRESS NOTES
Noted abdominal and chest CT results. Reviewed with Dr. Guillermina Real. Sent request for images to be pushed to [facility] PACS system. Phone call placed to patient's wife. Spoke to patient and wife on speaker phone and reviewed findings of imaging per Dr. Jesus Alberto Cramer request. Vasiliy Chakraborty patient and wife that Dr. Guillermina Real is out [place], but he wanted them to be aware of results and have a chance to discuss findings before results were posted to patient portal. Reviewed new lung lesion is concerning but inconclusive for metastasis due to size. Plan is to repeat imaging in 3 months to determine if lung lesion is growing. Advised patient and wife images will be reviewed by [facility] and case will be discussed on the weekly conference call with [facility]. Patient was appreciative for call and reports he \"is in good hands. \"

## 2019-04-16 LAB
ALBUMIN SERPL-MCNC: 4.2 G/DL (ref 3.6–4.8)
ALBUMIN/GLOB SERPL: 1.9 {RATIO} (ref 1.2–2.2)
ALP SERPL-CCNC: 79 IU/L (ref 39–117)
ALT SERPL-CCNC: 15 IU/L (ref 0–44)
AST SERPL-CCNC: 13 IU/L (ref 0–40)
BASOPHILS # BLD AUTO: 0.1 X10E3/UL (ref 0–0.2)
BASOPHILS NFR BLD AUTO: 2 %
BILIRUB SERPL-MCNC: 0.6 MG/DL (ref 0–1.2)
BUN SERPL-MCNC: 39 MG/DL (ref 8–27)
BUN/CREAT SERPL: 27 (ref 10–24)
CALCIUM SERPL-MCNC: 9.6 MG/DL (ref 8.6–10.2)
CHLORIDE SERPL-SCNC: 105 MMOL/L (ref 96–106)
CO2 SERPL-SCNC: 25 MMOL/L (ref 20–29)
CREAT SERPL-MCNC: 1.47 MG/DL (ref 0.76–1.27)
EOSINOPHIL # BLD AUTO: 0.2 X10E3/UL (ref 0–0.4)
EOSINOPHIL NFR BLD AUTO: 4 %
ERYTHROCYTE [DISTWIDTH] IN BLOOD BY AUTOMATED COUNT: 14.8 % (ref 12.3–15.4)
GLOBULIN SER CALC-MCNC: 2.2 G/DL (ref 1.5–4.5)
GLUCOSE SERPL-MCNC: 116 MG/DL (ref 65–99)
HCT VFR BLD AUTO: 31.5 % (ref 37.5–51)
HGB BLD-MCNC: 10.6 G/DL (ref 13–17.7)
IMM GRANULOCYTES # BLD AUTO: 0 X10E3/UL (ref 0–0.1)
IMM GRANULOCYTES NFR BLD AUTO: 0 %
INR PPP: 1 (ref 0.8–1.2)
LYMPHOCYTES # BLD AUTO: 1.5 X10E3/UL (ref 0.7–3.1)
LYMPHOCYTES NFR BLD AUTO: 27 %
MAGNESIUM SERPL-MCNC: 1.7 MG/DL (ref 1.6–2.3)
MCH RBC QN AUTO: 29.9 PG (ref 26.6–33)
MCHC RBC AUTO-ENTMCNC: 33.7 G/DL (ref 31.5–35.7)
MCV RBC AUTO: 89 FL (ref 79–97)
MONOCYTES # BLD AUTO: 0.4 X10E3/UL (ref 0.1–0.9)
MONOCYTES NFR BLD AUTO: 7 %
MORPHOLOGY BLD-IMP: ABNORMAL
NEUTROPHILS # BLD AUTO: 3.2 X10E3/UL (ref 1.4–7)
NEUTROPHILS NFR BLD AUTO: 60 %
PLATELET # BLD AUTO: 91 X10E3/UL (ref 150–379)
POTASSIUM SERPL-SCNC: 6 MMOL/L (ref 3.5–5.2)
PROT SERPL-MCNC: 6.4 G/DL (ref 6–8.5)
PROTHROMBIN TIME: 10.2 SEC (ref 9.1–12)
RBC # BLD AUTO: 3.54 X10E6/UL (ref 4.14–5.8)
SODIUM SERPL-SCNC: 142 MMOL/L (ref 134–144)
TACROLIMUS, 700249: 3.7 NG/ML (ref 2–20)
WBC # BLD AUTO: 5.4 X10E3/UL (ref 3.4–10.8)

## 2019-04-17 LAB
AFP L3 MFR SERPL: NORMAL % (ref 0–9.9)
AFP SERPL-MCNC: 1.2 NG/ML (ref 0–8)

## 2019-04-23 LAB
ALBUMIN SERPL-MCNC: 3.9 G/DL (ref 3.6–4.8)
ALBUMIN/GLOB SERPL: 1.8 {RATIO} (ref 1.2–2.2)
ALP SERPL-CCNC: 77 IU/L (ref 39–117)
ALT SERPL-CCNC: 12 IU/L (ref 0–44)
AST SERPL-CCNC: 12 IU/L (ref 0–40)
BASOPHILS # BLD AUTO: 0.1 X10E3/UL (ref 0–0.2)
BASOPHILS NFR BLD AUTO: 2 %
BILIRUB SERPL-MCNC: 0.5 MG/DL (ref 0–1.2)
BUN SERPL-MCNC: 29 MG/DL (ref 8–27)
BUN/CREAT SERPL: 21 (ref 10–24)
CALCIUM SERPL-MCNC: 9.3 MG/DL (ref 8.6–10.2)
CHLORIDE SERPL-SCNC: 103 MMOL/L (ref 96–106)
CO2 SERPL-SCNC: 25 MMOL/L (ref 20–29)
CREAT SERPL-MCNC: 1.36 MG/DL (ref 0.76–1.27)
EOSINOPHIL # BLD AUTO: 0.2 X10E3/UL (ref 0–0.4)
EOSINOPHIL NFR BLD AUTO: 4 %
ERYTHROCYTE [DISTWIDTH] IN BLOOD BY AUTOMATED COUNT: 15.3 % (ref 12.3–15.4)
GLOBULIN SER CALC-MCNC: 2.2 G/DL (ref 1.5–4.5)
GLUCOSE SERPL-MCNC: 145 MG/DL (ref 65–99)
HCT VFR BLD AUTO: 30.5 % (ref 37.5–51)
HGB BLD-MCNC: 10.7 G/DL (ref 13–17.7)
IMM GRANULOCYTES # BLD AUTO: 0 X10E3/UL (ref 0–0.1)
IMM GRANULOCYTES NFR BLD AUTO: 0 %
INR PPP: 1 (ref 0.8–1.2)
LYMPHOCYTES # BLD AUTO: 1.1 X10E3/UL (ref 0.7–3.1)
LYMPHOCYTES NFR BLD AUTO: 20 %
MAGNESIUM SERPL-MCNC: 1.7 MG/DL (ref 1.6–2.3)
MCH RBC QN AUTO: 30.4 PG (ref 26.6–33)
MCHC RBC AUTO-ENTMCNC: 35.1 G/DL (ref 31.5–35.7)
MCV RBC AUTO: 87 FL (ref 79–97)
MONOCYTES # BLD AUTO: 0.5 X10E3/UL (ref 0.1–0.9)
MONOCYTES NFR BLD AUTO: 8 %
MORPHOLOGY BLD-IMP: ABNORMAL
NEUTROPHILS # BLD AUTO: 3.6 X10E3/UL (ref 1.4–7)
NEUTROPHILS NFR BLD AUTO: 66 %
PLATELET # BLD AUTO: 83 X10E3/UL (ref 150–379)
POTASSIUM SERPL-SCNC: 5.4 MMOL/L (ref 3.5–5.2)
PROT SERPL-MCNC: 6.1 G/DL (ref 6–8.5)
PROTHROMBIN TIME: 10.3 SEC (ref 9.1–12)
RBC # BLD AUTO: 3.52 X10E6/UL (ref 4.14–5.8)
SODIUM SERPL-SCNC: 138 MMOL/L (ref 134–144)
TACROLIMUS, 700249: 3.8 NG/ML (ref 2–20)
WBC # BLD AUTO: 5.5 X10E3/UL (ref 3.4–10.8)

## 2019-04-25 ENCOUNTER — TELEPHONE (OUTPATIENT)
Dept: HEMATOLOGY | Age: 64
End: 2019-04-25

## 2019-04-26 ENCOUNTER — OFFICE VISIT (OUTPATIENT)
Dept: HEMATOLOGY | Age: 64
End: 2019-04-26

## 2019-04-26 VITALS
SYSTOLIC BLOOD PRESSURE: 133 MMHG | BODY MASS INDEX: 35.28 KG/M2 | HEART RATE: 80 BPM | OXYGEN SATURATION: 98 % | HEIGHT: 71 IN | TEMPERATURE: 97.5 F | DIASTOLIC BLOOD PRESSURE: 70 MMHG | RESPIRATION RATE: 17 BRPM | WEIGHT: 252 LBS

## 2019-04-26 DIAGNOSIS — Z94.4 H/O LIVER TRANSPLANT (HCC): Primary | ICD-10-CM

## 2019-04-26 DIAGNOSIS — R91.1 LUNG NODULE: ICD-10-CM

## 2019-04-26 DIAGNOSIS — C22.0 HEPATOCELLULAR CARCINOMA (HCC): ICD-10-CM

## 2019-04-26 RX ORDER — TACROLIMUS 1 MG/1
CAPSULE ORAL
COMMUNITY
End: 2019-06-14

## 2019-04-26 RX ORDER — METFORMIN HYDROCHLORIDE 500 MG/1
500 TABLET ORAL 2 TIMES DAILY WITH MEALS
COMMUNITY
End: 2020-06-19

## 2019-04-26 NOTE — Clinical Note
5/7/19 Patient: Prem Churchill YOB: 1955 Date of Visit: 4/26/2019 Kirsty Cuellar MD 
[address] [place] [id number] VIA Facsimile: [phone] Dear Kirsty Cuellar MD, Thank you for referring Mr. Prem Churchill to [address] [place] [address] for evaluation. My notes for this consultation are attached. If you have questions, please do not hesitate to call me. I look forward to following your patient along with you. Sincerely, Toni Flor MD

## 2019-04-26 NOTE — PROGRESS NOTES
[facility], [place], [address], [place], [place]       Genevieve Johnson, NP    Nilsa Carey, KENNETH Prince, [facility]   Sourav Barrientos, MELVA Delgado [facility] 136    at [address], 81 [facility] [place] [facility] [place], [facility] 22.    [phone]    FAX: [address] [place] [address] Avenue    [address], [id number] [place] [address], [address]    [phone]    FAX: [phone]       Patient Care Team:  Maryanne Baker MD as PCP - [facility])  Fred Sheets MD (Nephrology)  Odell Man MD (Gastroenterology)  Amy Verduzco MD as Physician (Internal Medicine)  Marbella Sutton MD as Physician (Neurology)      Problem List  Date Reviewed: 3/16/2019          Codes Class Noted    H/O liver transplant [facility]) ICD-10-CM: Z94.4  ICD-9-CM: V42.7  12/1/2018        Long-term use of immunosuppressant medication ICD-10-CM: Z79.899  ICD-9-CM: V58.69  12/1/2018        Liver transplant complication [facility]) XUY-31-YS: T86.40  ICD-9-CM: 996.82  12/1/2018        Slow rate of speech ICD-10-CM: R47.89  ICD-9-CM: 784.59  12/1/2018        Hepatic encephalopathy ([facility] 75.) ICD-10-CM: K72.90  ICD-9-CM: 572.2  9/19/2018        Type 2 diabetes with nephropathy ([facility] ICD-10-CM: E11.21  ICD-9-CM: 250.40, 583.81  3/29/2018        Neuropathy involving both lower extremities ICD-10-CM: G57.93  ICD-9-CM: 356.9  1/2/2018        CAMEJO (nonalcoholic steatohepatitis) ICD-10-CM: J16.31  ICD-9-CM: 571.8  11/2/2017        GERD (gastroesophageal reflux disease) (Chronic) ICD-10-CM: K21.9  ICD-9-CM: 530.81  2/22/2016        CAD (coronary artery disease) (Chronic) ICD-10-CM: I25.10  ICD-9-CM: 414.00  2/22/2016        DM type 2 (diabetes mellitus, type 2) ([facility] 75.) (Chronic) ICD-10-CM: E11.9  ICD-9-CM: 250.00 2/22/2016                Madera Sheridan returns to the [address] for management of liver transplant graft function, to monitor and adjust immune suppression and to assess for recurrence of the primary liver disease. The active problem list, all pertinent past medical history, medications, liver histology, endoscopic studies, radiologic findings and laboratory findings related to the liver disorder were reviewed with the patient. The patient underwent a liver transplant at [facility] in 10/2018. He was found to have a small [address] in his liver explant. The patient is taking the following for immune suppression: Tacrolimus, cellcept, prednisone    A CT scan of the chest from 4/2019 suggested a new 8 mm nodule in the right middle lobe adjacent to the diaphram.    The patient has no symptoms which can be attributed to the liver disorder. He feels great and has a lot of energy. The patient has not experienced fatigue, fevers, chills, pain in the right side over the liver,     The patient completes all daily activities without any functional limitations. 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 An incidental hepatocellular carcinoma was found in the liver explant. The most recent CT scan to screen for [address] prior to LT was in 6/2018. NO suspicious liver mass was noted. Liver transaminases are normal.  ALP is normal.  Liver function is normal.  The platelet count is normal.      Hepatocellular carcinoma   This was present in native liver prior to undergoing liver transplant. There was viable HCC in the liver explant. 2 lesions in the right lobe. The last triple phase CT was performed in 1/2019. Repeat triple phase CT in 4/2019 demonstrated no liver mass lesions. But there was a lesion in the right middle lung adjacent to diaphram.  Will repeat chest and abdominal CT in 3 months.     Immune Suppression  The patient is receiving immune suppression with tacrolimus which is being well tolerated with no side effects. The TAC level was reduced to 1 mg BID in 3/2019 because of an elevation in Screat. The liver enzymes remain normal.    Cellcept is being tolerated well with no side effects   Will continue at current dose     Anemia   This is due to multifactorial causes including recent surgery. The HB is slowly improving. No evidence of ongoing GI blood loss. Prevention of infections  The patient is taking bactrim to prevent PCP pneumonia. This will continue for 6 months post-LT. Acute and chronic kidney injury   This is a common adverse event of immune suppression. The Screat is now down to 1.36 from 1.47 mg with lowering of the TAC de la fuente. NSAIDs should be avoided since these agents can worsen renal insufficiency. Hypercholesterolemia   This can be caused by immune suppression. Serum cholesterol will be monitored at periodic intervals. Hypertension   This is a common side effect of immune suppression. Blood pressure is well controlled on the current treatment. Low serum magnesium   This is a common side effect of immune suppression. The patient is taking a magnesium supplement. The patient appears to be tolerating the current dose of oral magnesium without side effects. Laboratory studies demonstrate serum magnesium level is only slightly low. Osteoporosis   Osteoporosis is commin in patients with cirhrosis prior to liver transplant. The patient had a normal bone density prior to undergoing liver transplant. Monitoring for skin Cancer  The patient was counseled regarding increased risk of skin cancer in transplant recipients and need to have any new skin lesions evaluated by dermatology and removed if suspicious. The patient was instructed to see Dermatology annually examination.     Vaccinations  Routine vaccinations against other bacterial and viral agents can be performed as long as this is with attentuatted virus. Live virus vaccines should not be administered. Annual flu vaccination should be administered. ALLERGIES  No Known Allergies    MEDICATIONS  Current Outpatient Medications   Medication Sig    metFORMIN (GLUCOPHAGE) 500 mg tablet Take  by mouth every evening.  tacrolimus (PROGRAF) 1 mg capsule Take  by mouth nightly.  metFORMIN (GLUCOPHAGE) 1,000 mg tablet Take 1,000 mg by mouth every morning.  gabapentin (NEURONTIN) 300 mg capsule Take 1 Cap by mouth nightly.  lisinopril (PRINIVIL, ZESTRIL) 10 mg tablet Take 10 mg by mouth daily.  magnesium oxide (MAG-OX) 400 mg tablet Take 1 Tab by mouth two (2) times a day.  amLODIPine (NORVASC) 10 mg tablet Take 1 Tab by mouth daily.  triamcinolone acetonide (KENALOG) 0.1 % topical cream Apply  to affected area two (2) times a day. use thin layer    tacrolimus (PROGRAF) 1 mg capsule Take 1 mg by mouth every morning.  multivitamin (ONE A DAY) tablet Take 1 Tab by mouth daily.  trimethoprim-sulfamethoxazole (BACTRIM)  mg per tablet Take 1 Tab by mouth daily.  aspirin delayed-release 81 mg tablet Take 81 mg by mouth daily.  levothyroxine (SYNTHROID) 75 mcg tablet Take 75 mcg by mouth Daily (before breakfast).  cholecalciferol, vitamin D3, (VITAMIN D3) 2,000 unit tab Take 1 Tab by mouth two (2) times a day.  pantoprazole (PROTONIX) 40 mg tablet Take 40 mg by mouth daily.  insulin detemir U-100 (LEVEMIR FLEXTOUCH) 100 unit/mL (3 mL) inpn 20 Units by SubCUTAneous route nightly. No current facility-administered medications for this visit. SYSTEM REVIEW NOT RELATED TO LIVER DISEASE OR REVIEWED ABOVE:  Constitution systems: Negative for fever, chills, weight gain, weight loss. Eyes: Negative for visual changes. ENT: Negative for sore throat, painful swallowing. Respiratory: Negative for cough, hemoptysis, SOB. Cardiology: Negative for chest pain, palpitations.   GI:  Negative for constipation or diarrhea. : Negative for urinary frequency, dysuria, hematuria, nocturia. Skin: Negative for rash. Hematology: Negative for easy bruising, blood clots. Musculo-skelatal: Negative for back pain, muscle pain, weakness. Neurologic: Negative for headaches, dizziness, vertigo, memory problems not related to HE. Psychology: Negative for anxiety, depression. FAMILY HISTORY:  The father  of prostate cancer. The mother  of multiple myeloma. There is no family history of liver disease.       SOCIAL HISTORY:  The patient is . The patient has 3 children. The patient occasional cigar. The patient has been abstinent from alcohol since 2016. The patient does not work, he is on social security. PHYSICAL EXAMINATION:  Visit Vitals  /70 (BP 1 Location: Right arm, BP Patient Position: Sitting)   Pulse 80   Temp 97.5 °F (36.4 °C) (Tympanic)   Resp 17   Ht 5' 11\" (1.803 m)   Wt 252 lb (114.3 kg)   SpO2 98%   BMI 35.15 kg/m²     General: No acute distress. Eyes: Sclera anicteric. ENT: No oral lesions. Thyroid normal.  Nodes: No adenopathy. Skin: No spider angiomata. No jaundice. No palmar erythema. Respiratory: Lungs clear to auscultation. Cardiovascular: Regular heart rate. No murmurs. No JVD. Abdomen: Normal liver transplant incision that is healed. Soft non-tender. Liver size normal to percussion/palpation. Spleen not palpable. No obvious ascites. Extremities: No edema. Muscle wasting. Neurologic: Alert and oriented. Cranial nerves grossly intact.       LABORATORY STUDIES:  Liver [place] of [address] Units 2019 4/15/2019   WBC 3.4 - 10.8 x10E3/uL 5.5 5.4   ANC 1.4 - 7.0 x10E3/uL 3.6 3.2   HGB 13.0 - 17.7 g/dL 10.7 (L) 10.6 (L)    - 379 x10E3/uL 83 (LL) 91 (LL)   INR 0.8 - 1.2 1.0 1.0   AST 0 - 40 IU/L 12 13   ALT 0 - 44 IU/L 12 15   Alk Phos 39 - 117 IU/L 77 79   Bili, Total 0.0 - 1.2 mg/dL 0.5 0.6   Bili, Direct 0.00 - 0.40 mg/dL     Albumin 3.6 - 4.8 g/dL 3.9 4.2   BUN 8 - 27 mg/dL 29 (H) 39 (H)   Creat 0.76 - 1.27 mg/dL 1.36 (H) 1.47 (H)   Na 134 - 144 mmol/L 138 142   K 3.5 - 5.2 mmol/L 5.4 (H) 6.0 (H)   Cl 96 - 106 mmol/L 103 105   CO2 20 - 29 mmol/L 25 25   Glucose 65 - 99 mg/dL 145 (H) 116 (H)   Magnesium 1.6 - 2.3 mg/dL 1.7 1.7     Liver Virology and Transplant Immune Suppression Tacrolimus Level   Latest Ref Rng & Units 2.0 - 20.0 ng/mL   4/22/2019 3.8   4/15/2019 3.7   4/8/2019 3.4   4/1/2019 4.9     SEROLOGIES:  7/2018. HAV total positive, HBsurface antigne negative, anti-HBcore negative, anti-HBsurface negative, anti-HCV negative, Anti-HIV negative, CNV IgG negative, HSV IgG positive    Serologies Latest Ref Rng & Units 3/11/2019 11/29/2018 5/24/2018   Ferritin 30 - 400 ng/mL 177 411 (H) 161   Iron % Saturation 15 - 55 % 40 25 61 (H)     LIVER HISTOLOGY:  7/2014: Cirhosis with mild mixed macro- and microvesicular steatosis. 10/2018. Liver explant from [facility]. Multifocal moderately differentiated HCC.  2.3 cm HCC right lobe. 1.3 cm right lobe. No microscopic invasion. ENDOSCOPIC PROCEDURES:  Not available or performed    RADIOLOGY:  1/2019. CT scan abdomen with and without IV contrast.  Normal appearing liver. No liver mass lesions. Normal spleen. No ascites. 1/2019. CT scan chest.  No evidence of metastatic disease. OTHER TESTING:  Not available or performed    FOLLOW-UP:  All of the issues listed above in the Assessment and Plan were discussed with the patient. All questions were answered. The patient expressed a clear understanding of the above. Laboratory studies can be reduced to every 2 weeks to assess liver graft function and blood levels of immune suppression. The patient has a follow-up appointment at the liver transplant center in 6 weeks. [address] [place] [address] 87 3 months.       Elba Pa MD  [address] [facility] 1601 Lani Aguayo, 2000 [facility], [facility] 22.  201 [facility]

## 2019-04-26 NOTE — PROGRESS NOTES
Chief Complaint   Patient presents with    Follow-up   1. Have you been to the ER, urgent care clinic since your last visit? Hospitalized since your last visit? No    2. Have you seen or consulted any other health care providers outside of the [address] [place] [address] since your last visit? Include any pap smears or colon screening.  No   Visit Vitals  /70 (BP 1 Location: Right arm, BP Patient Position: Sitting)   Pulse 80   Temp 97.5 °F (36.4 °C) (Tympanic)   Resp 17   Ht 5' 11\" (1.803 m)   Wt 252 lb (114.3 kg)   SpO2 98%   BMI 35.15 kg/m²

## 2019-04-29 NOTE — TELEPHONE ENCOUNTER
Received forwarded [employer] message from patient. Phone call placed to patient. He asked if medications for erectile dysfunction were ok to take. Advised patient medications are ok to take and recommended he discuss with PCP. Patient stated he wanted to make sure he could take them before discussing with PCP.

## 2019-05-07 LAB
ALBUMIN SERPL-MCNC: 4.1 G/DL (ref 3.6–4.8)
ALBUMIN/GLOB SERPL: 1.8 {RATIO} (ref 1.2–2.2)
ALP SERPL-CCNC: 84 IU/L (ref 39–117)
ALT SERPL-CCNC: 15 IU/L (ref 0–44)
AST SERPL-CCNC: 15 IU/L (ref 0–40)
BASOPHILS # BLD AUTO: 0.2 X10E3/UL (ref 0–0.2)
BASOPHILS NFR BLD AUTO: 3 %
BILIRUB SERPL-MCNC: 0.6 MG/DL (ref 0–1.2)
BUN SERPL-MCNC: 36 MG/DL (ref 8–27)
BUN/CREAT SERPL: 29 (ref 10–24)
CALCIUM SERPL-MCNC: 9.4 MG/DL (ref 8.6–10.2)
CHLORIDE SERPL-SCNC: 105 MMOL/L (ref 96–106)
CO2 SERPL-SCNC: 26 MMOL/L (ref 20–29)
CREAT SERPL-MCNC: 1.23 MG/DL (ref 0.76–1.27)
EOSINOPHIL # BLD AUTO: 0.3 X10E3/UL (ref 0–0.4)
EOSINOPHIL NFR BLD AUTO: 5 %
ERYTHROCYTE [DISTWIDTH] IN BLOOD BY AUTOMATED COUNT: 15 % (ref 12.3–15.4)
GLOBULIN SER CALC-MCNC: 2.3 G/DL (ref 1.5–4.5)
GLUCOSE SERPL-MCNC: 162 MG/DL (ref 65–99)
HCT VFR BLD AUTO: 32.7 % (ref 37.5–51)
HGB BLD-MCNC: 11.1 G/DL (ref 13–17.7)
IMM GRANULOCYTES # BLD AUTO: 0 X10E3/UL (ref 0–0.1)
IMM GRANULOCYTES NFR BLD AUTO: 0 %
INR PPP: 1 (ref 0.8–1.2)
LYMPHOCYTES # BLD AUTO: 1.2 X10E3/UL (ref 0.7–3.1)
LYMPHOCYTES NFR BLD AUTO: 23 %
MAGNESIUM SERPL-MCNC: 1.9 MG/DL (ref 1.6–2.3)
MCH RBC QN AUTO: 29.9 PG (ref 26.6–33)
MCHC RBC AUTO-ENTMCNC: 33.9 G/DL (ref 31.5–35.7)
MCV RBC AUTO: 88 FL (ref 79–97)
MONOCYTES # BLD AUTO: 0.5 X10E3/UL (ref 0.1–0.9)
MONOCYTES NFR BLD AUTO: 10 %
MORPHOLOGY BLD-IMP: ABNORMAL
NEUTROPHILS # BLD AUTO: 3.1 X10E3/UL (ref 1.4–7)
NEUTROPHILS NFR BLD AUTO: 59 %
PLATELET # BLD AUTO: 89 X10E3/UL (ref 150–379)
POTASSIUM SERPL-SCNC: 5.8 MMOL/L (ref 3.5–5.2)
PROT SERPL-MCNC: 6.4 G/DL (ref 6–8.5)
PROTHROMBIN TIME: 9.9 SEC (ref 9.1–12)
RBC # BLD AUTO: 3.71 X10E6/UL (ref 4.14–5.8)
SODIUM SERPL-SCNC: 144 MMOL/L (ref 134–144)
WBC # BLD AUTO: 5.2 X10E3/UL (ref 3.4–10.8)

## 2019-05-08 ENCOUNTER — PATIENT OUTREACH (OUTPATIENT)
Dept: HEMATOLOGY | Age: 64
End: 2019-05-08

## 2019-05-08 LAB — TACROLIMUS, 700249: 1.1 NG/ML (ref 2–20)

## 2019-05-08 NOTE — PROGRESS NOTES
Phone call placed to patient. Reviewed most recent labs. Discussed low-potassium diet and reviewed foods to choose as well as foods to avoid/consume small amounts of. Notified patient information will be mailed to him. Advised patient to continue with labs every other week. Patient reports he saw PCP yesterday and he asked if it was ok to get the Shingles vaccine. Advised patient although the newer vaccine is non-live, we are not recommending that patients receive vaccine. Mailed 'Patient education: Low-potassium diet (The Basics)' to patient.

## 2019-05-21 LAB
25(OH)D3+25(OH)D2 SERPL-MCNC: 53.7 NG/ML (ref 30–100)
ALBUMIN SERPL-MCNC: 4.1 G/DL (ref 3.6–4.8)
ALBUMIN/GLOB SERPL: 1.9 {RATIO} (ref 1.2–2.2)
ALP SERPL-CCNC: 74 IU/L (ref 39–117)
ALT SERPL-CCNC: 15 IU/L (ref 0–44)
AST SERPL-CCNC: 16 IU/L (ref 0–40)
BASOPHILS # BLD AUTO: 0.1 X10E3/UL (ref 0–0.2)
BASOPHILS NFR BLD AUTO: 2 %
BILIRUB SERPL-MCNC: 0.5 MG/DL (ref 0–1.2)
BUN SERPL-MCNC: 36 MG/DL (ref 8–27)
BUN/CREAT SERPL: 26 (ref 10–24)
CALCIUM SERPL-MCNC: 9.2 MG/DL (ref 8.6–10.2)
CHLORIDE SERPL-SCNC: 106 MMOL/L (ref 96–106)
CHOLEST SERPL-MCNC: 135 MG/DL (ref 100–199)
CO2 SERPL-SCNC: 23 MMOL/L (ref 20–29)
CREAT SERPL-MCNC: 1.41 MG/DL (ref 0.76–1.27)
EOSINOPHIL # BLD AUTO: 0.2 X10E3/UL (ref 0–0.4)
EOSINOPHIL NFR BLD AUTO: 4 %
ERYTHROCYTE [DISTWIDTH] IN BLOOD BY AUTOMATED COUNT: 14.4 % (ref 12.3–15.4)
GLOBULIN SER CALC-MCNC: 2.2 G/DL (ref 1.5–4.5)
GLUCOSE SERPL-MCNC: 142 MG/DL (ref 65–99)
HBA1C MFR BLD: 5.1 % (ref 4.8–5.6)
HCT VFR BLD AUTO: 32 % (ref 37.5–51)
HDLC SERPL-MCNC: 33 MG/DL
HGB BLD-MCNC: 11 G/DL (ref 13–17.7)
IMM GRANULOCYTES # BLD AUTO: 0 X10E3/UL (ref 0–0.1)
IMM GRANULOCYTES NFR BLD AUTO: 0 %
INR PPP: 1 (ref 0.8–1.2)
LDLC SERPL CALC-MCNC: 85 MG/DL (ref 0–99)
LYMPHOCYTES # BLD AUTO: 1.2 X10E3/UL (ref 0.7–3.1)
LYMPHOCYTES NFR BLD AUTO: 24 %
MAGNESIUM SERPL-MCNC: 1.9 MG/DL (ref 1.6–2.3)
MCH RBC QN AUTO: 30.5 PG (ref 26.6–33)
MCHC RBC AUTO-ENTMCNC: 34.4 G/DL (ref 31.5–35.7)
MCV RBC AUTO: 89 FL (ref 79–97)
MONOCYTES # BLD AUTO: 0.3 X10E3/UL (ref 0.1–0.9)
MONOCYTES NFR BLD AUTO: 7 %
MORPHOLOGY BLD-IMP: ABNORMAL
NEUTROPHILS # BLD AUTO: 3 X10E3/UL (ref 1.4–7)
NEUTROPHILS NFR BLD AUTO: 63 %
PLATELET # BLD AUTO: 92 X10E3/UL (ref 150–450)
POTASSIUM SERPL-SCNC: 4.8 MMOL/L (ref 3.5–5.2)
PROT SERPL-MCNC: 6.3 G/DL (ref 6–8.5)
PROTHROMBIN TIME: 10.2 SEC (ref 9.1–12)
PSA SERPL-MCNC: 0.5 NG/ML (ref 0–4)
RBC # BLD AUTO: 3.61 X10E6/UL (ref 4.14–5.8)
SODIUM SERPL-SCNC: 143 MMOL/L (ref 134–144)
T4 FREE SERPL-MCNC: 1.19 NG/DL (ref 0.82–1.77)
TRIGL SERPL-MCNC: 87 MG/DL (ref 0–149)
TSH SERPL DL<=0.005 MIU/L-ACNC: 2.42 UIU/ML (ref 0.45–4.5)
VLDLC SERPL CALC-MCNC: 17 MG/DL (ref 5–40)
WBC # BLD AUTO: 4.8 X10E3/UL (ref 3.4–10.8)

## 2019-05-22 LAB — TACROLIMUS, 700249: 3.7 NG/ML (ref 2–20)

## 2019-06-04 LAB
ALBUMIN SERPL-MCNC: 4.2 G/DL (ref 3.6–4.8)
ALBUMIN/GLOB SERPL: 1.9 {RATIO} (ref 1.2–2.2)
ALP SERPL-CCNC: 71 IU/L (ref 39–117)
ALT SERPL-CCNC: 14 IU/L (ref 0–44)
AST SERPL-CCNC: 12 IU/L (ref 0–40)
BASOPHILS # BLD AUTO: 0.1 X10E3/UL (ref 0–0.2)
BASOPHILS NFR BLD AUTO: 2 %
BILIRUB SERPL-MCNC: 0.6 MG/DL (ref 0–1.2)
BUN SERPL-MCNC: 34 MG/DL (ref 8–27)
BUN/CREAT SERPL: 22 (ref 10–24)
CALCIUM SERPL-MCNC: 9.3 MG/DL (ref 8.6–10.2)
CHLORIDE SERPL-SCNC: 106 MMOL/L (ref 96–106)
CO2 SERPL-SCNC: 22 MMOL/L (ref 20–29)
CREAT SERPL-MCNC: 1.52 MG/DL (ref 0.76–1.27)
EOSINOPHIL # BLD AUTO: 0.2 X10E3/UL (ref 0–0.4)
EOSINOPHIL NFR BLD AUTO: 4 %
ERYTHROCYTE [DISTWIDTH] IN BLOOD BY AUTOMATED COUNT: 14 % (ref 12.3–15.4)
GLOBULIN SER CALC-MCNC: 2.2 G/DL (ref 1.5–4.5)
GLUCOSE SERPL-MCNC: 147 MG/DL (ref 65–99)
HCT VFR BLD AUTO: 33.3 % (ref 37.5–51)
HGB BLD-MCNC: 11.6 G/DL (ref 13–17.7)
IMM GRANULOCYTES # BLD AUTO: 0 X10E3/UL (ref 0–0.1)
IMM GRANULOCYTES NFR BLD AUTO: 0 %
INR PPP: 1 (ref 0.8–1.2)
LYMPHOCYTES # BLD AUTO: 1.2 X10E3/UL (ref 0.7–3.1)
LYMPHOCYTES NFR BLD AUTO: 21 %
MAGNESIUM SERPL-MCNC: 1.9 MG/DL (ref 1.6–2.3)
MCH RBC QN AUTO: 31.4 PG (ref 26.6–33)
MCHC RBC AUTO-ENTMCNC: 34.8 G/DL (ref 31.5–35.7)
MCV RBC AUTO: 90 FL (ref 79–97)
MONOCYTES # BLD AUTO: 0.4 X10E3/UL (ref 0.1–0.9)
MONOCYTES NFR BLD AUTO: 8 %
MORPHOLOGY BLD-IMP: ABNORMAL
NEUTROPHILS # BLD AUTO: 3.7 X10E3/UL (ref 1.4–7)
NEUTROPHILS NFR BLD AUTO: 65 %
PLATELET # BLD AUTO: 88 X10E3/UL (ref 150–450)
POTASSIUM SERPL-SCNC: 4.9 MMOL/L (ref 3.5–5.2)
PROT SERPL-MCNC: 6.4 G/DL (ref 6–8.5)
PROTHROMBIN TIME: 10.1 SEC (ref 9.1–12)
RBC # BLD AUTO: 3.69 X10E6/UL (ref 4.14–5.8)
SODIUM SERPL-SCNC: 143 MMOL/L (ref 134–144)
WBC # BLD AUTO: 5.6 X10E3/UL (ref 3.4–10.8)

## 2019-06-05 LAB — TACROLIMUS, 700249: 3.2 NG/ML (ref 2–20)

## 2019-06-14 ENCOUNTER — PATIENT OUTREACH (OUTPATIENT)
Dept: HEMATOLOGY | Age: 64
End: 2019-06-14

## 2019-06-14 ENCOUNTER — TELEPHONE (OUTPATIENT)
Dept: HEMATOLOGY | Age: 64
End: 2019-06-14

## 2019-06-14 RX ORDER — SIROLIMUS 1 MG/1
2 TABLET, FILM COATED ORAL 2 TIMES DAILY
Qty: 360 TAB | Refills: 3 | Status: SHIPPED | OUTPATIENT
Start: 2019-06-14 | End: 2020-02-04

## 2019-06-14 NOTE — PROGRESS NOTES
Reviewed labs with Dr. Miguel Williamson. Received VO to discontinue Tacrolimus and begin Sirolimus 2 mg BID. Reviewed potential drug interaction of Sirolimus and Lisinopril. Received VO to discontinue Lisinopril and monitor BP. Phone call placed to patient. Reviewed labs and change in immunosuppression. Advised patient to stop Lisinopril when he begins Sirolimus and monitor BP daily. Patient was instructed to notify NN if there is an increase in BP. Asked that patient notify NN when he receives Sirolimus to set up a lab draw for 1 week after switch. Patient verbalized understanding.      E-scribed Sirolimus prescription to [facility] per patient request.

## 2019-06-14 NOTE — TELEPHONE ENCOUNTER
Return phone call placed to pharmacy. Spoke to representative and advised Lisinopril has been stopped.

## 2019-06-14 NOTE — TELEPHONE ENCOUNTER
Black box warning for sirolimus 1mg regarding also taking Lisinopril. Calling to confirm that the patient will be monitored while taking both medications together. Requesting phone call back.

## 2019-06-28 ENCOUNTER — TELEPHONE (OUTPATIENT)
Dept: HEMATOLOGY | Age: 64
End: 2019-06-28

## 2019-06-28 NOTE — TELEPHONE ENCOUNTER
Patient would like for you to call him, he would like to know if he's labs have been changed or anything add to he's lab orders. Patient is aware that the Rosanne French is out of the office and will be back on 7/1/19.

## 2019-07-01 DIAGNOSIS — Z94.4 H/O LIVER TRANSPLANT (HCC): Primary | ICD-10-CM

## 2019-07-02 ENCOUNTER — OFFICE VISIT (OUTPATIENT)
Dept: HEMATOLOGY | Age: 64
End: 2019-07-02

## 2019-07-02 VITALS
TEMPERATURE: 97.8 F | SYSTOLIC BLOOD PRESSURE: 160 MMHG | BODY MASS INDEX: 34.83 KG/M2 | HEIGHT: 71 IN | WEIGHT: 248.8 LBS | HEART RATE: 80 BPM | OXYGEN SATURATION: 99 % | DIASTOLIC BLOOD PRESSURE: 68 MMHG

## 2019-07-02 DIAGNOSIS — Z94.4 H/O LIVER TRANSPLANT (HCC): Primary | ICD-10-CM

## 2019-07-02 LAB
ALBUMIN SERPL-MCNC: 4.3 G/DL (ref 3.6–4.8)
ALP SERPL-CCNC: 80 IU/L (ref 39–117)
ALT SERPL-CCNC: 14 IU/L (ref 0–44)
AST SERPL-CCNC: 15 IU/L (ref 0–40)
BASOPHILS # BLD AUTO: 0.1 X10E3/UL (ref 0–0.2)
BASOPHILS NFR BLD AUTO: 2 %
BILIRUB DIRECT SERPL-MCNC: 0.18 MG/DL (ref 0–0.4)
BILIRUB SERPL-MCNC: 0.6 MG/DL (ref 0–1.2)
BUN SERPL-MCNC: 30 MG/DL (ref 8–27)
BUN/CREAT SERPL: 21 (ref 10–24)
CALCIUM SERPL-MCNC: 8.7 MG/DL (ref 8.6–10.2)
CHLORIDE SERPL-SCNC: 104 MMOL/L (ref 96–106)
CO2 SERPL-SCNC: 25 MMOL/L (ref 20–29)
CREAT SERPL-MCNC: 1.45 MG/DL (ref 0.76–1.27)
EOSINOPHIL # BLD AUTO: 0.1 X10E3/UL (ref 0–0.4)
EOSINOPHIL NFR BLD AUTO: 2 %
ERYTHROCYTE [DISTWIDTH] IN BLOOD BY AUTOMATED COUNT: 13.4 % (ref 12.3–15.4)
GLUCOSE SERPL-MCNC: 194 MG/DL (ref 65–99)
HCT VFR BLD AUTO: 32.3 % (ref 37.5–51)
HGB BLD-MCNC: 10.9 G/DL (ref 13–17.7)
IMM GRANULOCYTES # BLD AUTO: 0 X10E3/UL (ref 0–0.1)
IMM GRANULOCYTES NFR BLD AUTO: 0 %
INR PPP: 0.9 (ref 0.8–1.2)
LYMPHOCYTES # BLD AUTO: 1.3 X10E3/UL (ref 0.7–3.1)
LYMPHOCYTES NFR BLD AUTO: 25 %
MCH RBC QN AUTO: 30.3 PG (ref 26.6–33)
MCHC RBC AUTO-ENTMCNC: 33.7 G/DL (ref 31.5–35.7)
MCV RBC AUTO: 90 FL (ref 79–97)
MONOCYTES # BLD AUTO: 0.4 X10E3/UL (ref 0.1–0.9)
MONOCYTES NFR BLD AUTO: 8 %
MORPHOLOGY BLD-IMP: ABNORMAL
NEUTROPHILS # BLD AUTO: 3.2 X10E3/UL (ref 1.4–7)
NEUTROPHILS NFR BLD AUTO: 63 %
PLATELET # BLD AUTO: 94 X10E3/UL (ref 150–450)
POTASSIUM SERPL-SCNC: 4.5 MMOL/L (ref 3.5–5.2)
PROT SERPL-MCNC: 6.7 G/DL (ref 6–8.5)
PROTHROMBIN TIME: 9.8 SEC (ref 9.1–12)
RBC # BLD AUTO: 3.6 X10E6/UL (ref 4.14–5.8)
SODIUM SERPL-SCNC: 141 MMOL/L (ref 134–144)
WBC # BLD AUTO: 5.1 X10E3/UL (ref 3.4–10.8)

## 2019-07-02 RX ORDER — FUROSEMIDE 40 MG/1
40 TABLET ORAL DAILY
COMMUNITY
End: 2019-10-30 | Stop reason: SDUPTHER

## 2019-07-02 NOTE — PROGRESS NOTES
Chief Complaint   Patient presents with    Follow-up     Visit Vitals  /68 (BP 1 Location: Left arm, BP Patient Position: Sitting)   Pulse 80   Temp 97.8 °F (36.6 °C) (Tympanic)   Ht 5' 11\" (1.803 m)   Wt 248 lb 12.8 oz (112.9 kg)   SpO2 99%   BMI 34.70 kg/m²     3 most recent PHQ Screens 3/14/2019   Little interest or pleasure in doing things Not at all   Feeling down, depressed, irritable, or hopeless Not at all   Total Score PHQ 2 0     Abuse Screening Questionnaire 11/27/2018   Do you ever feel afraid of your partner? N   Are you in a relationship with someone who physically or mentally threatens you? N   Is it safe for you to go home? Y     1. Have you been to the ER, urgent care clinic since your last visit? Hospitalized since your last visit? No    2. Have you seen or consulted any other health care providers outside of the [address] [place] [address] since your last visit? Include any pap smears or colon screening.  No

## 2019-07-02 NOTE — Clinical Note
7/12/19 Patient: Silvestre Lucas YOB: 1955 Date of Visit: 7/2/2019 Yuki Varela MD 
[address] [place] [address] [address] [facility] 7 [address] VIA Facsimile: [phone] Dear Yuki Varela MD, Thank you for referring Mr. Silvestre Lucas to [address] [place] [address],[address] for evaluation. My notes for this consultation are attached. If you have questions, please do not hesitate to call me. I look forward to following your patient along with you. Sincerely, Christopher Luo MD

## 2019-07-03 LAB — SIROLIMUS BLD-MCNC: 13.6 NG/ML (ref 3–20)

## 2019-07-09 LAB
ALBUMIN SERPL-MCNC: 3.9 G/DL (ref 3.6–4.8)
ALBUMIN/GLOB SERPL: 1.8 {RATIO} (ref 1.2–2.2)
ALP SERPL-CCNC: 86 IU/L (ref 39–117)
ALT SERPL-CCNC: 14 IU/L (ref 0–44)
AST SERPL-CCNC: 14 IU/L (ref 0–40)
BASOPHILS # BLD AUTO: 0.1 X10E3/UL (ref 0–0.2)
BASOPHILS NFR BLD AUTO: 2 %
BILIRUB SERPL-MCNC: 0.5 MG/DL (ref 0–1.2)
BUN SERPL-MCNC: 22 MG/DL (ref 8–27)
BUN/CREAT SERPL: 17 (ref 10–24)
CALCIUM SERPL-MCNC: 8.9 MG/DL (ref 8.6–10.2)
CHLORIDE SERPL-SCNC: 106 MMOL/L (ref 96–106)
CO2 SERPL-SCNC: 25 MMOL/L (ref 20–29)
CREAT SERPL-MCNC: 1.33 MG/DL (ref 0.76–1.27)
EOSINOPHIL # BLD AUTO: 0.1 X10E3/UL (ref 0–0.4)
EOSINOPHIL NFR BLD AUTO: 4 %
ERYTHROCYTE [DISTWIDTH] IN BLOOD BY AUTOMATED COUNT: 12.9 % (ref 12.3–15.4)
GLOBULIN SER CALC-MCNC: 2.2 G/DL (ref 1.5–4.5)
GLUCOSE SERPL-MCNC: 179 MG/DL (ref 65–99)
HCT VFR BLD AUTO: 30.4 % (ref 37.5–51)
HGB BLD-MCNC: 10.4 G/DL (ref 13–17.7)
IMM GRANULOCYTES # BLD AUTO: 0 X10E3/UL (ref 0–0.1)
IMM GRANULOCYTES NFR BLD AUTO: 0 %
INR PPP: 0.9 (ref 0.8–1.2)
LYMPHOCYTES # BLD AUTO: 0.7 X10E3/UL (ref 0.7–3.1)
LYMPHOCYTES NFR BLD AUTO: 23 %
MAGNESIUM SERPL-MCNC: 2 MG/DL (ref 1.6–2.3)
MCH RBC QN AUTO: 30.7 PG (ref 26.6–33)
MCHC RBC AUTO-ENTMCNC: 34.2 G/DL (ref 31.5–35.7)
MCV RBC AUTO: 90 FL (ref 79–97)
MONOCYTES # BLD AUTO: 0.2 X10E3/UL (ref 0.1–0.9)
MONOCYTES NFR BLD AUTO: 8 %
MORPHOLOGY BLD-IMP: ABNORMAL
NEUTROPHILS # BLD AUTO: 1.8 X10E3/UL (ref 1.4–7)
NEUTROPHILS NFR BLD AUTO: 63 %
PLATELET # BLD AUTO: 64 X10E3/UL (ref 150–450)
POTASSIUM SERPL-SCNC: 4.5 MMOL/L (ref 3.5–5.2)
PROT SERPL-MCNC: 6.1 G/DL (ref 6–8.5)
PROTHROMBIN TIME: 9.7 SEC (ref 9.1–12)
RBC # BLD AUTO: 3.39 X10E6/UL (ref 4.14–5.8)
SODIUM SERPL-SCNC: 144 MMOL/L (ref 134–144)
WBC # BLD AUTO: 2.9 X10E3/UL (ref 3.4–10.8)

## 2019-07-11 LAB — TACROLIMUS, 700249: NORMAL NG/ML (ref 2–20)

## 2019-07-12 ENCOUNTER — PATIENT OUTREACH (OUTPATIENT)
Dept: HEMATOLOGY | Age: 64
End: 2019-07-12

## 2019-07-12 DIAGNOSIS — Z94.4 H/O LIVER TRANSPLANT (HCC): Primary | ICD-10-CM

## 2019-07-12 NOTE — PROGRESS NOTES
Reviewed labs with Dr. Tamia Burrows. Phone call placed to patient. Reviewed labs. Advised patient labs should be drawn again on 7/15/19. Patient reports 2 new cold sores in his mouth. Instructed patient to decrease Sirolimus to 1 mg BID per Dr. Tamia Burrows. Discussed elevated level and link with cold sores.

## 2019-07-12 NOTE — PROGRESS NOTES
[address] [place] [address], MD, MD Lonnie Abdullahi PA-C Reese Dexter, [facility]     Berna Aaron [facility]   Larry Tinsley FNKIANA-TASH Uriostegui [facility]       [address]    at [address], [address] [place] [address], [facility] 22.    [phone]    FAX: [address] [place] [address], [address]    [phone]    FAX: [phone]       Patient Care Team:  Edy Gutierrez MD as PCP - [facility])  Mariam Walker MD (Nephrology)  Derrick Dougherty MD (Gastroenterology)  Rachel Montano MD as Physician (Internal Medicine)  Mojgan Garcia MD as Physician (Neurology)      Problem List  Date Reviewed: 5/7/2019          Codes Class Noted    H/O liver transplant [facility]) ICD-10-CM: Z94.4  ICD-9-CM: V42.7  12/1/2018        Long-term use of immunosuppressant medication ICD-10-CM: Z79.899  ICD-9-CM: V58.69  12/1/2018        Liver transplant complication [facility]) TKA-50-AQ: T86.40  ICD-9-CM: 996.82  12/1/2018        Slow rate of speech ICD-10-CM: R47.89  ICD-9-CM: 784.59  12/1/2018        Hepatic encephalopathy ([facility] [address] ICD-10-CM: K72.90  ICD-9-CM: 572.2  9/19/2018        Type 2 diabetes with nephropathy ([facility] ICD-10-CM: E11.21  ICD-9-CM: 250.40, 583.81  3/29/2018        Neuropathy involving both lower extremities ICD-10-CM: G57.93  ICD-9-CM: 356.9  1/2/2018        CAMEJO (nonalcoholic steatohepatitis) ICD-10-CM: E76.63  ICD-9-CM: 571.8  11/2/2017        GERD (gastroesophageal reflux disease) (Chronic) ICD-10-CM: K21.9  ICD-9-CM: 530.81  2/22/2016        CAD (coronary artery disease) (Chronic) ICD-10-CM: I25.10  ICD-9-CM: 414.00  2/22/2016        DM type 2 (diabetes mellitus, type 2) ([facility] (Chronic) ICD-10-CM: E11.9  ICD-9-CM: 250.00  2/22/2016                Adolfo Cordero returns to the [address] for management of liver transplant graft function, to monitor and adjust immune suppression and to assess for recurrence of the primary liver disease. The active problem list, all pertinent past medical history, medications, liver histology, endoscopic studies, radiologic findings and laboratory findings related to the liver disorder were reviewed with the patient. The patient underwent a liver transplant at [facility] [place] in 10/2018. He was found to have a small [address] in his liver explant. The patient is taking the following for immune suppression: Tacrolimus, cellcept, prednisone    A CT scan of the chest from 4/2019 suggested a new 8 mm nodule in the right middle lobe adjacent to the diaphram.    The patient has no symptoms which can be attributed to the liver disorder. He feels great and has a lot of energy. The patient has not experienced fatigue, fevers, chills, pain in the right side over the liver,     The patient completes all daily activities without any functional limitations. 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 An incidental hepatocellular carcinoma was found in the liver explant. The most recent CT scan to screen for [address] prior to LT was in 6/2018. NO suspicious liver mass was noted. Liver transaminases are normal.  ALP is normal.  Liver function is normal.  The platelet count is normal.      Hepatocellular carcinoma   This was present in native liver prior to undergoing liver transplant. There was viable HCC in the liver explant. 2 lesions in the right lobe. The most recent triple phase CT in 4/2019 demonstrated no liver mass lesions. But there was a lesion in the right middle lung adjacent to diaphram.  Will repeat chest and abdominal CT in 2 weeks.     Immune Suppression  The patient was switched from Tacrolimus to Sirolimus because of an elevation in Scr. Sirolimus is being taken at a dose of 2 mg BID  This is causing No significant adverse effects. The immune suppression blood level is too high. Will lower th dose to 1 mg BID. Mycophenolate mofetil (Cellcept) was stopped due to neutropenia  The patient is no longer taking prednsione. Anemia   This is due to multifactorial causes including recent surgery. The HB is stable in the 10.5-11.0 range. There is no evidence of ongoing GI blood loss. Neutropenia   This was thought to be secondary to cellcept. Cellcept was stopped but the WBC has not returned to the normal range. There is no evidence of infection. Will continue to monitor. There is no indication for GCSF at this time. Thrombocytopenia   This is secondary to cirrhosis. Some patients do not recover the PLT back to normal after LT. The platelet count is adequate for the patient to undergo procedures without the need for platelet transfusion or platelet growth factors. Prevention of infections  The patient is taking bactrim to prevent PCP pneumonia. This will continue for 12 months post-LT. Chronic kidney injury   This is a common adverse event of immune suppression. The Screat is now down to 1.33 with lowering of the TAC dose. NSAIDs should be avoided since these agents can worsen renal insufficiency. Hypercholesterolemia   This can be caused by immune suppression. Serum cholesterol will be monitored at periodic intervals. Hypertension   This is a common side effect of immune suppression. Blood pressure is well controlled on the current treatment. Low serum magnesium   This is a common side effect of immune suppression. The patient is taking a magnesium supplement. The patient appears to be tolerating the current dose of oral magnesium without side effects. Laboratory studies demonstrate serum magnesium level is only slightly low. Osteoporosis   Osteoporosis is commin in patients with cirhrosis prior to liver transplant. The patient had a normal bone density prior to undergoing liver transplant. Monitoring for skin Cancer  The patient was counseled regarding increased risk of skin cancer in transplant recipients and need to have any new skin lesions evaluated by dermatology and removed if suspicious. The patient was instructed to see Dermatology annually examination. Vaccinations  Routine vaccinations against other bacterial and viral agents can be performed as long as this is with attentuatted virus. Live virus vaccines should not be administered. Annual flu vaccination should be administered. ALLERGIES  No Known Allergies    MEDICATIONS  Current Outpatient Medications   Medication Sig    furosemide (LASIX) 40 mg tablet Take  by mouth daily.  sirolimus (RAPAMUNE) 1 mg tablet Take 2 Tabs by mouth two (2) times a day.  metFORMIN (GLUCOPHAGE) 500 mg tablet Take 500 mg by mouth every evening. 2 tabs in the a.m and 2 tabs in the p.m.    gabapentin (NEURONTIN) 300 mg capsule Take 1 Cap by mouth nightly.  magnesium oxide (MAG-OX) 400 mg tablet Take 1 Tab by mouth two (2) times a day.  amLODIPine (NORVASC) 10 mg tablet Take 1 Tab by mouth daily.  trimethoprim-sulfamethoxazole (BACTRIM)  mg per tablet Take 1 Tab by mouth daily.  aspirin delayed-release 81 mg tablet Take 81 mg by mouth daily.  levothyroxine (SYNTHROID) 75 mcg tablet Take 75 mcg by mouth Daily (before breakfast).  cholecalciferol, vitamin D3, (VITAMIN D3) 2,000 unit tab Take 1 Tab by mouth two (2) times a day.  metFORMIN (GLUCOPHAGE) 1,000 mg tablet Take 1,000 mg by mouth every morning.  insulin detemir U-100 (LEVEMIR FLEXTOUCH) 100 unit/mL (3 mL) inpn 20 Units by SubCUTAneous route nightly.  triamcinolone acetonide (KENALOG) 0.1 % topical cream Apply  to affected area two (2) times a day.  use thin layer    multivitamin (ONE A DAY) tablet Take 1 Tab by mouth daily.  pantoprazole (PROTONIX) 40 mg tablet Take 40 mg by mouth daily. No current facility-administered medications for this visit. SYSTEM REVIEW NOT RELATED TO LIVER DISEASE OR REVIEWED ABOVE:  Constitution systems: Negative for fever, chills, weight gain, weight loss. Eyes: Negative for visual changes. ENT: Negative for sore throat, painful swallowing. Respiratory: Negative for cough, hemoptysis, SOB. Cardiology: Negative for chest pain, palpitations. GI:  Negative for constipation or diarrhea. : Negative for urinary frequency, dysuria, hematuria, nocturia. Skin: Negative for rash. Hematology: Negative for easy bruising, blood clots. Musculo-skelatal: Negative for back pain, muscle pain, weakness. Neurologic: Negative for headaches, dizziness, vertigo, memory problems not related to HE. Psychology: Negative for anxiety, depression. FAMILY HISTORY:  The father  of prostate cancer. The mother  of multiple myeloma. There is no family history of liver disease.       SOCIAL HISTORY:  The patient is . The patient has 3 children. The patient occasional cigar. The patient has been abstinent from alcohol since 2016. The patient does not work, he is on social security. PHYSICAL EXAMINATION:  Visit Vitals  /68 (BP 1 Location: Left arm, BP Patient Position: Sitting)   Pulse 80   Temp 97.8 °F (36.6 °C) (Tympanic)   Ht 5' 11\" (1.803 m)   Wt 248 lb 12.8 oz (112.9 kg)   SpO2 99%   BMI 34.70 kg/m²     General: No acute distress. Eyes: Sclera anicteric. ENT: No oral lesions. Thyroid normal.  Nodes: No adenopathy. Skin: No spider angiomata. No jaundice. No palmar erythema. Respiratory: Lungs clear to auscultation. Cardiovascular: Regular heart rate. No murmurs. No JVD. Abdomen: Normal liver transplant incision that is healed. Soft non-tender. Liver size normal to percussion/palpation.   Spleen not palpable. No obvious ascites. Extremities: No edema. Muscle wasting. Neurologic: Alert and oriented. Cranial nerves grossly intact. LABORATORY STUDIES:  [facility] [place] [facility] [address] [place] [address] & Units 7/8/2019 7/1/2019   WBC 3.4 - 10.8 x10E3/uL 2.9 (L) 5.1   ANC 1.4 - 7.0 x10E3/uL 1.8 3.2   HGB 13.0 - 17.7 g/dL 10.4 (L) 10.9 (L)    - 450 x10E3/uL 64 (LL) 94 (LL)   INR 0.8 - 1.2 0.9 0.9   AST 0 - 40 IU/L 14 15   ALT 0 - 44 IU/L 14 14   Alk Phos 39 - 117 IU/L 86 80   Bili, Total 0.0 - 1.2 mg/dL 0.5 0.6   Bili, Direct 0.00 - 0.40 mg/dL  0.18   Albumin 3.6 - 4.8 g/dL 3.9 4.3   BUN 8 - 27 mg/dL 22 30 (H)   Creat 0.76 - 1.27 mg/dL 1.33 (H) 1.45 (H)   Na 134 - 144 mmol/L 144 141   K 3.5 - 5.2 mmol/L 4.5 4.5   Cl 96 - 106 mmol/L 106 104   CO2 20 - 29 mmol/L 25 25   Glucose 65 - 99 mg/dL 179 (H) 194 (H)   Magnesium 1.6 - 2.3 mg/dL 2.0        Liver Virology and Transplant Immune Suppression Tacrolimus Level   Latest Ref Rng & Units 2.0 - 20.0 ng/mL   4/22/2019 3.8   4/15/2019 3.7   4/8/2019 3.4   4/1/2019 4.9     SEROLOGIES:  7/2018. HAV total positive, HBsurface antigne negative, anti-HBcore negative, anti-HBsurface negative, anti-HCV negative, Anti-HIV negative, CNV IgG negative, HSV IgG positive    Serologies Latest Ref Rng & Units 3/11/2019 11/29/2018 5/24/2018   Ferritin 30 - 400 ng/mL 177 411 (H) 161   Iron % Saturation 15 - 55 % 40 25 61 (H)     LIVER HISTOLOGY:  7/2014: Cirhosis with mild mixed macro- and microvesicular steatosis. 10/2018. Liver explant from [facility]. Multifocal moderately differentiated HCC.  2.3 cm HCC right lobe. 1.3 cm right lobe. No microscopic invasion. ENDOSCOPIC PROCEDURES:  Not available or performed    RADIOLOGY:  1/2019. CT scan abdomen with and without IV contrast.  Normal appearing liver. No liver mass lesions. Normal spleen. No ascites. 1/2019. CT scan chest.  No evidence of metastatic disease.     OTHER TESTING:  Not available or performed    FOLLOW-UP:  All of the issues listed above in the Assessment and Plan were discussed with the patient. All questions were answered. The patient expressed a clear understanding of the above. Laboratory studies can be reduced to every 2 weeks to assess liver graft function and blood levels of immune suppression. The patient has a follow-up appointment at the liver transplant center in 6 weeks. [address] [place] [address] 4 months.       Vida France MD  [address] [place] [address] 22.  [phone]  [address] [place] [address]

## 2019-07-16 LAB
AFP L3 MFR SERPL: NORMAL % (ref 0–9.9)
AFP SERPL-MCNC: 1.2 NG/ML (ref 0–8)
ALBUMIN SERPL-MCNC: 4 G/DL (ref 3.6–4.8)
ALP SERPL-CCNC: 91 IU/L (ref 39–117)
ALT SERPL-CCNC: 13 IU/L (ref 0–44)
AST SERPL-CCNC: 12 IU/L (ref 0–40)
BASOPHILS # BLD AUTO: 0.1 X10E3/UL (ref 0–0.2)
BASOPHILS NFR BLD AUTO: 2 %
BILIRUB DIRECT SERPL-MCNC: 0.1 MG/DL (ref 0–0.4)
BILIRUB SERPL-MCNC: 0.4 MG/DL (ref 0–1.2)
BUN SERPL-MCNC: 27 MG/DL (ref 8–27)
BUN/CREAT SERPL: 20 (ref 10–24)
CALCIUM SERPL-MCNC: 9.3 MG/DL (ref 8.6–10.2)
CHLORIDE SERPL-SCNC: 105 MMOL/L (ref 96–106)
CO2 SERPL-SCNC: 26 MMOL/L (ref 20–29)
CREAT SERPL-MCNC: 1.38 MG/DL (ref 0.76–1.27)
EOSINOPHIL # BLD AUTO: 0.1 X10E3/UL (ref 0–0.4)
EOSINOPHIL NFR BLD AUTO: 3 %
ERYTHROCYTE [DISTWIDTH] IN BLOOD BY AUTOMATED COUNT: 11.8 % (ref 12.3–15.4)
GLUCOSE SERPL-MCNC: 158 MG/DL (ref 65–99)
HCT VFR BLD AUTO: 28.3 % (ref 37.5–51)
HGB BLD-MCNC: 9.7 G/DL (ref 13–17.7)
IMM GRANULOCYTES # BLD AUTO: 0 X10E3/UL (ref 0–0.1)
IMM GRANULOCYTES NFR BLD AUTO: 1 %
INR PPP: 0.9 (ref 0.8–1.2)
LYMPHOCYTES # BLD AUTO: 0.8 X10E3/UL (ref 0.7–3.1)
LYMPHOCYTES NFR BLD AUTO: 27 %
MAGNESIUM SERPL-MCNC: 1.9 MG/DL (ref 1.6–2.3)
MCH RBC QN AUTO: 29.5 PG (ref 26.6–33)
MCHC RBC AUTO-ENTMCNC: 34.3 G/DL (ref 31.5–35.7)
MCV RBC AUTO: 86 FL (ref 79–97)
MONOCYTES # BLD AUTO: 0.2 X10E3/UL (ref 0.1–0.9)
MONOCYTES NFR BLD AUTO: 8 %
MORPHOLOGY BLD-IMP: ABNORMAL
NEUTROPHILS # BLD AUTO: 1.8 X10E3/UL (ref 1.4–7)
NEUTROPHILS NFR BLD AUTO: 59 %
PLATELET # BLD AUTO: 79 X10E3/UL (ref 150–450)
POTASSIUM SERPL-SCNC: 4.8 MMOL/L (ref 3.5–5.2)
PROT SERPL-MCNC: 6.3 G/DL (ref 6–8.5)
PROTHROMBIN TIME: 9.8 SEC (ref 9.1–12)
RBC # BLD AUTO: 3.29 X10E6/UL (ref 4.14–5.8)
SIROLIMUS BLD-MCNC: 10.3 NG/ML (ref 3–20)
SODIUM SERPL-SCNC: 142 MMOL/L (ref 134–144)
WBC # BLD AUTO: 3 X10E3/UL (ref 3.4–10.8)

## 2019-07-19 ENCOUNTER — HOSPITAL ENCOUNTER (OUTPATIENT)
Dept: CT IMAGING | Age: 64
Discharge: HOME | End: 2019-07-19
Attending: INTERNAL MEDICINE
Payer: COMMERCIAL

## 2019-07-19 ENCOUNTER — PATIENT OUTREACH (OUTPATIENT)
Dept: HEMATOLOGY | Age: 64
End: 2019-07-19

## 2019-07-19 DIAGNOSIS — Z94.4 H/O LIVER TRANSPLANT (HCC): ICD-10-CM

## 2019-07-19 DIAGNOSIS — C22.0 HEPATOCELLULAR CARCINOMA (HCC): ICD-10-CM

## 2019-07-19 DIAGNOSIS — R91.1 LUNG NODULE: ICD-10-CM

## 2019-07-19 PROCEDURE — 74170 CT ABD WO CNTRST FLWD CNTRST: CPT

## 2019-07-19 PROCEDURE — 71260 CT THORAX DX C+: CPT

## 2019-07-19 PROCEDURE — 74011636320 HC RX REV CODE- 636/320: Performed by: INTERNAL MEDICINE

## 2019-07-19 PROCEDURE — 74011000258 HC RX REV CODE- 258: Performed by: INTERNAL MEDICINE

## 2019-07-19 RX ORDER — SODIUM CHLORIDE 9 MG/ML
100 INJECTION, SOLUTION INTRAVENOUS ONCE
Status: COMPLETED | OUTPATIENT
Start: 2019-07-19 | End: 2019-07-19

## 2019-07-19 RX ORDER — SODIUM CHLORIDE 0.9 % (FLUSH) 0.9 %
10 SYRINGE (ML) INJECTION ONCE
Status: COMPLETED | OUTPATIENT
Start: 2019-07-19 | End: 2019-07-19

## 2019-07-19 RX ADMIN — IOPAMIDOL 100 ML: 755 INJECTION, SOLUTION INTRAVENOUS at 17:41

## 2019-07-19 RX ADMIN — SODIUM CHLORIDE 50 ML: 900 INJECTION, SOLUTION INTRAVENOUS at 17:41

## 2019-07-19 RX ADMIN — Medication 10 ML: at 17:41

## 2019-07-19 NOTE — PROGRESS NOTES
Phone call placed to patient's wife, Jake Mcmahon. Reviewed continued elevation of sirolimus level and advised her to decrease Sirolimus from 1 mg BID to 1 mg daily. Jake Mcmahon reports patient has noted increased blood sugars since changing to Sirolimus. She reports patient has not changed his diet and inquired if this could be related. Asked that patient keep an eye on blood sugars and monitor response with reduced dose. Asked that patient continue weekly labs.

## 2019-07-22 DIAGNOSIS — Z94.4 H/O LIVER TRANSPLANT (HCC): Primary | ICD-10-CM

## 2019-07-22 DIAGNOSIS — C22.0 HEPATOCELLULAR CARCINOMA (HCC): ICD-10-CM

## 2019-07-23 LAB
ALBUMIN SERPL-MCNC: 4 G/DL (ref 3.6–4.8)
ALP SERPL-CCNC: 90 IU/L (ref 39–117)
ALT SERPL-CCNC: 16 IU/L (ref 0–44)
AST SERPL-CCNC: 17 IU/L (ref 0–40)
BILIRUB DIRECT SERPL-MCNC: 0.15 MG/DL (ref 0–0.4)
BILIRUB SERPL-MCNC: 0.5 MG/DL (ref 0–1.2)
BUN SERPL-MCNC: 22 MG/DL (ref 8–27)
BUN/CREAT SERPL: 19 (ref 10–24)
CALCIUM SERPL-MCNC: 8.7 MG/DL (ref 8.6–10.2)
CHLORIDE SERPL-SCNC: 105 MMOL/L (ref 96–106)
CO2 SERPL-SCNC: 24 MMOL/L (ref 20–29)
CREAT SERPL-MCNC: 1.15 MG/DL (ref 0.76–1.27)
ERYTHROCYTE [DISTWIDTH] IN BLOOD BY AUTOMATED COUNT: 12.4 % (ref 12.3–15.4)
GLUCOSE SERPL-MCNC: 179 MG/DL (ref 65–99)
HCT VFR BLD AUTO: 30.4 % (ref 37.5–51)
HGB BLD-MCNC: 10.4 G/DL (ref 13–17.7)
INR PPP: 0.9 (ref 0.8–1.2)
MAGNESIUM SERPL-MCNC: 2.2 MG/DL (ref 1.6–2.3)
MCH RBC QN AUTO: 29.4 PG (ref 26.6–33)
MCHC RBC AUTO-ENTMCNC: 34.2 G/DL (ref 31.5–35.7)
MCV RBC AUTO: 86 FL (ref 79–97)
PLATELET # BLD AUTO: 122 X10E3/UL (ref 150–450)
POTASSIUM SERPL-SCNC: 4.7 MMOL/L (ref 3.5–5.2)
PROTHROMBIN TIME: 9.6 SEC (ref 9.1–12)
RBC # BLD AUTO: 3.54 X10E6/UL (ref 4.14–5.8)
SODIUM SERPL-SCNC: 142 MMOL/L (ref 134–144)
WBC # BLD AUTO: 3.5 X10E3/UL (ref 3.4–10.8)

## 2019-07-24 LAB — SIROLIMUS BLD-MCNC: 3.1 NG/ML (ref 3–20)

## 2019-07-25 ENCOUNTER — PATIENT OUTREACH (OUTPATIENT)
Dept: HEMATOLOGY | Age: 64
End: 2019-07-25

## 2019-07-25 DIAGNOSIS — Z94.4 H/O LIVER TRANSPLANT (HCC): Primary | ICD-10-CM

## 2019-07-25 DIAGNOSIS — C22.0 HEPATOCELLULAR CARCINOMA (HCC): ICD-10-CM

## 2019-07-25 NOTE — PROGRESS NOTES
Reviewed imaging and labs with Dr. Velasquez Chen. Phone call placed to patient. Reviewed results of CT scans and lab work. Advised patient to remain on Sirolimus 1 mg daily and continue with weekly labs. Advised patient repeat imaging should be performed in 10/2019. Scheduled follow up with Dr. Velasquez Chen on 10/25/19.

## 2019-07-29 DIAGNOSIS — Z94.4 H/O LIVER TRANSPLANT (HCC): Primary | ICD-10-CM

## 2019-07-30 LAB
ALBUMIN SERPL-MCNC: 3.8 G/DL (ref 3.6–4.8)
ALP SERPL-CCNC: 81 IU/L (ref 39–117)
ALT SERPL-CCNC: 15 IU/L (ref 0–44)
AST SERPL-CCNC: 11 IU/L (ref 0–40)
BILIRUB DIRECT SERPL-MCNC: 0.11 MG/DL (ref 0–0.4)
BILIRUB SERPL-MCNC: 0.4 MG/DL (ref 0–1.2)
BUN SERPL-MCNC: 20 MG/DL (ref 8–27)
BUN/CREAT SERPL: 16 (ref 10–24)
CALCIUM SERPL-MCNC: 8.7 MG/DL (ref 8.6–10.2)
CHLORIDE SERPL-SCNC: 107 MMOL/L (ref 96–106)
CO2 SERPL-SCNC: 25 MMOL/L (ref 20–29)
CREAT SERPL-MCNC: 1.26 MG/DL (ref 0.76–1.27)
ERYTHROCYTE [DISTWIDTH] IN BLOOD BY AUTOMATED COUNT: 12.1 % (ref 12.3–15.4)
GLUCOSE SERPL-MCNC: 173 MG/DL (ref 65–99)
HCT VFR BLD AUTO: 30 % (ref 37.5–51)
HGB BLD-MCNC: 9.9 G/DL (ref 13–17.7)
MCH RBC QN AUTO: 28.9 PG (ref 26.6–33)
MCHC RBC AUTO-ENTMCNC: 33 G/DL (ref 31.5–35.7)
MCV RBC AUTO: 88 FL (ref 79–97)
PLATELET # BLD AUTO: 108 X10E3/UL (ref 150–450)
POTASSIUM SERPL-SCNC: 5.1 MMOL/L (ref 3.5–5.2)
PROT SERPL-MCNC: 5.9 G/DL (ref 6–8.5)
RBC # BLD AUTO: 3.42 X10E6/UL (ref 4.14–5.8)
SODIUM SERPL-SCNC: 144 MMOL/L (ref 134–144)
WBC # BLD AUTO: 4.1 X10E3/UL (ref 3.4–10.8)

## 2019-07-31 LAB — SIROLIMUS BLD-MCNC: 4.8 NG/ML (ref 3–20)

## 2019-08-02 ENCOUNTER — PATIENT OUTREACH (OUTPATIENT)
Dept: HEMATOLOGY | Age: 64
End: 2019-08-02

## 2019-08-02 NOTE — PROGRESS NOTES
Reviewed labs with Dr. Benji Alvarez. Phone call placed to patient. Reviewed most recent lab results with him. Advised patient to decrease lab frequency to every other week. Patient will be going out of town and will have labs repeated on 8/14.

## 2019-08-13 LAB
ALBUMIN SERPL-MCNC: 3.6 G/DL (ref 3.6–4.8)
ALP SERPL-CCNC: 90 IU/L (ref 39–117)
ALT SERPL-CCNC: 16 IU/L (ref 0–44)
AST SERPL-CCNC: 13 IU/L (ref 0–40)
BILIRUB DIRECT SERPL-MCNC: 0.12 MG/DL (ref 0–0.4)
BILIRUB SERPL-MCNC: 0.4 MG/DL (ref 0–1.2)
BUN SERPL-MCNC: 18 MG/DL (ref 8–27)
BUN/CREAT SERPL: 16 (ref 10–24)
CALCIUM SERPL-MCNC: 9 MG/DL (ref 8.6–10.2)
CHLORIDE SERPL-SCNC: 104 MMOL/L (ref 96–106)
CO2 SERPL-SCNC: 28 MMOL/L (ref 20–29)
CREAT SERPL-MCNC: 1.13 MG/DL (ref 0.76–1.27)
GLUCOSE SERPL-MCNC: 124 MG/DL (ref 65–99)
POTASSIUM SERPL-SCNC: 4.2 MMOL/L (ref 3.5–5.2)
PROT SERPL-MCNC: 5.9 G/DL (ref 6–8.5)
SIROLIMUS BLD-MCNC: 4.2 NG/ML (ref 3–20)
SODIUM SERPL-SCNC: 143 MMOL/L (ref 134–144)

## 2019-08-15 LAB
ERYTHROCYTE [DISTWIDTH] IN BLOOD BY AUTOMATED COUNT: 12.9 % (ref 12.3–15.4)
HCT VFR BLD AUTO: 30.3 % (ref 37.5–51)
HGB BLD-MCNC: 9.9 G/DL (ref 13–17.7)
MCH RBC QN AUTO: 28.7 PG (ref 26.6–33)
MCHC RBC AUTO-ENTMCNC: 32.7 G/DL (ref 31.5–35.7)
MCV RBC AUTO: 88 FL (ref 79–97)
MORPHOLOGY BLD-IMP: ABNORMAL
PLATELET # BLD AUTO: 96 X10E3/UL (ref 150–450)
RBC # BLD AUTO: 3.45 X10E6/UL (ref 4.14–5.8)
WBC # BLD AUTO: 4 X10E3/UL (ref 3.4–10.8)

## 2019-08-16 ENCOUNTER — PATIENT OUTREACH (OUTPATIENT)
Dept: HEMATOLOGY | Age: 64
End: 2019-08-16

## 2019-08-16 DIAGNOSIS — Z94.4 H/O LIVER TRANSPLANT (HCC): Primary | ICD-10-CM

## 2019-08-26 DIAGNOSIS — Z94.4 H/O LIVER TRANSPLANT (HCC): ICD-10-CM

## 2019-08-26 NOTE — PROGRESS NOTES
Attempted to review labs with patient, but cell service was poor and the call was disconnected. Plan to repeat labs in 2 weeks, adding iron studies.

## 2019-08-27 LAB
ALBUMIN SERPL-MCNC: 3.5 G/DL (ref 3.6–4.8)
ALP SERPL-CCNC: 99 IU/L (ref 39–117)
ALT SERPL-CCNC: 13 IU/L (ref 0–44)
AST SERPL-CCNC: 11 IU/L (ref 0–40)
BILIRUB DIRECT SERPL-MCNC: 0.11 MG/DL (ref 0–0.4)
BILIRUB SERPL-MCNC: 0.4 MG/DL (ref 0–1.2)
BUN SERPL-MCNC: 19 MG/DL (ref 8–27)
BUN/CREAT SERPL: 17 (ref 10–24)
CALCIUM SERPL-MCNC: 8.7 MG/DL (ref 8.6–10.2)
CHLORIDE SERPL-SCNC: 107 MMOL/L (ref 96–106)
CO2 SERPL-SCNC: 24 MMOL/L (ref 20–29)
CREAT SERPL-MCNC: 1.12 MG/DL (ref 0.76–1.27)
ERYTHROCYTE [DISTWIDTH] IN BLOOD BY AUTOMATED COUNT: 12.9 % (ref 12.3–15.4)
FERRITIN SERPL-MCNC: 153 NG/ML (ref 30–400)
GLUCOSE SERPL-MCNC: 153 MG/DL (ref 65–99)
HCT VFR BLD AUTO: 29.3 % (ref 37.5–51)
HGB BLD-MCNC: 9.9 G/DL (ref 13–17.7)
IRON SATN MFR SERPL: 32 % (ref 15–55)
IRON SERPL-MCNC: 60 UG/DL (ref 38–169)
MCH RBC QN AUTO: 28.6 PG (ref 26.6–33)
MCHC RBC AUTO-ENTMCNC: 33.8 G/DL (ref 31.5–35.7)
MCV RBC AUTO: 85 FL (ref 79–97)
PLATELET # BLD AUTO: 113 X10E3/UL (ref 150–450)
POTASSIUM SERPL-SCNC: 4.8 MMOL/L (ref 3.5–5.2)
PROT SERPL-MCNC: 6 G/DL (ref 6–8.5)
RBC # BLD AUTO: 3.46 X10E6/UL (ref 4.14–5.8)
SIROLIMUS BLD-MCNC: 4.6 NG/ML (ref 3–20)
SODIUM SERPL-SCNC: 145 MMOL/L (ref 134–144)
TIBC SERPL-MCNC: 190 UG/DL (ref 250–450)
UIBC SERPL-MCNC: 130 UG/DL (ref 111–343)
WBC # BLD AUTO: 4.4 X10E3/UL (ref 3.4–10.8)

## 2019-09-06 ENCOUNTER — TELEPHONE (OUTPATIENT)
Dept: NEUROLOGY | Age: 64
End: 2019-09-06

## 2019-09-06 DIAGNOSIS — Z94.4 H/O LIVER TRANSPLANT (HCC): Primary | ICD-10-CM

## 2019-09-06 DIAGNOSIS — R90.89 ABNORMAL FINDING ON MRI OF BRAIN: Primary | ICD-10-CM

## 2019-09-06 NOTE — TELEPHONE ENCOUNTER
----- Message from Ross Kuhn sent at 9/6/2019 11:43 AM EDT -----  Regarding: Dr. Chica Hull  Pt requesting a call back in regards to scheduling MRI and CT scan. Best contact [phone].

## 2019-09-06 NOTE — PROGRESS NOTES
Patient reports he is almost out of magnesium and asked if he needed to continue taking. Advised patient he can stop taking magnesium now that he is no longer on tacrolimus and we will monitor levels.

## 2019-09-10 LAB
ALBUMIN SERPL-MCNC: 3.8 G/DL (ref 3.6–4.8)
ALBUMIN/GLOB SERPL: 1.7 {RATIO} (ref 1.2–2.2)
ALP SERPL-CCNC: 97 IU/L (ref 39–117)
ALT SERPL-CCNC: 22 IU/L (ref 0–44)
AST SERPL-CCNC: 19 IU/L (ref 0–40)
BILIRUB DIRECT SERPL-MCNC: 0.1 MG/DL (ref 0–0.4)
BILIRUB SERPL-MCNC: 0.4 MG/DL (ref 0–1.2)
BUN SERPL-MCNC: 24 MG/DL (ref 8–27)
BUN/CREAT SERPL: 18 (ref 10–24)
CALCIUM SERPL-MCNC: 8.9 MG/DL (ref 8.6–10.2)
CHLORIDE SERPL-SCNC: 106 MMOL/L (ref 96–106)
CO2 SERPL-SCNC: 27 MMOL/L (ref 20–29)
CREAT SERPL-MCNC: 1.32 MG/DL (ref 0.76–1.27)
ERYTHROCYTE [DISTWIDTH] IN BLOOD BY AUTOMATED COUNT: 13.1 % (ref 12.3–15.4)
GLOBULIN SER CALC-MCNC: 2.3 G/DL (ref 1.5–4.5)
GLUCOSE SERPL-MCNC: 144 MG/DL (ref 65–99)
HBA1C MFR BLD: 5.6 % (ref 4.8–5.6)
HCT VFR BLD AUTO: 31.2 % (ref 37.5–51)
HGB BLD-MCNC: 10.4 G/DL (ref 13–17.7)
INR PPP: 0.9 (ref 0.8–1.2)
MAGNESIUM SERPL-MCNC: 1.9 MG/DL (ref 1.6–2.3)
MCH RBC QN AUTO: 28 PG (ref 26.6–33)
MCHC RBC AUTO-ENTMCNC: 33.3 G/DL (ref 31.5–35.7)
MCV RBC AUTO: 84 FL (ref 79–97)
PLATELET # BLD AUTO: 111 X10E3/UL (ref 150–450)
POTASSIUM SERPL-SCNC: 4.3 MMOL/L (ref 3.5–5.2)
PROT SERPL-MCNC: 6.1 G/DL (ref 6–8.5)
PROTHROMBIN TIME: 9.7 SEC (ref 9.1–12)
RBC # BLD AUTO: 3.72 X10E6/UL (ref 4.14–5.8)
SODIUM SERPL-SCNC: 145 MMOL/L (ref 134–144)
T4 FREE SERPL-MCNC: 1.33 NG/DL (ref 0.82–1.77)
TSH SERPL DL<=0.005 MIU/L-ACNC: 3.26 UIU/ML (ref 0.45–4.5)
WBC # BLD AUTO: 4.4 X10E3/UL (ref 3.4–10.8)

## 2019-09-11 LAB — SIROLIMUS BLD-MCNC: 6.1 NG/ML (ref 3–20)

## 2019-09-12 LAB — 25(OH)D3+25(OH)D2 SERPL-MCNC: 47.1 NG/ML (ref 30–100)

## 2019-09-18 ENCOUNTER — PATIENT OUTREACH (OUTPATIENT)
Dept: HEMATOLOGY | Age: 64
End: 2019-09-18

## 2019-09-18 DIAGNOSIS — Z94.4 H/O LIVER TRANSPLANT (HCC): Primary | ICD-10-CM

## 2019-09-18 NOTE — PROGRESS NOTES
Reviewed labs with Dr. Stephany Glez. Phone call placed to patient. Reviewed labs with him. Advised patient he can decrease lab frequency to once a month, next due on 10/7/19. Patient reports he is doing well, other than having a cold. Faxed labs to Dr. Anjana Sevilla (F: [phone]).

## 2019-10-04 LAB
AFP L3 MFR SERPL: NORMAL % (ref 0–9.9)
AFP SERPL-MCNC: 1.3 NG/ML (ref 0–8)
ALBUMIN SERPL-MCNC: 3.8 G/DL (ref 3.6–4.8)
ALP SERPL-CCNC: 92 IU/L (ref 39–117)
ALT SERPL-CCNC: 33 IU/L (ref 0–44)
AST SERPL-CCNC: 24 IU/L (ref 0–40)
BILIRUB DIRECT SERPL-MCNC: 0.09 MG/DL (ref 0–0.4)
BILIRUB SERPL-MCNC: 0.4 MG/DL (ref 0–1.2)
BUN SERPL-MCNC: 23 MG/DL (ref 8–27)
BUN/CREAT SERPL: 17 (ref 10–24)
CALCIUM SERPL-MCNC: 8.6 MG/DL (ref 8.6–10.2)
CHLORIDE SERPL-SCNC: 105 MMOL/L (ref 96–106)
CO2 SERPL-SCNC: 24 MMOL/L (ref 20–29)
CREAT SERPL-MCNC: 1.34 MG/DL (ref 0.76–1.27)
ERYTHROCYTE [DISTWIDTH] IN BLOOD BY AUTOMATED COUNT: 13.5 % (ref 12.3–15.4)
GLUCOSE SERPL-MCNC: 210 MG/DL (ref 65–99)
HCT VFR BLD AUTO: 31.6 % (ref 37.5–51)
HGB BLD-MCNC: 10.7 G/DL (ref 13–17.7)
INR PPP: 0.9 (ref 0.8–1.2)
MAGNESIUM SERPL-MCNC: 1.7 MG/DL (ref 1.6–2.3)
MCH RBC QN AUTO: 28.1 PG (ref 26.6–33)
MCHC RBC AUTO-ENTMCNC: 33.9 G/DL (ref 31.5–35.7)
MCV RBC AUTO: 83 FL (ref 79–97)
PLATELET # BLD AUTO: 127 X10E3/UL (ref 150–450)
POTASSIUM SERPL-SCNC: 4.2 MMOL/L (ref 3.5–5.2)
PROT SERPL-MCNC: 5.9 G/DL (ref 6–8.5)
PROTHROMBIN TIME: 9.7 SEC (ref 9.1–12)
RBC # BLD AUTO: 3.81 X10E6/UL (ref 4.14–5.8)
SODIUM SERPL-SCNC: 143 MMOL/L (ref 134–144)
WBC # BLD AUTO: 4.5 X10E3/UL (ref 3.4–10.8)

## 2019-10-05 LAB — SIROLIMUS BLD-MCNC: 3.3 NG/ML (ref 3–20)

## 2019-10-11 ENCOUNTER — TELEPHONE (OUTPATIENT)
Dept: HEMATOLOGY | Age: 64
End: 2019-10-11

## 2019-10-11 NOTE — TELEPHONE ENCOUNTER
Received phone call from Branden Phelan who wanted to advise Dr. Grant Colorado the chest CT has not been authorized due to not meeting medical necessity. Phone call placed to [facility] ([phone]). Spoke with nurse reviewer and provided additional clinical information. Received notification the request has been approved. Auth #: [id number], valid today through 11/9/19. Return phone call placed to Branden Phelan. Received VM. Left message regarding approval and provided authorization information. NN name and contact information provided.

## 2019-10-14 ENCOUNTER — HOSPITAL ENCOUNTER (OUTPATIENT)
Dept: CT IMAGING | Age: 64
Discharge: HOME | End: 2019-10-14
Attending: INTERNAL MEDICINE
Payer: COMMERCIAL

## 2019-10-14 DIAGNOSIS — C22.0 HEPATOCELLULAR CARCINOMA (HCC): ICD-10-CM

## 2019-10-14 DIAGNOSIS — Z94.4 H/O LIVER TRANSPLANT (HCC): ICD-10-CM

## 2019-10-14 PROCEDURE — 74011636320 HC RX REV CODE- 636/320: Performed by: INTERNAL MEDICINE

## 2019-10-14 PROCEDURE — 71260 CT THORAX DX C+: CPT

## 2019-10-14 PROCEDURE — 74170 CT ABD WO CNTRST FLWD CNTRST: CPT

## 2019-10-14 PROCEDURE — 74011250636 HC RX REV CODE- 250/636: Performed by: INTERNAL MEDICINE

## 2019-10-14 RX ORDER — SODIUM CHLORIDE 0.9 % (FLUSH) 0.9 %
10 SYRINGE (ML) INJECTION ONCE
Status: COMPLETED | OUTPATIENT
Start: 2019-10-14 | End: 2019-10-14

## 2019-10-14 RX ORDER — SODIUM CHLORIDE 9 MG/ML
50 INJECTION, SOLUTION INTRAVENOUS ONCE
Status: COMPLETED | OUTPATIENT
Start: 2019-10-14 | End: 2019-10-14

## 2019-10-14 RX ADMIN — SODIUM CHLORIDE 50 ML/HR: 900 INJECTION, SOLUTION INTRAVENOUS at 16:16

## 2019-10-14 RX ADMIN — IOPAMIDOL 100 ML: 755 INJECTION, SOLUTION INTRAVENOUS at 16:16

## 2019-10-14 RX ADMIN — Medication 10 ML: at 16:16

## 2019-10-25 ENCOUNTER — OFFICE VISIT (OUTPATIENT)
Dept: HEMATOLOGY | Age: 64
End: 2019-10-25

## 2019-10-25 VITALS
RESPIRATION RATE: 16 BRPM | SYSTOLIC BLOOD PRESSURE: 154 MMHG | HEIGHT: 71 IN | TEMPERATURE: 96.6 F | BODY MASS INDEX: 35.14 KG/M2 | DIASTOLIC BLOOD PRESSURE: 70 MMHG | HEART RATE: 69 BPM | WEIGHT: 251 LBS | OXYGEN SATURATION: 99 %

## 2019-10-25 DIAGNOSIS — Z94.4 H/O LIVER TRANSPLANT (HCC): Primary | ICD-10-CM

## 2019-10-25 PROBLEM — R47.89 SLOW RATE OF SPEECH: Status: RESOLVED | Noted: 2018-12-01 | Resolved: 2019-10-25

## 2019-10-25 PROBLEM — E11.21 TYPE 2 DIABETES WITH NEPHROPATHY (HCC): Status: RESOLVED | Noted: 2018-03-29 | Resolved: 2019-10-25

## 2019-10-25 PROBLEM — K76.82 HEPATIC ENCEPHALOPATHY: Status: RESOLVED | Noted: 2018-09-19 | Resolved: 2019-10-25

## 2019-10-25 RX ORDER — GLIMEPIRIDE 1 MG/1
3 TABLET ORAL 2 TIMES DAILY
COMMUNITY
Start: 2019-09-26 | End: 2022-06-22

## 2019-10-25 RX ORDER — SILDENAFIL 25 MG/1
TABLET, FILM COATED ORAL
Refills: 2 | COMMUNITY
Start: 2019-10-01 | End: 2020-06-19

## 2019-10-25 NOTE — PROGRESS NOTES
[address] [place] [address], MD, MD Chio Olivares PA-C Janeann Barters, [facility]     April S Agnieszka, [facility]   REINA Orozco-TASH Mosqueda, [facility]       Yeison Weaver [facility] 136    at [address], [address] [place] [address], [facility] 22.    [phone]    FAX: [address] [place] [address], [address], [address]    [phone]    FAX: [phone]       Patient Care Team:  Judy Underwood MD as PCP - General (Family Practice)  Kandis Soliman MD (Nephrology)  Ryan Cardona MD (Gastroenterology)  Eleuterio Ulloa MD as Physician (Internal Medicine)  Jesus Manuel Hamlin MD as Physician (Neurology)      Problem List  Date Reviewed: 10/25/2019          Codes Class Noted    H/O liver transplant [facility]) ICD-10-CM: Z94.4  ICD-9-CM: V42.7  12/1/2018        Long-term use of immunosuppressant medication ICD-10-CM: Z79.899  ICD-9-CM: V58.69  12/1/2018        Liver transplant complication [facility]) ZWE-91-XS: T86.40  ICD-9-CM: 996.82  12/1/2018        Neuropathy involving both lower extremities ICD-10-CM: G57.93  ICD-9-CM: 356.9  1/2/2018        CAMEJO (nonalcoholic steatohepatitis) ICD-10-CM: K75.81  ICD-9-CM: 571.8  11/2/2017        GERD (gastroesophageal reflux disease) (Chronic) ICD-10-CM: K21.9  ICD-9-CM: 530.81  2/22/2016        CAD (coronary artery disease) (Chronic) ICD-10-CM: I25.10  ICD-9-CM: 414.00  2/22/2016        DM type 2 (diabetes mellitus, type 2) ([facility] (Chronic) ICD-10-CM: E11.9  ICD-9-CM: 250.00  2/22/2016                Edy Prabhakar returns to the [address] for management of liver transplant graft function, to monitor and adjust immune suppression and to assess for recurrence of the primary liver disease. The active problem list, all pertinent past medical history, medications, liver histology, endoscopic studies, radiologic findings and laboratory findings related to the liver disorder were reviewed with the patient. The patient underwent a liver transplant at [facility] [place] in 10/2018. He was found to have a small [address] in his liver explant. The patient is taking the following for immune suppression: Tacrolimus, cellcept, prednisone    Since the last office appointment the patient has:  Had a repeat MRI or the abdomen and chest CT. No evidence of recurrent HCC was seen. The patient has no symptoms which can be attributed to the liver disorder. The patient has not experienced fatigue, fevers, chills, pain in the right side over the liver,     The patient completes all daily activities without any functional limitations. 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 The date of the LT was 10/2018. Liver transaminases are normal.  ALP is normal.  Liver function is normal.  The platelet count is normal.     Hepatocellular carcinoma   This was present in native liver prior to undergoing liver transplant. There was viable HCC in the liver explant. 2 lesions in the right lobe. The most recent triple phase CT in 10/2019 demonstrated no liver mass lesions. The most recent chest CT in 10/2019 demonstrated a 2 mm nodule in the right lung. This was small then 8 mm from4/2019. Will repeat Chest and abdominal CT scans in 6 months. Immune Suppression  The patient was switched from Tacrolimus to Sirolimus because of an elevation in Scr. Sirolimus is being taken at a dose of 1 mg BID  This is causing No significant adverse effects. The immune suppression blood level is in the correct range. Will continue the current dose of sirolimus.     Mycophenolate mofetil (Cellcept) was stopped due to neutropenia  The patient is no longer taking prednsione. Anemia   This is of unclear etiology. The HB is stable in the 10.5-11.0 range. The Ferrtin is normal  The FE saturation is normal.    Neutropenia   This has resolved when cellcept was discontinued. Thrombocytopenia   This is secondary to cirrhosis. Some patients do not recover the PLT back to normal after LT. The platelet count is adequate for the patient to undergo procedures without the need for platelet transfusion or platelet growth factors. Prevention of infections  The patient is taking bactrim to prevent PCP pneumonia. This can be stopped not that he is 1 year out from the LT. Chronic kidney injury   This is a common adverse event of immune suppression. The Screat is now stable in the 1.2-1.3 mg range   NSAIDs should be avoided since these agents can worsen renal insufficiency. Hypercholesterolemia   This can be caused by immune suppression. Serum cholesterol will be monitored at periodic intervals. Hypertension   This is a common side effect of immune suppression. Blood pressure is well controlled on the current treatment. Low serum magnesium   This is a common side effect of immune suppression. The serum Mag is in the normal range. No treatment is needed. Osteoporosis   Osteoporosis is commin in patients with cirhrosis prior to liver transplant. The patient had a normal bone density prior to undergoing liver transplant. Monitoring for skin Cancer  The patient was counseled regarding increased risk of skin cancer in transplant recipients and need to have any new skin lesions evaluated by dermatology and removed if suspicious. The patient was instructed to see Dermatology annually examination. Vaccinations  Routine vaccinations against other bacterial and viral agents can be performed as long as this is with attentuatted virus. Live virus vaccines should not be administered. Annual flu vaccination should be administered. ALLERGIES  No Known Allergies    MEDICATIONS  Current Outpatient Medications   Medication Sig    glimepiride (AMARYL) 1 mg tablet Take 3 Tabs by mouth two (2) times a day.  furosemide (LASIX) 40 mg tablet Take 40 mg by mouth daily.  sirolimus (RAPAMUNE) 1 mg tablet Take 2 Tabs by mouth two (2) times a day. (Patient taking differently: Take 1 mg by mouth two (2) times a day.)    metFORMIN (GLUCOPHAGE) 500 mg tablet Take 500 mg by mouth every evening. 2 tabs in the a.m and 2 tabs in the p.m.    amLODIPine (NORVASC) 10 mg tablet Take 1 Tab by mouth daily.  triamcinolone acetonide (KENALOG) 0.1 % topical cream Apply  to affected area two (2) times a day. use thin layer    aspirin delayed-release 81 mg tablet Take 81 mg by mouth daily.  levothyroxine (SYNTHROID) 75 mcg tablet Take 75 mcg by mouth Daily (before breakfast).  cholecalciferol, vitamin D3, (VITAMIN D3) 2,000 unit tab Take 1 Tab by mouth two (2) times a day.  sildenafil citrate (VIAGRA) 25 mg tablet TAKE ONE TABLET BY MOUTH ONE TIME DAILY    metFORMIN (GLUCOPHAGE) 1,000 mg tablet Take 1,000 mg by mouth every morning.  gabapentin (NEURONTIN) 300 mg capsule Take 1 Cap by mouth nightly.  insulin detemir U-100 (LEVEMIR FLEXTOUCH) 100 unit/mL (3 mL) inpn 20 Units by SubCUTAneous route nightly. No current facility-administered medications for this visit. SYSTEM REVIEW NOT RELATED TO LIVER DISEASE OR REVIEWED ABOVE:  Constitution systems: Negative for fever, chills, weight gain, weight loss. Eyes: Negative for visual changes. ENT: Negative for sore throat, painful swallowing. Respiratory: Negative for cough, hemoptysis, SOB. Cardiology: Negative for chest pain, palpitations. GI:  Negative for constipation or diarrhea. : Negative for urinary frequency, dysuria, hematuria, nocturia. Skin: Negative for rash. Hematology: Negative for easy bruising, blood clots.     Musculo-skelatal: Negative for back pain, muscle pain, weakness. Neurologic: Negative for headaches, dizziness, vertigo, memory problems not related to HE. Psychology: Negative for anxiety, depression. FAMILY HISTORY:  The father  of prostate cancer. The mother  of multiple myeloma. There is no family history of liver disease.       SOCIAL HISTORY:  The patient is . The patient has 3 children. The patient occasional cigar. The patient has been abstinent from alcohol since 2016. The patient does not work, he is on social security. PHYSICAL EXAMINATION:  Visit Vitals  /70   Pulse 69   Temp 96.6 °F (35.9 °C) (Tympanic)   Resp 16   Ht 5' 11\" (1.803 m)   Wt 251 lb (113.9 kg)   SpO2 99%   BMI 35.01 kg/m²     General: No acute distress. Eyes: Sclera anicteric. ENT: No oral lesions. Thyroid normal.  Nodes: No adenopathy. Skin: No spider angiomata. No jaundice. No palmar erythema. Respiratory: Lungs clear to auscultation. Cardiovascular: Regular heart rate. No murmurs. No JVD. Abdomen: Normal liver transplant incision that is healed. Soft non-tender. Liver size normal to percussion/palpation. Spleen not palpable. No obvious ascites. Extremities: No edema. Muscle wasting. Neurologic: Alert and oriented. Cranial nerves grossly intact.       LABORATORY STUDIES:  Liver [place] of [address] Units 10/3/2019 2019   WBC 3.4 - 10.8 x10E3/uL 4.5 4.4   ANC 1.4 - 7.0 x10E3/uL     HGB 13.0 - 17.7 g/dL 10.7 (L) 10.4 (L)    - 450 x10E3/uL 127 (L) 111 (L)   INR 0.8 - 1.2 0.9 0.9   AST 0 - 40 IU/L 24 19   ALT 0 - 44 IU/L 33 22   Alk Phos 39 - 117 IU/L 92 97   Bili, Total 0.0 - 1.2 mg/dL 0.4 0.4   Bili, Direct 0.00 - 0.40 mg/dL 0.09 0.10   Albumin 3.6 - 4.8 g/dL 3.8 3.8   BUN 8 - 27 mg/dL 23 24   Creat 0.76 - 1.27 mg/dL 1.34 (H) 1.32 (H)   Na 134 - 144 mmol/L 143 145 (H)   K 3.5 - 5.2 mmol/L 4.2 4.3   Cl 96 - 106 mmol/L 105 106   CO2 20 - 29 mmol/L 24 27   Glucose 65 - 99 mg/dL 210 (H) 144 (H)   Magnesium 1.6 - 2.3 mg/dL 1.7 1.9         Liver Virology and Transplant Immune Suppression Tacrolimus Level   Latest Ref Rng & Units 2.0 - 20.0 ng/mL   4/22/2019 3.8   4/15/2019 3.7   4/8/2019 3.4   4/1/2019 4.9     SEROLOGIES:  7/2018. HAV total positive, HBsurface antigne negative, anti-HBcore negative, anti-HBsurface negative, anti-HCV negative, Anti-HIV negative, CNV IgG negative, HSV IgG positive    Serologies Latest Ref Rng & Units 8/26/2019   Ferritin 30 - 400 ng/mL 153   Iron % Saturation 15 - 55 % 32     LIVER HISTOLOGY:  7/2014: Cirhosis with mild mixed macro- and microvesicular steatosis. 10/2018. Liver explant from [facility]. Multifocal moderately differentiated HCC.  2.3 cm HCC right lobe. 1.3 cm right lobe. No microscopic invasion. ENDOSCOPIC PROCEDURES:  Not available or performed    RADIOLOGY:  1/2019. CT scan abdomen with and without IV contrast.  Normal appearing liver. No liver mass lesions. Normal spleen. No ascites. 1/2019. CT scan chest.  No evidence of metastatic disease. OTHER TESTING:  Not available or performed    FOLLOW-UP:  All of the issues listed above in the Assessment and Plan were discussed with the patient. All questions were answered. The patient expressed a clear understanding of the above. Laboratory studies can be reduced to every 4 weeks to assess liver graft function and blood levels of immune suppression. The patient has his last follow-up appointment at the liver transplant center in December. [facility] 87 3 months.       Aubree Ziegler MD  [address], [address] [place] [address] 22.  [phone]  [address] [place] [address]

## 2019-10-25 NOTE — PROGRESS NOTES
Chief Complaint   Patient presents with    Follow-up     Liver Transplant follow up     3 most recent PHQ Screens 3/14/2019   Little interest or pleasure in doing things Not at all   Feeling down, depressed, irritable, or hopeless Not at all   Total Score PHQ 2 0     Abuse Screening Questionnaire 11/27/2018   Do you ever feel afraid of your partner? N   Are you in a relationship with someone who physically or mentally threatens you? N   Is it safe for you to go home? Y     Learning Assessment 9/19/2018   PRIMARY LEARNER Patient   BARRIERS PRIMARY LEARNER NONE   CO-LEARNER CAREGIVER No   PRIMARY LANGUAGE ENGLISH   LEARNER PREFERENCE PRIMARY LISTENING   ANSWERED BY patient   RELATIONSHIP SELF     1. Have you been to the ER, urgent care clinic since your last visit? Hospitalized since your last visit? No    2. Have you seen or consulted any other health care providers outside of the [address] [place] [address] since your last visit? Include any pap smears or colon screening.  Yes Reason for visit: endocrine

## 2019-10-25 NOTE — Clinical Note
10/25/19 Patient: Usman Montes YOB: 1955 Date of Visit: 10/25/2019 Esperanza Vance MD 
[facility] [place] [id number] VIA Facsimile: [phone] Dear Esperanza Vance MD, Thank you for referring Mr. Usman Montes to [facility] for evaluation. My notes for this consultation are attached. If you have questions, please do not hesitate to call me. I look forward to following your patient along with you. Sincerely, Karen Rubio MD

## 2019-10-30 RX ORDER — FUROSEMIDE 40 MG/1
40 TABLET ORAL DAILY
Qty: 90 TAB | Refills: 3 | Status: SHIPPED | OUTPATIENT
Start: 2019-10-30 | End: 2020-06-19

## 2019-11-04 DIAGNOSIS — Z94.4 H/O LIVER TRANSPLANT (HCC): ICD-10-CM

## 2019-11-04 DIAGNOSIS — Z94.4 HISTORY OF LIVER TRANSPLANT (HCC): Primary | ICD-10-CM

## 2019-11-04 LAB
BASOPHILS # BLD AUTO: 0.1 X10E3/UL (ref 0–0.2)
BASOPHILS NFR BLD AUTO: 2 %
EOSINOPHIL # BLD AUTO: 0.2 X10E3/UL (ref 0–0.4)
EOSINOPHIL NFR BLD AUTO: 4 %
ERYTHROCYTE [DISTWIDTH] IN BLOOD BY AUTOMATED COUNT: 13.5 % (ref 12.3–15.4)
HCT VFR BLD AUTO: 33.6 % (ref 37.5–51)
HGB BLD-MCNC: 11.4 G/DL (ref 13–17.7)
IMM GRANULOCYTES # BLD AUTO: 0 X10E3/UL (ref 0–0.1)
IMM GRANULOCYTES NFR BLD AUTO: 1 %
LYMPHOCYTES # BLD AUTO: 1.1 X10E3/UL (ref 0.7–3.1)
LYMPHOCYTES NFR BLD AUTO: 21 %
MCH RBC QN AUTO: 28.2 PG (ref 26.6–33)
MCHC RBC AUTO-ENTMCNC: 33.9 G/DL (ref 31.5–35.7)
MCV RBC AUTO: 83 FL (ref 79–97)
MONOCYTES # BLD AUTO: 0.4 X10E3/UL (ref 0.1–0.9)
MONOCYTES NFR BLD AUTO: 8 %
NEUTROPHILS # BLD AUTO: 3.4 X10E3/UL (ref 1.4–7)
NEUTROPHILS NFR BLD AUTO: 64 %
PLATELET # BLD AUTO: 128 X10E3/UL (ref 150–450)
RBC # BLD AUTO: 4.04 X10E6/UL (ref 4.14–5.8)
WBC # BLD AUTO: 5.2 X10E3/UL (ref 3.4–10.8)

## 2019-11-05 LAB
ALBUMIN SERPL-MCNC: 3.7 G/DL (ref 3.6–4.8)
ALP SERPL-CCNC: 89 IU/L (ref 39–117)
ALT SERPL-CCNC: 13 IU/L (ref 0–44)
AST SERPL-CCNC: 13 IU/L (ref 0–40)
BILIRUB DIRECT SERPL-MCNC: 0.12 MG/DL (ref 0–0.4)
BILIRUB SERPL-MCNC: 0.4 MG/DL (ref 0–1.2)
BUN SERPL-MCNC: 23 MG/DL (ref 8–27)
BUN/CREAT SERPL: 21 (ref 10–24)
CALCIUM SERPL-MCNC: 9 MG/DL (ref 8.6–10.2)
CHLORIDE SERPL-SCNC: 104 MMOL/L (ref 96–106)
CO2 SERPL-SCNC: 25 MMOL/L (ref 20–29)
CREAT SERPL-MCNC: 1.08 MG/DL (ref 0.76–1.27)
GLUCOSE SERPL-MCNC: 133 MG/DL (ref 65–99)
POTASSIUM SERPL-SCNC: 4.9 MMOL/L (ref 3.5–5.2)
PROT SERPL-MCNC: 6.3 G/DL (ref 6–8.5)
SODIUM SERPL-SCNC: 141 MMOL/L (ref 134–144)

## 2019-11-06 LAB — SIROLIMUS BLD-MCNC: 4.3 NG/ML (ref 3–20)

## 2019-11-07 NOTE — PROGRESS NOTES
Blood work looks great. The kidney function is now normal. [facility] transplant coordinator will call patient with results and blood work will now be every other month.

## 2019-11-08 ENCOUNTER — TELEPHONE (OUTPATIENT)
Dept: HEMATOLOGY | Age: 64
End: 2019-11-08

## 2019-11-08 ENCOUNTER — PATIENT OUTREACH (OUTPATIENT)
Dept: HEMATOLOGY | Age: 64
End: 2019-11-08

## 2019-11-08 NOTE — PROGRESS NOTES
Phone call placed to patient. Received VM. Left message advising patient labs look good. Notified patient labs can be reduced to an every other month schedule, next due in 1/2020.

## 2019-11-08 NOTE — TELEPHONE ENCOUNTER
Patient return phone call patient left a voicemail on office phone. For someone to call him back, patient voicemail wasn't working at the time.

## 2019-11-22 ENCOUNTER — HOSPITAL ENCOUNTER (OUTPATIENT)
Dept: MRI IMAGING | Age: 64
Discharge: HOME | End: 2019-11-22
Attending: PSYCHIATRY & NEUROLOGY
Payer: COMMERCIAL

## 2019-11-22 DIAGNOSIS — R90.89 ABNORMAL FINDING ON MRI OF BRAIN: ICD-10-CM

## 2019-11-22 PROCEDURE — 70551 MRI BRAIN STEM W/O DYE: CPT

## 2019-11-25 NOTE — PROGRESS NOTES
MRI scan of the brain does not show any changes when compared to the scan from last year.   Please inform

## 2019-12-12 ENCOUNTER — PATIENT MESSAGE (OUTPATIENT)
Dept: HEMATOLOGY | Age: 64
End: 2019-12-12

## 2019-12-12 DIAGNOSIS — R60.0 LOCALIZED EDEMA: Primary | ICD-10-CM

## 2019-12-16 ENCOUNTER — PATIENT OUTREACH (OUTPATIENT)
Dept: HEMATOLOGY | Age: 64
End: 2019-12-16

## 2019-12-16 DIAGNOSIS — Z94.4 H/O LIVER TRANSPLANT (HCC): Primary | ICD-10-CM

## 2019-12-16 RX ORDER — TACROLIMUS 1 MG/1
1 CAPSULE ORAL 2 TIMES DAILY
COMMUNITY
End: 2020-09-01 | Stop reason: SDUPTHER

## 2020-01-06 ENCOUNTER — HOSPITAL ENCOUNTER (OUTPATIENT)
Dept: NON INVASIVE DIAGNOSTICS | Age: 65
Discharge: HOME | End: 2020-01-06
Attending: INTERNAL MEDICINE
Payer: MEDICARE

## 2020-01-06 VITALS
WEIGHT: 251 LBS | BODY MASS INDEX: 35.14 KG/M2 | DIASTOLIC BLOOD PRESSURE: 69 MMHG | HEIGHT: 71 IN | SYSTOLIC BLOOD PRESSURE: 164 MMHG

## 2020-01-06 DIAGNOSIS — R60.0 LOCALIZED EDEMA: ICD-10-CM

## 2020-01-06 LAB
AV VELOCITY RATIO: 0.68
ECHO AO ROOT DIAM: 3.94 CM
ECHO AV PEAK GRADIENT: 7.9 MMHG
ECHO AV PEAK VELOCITY: 140.72 CM/S
ECHO LA MAJOR AXIS: 5.62 CM
ECHO LA TO AORTIC ROOT RATIO: 1.42
ECHO LV E' LATERAL VELOCITY: 10.16 CM/S
ECHO LV E' SEPTAL VELOCITY: 8.91 CM/S
ECHO LV INTERNAL DIMENSION DIASTOLIC: 4.87 CM (ref 4.2–5.9)
ECHO LV INTERNAL DIMENSION SYSTOLIC: 2.94 CM
ECHO LV IVSD: 1.34 CM (ref 0.6–1)
ECHO LV MASS 2D: 309.7 G (ref 88–224)
ECHO LV MASS INDEX 2D: 133.3 G/M2 (ref 49–115)
ECHO LV POSTERIOR WALL DIASTOLIC: 1.31 CM (ref 0.6–1)
ECHO LVOT PEAK GRADIENT: 3.7 MMHG
ECHO LVOT PEAK VELOCITY: 95.84 CM/S
ECHO MV A VELOCITY: 103.11 CM/S
ECHO MV AREA PHT: 3.1 CM2
ECHO MV E DECELERATION TIME (DT): 242.5 MS
ECHO MV E VELOCITY: 99.75 CM/S
ECHO MV E/A RATIO: 0.97
ECHO MV E/E' LATERAL: 9.82
ECHO MV E/E' RATIO (AVERAGED): 10.51
ECHO MV E/E' SEPTAL: 11.2
ECHO MV PRESSURE HALF TIME (PHT): 70.3 MS
ECHO PULMONARY ARTERY SYSTOLIC PRESSURE (PASP): 35 MMHG
ECHO PV MAX VELOCITY: 106.5 CM/S
ECHO PV PEAK GRADIENT: 4.5 MMHG
ECHO RV TAPSE: 2.69 CM (ref 1.5–2)
ECHO TV REGURGITANT MAX VELOCITY: 288.68 CM/S
ECHO TV REGURGITANT PEAK GRADIENT: 33.3 MMHG
LVFS 2D: 39.65 %
MV DEC SLOPE: 4.11

## 2020-01-06 PROCEDURE — 93306 TTE W/DOPPLER COMPLETE: CPT

## 2020-01-07 ENCOUNTER — OFFICE VISIT (OUTPATIENT)
Dept: NEUROLOGY | Age: 65
End: 2020-01-07

## 2020-01-07 VITALS
RESPIRATION RATE: 16 BRPM | HEIGHT: 71 IN | BODY MASS INDEX: 34.72 KG/M2 | SYSTOLIC BLOOD PRESSURE: 165 MMHG | OXYGEN SATURATION: 99 % | DIASTOLIC BLOOD PRESSURE: 82 MMHG | WEIGHT: 248 LBS | HEART RATE: 91 BPM

## 2020-01-07 DIAGNOSIS — R25.9 PARKINSONIAN FEATURES: ICD-10-CM

## 2020-01-07 DIAGNOSIS — R47.89 SLOW RATE OF SPEECH: Primary | ICD-10-CM

## 2020-01-07 LAB
ALBUMIN SERPL-MCNC: 3.7 G/DL (ref 3.6–4.8)
ALP SERPL-CCNC: 82 IU/L (ref 39–117)
ALT SERPL-CCNC: 13 IU/L (ref 0–44)
APPEARANCE UR: CLEAR
AST SERPL-CCNC: 17 IU/L (ref 0–40)
BACTERIA #/AREA URNS HPF: ABNORMAL /[HPF]
BASOPHILS # BLD AUTO: 0.1 X10E3/UL (ref 0–0.2)
BASOPHILS NFR BLD AUTO: 2 %
BILIRUB DIRECT SERPL-MCNC: 0.14 MG/DL (ref 0–0.4)
BILIRUB SERPL-MCNC: 0.6 MG/DL (ref 0–1.2)
BILIRUB UR QL STRIP: NEGATIVE
BUN SERPL-MCNC: 25 MG/DL (ref 8–27)
BUN/CREAT SERPL: 21 (ref 10–24)
CALCIUM SERPL-MCNC: 8.9 MG/DL (ref 8.6–10.2)
CASTS URNS QL MICRO: ABNORMAL /LPF
CHLORIDE SERPL-SCNC: 102 MMOL/L (ref 96–106)
CO2 SERPL-SCNC: 28 MMOL/L (ref 20–29)
COLOR UR: YELLOW
CREAT SERPL-MCNC: 1.19 MG/DL (ref 0.76–1.27)
EOSINOPHIL # BLD AUTO: 0.2 X10E3/UL (ref 0–0.4)
EOSINOPHIL NFR BLD AUTO: 3 %
EPI CELLS #/AREA URNS HPF: ABNORMAL /HPF (ref 0–10)
ERYTHROCYTE [DISTWIDTH] IN BLOOD BY AUTOMATED COUNT: 13.7 % (ref 11.6–15.4)
GLUCOSE SERPL-MCNC: 133 MG/DL (ref 65–99)
GLUCOSE UR QL: NEGATIVE
HCT VFR BLD AUTO: 37 % (ref 37.5–51)
HGB BLD-MCNC: 12.6 G/DL (ref 13–17.7)
HGB UR QL STRIP: ABNORMAL
IMM GRANULOCYTES # BLD AUTO: 0.1 X10E3/UL (ref 0–0.1)
IMM GRANULOCYTES NFR BLD AUTO: 1 %
INR PPP: 0.9 (ref 0.8–1.2)
KETONES UR QL STRIP: NEGATIVE
LEUKOCYTE ESTERASE UR QL STRIP: NEGATIVE
LYMPHOCYTES # BLD AUTO: 0.8 X10E3/UL (ref 0.7–3.1)
LYMPHOCYTES NFR BLD AUTO: 14 %
MAGNESIUM SERPL-MCNC: 1.8 MG/DL (ref 1.6–2.3)
MCH RBC QN AUTO: 28.2 PG (ref 26.6–33)
MCHC RBC AUTO-ENTMCNC: 34.1 G/DL (ref 31.5–35.7)
MCV RBC AUTO: 83 FL (ref 79–97)
MICRO URNS: ABNORMAL
MONOCYTES # BLD AUTO: 0.5 X10E3/UL (ref 0.1–0.9)
MONOCYTES NFR BLD AUTO: 8 %
MUCOUS THREADS URNS QL MICRO: PRESENT
NEUTROPHILS # BLD AUTO: 4.4 X10E3/UL (ref 1.4–7)
NEUTROPHILS NFR BLD AUTO: 72 %
NITRITE UR QL STRIP: NEGATIVE
PH UR STRIP: 5 [PH] (ref 5–7.5)
PLATELET # BLD AUTO: 133 X10E3/UL (ref 150–450)
POTASSIUM SERPL-SCNC: 4 MMOL/L (ref 3.5–5.2)
PROT SERPL-MCNC: 5.8 G/DL (ref 6–8.5)
PROT UR QL STRIP: ABNORMAL
PROTHROMBIN TIME: 9.9 SEC (ref 9.1–12)
RBC # BLD AUTO: 4.47 X10E6/UL (ref 4.14–5.8)
RBC #/AREA URNS HPF: ABNORMAL /HPF (ref 0–2)
SODIUM SERPL-SCNC: 141 MMOL/L (ref 134–144)
SP GR UR: 1.02 (ref 1–1.03)
UROBILINOGEN UR STRIP-MCNC: 0.2 MG/DL (ref 0.2–1)
WBC # BLD AUTO: 5.9 X10E3/UL (ref 3.4–10.8)
WBC #/AREA URNS HPF: ABNORMAL /HPF (ref 0–5)

## 2020-01-07 NOTE — PATIENT INSTRUCTIONS
[facility]   Statement to Patients  April 1, 2014      In an effort to ensure the large volume of patient prescription refills is processed in the most efficient and expeditious manner, we are asking our patients to assist us by calling your Pharmacy for all prescription refills, this will include also your  Mail Order Pharmacy. The pharmacy will contact our office electronically to continue the refill process. Please do not wait until the last minute to call your pharmacy. We need at least 48 hours (2days) to fill prescriptions. We also encourage you to call your pharmacy before going to  your prescription to make sure it is ready. With regard to controlled substance prescription refill requests (narcotic refills) that need to be picked up at our office, we ask your cooperation by providing us with at least 72 hours (3days) notice that you will need a refill. We will not refill narcotic prescription refill requests after 4:00pm on any weekday, Monday through Thursday, or after 2:00pm on Fridays, or on the weekends. We encourage everyone to explore another way of getting your prescription refill request processed using [employer], our patient web portal through our electronic medical record system. [employer] is an efficient and effective way to communicate your medication request directly to the office and  downloadable as an robb on your smart phone . [employer] also features a review functionality that allows you to view your medication list as well as leave messages for your physician. Are you ready to get connected? If so please review the attatched instructions or speak to any of our staff to get you set up right away! Thank you so much for your cooperation. Should you have any questions please contact our Practice Administrator.     The Physicians and Staff,  [facility]

## 2020-01-07 NOTE — PROGRESS NOTES
Chief Complaint   Patient presents with    Neurologic Problem    Parkinsons Disease         HISTORY OF PRESENT ILLNESS  Warden Villarreal came back for follow-up. Overall, he is doing quite well. Denies any complaints. He was suspected to have mild parkinsonism at the last visit and history of brain imaging raised a concern for ventriculomegaly. Repeat scan done recently did not show any changes compared to the previous scan. Denies any difficulties with mobility, balance, falls, bladder control issues or memory problems     RECAP  He was seen by me in the hospital in 2016 for altered mental status and that was suspected to be related to hepatic encephalopathy. He had hepatic cirrhosis and recently had liver transplant. He was doing quite well and a few weeks after surgery he developed slowness of speech and generalized debility. This prompted an MRI scan of the brain which revealed enlargement of the ventricular size with slight progression from 2016. He has had a fall which he thinks was more accidental.  Overall his balance has been improving over the past couple of weeks. His speech rate has also gotten faster. He has occasional dribbling of urine but denies any samantha incontinence. He has issues with short-term memory but long-term memory is quite good. Denies any focal motor or sensory deficits. It is still difficult for him to quickly get out of a seated position. Current Outpatient Medications   Medication Sig    tacrolimus (PROGRAF) 1 mg capsule Take 1 mg by mouth every other day. Alternate days with sirolimus.  furosemide (LASIX) 40 mg tablet Take 1 Tab by mouth daily. (Patient taking differently: Take 80 mg by mouth daily.)    sildenafil citrate (VIAGRA) 25 mg tablet TAKE ONE TABLET BY MOUTH ONE TIME DAILY    glimepiride (AMARYL) 1 mg tablet Take 3 Tabs by mouth two (2) times a day.  sirolimus (RAPAMUNE) 1 mg tablet Take 2 Tabs by mouth two (2) times a day.  (Patient taking differently: Take 1 mg by mouth every other day. Alternate days with tacrolimus.)    metFORMIN (GLUCOPHAGE) 500 mg tablet Take 500 mg by mouth every evening. 2 tabs in the a.m and 2 tabs in the p.m.    amLODIPine (NORVASC) 10 mg tablet Take 1 Tab by mouth daily.  triamcinolone acetonide (KENALOG) 0.1 % topical cream Apply  to affected area two (2) times a day. use thin layer    aspirin delayed-release 81 mg tablet Take 81 mg by mouth daily.  levothyroxine (SYNTHROID) 75 mcg tablet Take 75 mcg by mouth Daily (before breakfast).  cholecalciferol, vitamin D3, (VITAMIN D3) 2,000 unit tab Take 1 Tab by mouth two (2) times a day.  metFORMIN (GLUCOPHAGE) 1,000 mg tablet Take 1,000 mg by mouth every morning.  gabapentin (NEURONTIN) 300 mg capsule Take 1 Cap by mouth nightly.  insulin detemir U-100 (LEVEMIR FLEXTOUCH) 100 unit/mL (3 mL) inpn 20 Units by SubCUTAneous route nightly. No current facility-administered medications for this visit. PHYSICAL EXAMINATION:    Visit Vitals  /82   Pulse 91   Resp 16   Ht 5' 11\" (1.803 m)   Wt 112.5 kg (248 lb)   SpO2 99%   BMI 34.59 kg/m²       NEUROLOGICAL EXAMINATION:     Mental Status:   Alert and oriented to person, place, and time with recent and remote memory intact. Attention span and concentration are normal. Speech is clear. Cranial Nerves:    II, III, IV, VI:  Visual acuity grossly intact. Visual fields are normal.    Pupils are equal, round, and reactive to light and accommodation. Extra-ocular movements are full and fluid. V-XII: Hearing is grossly intact. Facial features are symmetric, with normal sensation and strength. The palate rises symmetrically and the tongue protrudes midline. Sternocleidomastoids 5/5. Motor Examination: Normal tone, bulk, and strength. 5/5 muscle strength throughout. No cogwheel rigidity or clonus present. Sensory exam:  Normal throughout to pinprick, temperature, and vibration sense. Normal proprioception. Coordination: Finger to nose and rapid arm movement testing was normal.   No resting or intention tremor    Gait and Station: Normal with normal arm swing. No Rhomberg or pronator drift. No muscle wasting or fasiculations noted. Reflexes:  DTRs 2+ throughout. Toes downgoing. LABS / IMAGING  MRI Results (most recent):  Results from Hospital Encounter encounter on 11/22/19   MRI BRAIN WO CONT    Narrative CLINICAL HISTORY: Ventriculomegaly    INDICATION: Gradually enlarging ventriculomegaly. COMPARISON: 11/23/2018, 2/22/2016    TECHNIQUE: MR examination of the brain includes axial and sagittal T1, coronal  T2, axial T2, axial FLAIR, axial gradient echo, axial DWI. CONTRAST: None    FINDINGS:   There is no intracranial mass, hemorrhage or evidence of acute infarction. Ventriculomegaly is not significant change compared to 11/23/2018 examination. There are scattered foci of increased T2 signal intensity noted in the central  gill. Mild cerebral volume loss. Mucoperiosteal thickening in the maxillary  sinuses on the right and on the left. There is anterior ethmoid sinus disease as  well. The brain architecture is normal. There is no evidence of midline shift or  mass-effect. There are no extra-axial fluid collections. There is no Chiari or  syrinx. The pituitary and infundibulum are grossly unremarkable. There is no  skull base mass. Cerebellopontine angles are grossly unremarkable. The major  intracranial vascular flow-voids are unremarkable. The cavernous sinuses are  symmetric. Optic chiasm and infundibulum grossly unremarkable. Orbits are  grossly symmetric. Dural venous sinuses are grossly patent. The mastoid air cells are well pneumatized and clear. Impression IMPRESSION:  Ventriculomegaly and minimal chronic microvascular ischemic change without   interval progression compared to the 11/23/2018 examination.     Mild paranasal sinus disease in the maxillary and frontal sinuses. MRI images were independently reviewed    ASSESSMENT    ICD-10-CM ICD-9-CM    1. Slow rate of speech R47.89 784.59    2. Parkinsonian features R25.9 781.0        DISCUSSION  Mr. Mariza Verdugo was last evaluated for symptoms of generalized debility, difficulty getting up from a seated position, slow rate of speech but all these have now resolved. He still notices that his speech is not what it used to be but I do not notice any issues   His MRI in the past raises a concern for ventriculomegaly but his most recent scan was stable. Clinically, he does not have any symptoms or signs that would suggest NPH. He was reassured today and was advised to follow-up with me if he develops any new symptoms or if he notices a change    Hamzah Vickers MD  Diplomate, American Board of Psychiatry & Neurology (Neurology)  Diplomate, American Board of Psychiatry & Neurology (Clinical Neurophysiology)  Diplomate, American Board of Electrodiagnostic Medicine    This note will not be viewable in [address]

## 2020-01-07 NOTE — PROGRESS NOTES
. Brandie Delcid presents today to follow up multiple neurological issues. Depression screening done on patient.

## 2020-01-08 LAB
SIROLIMUS BLD-MCNC: 2.4 NG/ML (ref 3–20)
TACROLIMUS, 700249: NORMAL NG/ML (ref 2–20)

## 2020-01-14 ENCOUNTER — PATIENT OUTREACH (OUTPATIENT)
Dept: HEMATOLOGY | Age: 65
End: 2020-01-14

## 2020-01-14 RX ORDER — AMLODIPINE BESYLATE 10 MG/1
10 TABLET ORAL DAILY
Qty: 90 TAB | Refills: 3 | Status: SHIPPED | OUTPATIENT
Start: 2020-01-14 | End: 2020-06-19

## 2020-01-14 NOTE — TELEPHONE ENCOUNTER
Requested Prescriptions     Pending Prescriptions Disp Refills    amLODIPine (NORVASC) 10 mg tablet 90 Tab 3     Sig: Take 1 Tab by mouth daily. Pt called for medication refill due to he has new insurance and change pharmacy.

## 2020-01-15 NOTE — PROGRESS NOTES
Spoke to patient and advised him to stop Sirolimus and increase Tacrolimus to 1 mg BID. Patient has follow up with Dr. Sheila Chairez on 1/31/20 and will have repeat labs at that time. Confirmed follow up with MARCOS Aaron NP on 2/4/20. Faxed most recent labs to Dr. Sheila Chairez (F: [phone]).

## 2020-01-30 DIAGNOSIS — Z94.4 H/O LIVER TRANSPLANT (HCC): Primary | ICD-10-CM

## 2020-02-01 LAB
ALBUMIN SERPL-MCNC: 3.7 G/DL (ref 3.8–4.8)
ALP SERPL-CCNC: 83 IU/L (ref 39–117)
ALT SERPL-CCNC: 16 IU/L (ref 0–44)
AST SERPL-CCNC: 14 IU/L (ref 0–40)
BASOPHILS # BLD AUTO: 0.2 X10E3/UL (ref 0–0.2)
BASOPHILS NFR BLD AUTO: 3 %
BILIRUB DIRECT SERPL-MCNC: 0.18 MG/DL (ref 0–0.4)
BILIRUB SERPL-MCNC: 0.6 MG/DL (ref 0–1.2)
BUN SERPL-MCNC: 21 MG/DL (ref 8–27)
BUN/CREAT SERPL: 16 (ref 10–24)
CALCIUM SERPL-MCNC: 8.9 MG/DL (ref 8.6–10.2)
CHLORIDE SERPL-SCNC: 106 MMOL/L (ref 96–106)
CO2 SERPL-SCNC: 25 MMOL/L (ref 20–29)
CREAT SERPL-MCNC: 1.31 MG/DL (ref 0.76–1.27)
EOSINOPHIL # BLD AUTO: 0.2 X10E3/UL (ref 0–0.4)
EOSINOPHIL NFR BLD AUTO: 2 %
ERYTHROCYTE [DISTWIDTH] IN BLOOD BY AUTOMATED COUNT: 14.4 % (ref 11.6–15.4)
GLUCOSE SERPL-MCNC: 157 MG/DL (ref 65–99)
HCT VFR BLD AUTO: 35 % (ref 37.5–51)
HGB BLD-MCNC: 12.3 G/DL (ref 13–17.7)
IMM GRANULOCYTES # BLD AUTO: 0.1 X10E3/UL (ref 0–0.1)
IMM GRANULOCYTES NFR BLD AUTO: 1 %
INR PPP: 0.9 (ref 0.8–1.2)
LYMPHOCYTES # BLD AUTO: 1.4 X10E3/UL (ref 0.7–3.1)
LYMPHOCYTES NFR BLD AUTO: 22 %
MAGNESIUM SERPL-MCNC: 1.7 MG/DL (ref 1.6–2.3)
MCH RBC QN AUTO: 29.8 PG (ref 26.6–33)
MCHC RBC AUTO-ENTMCNC: 35.1 G/DL (ref 31.5–35.7)
MCV RBC AUTO: 85 FL (ref 79–97)
MONOCYTES # BLD AUTO: 0.4 X10E3/UL (ref 0.1–0.9)
MONOCYTES NFR BLD AUTO: 6 %
NEUTROPHILS # BLD AUTO: 4.2 X10E3/UL (ref 1.4–7)
NEUTROPHILS NFR BLD AUTO: 66 %
PLATELET # BLD AUTO: 118 X10E3/UL (ref 150–450)
POTASSIUM SERPL-SCNC: 4.5 MMOL/L (ref 3.5–5.2)
PROT SERPL-MCNC: 6 G/DL (ref 6–8.5)
PROTHROMBIN TIME: 9.8 SEC (ref 9.1–12)
RBC # BLD AUTO: 4.13 X10E6/UL (ref 4.14–5.8)
SODIUM SERPL-SCNC: 145 MMOL/L (ref 134–144)
WBC # BLD AUTO: 6.3 X10E3/UL (ref 3.4–10.8)

## 2020-02-02 LAB — TACROLIMUS, 700249: 4.6 NG/ML (ref 2–20)

## 2020-02-04 ENCOUNTER — OFFICE VISIT (OUTPATIENT)
Dept: HEMATOLOGY | Age: 65
End: 2020-02-04

## 2020-02-04 VITALS
TEMPERATURE: 97.2 F | HEART RATE: 68 BPM | SYSTOLIC BLOOD PRESSURE: 160 MMHG | OXYGEN SATURATION: 98 % | DIASTOLIC BLOOD PRESSURE: 98 MMHG | BODY MASS INDEX: 35.42 KG/M2 | HEIGHT: 71 IN | WEIGHT: 253 LBS

## 2020-02-04 DIAGNOSIS — E66.01 SEVERE OBESITY (HCC): ICD-10-CM

## 2020-02-04 NOTE — PROGRESS NOTES
[address] [place] [address], MD, MD Nik Miller PA-C America Baumann, [facility]     April S Agnieszka [facility]   REINA Curiel-TASH Boyce [facility]       Ye[address]    at [facility]    [address] [facility], [address] [place] [address], [facility] 22.    [phone]    FAX: [address] [place] [address]    at [address], [address]    [phone]    FAX: [phone]       Patient Care Team:  Key Pabon MD as PCP - General (Family Practice)  Paul French MD (Nephrology)  Joan Prabhakar MD (Gastroenterology)  Migdalia Kwon MD as Physician (Internal Medicine)  Campos Gonzalez MD as Physician (Neurology)      Problem List  Date Reviewed: 2/5/2020          Codes Class Noted    Stage 2 chronic kidney disease ICD-10-CM: N18.2  ICD-9-CM: 585.2  2/5/2020        Severe obesity ([facility] [address] ICD-10-CM: E66.01  ICD-9-CM: 278.01  2/4/2020        H/O liver transplant [facility]) ICD-10-CM: Z94.4  ICD-9-CM: V42.7  12/1/2018        Long-term use of immunosuppressant medication ICD-10-CM: Z79.899  ICD-9-CM: V58.69  12/1/2018        Liver transplant complication [facility]) ISE-35-RM: T86.40  ICD-9-CM: 996.82  12/1/2018        Neuropathy involving both lower extremities ICD-10-CM: G57.93  ICD-9-CM: 356.9  1/2/2018        CAMEJO (nonalcoholic steatohepatitis) ICD-10-CM: K75.81  ICD-9-CM: 571.8  11/2/2017        GERD (gastroesophageal reflux disease) (Chronic) ICD-10-CM: K21.9  ICD-9-CM: 530.81  2/22/2016        CAD (coronary artery disease) (Chronic) ICD-10-CM: I25.10  ICD-9-CM: 414.00  2/22/2016        DM type 2 (diabetes mellitus, type 2) ([facility] (Chronic) ICD-10-CM: E11.9  ICD-9-CM: 250.00  2/22/2016              Bharathi Morel returns to the [place] [address] of [place] for management of liver transplant graft function, to monitor and adjust immune suppression and to assess for recurrence of the primary liver disease. The active problem list, all pertinent past medical history, medications, liver histology, endoscopic studies, radiologic findings and laboratory findings related to the liver disorder were reviewed with the patient. The patient underwent a liver transplant at [facility] [place] in 10/2018. He was found to have a small [address] in his liver explant. The patient is taking the following for immune suppression: Tacrolimus 1 mg BID. Since the last office visit the patient discontinued sirolimus ~ 2 weeks ago due to proteinuria and lower extremity edema. He had a repeat urine with Nephrology 1/31/2020 results were unavailable at the office visit. The lower extremity edema has improved but not resolved. The patient has no symptoms which can be attributed to the liver disorder. The patient has not experienced fatigue, fevers, chills, or pain in the right side over the liver. The patient completes all daily activities without any functional limitations. 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 The date of the LT was 10/2018. Liver transaminases are normal.  ALP is normal.  Liver function is normal.  The platelet count is normal.     Hepatocellular carcinoma   This was present in native liver prior to undergoing liver transplant. There was viable HCC in the liver explant,  2 lesions in the right lobe. The most recent triple phase CT in 10/2019 demonstrated no liver mass lesions. The most recent chest CT in 10/2019 demonstrated a 2 mm nodule in the right lung. Previously 8 mm from 4/2019. Will repeat Chest and abdominal CT scans in 6 months. Immune Suppression  The patient was switched from Tacrolimus to Sirolimus because of an elevation in Scr.   Sirolimus was subsequently discontinued 1/2020 for proteinuria and worsening lower extremity edema. He is currently on tacrolimus 1 mg BID. The Sr. Creatinine had a slight elevation from 1.19 to 1.31  The immune suppression blood level is in the correct range. Will repeat blood work in 4 weeks. Consider decreasing dose to 1 mg in am and 0.5 mg in pm pending Scr. Anemia   This is of unclear etiology. The HB is stable in the 10.5-11.0 range. The Ferrtin is normal  The FE saturation is normal.    Neutropenia   This has resolved when cellcept was discontinued. Thrombocytopenia   This is secondary to cirrhosis. Some patients do not recover the PLT back to normal after LT. The platelet count is adequate for the patient to undergo procedures without the need for platelet transfusion or platelet growth factors. Prevention of infections  The patient is more than 1 year out from transplant and is off PCP prophylaxis. Chronic kidney injury   This is a common adverse event of immune suppression. The Screat is stable ranging 1.2-1.3. Consider decreasing dose of tacrolimus if this increases. NSAIDs should be avoided since these agents can worsen renal insufficiency. Lower Extremity Edema  Amlodipine has a side effect of lower extremity edema. This has improved with discontinuation of sirolimus but not resolved. His blood pressure continues to be elevated. He may benefit from discontinuing amlodipine and starting chlorthalidone. The furosemide could also be decreased and possibly discontinued. Hypercholesterolemia   This can be caused by immune suppression. Serum cholesterol will be monitored at periodic intervals. Hypertension   This is a common side effect of immune suppression. Blood pressure was elevated today and has been ranging 031-219 systolic. He may benefit from changing the amlodipine to chlorthalidone and weaning off the furosemide.  This may improve blood pressure and decrease lower extremity edema. Low serum magnesium   This is a common side effect of immune suppression. The serum Mag is in the normal range. No treatment is needed. Osteoporosis   Osteoporosis is commin in patients with cirhrosis prior to liver transplant. The patient had a normal bone density prior to undergoing liver transplant. Monitoring for skin Cancer  The patient was counseled regarding increased risk of skin cancer in transplant recipients and need to have any new skin lesions evaluated by dermatology and removed if suspicious. The patient was instructed to see Dermatology annually examination. Vaccinations  Routine vaccinations against other bacterial and viral agents can be performed as long as this is with attentuatted virus. Live virus vaccines should not be administered. Annual flu vaccination should be administered. ALLERGIES  No Known Allergies    MEDICATIONS  Current Outpatient Medications   Medication Sig    amLODIPine (NORVASC) 10 mg tablet Take 1 Tab by mouth daily.  tacrolimus (PROGRAF) 1 mg capsule Take 1 mg by mouth two (2) times a day. Alternate days with sirolimus.  furosemide (LASIX) 40 mg tablet Take 1 Tab by mouth daily. (Patient taking differently: Take 80 mg by mouth daily.)    glimepiride (AMARYL) 1 mg tablet Take 3 mg by mouth two (2) times a day.  metFORMIN (GLUCOPHAGE) 500 mg tablet Take 500 mg by mouth two (2) times daily (with meals). 2 tabs in the a.m and 2 tabs in the p.m.    triamcinolone acetonide (KENALOG) 0.1 % topical cream Apply  to affected area two (2) times a day. use thin layer    aspirin delayed-release 81 mg tablet Take 81 mg by mouth daily.  levothyroxine (SYNTHROID) 75 mcg tablet Take 75 mcg by mouth Daily (before breakfast).  cholecalciferol, vitamin D3, (VITAMIN D3) 2,000 unit tab Take 1 Tab by mouth two (2) times a day.     sildenafil citrate (VIAGRA) 25 mg tablet TAKE ONE TABLET BY MOUTH ONE TIME DAILY    metFORMIN (GLUCOPHAGE) 1,000 mg tablet Take 1,000 mg by mouth every morning.  gabapentin (NEURONTIN) 300 mg capsule Take 1 Cap by mouth nightly.  insulin detemir U-100 (LEVEMIR FLEXTOUCH) 100 unit/mL (3 mL) inpn 20 Units by SubCUTAneous route nightly. No current facility-administered medications for this visit. SYSTEM REVIEW NOT RELATED TO LIVER DISEASE OR REVIEWED ABOVE:  Constitution systems: Negative for fever, chills, weight gain, weight loss. Eyes: Negative for visual changes. ENT: Negative for sore throat, painful swallowing. Respiratory: Negative for cough, hemoptysis, SOB. Cardiology: Negative for chest pain, palpitations. GI:  Negative for constipation or diarrhea. : Negative for urinary frequency, dysuria, hematuria, nocturia. Skin: Negative for rash. Hematology: Negative for easy bruising, blood clots. Musculo-skelatal: Negative for back pain, muscle pain, weakness. Neurologic: Negative for headaches, dizziness, vertigo, memory problems not related to HE. Psychology: Negative for anxiety, depression. FAMILY HISTORY:  The father  of prostate cancer. The mother  of multiple myeloma. There is no family history of liver disease.       SOCIAL HISTORY:  The patient is . The patient has 3 children. The patient occasional cigar. The patient has been abstinent from alcohol since 2016. The patient does not work, he is on social security. PHYSICAL EXAMINATION:  Visit Vitals  BP (!) 160/98   Pulse 68   Temp 97.2 °F (36.2 °C) (Tympanic)   Ht 5' 11\" (1.803 m)   Wt 253 lb (114.8 kg)   SpO2 98%   BMI 35.29 kg/m²     General: No acute distress. Eyes: Sclera anicteric. ENT: No oral lesions. Thyroid normal.  Nodes: No adenopathy. Skin: No spider angiomata. No jaundice. No palmar erythema. Respiratory: Lungs clear to auscultation. Cardiovascular: Regular heart rate. No murmurs. No JVD.   Abdomen: Normal liver transplant incision that is healed. Soft non-tender. Liver size normal to percussion/palpation. Spleen not palpable. No obvious ascites. Extremities: No edema. Muscle wasting. Neurologic: Alert and oriented. Cranial nerves grossly intact. LABORATORY STUDIES:  Liver [facility]psy of [address] [place] [address] & Units 1/31/2020 1/6/2020   WBC 3.4 - 10.8 x10E3/uL 6.3 5.9   ANC 1.4 - 7.0 x10E3/uL 4.2 4.4   HGB 13.0 - 17.7 g/dL 12.3 (L) 12.6 (L)    - 450 x10E3/uL 118 (L) 133 (L)   INR 0.8 - 1.2 0.9 0.9   AST 0 - 40 IU/L 14 17   ALT 0 - 44 IU/L 16 13   Alk Phos 39 - 117 IU/L 83 82   Bili, Total 0.0 - 1.2 mg/dL 0.6 0.6   Bili, Direct 0.00 - 0.40 mg/dL 0.18 0.14   Albumin 3.8 - 4.8 g/dL 3.7 (L) 3.7   BUN 8 - 27 mg/dL 21 25   Creat 0.76 - 1.27 mg/dL 1.31 (H) 1.19   Na 134 - 144 mmol/L 145 (H) 141   K 3.5 - 5.2 mmol/L 4.5 4.0   Cl 96 - 106 mmol/L 106 102   CO2 20 - 29 mmol/L 25 28   Glucose 65 - 99 mg/dL 157 (H) 133 (H)   Magnesium 1.6 - 2.3 mg/dL 1.7 1.8     Liver Virology and Transplant Immune Suppression Tacrolimus Level   Latest Ref Rng & Units 2.0 - 20.0 ng/mL   1/31/2020 4.6   1/6/2020 None Detected     Liver Virology and Transplant Immune Suppression Tacrolimus Level   Latest Ref Rng & Units 2.0 - 20.0 ng/mL   4/22/2019 3.8   4/15/2019 3.7   4/8/2019 3.4   4/1/2019 4.9     SEROLOGIES:  7/2018. HAV total positive, HBsurface antigne negative, anti-HBcore negative, anti-HBsurface negative, anti-HCV negative, Anti-HIV negative, CNV IgG negative, HSV IgG positive    Serologies Latest Ref Rng & Units 8/26/2019   Ferritin 30 - 400 ng/mL 153   Iron % Saturation 15 - 55 % 32     LIVER HISTOLOGY:  7/2014: Cirhosis with mild mixed macro- and microvesicular steatosis. 10/2018. Liver explant from [facility]. Multifocal moderately differentiated HCC.  2.3 cm HCC right lobe. 1.3 cm right lobe. No microscopic invasion. ENDOSCOPIC PROCEDURES:  Not available or performed    RADIOLOGY:  1/2019.   CT scan abdomen with and without IV contrast.  Normal appearing liver. No liver mass lesions. Normal spleen. No ascites. 1/2019. CT scan chest.  No evidence of metastatic disease. OTHER TESTING:  Not available or performed    FOLLOW-UP:  All of the issues listed above in the Assessment and Plan were discussed with the patient. All questions were answered. The patient expressed a clear understanding of the above. Laboratory studies can be reduced to every 4 weeks to assess liver graft function and blood levels of immune suppression. The patient has his last follow-up appointment at the liver transplant center in December. [facility] [place] [address] 1 month. April S.  [facility] [address] [address] [place], [address] [facility] 22.  [address] [facility]

## 2020-02-04 NOTE — PROGRESS NOTES
Pt complained of leg edema that he experienced everyday. Pt is on diuretics but he still retaining fluid. Mary Davison is a 71 y.o. male  Chief Complaint   Patient presents with    Follow-up     Visit Vitals  BP (!) 180/100 (BP 1 Location: Left arm, BP Patient Position: Sitting)   Pulse 68   Temp 97.2 °F (36.2 °C) (Tympanic)   Ht 5' 11\" (1.803 m)   Wt 253 lb (114.8 kg)   SpO2 98%   BMI 35.29 kg/m²         Visit Vitals  /84 (BP 1 Location: Left arm, BP Patient Position: Sitting)   Pulse 68   Temp 97.2 °F (36.2 °C) (Tympanic)   Ht 5' 11\" (1.803 m)   Wt 253 lb (114.8 kg)   SpO2 98%   BMI 35.29 kg/m²     Elevated blood pressure informed provider. 3 most recent PHQ Screens 2/4/2020   Little interest or pleasure in doing things Not at all   Feeling down, depressed, irritable, or hopeless Not at all   Total Score PHQ 2 0     Learning Assessment 2/4/2020   PRIMARY LEARNER Patient   BARRIERS PRIMARY LEARNER NONE   CO-LEARNER CAREGIVER No   PRIMARY LANGUAGE ENGLISH   LEARNER PREFERENCE PRIMARY DEMONSTRATION   ANSWERED BY patient   RELATIONSHIP SELF     Abuse Screening Questionnaire 2/4/2020   Do you ever feel afraid of your partner? N   Are you in a relationship with someone who physically or mentally threatens you? N   Is it safe for you to go home? Y         1. Have you been to the ER, urgent care clinic since your last visit? Hospitalized since your last visit? No    2. Have you seen or consulted any other health care providers outside of the [address] [place] [address] since your last visit? Include any pap smears or colon screening.  No

## 2020-02-05 PROBLEM — N18.2 STAGE 2 CHRONIC KIDNEY DISEASE: Status: ACTIVE | Noted: 2020-02-05

## 2020-02-06 ENCOUNTER — DOCUMENTATION ONLY (OUTPATIENT)
Dept: HEMATOLOGY | Age: 65
End: 2020-02-06

## 2020-02-06 DIAGNOSIS — K75.81 NASH (NONALCOHOLIC STEATOHEPATITIS): ICD-10-CM

## 2020-02-06 DIAGNOSIS — C22.0 HEPATOCELLULAR CARCINOMA (HCC): Primary | ICD-10-CM

## 2020-02-06 DIAGNOSIS — Z79.60 LONG-TERM USE OF IMMUNOSUPPRESSANT MEDICATION: Primary | ICD-10-CM

## 2020-02-06 DIAGNOSIS — Z94.4 H/O LIVER TRANSPLANT (HCC): ICD-10-CM

## 2020-02-06 NOTE — PROGRESS NOTES
Chart reviewed for patient's potential eligibility in clinical trial opportunities. Patient does not qualify at this time.

## 2020-02-13 DIAGNOSIS — K75.81 NASH (NONALCOHOLIC STEATOHEPATITIS): ICD-10-CM

## 2020-02-13 DIAGNOSIS — Z79.60 LONG-TERM USE OF IMMUNOSUPPRESSANT MEDICATION: ICD-10-CM

## 2020-02-13 DIAGNOSIS — Z94.4 H/O LIVER TRANSPLANT (HCC): ICD-10-CM

## 2020-02-29 LAB
ALBUMIN SERPL-MCNC: 4 G/DL (ref 3.8–4.8)
ALP SERPL-CCNC: 69 IU/L (ref 39–117)
ALT SERPL-CCNC: 15 IU/L (ref 0–44)
AST SERPL-CCNC: 15 IU/L (ref 0–40)
BASOPHILS # BLD AUTO: 0.2 X10E3/UL (ref 0–0.2)
BASOPHILS NFR BLD AUTO: 3 %
BILIRUB DIRECT SERPL-MCNC: 0.22 MG/DL (ref 0–0.4)
BILIRUB SERPL-MCNC: 0.9 MG/DL (ref 0–1.2)
BUN SERPL-MCNC: 31 MG/DL (ref 8–27)
BUN/CREAT SERPL: 21 (ref 10–24)
CALCIUM SERPL-MCNC: 8.9 MG/DL (ref 8.6–10.2)
CHLORIDE SERPL-SCNC: 104 MMOL/L (ref 96–106)
CO2 SERPL-SCNC: 26 MMOL/L (ref 20–29)
CREAT SERPL-MCNC: 1.51 MG/DL (ref 0.76–1.27)
EOSINOPHIL # BLD AUTO: 0.2 X10E3/UL (ref 0–0.4)
EOSINOPHIL NFR BLD AUTO: 3 %
ERYTHROCYTE [DISTWIDTH] IN BLOOD BY AUTOMATED COUNT: 14.3 % (ref 11.6–15.4)
GLUCOSE SERPL-MCNC: 127 MG/DL (ref 65–99)
HCT VFR BLD AUTO: 35 % (ref 37.5–51)
HGB BLD-MCNC: 11.8 G/DL (ref 13–17.7)
IMM GRANULOCYTES # BLD AUTO: 0 X10E3/UL (ref 0–0.1)
IMM GRANULOCYTES NFR BLD AUTO: 0 %
INR PPP: 1 (ref 0.8–1.2)
LYMPHOCYTES # BLD AUTO: 1.3 X10E3/UL (ref 0.7–3.1)
LYMPHOCYTES NFR BLD AUTO: 19 %
MAGNESIUM SERPL-MCNC: 1.7 MG/DL (ref 1.6–2.3)
MCH RBC QN AUTO: 29.4 PG (ref 26.6–33)
MCHC RBC AUTO-ENTMCNC: 33.7 G/DL (ref 31.5–35.7)
MCV RBC AUTO: 87 FL (ref 79–97)
MONOCYTES # BLD AUTO: 0.5 X10E3/UL (ref 0.1–0.9)
MONOCYTES NFR BLD AUTO: 7 %
NEUTROPHILS # BLD AUTO: 4.6 X10E3/UL (ref 1.4–7)
NEUTROPHILS NFR BLD AUTO: 68 %
PLATELET # BLD AUTO: 124 X10E3/UL (ref 150–450)
POTASSIUM SERPL-SCNC: 4 MMOL/L (ref 3.5–5.2)
PROT SERPL-MCNC: 6.1 G/DL (ref 6–8.5)
PROTHROMBIN TIME: 10.2 SEC (ref 9.1–12)
RBC # BLD AUTO: 4.02 X10E6/UL (ref 4.14–5.8)
SODIUM SERPL-SCNC: 144 MMOL/L (ref 134–144)
WBC # BLD AUTO: 6.8 X10E3/UL (ref 3.4–10.8)

## 2020-03-01 LAB — TACROLIMUS, 700249: 3 NG/ML (ref 2–20)

## 2020-03-04 ENCOUNTER — OFFICE VISIT (OUTPATIENT)
Dept: HEMATOLOGY | Age: 65
End: 2020-03-04

## 2020-03-04 VITALS
SYSTOLIC BLOOD PRESSURE: 155 MMHG | OXYGEN SATURATION: 100 % | BODY MASS INDEX: 35.08 KG/M2 | WEIGHT: 250.6 LBS | TEMPERATURE: 98.1 F | HEIGHT: 71 IN | HEART RATE: 70 BPM | DIASTOLIC BLOOD PRESSURE: 65 MMHG

## 2020-03-04 DIAGNOSIS — N18.2 STAGE 2 CHRONIC KIDNEY DISEASE: ICD-10-CM

## 2020-03-04 DIAGNOSIS — Z79.60 LONG-TERM USE OF IMMUNOSUPPRESSANT MEDICATION: ICD-10-CM

## 2020-03-04 DIAGNOSIS — I10 HYPERTENSION, UNSPECIFIED TYPE: ICD-10-CM

## 2020-03-04 DIAGNOSIS — Z94.4 H/O LIVER TRANSPLANT (HCC): Primary | ICD-10-CM

## 2020-03-04 NOTE — PROGRESS NOTES
Critical access [facility] [place], Dannielle PEREZ, Devin Miramontes, MD Ana Melendez, PATyronC    Sav Officer, [facility]     Berna Aaron, [facility]   Gabby Ash, FNP-C    Selina Rubalcava, [facility]       Yeison Weaver Ender De Cast[address]eda [address]    at [address], [address] [place] [address] Marlee Akhtar  22.    [phone]    FAX: [address] [place] [address], [address]    [phone]    FAX: [phone]       Patient Care Team:  Riya Pierre MD as PCP - General (Family Practice)  Riya Pierre MD as PCP - [facility]  Sunita Rodriguez MD (Nephrology)  Victorino Elizalde MD (Gastroenterology)  Lacey Barrios MD as Physician (Internal Medicine)  Brittany Draper MD as Physician (Neurology)      Problem List  Date Reviewed: 2/5/2020          Codes Class Noted    Stage 2 chronic kidney disease ICD-10-CM: N18.2  ICD-9-CM: 585.2  2/5/2020        Severe obesity ([address] ICD-10-CM: E66.01  ICD-9-CM: 278.01  2/4/2020        H/O liver transplant [facility]) ICD-10-CM: Z94.4  ICD-9-CM: V42.7  12/1/2018        Long-term use of immunosuppressant medication ICD-10-CM: Z79.899  ICD-9-CM: V58.69  12/1/2018        Liver transplant complication [facility]) NRM-16-ML: T86.40  ICD-9-CM: 996.82  12/1/2018        Neuropathy involving both lower extremities ICD-10-CM: G57.93  ICD-9-CM: 356.9  1/2/2018        CAMEJO (nonalcoholic steatohepatitis) ICD-10-CM: K75.81  ICD-9-CM: 571.8  11/2/2017        GERD (gastroesophageal reflux disease) (Chronic) ICD-10-CM: K21.9  ICD-9-CM: 530.81  2/22/2016        CAD (coronary artery disease) (Chronic) ICD-10-CM: I25.10  ICD-9-CM: 414.00  2/22/2016        DM type 2 (diabetes mellitus, type 2) ([facility] (Chronic) ICD-10-CM: E11.9  ICD-9-CM: 250.00  2/22/2016 Genaro Schmid returns to the [place] [address] for management of liver transplant graft function, to monitor and adjust immune suppression and to assess for recurrence of the primary liver disease. The active problem list, all pertinent past medical history, medications, liver histology, endoscopic studies, radiologic findings and laboratory findings related to the liver disorder were reviewed with the patient. The patient underwent a liver transplant at [facility] [place] in 10/2018. He was found to have a small [address] in his liver explant. The patient is taking the following for immune suppression: Tacrolimus 1 mg BID. The patient discontinued sirolimus 1/2020 due to proteinuria, mouth ulcers and lower extremity edema. He had a repeat urine with Nephrology 1/31/2020 results were unavailable. The lower extremity edema has improved but not resolved. Since discontinuation of sirolimus the Sr. Creatinine has increased up to 1.51 despite low levels of tacrolimus. The patient has no symptoms which can be attributed to the liver disorder. The patient has not experienced fatigue, fevers, chills, or pain in the right side over the liver. The patient completes all daily activities without any functional limitations. 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 The date of the LT was 10/2018. Liver transaminases are normal.  ALP is normal.  Liver function is normal.  The platelet count is supressed. Hepatocellular carcinoma   This was present in native liver prior to undergoing liver transplant. There was viable HCC in the liver explant,  2 lesions in the right lobe. The most recent triple phase CT in 10/2019 demonstrated no liver mass lesions. The most recent chest CT in 10/2019 demonstrated a stable 2 mm nodule in the right lung. Previously 8 mm from 4/2019. Repeat imaging is scheduled for 3/26/2020. Immune Suppression  The patient was switched from Tacrolimus to Sirolimus because of an elevation in Scr. Sirolimus was subsequently discontinued 1/2020 for proteinuria, mouth ulcers and worsening lower extremity edema. He is currently on tacrolimus 1 mg BID. The Sr. Creatinine has continued to elevate and is now 1.51. The immune suppression blood level is on the low end of normal. We are unable to decrease the tacrolimus dose at this time. Anemia   This is of unclear etiology. The HB is stable in the 10.5-11.0 range. The Ferrtin is normal  The FE saturation is normal.    Neutropenia   This has resolved when cellcept was discontinued. Thrombocytopenia   This is secondary to cirrhosis. Some patients do not recover the PLT back to normal after LT. The platelet count is adequate for the patient to undergo procedures without the need for platelet transfusion or platelet growth factors. Prevention of infections  The patient is more than 1 year out from transplant and is off PCP prophylaxis. Chronic kidney injury   This is a common adverse event of immune suppression. The Sr. creat has continued to elevate after changing back to tacrolimus. The patient will not be able to return to Sirolimus due to mouth ulcers and proteinuria. It may be beneficial to change amlodipine to an alternative such as chlorthalidone. This may allow for furosemide to be decreased or discontinued. Will defer to Dr. Candis Damon for management. NSAIDs should be avoided since these agents can worsen renal insufficiency. Lower Extremity Edema  Amlodipine has a side effect of lower extremity edema. This has improved with discontinuation of sirolimus but not resolved. His blood pressure continues to be elevated. He may benefit from discontinuing amlodipine and starting chlorthalidone. The furosemide could also be decreased and possibly discontinued. Hypercholesterolemia   This can be caused by immune suppression. Serum cholesterol will be monitored at periodic intervals. Hypertension   This is a common side effect of immune suppression. Blood pressure was elevated today and has been ranging 408-145 systolic. Low serum magnesium   This is a common side effect of immune suppression. The serum Mag is in the normal range. No treatment is needed. Osteoporosis   Osteoporosis is commin in patients with cirhrosis prior to liver transplant. The patient had a normal bone density prior to undergoing liver transplant. Monitoring for skin Cancer  The patient was counseled regarding increased risk of skin cancer in transplant recipients and need to have any new skin lesions evaluated by dermatology and removed if suspicious. The patient was instructed to see Dermatology annually examination. Vaccinations  Routine vaccinations against other bacterial and viral agents can be performed as long as this is with attentuatted virus. Live virus vaccines should not be administered. Annual flu vaccination should be administered. ALLERGIES  No Known Allergies    MEDICATIONS  Current Outpatient Medications   Medication Sig    amLODIPine (NORVASC) 10 mg tablet Take 1 Tab by mouth daily.  tacrolimus (PROGRAF) 1 mg capsule Take 1 mg by mouth two (2) times a day. Alternate days with sirolimus.  furosemide (LASIX) 40 mg tablet Take 1 Tab by mouth daily. (Patient taking differently: Take 80 mg by mouth daily.)    sildenafil citrate (VIAGRA) 25 mg tablet TAKE ONE TABLET BY MOUTH ONE TIME DAILY    glimepiride (AMARYL) 1 mg tablet Take 3 mg by mouth two (2) times a day.  metFORMIN (GLUCOPHAGE) 500 mg tablet Take 500 mg by mouth two (2) times daily (with meals). 2 tabs in the a.m and 2 tabs in the p.m.    metFORMIN (GLUCOPHAGE) 1,000 mg tablet Take 1,000 mg by mouth every morning.  gabapentin (NEURONTIN) 300 mg capsule Take 1 Cap by mouth nightly.     insulin detemir U-100 (LEVEMIR FLEXTOUCH) 100 unit/mL (3 mL) inpn 20 Units by SubCUTAneous route nightly.  triamcinolone acetonide (KENALOG) 0.1 % topical cream Apply  to affected area two (2) times a day. use thin layer    aspirin delayed-release 81 mg tablet Take 81 mg by mouth daily.  levothyroxine (SYNTHROID) 75 mcg tablet Take 75 mcg by mouth Daily (before breakfast).  cholecalciferol, vitamin D3, (VITAMIN D3) 2,000 unit tab Take 1 Tab by mouth two (2) times a day. No current facility-administered medications for this visit. SYSTEM REVIEW NOT RELATED TO LIVER DISEASE OR REVIEWED ABOVE:  Constitution systems: Negative for fever, chills, weight gain, weight loss. Eyes: Negative for visual changes. ENT: Negative for sore throat, painful swallowing. Respiratory: Negative for cough, hemoptysis, SOB. Cardiology: Negative for chest pain, palpitations. GI:  Negative for constipation or diarrhea. : Negative for urinary frequency, dysuria, hematuria, nocturia. Skin: Negative for rash. Hematology: Negative for easy bruising, blood clots. Musculo-skelatal: Negative for back pain, muscle pain, weakness. Neurologic: Negative for headaches, dizziness, vertigo, memory problems not related to HE. Psychology: Negative for anxiety, depression. FAMILY HISTORY:  The father  of prostate cancer. The mother  of multiple myeloma. There is no family history of liver disease.       SOCIAL HISTORY:  The patient is . The patient has 3 children. The patient occasional cigar. The patient has been abstinent from alcohol since 2016. The patient does not work, he is on social security. PHYSICAL EXAMINATION:  Visit Vitals  /65 (BP 1 Location: Left arm, BP Patient Position: Sitting)   Pulse 70   Temp 98.1 °F (36.7 °C) (Tympanic)   Ht 5' 11\" (1.803 m)   Wt 250 lb 9.6 oz (113.7 kg)   SpO2 100%   BMI 34.95 kg/m²     General: No acute distress. Eyes: Sclera anicteric. ENT: No oral lesions.   Thyroid normal.  Nodes: No adenopathy. Skin: No spider angiomata. No jaundice. No palmar erythema. Respiratory: Lungs clear to auscultation. Cardiovascular: Regular heart rate. No murmurs. No JVD. Abdomen: Normal liver transplant incision that is healed. Soft non-tender. Liver size normal to percussion/palpation. Spleen not palpable. No obvious ascites. Extremities: No edema. Muscle wasting. Neurologic: Alert and oriented. Cranial nerves grossly intact. LABORATORY STUDIES:  Liver [place] of [address] & Units 2/28/2020 1/31/2020 1/6/2020   WBC 3.4 - 10.8 x10E3/uL 6.8 6.3 5.9   ANC 1.4 - 7.0 x10E3/uL 4.6 4.2 4.4   HGB 13.0 - 17.7 g/dL 11.8 (L) 12.3 (L) 12.6 (L)    - 450 x10E3/uL 124 (L) 118 (L) 133 (L)   INR 0.8 - 1.2 1.0 0.9 0.9   AST 0 - 40 IU/L 15 14 17   ALT 0 - 44 IU/L 15 16 13   Alk Phos 39 - 117 IU/L 69 83 82   Bili, Total 0.0 - 1.2 mg/dL 0.9 0.6 0.6   Bili, Direct 0.00 - 0.40 mg/dL 0.22 0.18 0.14   Albumin 3.8 - 4.8 g/dL 4.0 3.7 (L) 3.7   BUN 8 - 27 mg/dL 31 (H) 21 25   Creat 0.76 - 1.27 mg/dL 1.51 (H) 1.31 (H) 1.19   Na 134 - 144 mmol/L 144 145 (H) 141   K 3.5 - 5.2 mmol/L 4.0 4.5 4.0   Cl 96 - 106 mmol/L 104 106 102   CO2 20 - 29 mmol/L 26 25 28   Glucose 65 - 99 mg/dL 127 (H) 157 (H) 133 (H)   Magnesium 1.6 - 2.3 mg/dL 1.7 1.7 1.8     Liver Virology and Transplant Immune Suppression Tacrolimus Level   Latest Ref Rng & Units 2.0 - 20.0 ng/mL   2/28/2020 3.0   1/31/2020 4.6   1/6/2020 None Detected     Liver Virology and Transplant Immune Suppression Sirolimus Level   Latest Ref Rng & Units 3.0 - 20.0 ng/mL   1/6/2020 2.4 (L)   11/4/2019 4.3   10/3/2019 3.3   9/9/2019 6.1   8/26/2019 4.6   8/12/2019 4.2   7/29/2019 4.8   7/22/2019 3.1   7/15/2019 10.3     Liver Virology and Transplant Immune Suppression Tacrolimus Level   Latest Ref Rng & Units 2.0 - 20.0 ng/mL   4/22/2019 3.8   4/15/2019 3.7   4/8/2019 3.4   4/1/2019 4.9     SEROLOGIES:  7/2018.   HAV total positive, HBsurface antigne negative, anti-HBcore negative, anti-HBsurface negative, anti-HCV negative, Anti-HIV negative, CNV IgG negative, HSV IgG positive    Serologies Latest Ref Rng & Units 8/26/2019   Ferritin 30 - 400 ng/mL 153   Iron % Saturation 15 - 55 % 32     LIVER HISTOLOGY:  7/2014: Cirhosis with mild mixed macro- and microvesicular steatosis. 10/2018. Liver explant from [facility]. Multifocal moderately differentiated HCC.  2.3 cm HCC right lobe. 1.3 cm right lobe. No microscopic invasion. ENDOSCOPIC PROCEDURES:  Not available or performed    RADIOLOGY:  1/2019. CT scan abdomen with and without IV contrast.  Normal appearing liver. No liver mass lesions. Normal spleen. No ascites. 1/2019. CT scan chest.  No evidence of metastatic disease. 4/2019. CT scan of abdomen. Normal appearing liver. No liver mass or lesion. Concerning 8 mm lung mass. 4/2019. CT scan of chest. New 8 mm nodule in the right and middle lobe adjacent to the diaphragm concerning for metastatic disease. 7/2019. CT scan of abdomen. No liver mass lesions. Normal appearing liver.   7/2019. CT scan of chest.  8 mm nodule is no longer visualized. There is a 2 mm nodule in the right middle lobe too small to characterize. 10/2019. CT scan of chest. Stable 2 mm right lung nodule. 10/2019. CT scan of abdomen. Stable post liver transplant with no concerning mass or lesion. OTHER TESTING:  Not available or performed    FOLLOW-UP:  All of the issues listed above in the Assessment and Plan were discussed with the patient. All questions were answered. The patient expressed a clear understanding of the above. Laboratory studies can be reduced to every 4 weeks to assess liver graft function and blood levels of immune suppression. The patient has his last follow-up appointment at the liver transplant center in December. [address] [place] [address] 1 month. JORGE LUIS [address] of [address], [address] [place] [address] 22.  201 [facility]

## 2020-03-04 NOTE — PROGRESS NOTES
Karl Kessler is a 71 y.o. male  Chief Complaint   Patient presents with    Follow-up       Visit Vitals  /65 (BP 1 Location: Left arm, BP Patient Position: Sitting)   Pulse 70   Temp 98.1 °F (36.7 °C) (Tympanic)   Ht 5' 11\" (1.803 m)   Wt 250 lb 9.6 oz (113.7 kg)   SpO2 100%   BMI 34.95 kg/m²     bp 155/65  patient reports no current symptoms  Provider notified via exam room white board. 3 most recent PHQ Screens 2/4/2020   Little interest or pleasure in doing things Not at all   Feeling down, depressed, irritable, or hopeless Not at all   Total Score PHQ 2 0     Learning Assessment 2/4/2020   PRIMARY LEARNER Patient   BARRIERS PRIMARY LEARNER NONE   CO-LEARNER CAREGIVER No   PRIMARY LANGUAGE ENGLISH   LEARNER PREFERENCE PRIMARY DEMONSTRATION   ANSWERED BY patient   RELATIONSHIP SELF     Abuse Screening Questionnaire 2/4/2020   Do you ever feel afraid of your partner? N   Are you in a relationship with someone who physically or mentally threatens you? N   Is it safe for you to go home? Y       Fall Risk Assessment, last 12 mths 3/4/2020   Able to walk? Yes   Fall in past 12 months? No         1. Have you been to the ER, urgent care clinic since your last visit? Hospitalized since your last visit? No    2. Have you seen or consulted any other health care providers outside of the [address] [place] [address] since your last visit? Include any pap smears or colon screening.  No

## 2020-03-18 DIAGNOSIS — Z94.4 H/O LIVER TRANSPLANT (HCC): Primary | ICD-10-CM

## 2020-03-18 DIAGNOSIS — C22.0 HEPATOCELLULAR CARCINOMA (HCC): ICD-10-CM

## 2020-03-18 NOTE — PROGRESS NOTES
Spoke to patient and reviewed plan to have labs drawn on 3/20 and imaging on 3/26 as planned. Cancelled follow up appointment with MARCOS Aaron NP. Advised patient he will receive a call to reschedule once COVID-19 is no longer a public health emergency.

## 2020-03-21 LAB
ALBUMIN SERPL-MCNC: 4.1 G/DL (ref 3.8–4.8)
ALP SERPL-CCNC: 80 IU/L (ref 39–117)
ALT SERPL-CCNC: 16 IU/L (ref 0–44)
AST SERPL-CCNC: 15 IU/L (ref 0–40)
BASOPHILS # BLD AUTO: 0.3 X10E3/UL (ref 0–0.2)
BASOPHILS NFR BLD AUTO: 4 %
BILIRUB DIRECT SERPL-MCNC: 0.2 MG/DL (ref 0–0.4)
BILIRUB SERPL-MCNC: 0.8 MG/DL (ref 0–1.2)
BUN SERPL-MCNC: 28 MG/DL (ref 8–27)
BUN/CREAT SERPL: 20 (ref 10–24)
CALCIUM SERPL-MCNC: 9 MG/DL (ref 8.6–10.2)
CHLORIDE SERPL-SCNC: 103 MMOL/L (ref 96–106)
CO2 SERPL-SCNC: 25 MMOL/L (ref 20–29)
CREAT SERPL-MCNC: 1.39 MG/DL (ref 0.76–1.27)
EOSINOPHIL # BLD AUTO: 0.2 X10E3/UL (ref 0–0.4)
EOSINOPHIL NFR BLD AUTO: 3 %
ERYTHROCYTE [DISTWIDTH] IN BLOOD BY AUTOMATED COUNT: 13.9 % (ref 11.6–15.4)
GLUCOSE SERPL-MCNC: 126 MG/DL (ref 65–99)
HCT VFR BLD AUTO: 35.7 % (ref 37.5–51)
HGB BLD-MCNC: 12.5 G/DL (ref 13–17.7)
IMM GRANULOCYTES # BLD AUTO: 0 X10E3/UL (ref 0–0.1)
IMM GRANULOCYTES NFR BLD AUTO: 1 %
INR PPP: 0.9 (ref 0.8–1.2)
LYMPHOCYTES # BLD AUTO: 1.8 X10E3/UL (ref 0.7–3.1)
LYMPHOCYTES NFR BLD AUTO: 27 %
MAGNESIUM SERPL-MCNC: 1.9 MG/DL (ref 1.6–2.3)
MCH RBC QN AUTO: 30.9 PG (ref 26.6–33)
MCHC RBC AUTO-ENTMCNC: 35 G/DL (ref 31.5–35.7)
MCV RBC AUTO: 88 FL (ref 79–97)
MONOCYTES # BLD AUTO: 0.4 X10E3/UL (ref 0.1–0.9)
MONOCYTES NFR BLD AUTO: 6 %
MORPHOLOGY BLD-IMP: ABNORMAL
NEUTROPHILS # BLD AUTO: 4 X10E3/UL (ref 1.4–7)
NEUTROPHILS NFR BLD AUTO: 59 %
PLATELET # BLD AUTO: 129 X10E3/UL (ref 150–450)
POTASSIUM SERPL-SCNC: 4.4 MMOL/L (ref 3.5–5.2)
PROT SERPL-MCNC: 6.5 G/DL (ref 6–8.5)
PROTHROMBIN TIME: 9.6 SEC (ref 9.1–12)
RBC # BLD AUTO: 4.04 X10E6/UL (ref 4.14–5.8)
SODIUM SERPL-SCNC: 143 MMOL/L (ref 134–144)
TACROLIMUS, 700249: 6.4 NG/ML (ref 2–20)
WBC # BLD AUTO: 6.6 X10E3/UL (ref 3.4–10.8)

## 2020-03-23 LAB
AFP L3 MFR SERPL: NORMAL % (ref 0–9.9)
AFP SERPL-MCNC: 1.6 NG/ML (ref 0–8)

## 2020-03-23 NOTE — PROGRESS NOTES
Called patient with blood work results. The creatinine improved with the labs.  All of the liver numbers are normal.

## 2020-05-26 ENCOUNTER — HOSPITAL ENCOUNTER (OUTPATIENT)
Dept: MRI IMAGING | Age: 65
Discharge: HOME | End: 2020-05-26
Attending: NURSE PRACTITIONER
Payer: MEDICARE

## 2020-05-26 ENCOUNTER — HOSPITAL ENCOUNTER (OUTPATIENT)
Dept: CT IMAGING | Age: 65
Discharge: HOME | End: 2020-05-26
Attending: NURSE PRACTITIONER
Payer: MEDICARE

## 2020-05-26 VITALS — WEIGHT: 252 LBS | BODY MASS INDEX: 35.15 KG/M2

## 2020-05-26 DIAGNOSIS — C22.0 HEPATOCELLULAR CARCINOMA (HCC): ICD-10-CM

## 2020-05-26 LAB — CREAT BLD-MCNC: 1.6 MG/DL (ref 0.6–1.3)

## 2020-05-26 PROCEDURE — 74011000258 HC RX REV CODE- 258: Performed by: NURSE PRACTITIONER

## 2020-05-26 PROCEDURE — 82565 ASSAY OF CREATININE: CPT

## 2020-05-26 PROCEDURE — 74011636320 HC RX REV CODE- 636/320: Performed by: NURSE PRACTITIONER

## 2020-05-26 PROCEDURE — 74011250636 HC RX REV CODE- 250/636: Performed by: NURSE PRACTITIONER

## 2020-05-26 PROCEDURE — A9585 GADOBUTROL INJECTION: HCPCS | Performed by: NURSE PRACTITIONER

## 2020-05-26 PROCEDURE — 74183 MRI ABD W/O CNTR FLWD CNTR: CPT

## 2020-05-26 PROCEDURE — 71260 CT THORAX DX C+: CPT

## 2020-05-26 RX ORDER — SODIUM CHLORIDE 9 MG/ML
50 INJECTION, SOLUTION INTRAVENOUS ONCE
Status: COMPLETED | OUTPATIENT
Start: 2020-05-26 | End: 2020-05-26

## 2020-05-26 RX ORDER — SODIUM CHLORIDE 0.9 % (FLUSH) 0.9 %
10 SYRINGE (ML) INJECTION ONCE
Status: COMPLETED | OUTPATIENT
Start: 2020-05-26 | End: 2020-05-26

## 2020-05-26 RX ADMIN — IOPAMIDOL 100 ML: 612 INJECTION, SOLUTION INTRAVENOUS at 09:39

## 2020-05-26 RX ADMIN — Medication 10 ML: at 09:39

## 2020-05-26 RX ADMIN — SODIUM CHLORIDE 50 ML: 900 INJECTION, SOLUTION INTRAVENOUS at 09:40

## 2020-05-26 RX ADMIN — GADOBUTROL 11 ML: 604.72 INJECTION INTRAVENOUS at 09:22

## 2020-05-29 ENCOUNTER — HOSPITAL ENCOUNTER (OUTPATIENT)
Dept: GENERAL RADIOLOGY | Age: 65
Discharge: HOME | End: 2020-05-29
Payer: MEDICARE

## 2020-05-29 DIAGNOSIS — M54.6 ACUTE RIGHT-SIDED THORACIC BACK PAIN: ICD-10-CM

## 2020-05-29 PROCEDURE — 71101 X-RAY EXAM UNILAT RIBS/CHEST: CPT

## 2020-06-05 DIAGNOSIS — Z94.4 H/O LIVER TRANSPLANT (HCC): Primary | ICD-10-CM

## 2020-06-15 ENCOUNTER — HOSPITAL ENCOUNTER (OUTPATIENT)
Dept: ULTRASOUND IMAGING | Age: 65
Discharge: HOME | End: 2020-06-15
Attending: NURSE PRACTITIONER
Payer: MEDICARE

## 2020-06-15 DIAGNOSIS — Z94.4 H/O LIVER TRANSPLANT (HCC): ICD-10-CM

## 2020-06-15 PROCEDURE — 93975 VASCULAR STUDY: CPT

## 2020-06-15 PROCEDURE — 76705 ECHO EXAM OF ABDOMEN: CPT

## 2020-06-16 LAB
ALBUMIN SERPL-MCNC: 3.8 G/DL (ref 3.8–4.8)
ALP SERPL-CCNC: 67 IU/L (ref 39–117)
ALT SERPL-CCNC: 22 IU/L (ref 0–44)
AST SERPL-CCNC: 17 IU/L (ref 0–40)
BASOPHILS # BLD AUTO: 0.1 X10E3/UL (ref 0–0.2)
BASOPHILS NFR BLD AUTO: 1 %
BILIRUB DIRECT SERPL-MCNC: 0.22 MG/DL (ref 0–0.4)
BILIRUB SERPL-MCNC: 0.8 MG/DL (ref 0–1.2)
BUN SERPL-MCNC: 26 MG/DL (ref 8–27)
BUN/CREAT SERPL: 18 (ref 10–24)
CALCIUM SERPL-MCNC: 8.9 MG/DL (ref 8.6–10.2)
CHLORIDE SERPL-SCNC: 107 MMOL/L (ref 96–106)
CO2 SERPL-SCNC: 24 MMOL/L (ref 20–29)
CREAT SERPL-MCNC: 1.46 MG/DL (ref 0.76–1.27)
EOSINOPHIL # BLD AUTO: 0.1 X10E3/UL (ref 0–0.4)
EOSINOPHIL NFR BLD AUTO: 2 %
ERYTHROCYTE [DISTWIDTH] IN BLOOD BY AUTOMATED COUNT: 13 % (ref 11.6–15.4)
GLUCOSE SERPL-MCNC: 105 MG/DL (ref 65–99)
HCT VFR BLD AUTO: 36.5 % (ref 37.5–51)
HGB BLD-MCNC: 12.3 G/DL (ref 13–17.7)
IMM GRANULOCYTES # BLD AUTO: 0 X10E3/UL (ref 0–0.1)
IMM GRANULOCYTES NFR BLD AUTO: 1 %
INR PPP: 0.9 (ref 0.8–1.2)
LYMPHOCYTES # BLD AUTO: 1.5 X10E3/UL (ref 0.7–3.1)
LYMPHOCYTES NFR BLD AUTO: 26 %
MAGNESIUM SERPL-MCNC: 1.6 MG/DL (ref 1.6–2.3)
MCH RBC QN AUTO: 30.2 PG (ref 26.6–33)
MCHC RBC AUTO-ENTMCNC: 33.7 G/DL (ref 31.5–35.7)
MCV RBC AUTO: 90 FL (ref 79–97)
MONOCYTES # BLD AUTO: 0.3 X10E3/UL (ref 0.1–0.9)
MONOCYTES NFR BLD AUTO: 6 %
NEUTROPHILS # BLD AUTO: 3.9 X10E3/UL (ref 1.4–7)
NEUTROPHILS NFR BLD AUTO: 64 %
PLATELET # BLD AUTO: 119 X10E3/UL (ref 150–450)
POTASSIUM SERPL-SCNC: 4.5 MMOL/L (ref 3.5–5.2)
PROT SERPL-MCNC: 6 G/DL (ref 6–8.5)
PROTHROMBIN TIME: 10 SEC (ref 9.1–12)
RBC # BLD AUTO: 4.07 X10E6/UL (ref 4.14–5.8)
SODIUM SERPL-SCNC: 143 MMOL/L (ref 134–144)
TACROLIMUS, 700249: 3.8 NG/ML (ref 2–20)
WBC # BLD AUTO: 5.9 X10E3/UL (ref 3.4–10.8)

## 2020-06-19 ENCOUNTER — VIRTUAL VISIT (OUTPATIENT)
Dept: HEMATOLOGY | Age: 65
End: 2020-06-19

## 2020-06-19 DIAGNOSIS — F41.1 GENERALIZED ANXIETY DISORDER: ICD-10-CM

## 2020-06-19 DIAGNOSIS — F41.9 ANXIETY: Primary | ICD-10-CM

## 2020-06-19 DIAGNOSIS — N18.2 STAGE 2 CHRONIC KIDNEY DISEASE: ICD-10-CM

## 2020-06-19 DIAGNOSIS — Z94.4 H/O LIVER TRANSPLANT (HCC): ICD-10-CM

## 2020-06-19 DIAGNOSIS — Z79.60 LONG-TERM USE OF IMMUNOSUPPRESSANT MEDICATION: ICD-10-CM

## 2020-06-19 RX ORDER — LISINOPRIL 20 MG/1
TABLET ORAL DAILY
COMMUNITY
End: 2020-06-25

## 2020-06-19 RX ORDER — FLUOXETINE 10 MG/1
10 CAPSULE ORAL DAILY
Qty: 30 CAP | Refills: 5 | Status: SHIPPED | OUTPATIENT
Start: 2020-06-19 | End: 2020-12-11

## 2020-06-19 RX ORDER — CHOLECALCIFEROL TAB 125 MCG (5000 UNIT) 125 MCG
TAB ORAL DAILY
COMMUNITY

## 2020-06-19 NOTE — PROGRESS NOTES
[address] [place] [address], MD, MD Juan Richard PA-C Bridgett Ruffing, [facility]-BC     Berna ADAMS Agnieszka, [facility]   Ward Sommer P-TASH French, [facility]       Yeison Deputado Ender De [address]    at [address], [address] [place] [address], [facility] 22.    [phone]    FAX: [address] [place] [address], [address], [address]    [phone]    FAX: [phone]       Patient Care Team:  Aleksandr Bashir MD as PCP - General (Family Practice)  Aleksandr Bashir MD as PCP - [facility]  London Espinoza MD (Nephrology)  Dominic French MD (Gastroenterology)  Carole Field MD as Physician (Internal Medicine)  Darinel Martin MD as Physician (Neurology)      Problem List  Date Reviewed: 6/22/2020          Codes Class Noted    Generalized anxiety disorder ICD-10-CM: F41.1  ICD-9-CM: 300.02  6/22/2020        Stage 2 chronic kidney disease ICD-10-CM: N18.2  ICD-9-CM: 585.2  2/5/2020        Severe obesity ([facility] ICD-10-CM: E66.01  ICD-9-CM: 278.01  2/4/2020        H/O liver transplant ([facility] ICD-10-CM: Z94.4  ICD-9-CM: V42.7  12/1/2018        Long-term use of immunosuppressant medication ICD-10-CM: Z79.899  ICD-9-CM: V58.69  12/1/2018        Liver transplant complication ([facility] DGW-49-AB: T86.40  ICD-9-CM: 996.82  12/1/2018        Neuropathy involving both lower extremities ICD-10-CM: G57.93  ICD-9-CM: 356.9  1/2/2018        CAMEJO (nonalcoholic steatohepatitis) ICD-10-CM: K75.81  ICD-9-CM: 571.8  11/2/2017        GERD (gastroesophageal reflux disease) (Chronic) ICD-10-CM: K21.9  ICD-9-CM: 530.81  2/22/2016        CAD (coronary artery disease) (Chronic) ICD-10-CM: I25.10  ICD-9-CM: 414.00  2/22/2016        DM type 2 (diabetes mellitus, type 2) (HCC) (Chronic) ICD-10-CM: E11.9  ICD-9-CM: 250.00  2/22/2016            VIRTUAL TELEHEALTH VISIT PERFORMED DUE TO COVID-19 EPIDEMIC    CONSENT:  Anuja Anne, who was seen by synchronous, real-time, audio-video technology, and/or his healthcare decision maker, is aware that this patient-initiated, Telehealth encounter on 6/19/2020 is a billable service, with coverage as determined by his insurance carrier. He is aware that he may receive a bill and has provided verbal consent to proceed. This patient was evaluated during a Virtual Telehealth visit. A caregiver was present if appropriate. Due to this being a TeleHealth encounter performed during the QPDCI-77 public health emergency, the physical examination was limited to that listed in the [address] [place] [address] returns to the [address] for management of liver transplant graft function, to monitor and adjust immune suppression and to assess for recurrence of the primary liver disease. The active problem list, all pertinent past medical history, medications, liver histology, endoscopic studies, radiologic findings and laboratory findings related to the liver disorder were reviewed with the patient. The patient underwent a liver transplant at [facility] in 10/2018. He was found to have a small [address] in his liver explant. Post-transplant imaging are now being performed every 6 months with no recurrence. The patient is taking the following for immune suppression: Tacrolimus 1 mg BID. The patient discontinued sirolimus 1/2020 due to proteinuria, mouth ulcers and lower extremity edema. He continues to follow with Nephrology. Since the last office visit the patient discontinued amlodipine and started Lisinopril. The lower extremity edema has resolved. Furosemide was discontinued. Renal function has been stable. Recent imaging suggests a large splenorenal shunt.  A duplex ultrasound was performed 6/2020. The results demonstrate patent hepatic veins, portal velocity was normal.     The patient has no symptoms which can be attributed to the liver disorder. The patient has not experienced fatigue, fevers, chills, or pain in the right side over the liver. The patient completes all daily activities without any functional limitations. 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 The date of the LT was 10/2018. Liver transaminases are normal.  ALP is normal.  Liver function is normal.  The platelet count is depressed. Hepatocellular carcinoma   This was present in native liver prior to undergoing liver transplant. There was viable HCC in the liver explant,  2 lesions in the right lobe. The most recent MRI 5/2020 demonstrated no liver mass lesions. The most recent chest CT 5/2020 demonstrated no metastases. No mention of previously seen pulmonary nodules. Immune Suppression  The patient was switched from Tacrolimus to Sirolimus because of an elevation in Scr. Sirolimus was subsequently discontinued 1/2020 for proteinuria, mouth ulcers and worsening lower extremity edema. He is currently on tacrolimus 1 mg BID. The Sr. Creatinine is stable ranging 1.39-1. 51. The immune suppression blood level is on the low end of normal. We are unable to decrease the tacrolimus dose at this time. Anemia   This is of unclear etiology. The HB is stable in the 11-12.0 range. The Ferrtin is normal  The FE saturation is normal.    Neutropenia   This has resolved when cellcept was discontinued. Thrombocytopenia   This is secondary to cirrhosis. Some patients do not recover the PLT back to normal after LT. The platelet count is adequate for the patient to undergo procedures without the need for platelet transfusion or platelet growth factors.     Prevention of infections  The patient is more than 1 year out from transplant and is off PCP prophylaxis. Chronic kidney injury   This is a common adverse event of immune suppression. The Sr. creat has remained elevated but stable ranging 1.39-1. 51. Furosemide has been discontinued. NSAIDs should be avoided since these agents can worsen renal insufficiency. Lower Extremity Edema  This is now resolved. Hypercholesterolemia   This can be caused by immune suppression. Serum cholesterol will be monitored at periodic intervals. Hypertension   This is a common side effect of immune suppression. The patient reports improvement in the blood pressure on Lisinopril. Low serum magnesium   This is a common side effect of immune suppression. The serum Mag is in the normal range. No treatment is needed. Generalized Anxiety Disorder   Will start low dose Paxil 10 mg. Discussed side effects. Will monitor closely. Osteoporosis   Osteoporosis is commin in patients with cirhrosis prior to liver transplant. The patient had a normal bone density prior to undergoing liver transplant. Monitoring for skin Cancer  The patient was counseled regarding increased risk of skin cancer in transplant recipients and need to have any new skin lesions evaluated by dermatology and removed if suspicious. The patient was instructed to see Dermatology annually examination. Vaccinations  Routine vaccinations against other bacterial and viral agents can be performed as long as this is with attentuatted virus. Live virus vaccines should not be administered. Annual flu vaccination should be administered. ALLERGIES  No Known Allergies    MEDICATIONS  Current Outpatient Medications   Medication Sig    lisinopriL (PRINIVIL, ZESTRIL) 20 mg tablet Take  by mouth daily.  cholecalciferol (VITAMIN D3) (5000 Units/125 mcg) tab tablet Take  by mouth daily.  FLUoxetine (PROzac) 10 mg capsule Take 1 Cap by mouth daily.  tacrolimus (PROGRAF) 1 mg capsule Take 1 mg by mouth two (2) times a day. Alternate days with sirolimus.  glimepiride (AMARYL) 1 mg tablet Take 3 mg by mouth two (2) times a day.  metFORMIN (GLUCOPHAGE) 1,000 mg tablet Take 1,000 mg by mouth two (2) times a day.  aspirin delayed-release 81 mg tablet Take 81 mg by mouth daily.  levothyroxine (SYNTHROID) 75 mcg tablet Take 75 mcg by mouth Daily (before breakfast). No current facility-administered medications for this visit. SYSTEM REVIEW NOT RELATED TO LIVER DISEASE OR REVIEWED ABOVE:  Constitution systems: Negative for fever, chills, weight gain, weight loss. Eyes: Negative for visual changes. ENT: Negative for sore throat, painful swallowing. Respiratory: Negative for cough, hemoptysis, SOB. Cardiology: Negative for chest pain, palpitations. GI:  Negative for constipation or diarrhea. : Negative for urinary frequency, dysuria, hematuria, nocturia. Skin: Negative for rash. Hematology: Negative for easy bruising, blood clots. Musculo-skeletal: Negative for back pain, muscle pain, weakness. Neurologic: Negative for headaches, dizziness, vertigo, memory problems not related to HE. Psychology: Negative for anxiety, depression. FAMILY HISTORY:  The father  of prostate cancer. The mother  of multiple myeloma. There is no family history of liver disease.       SOCIAL HISTORY:  The patient is . The patient has 3 children. The patient occasional cigar. The patient has been abstinent from alcohol since 2016. The patient does not work, he is on social security. PHYSICAL EXAMINATION PERFORMED BY VIRTUAL TELEHEALTH:  VS: Not performed   General: No acute distress. Eyes: Sclera anicteric. ENT: No oral lesions. Skin: No rashes. spider angiomata. No jaundice. Abdomen: No obvious distention suggesting ascites. Extremities: No edema. No muscle wasting. Neurologic: Alert and oriented. Cranial nerves grossly intact.       LABORATORY STUDIES:  [facility] of [address] [place] [address] Ref Rng & Units 6/15/2020   WBC 3.4 - 10.8 x10E3/uL 5.9   ANC 1.4 - 7.0 x10E3/uL 3.9   HGB 13.0 - 17.7 g/dL 12.3 (L)    - 450 x10E3/uL 119 (L)   INR 0.8 - 1.2 0.9   AST 0 - 40 IU/L 17   ALT 0 - 44 IU/L 22   Alk Phos 39 - 117 IU/L 67   Bili, Total 0.0 - 1.2 mg/dL 0.8   Bili, Direct 0.00 - 0.40 mg/dL 0.22   Albumin 3.8 - 4.8 g/dL 3.8   BUN 8 - 27 mg/dL 26   Creat 0.76 - 1.27 mg/dL 1.46 (H)   Creat (iSTAT) 0.6 - 1.3 mg/dL    Na 134 - 144 mmol/L 143   K 3.5 - 5.2 mmol/L 4.5   Cl 96 - 106 mmol/L 107 (H)   CO2 20 - 29 mmol/L 24   Glucose 65 - 99 mg/dL 105 (H)   Magnesium 1.6 - 2.3 mg/dL 1.6     Liver [place] of [address] [place] [address] Ref Rng & Units 3/20/2020 2/28/2020   WBC 3.4 - 10.8 x10E3/uL 6.6 6.8   ANC 1.4 - 7.0 x10E3/uL 4.0 4.6   HGB 13.0 - 17.7 g/dL 12.5 (L) 11.8 (L)    - 450 x10E3/uL 129 (L) 124 (L)   INR 0.8 - 1.2 0.9 1.0   AST 0 - 40 IU/L 15 15   ALT 0 - 44 IU/L 16 15   Alk Phos 39 - 117 IU/L 80 69   Bili, Total 0.0 - 1.2 mg/dL 0.8 0.9   Bili, Direct 0.00 - 0.40 mg/dL 0.20 0.22   Albumin 3.8 - 4.8 g/dL 4.1 4.0   BUN 8 - 27 mg/dL 28 (H) 31 (H)   Creat 0.76 - 1.27 mg/dL 1.39 (H) 1.51 (H)   Creat (iSTAT) 0.6 - 1.3 mg/dL     Na 134 - 144 mmol/L 143 144   K 3.5 - 5.2 mmol/L 4.4 4.0   Cl 96 - 106 mmol/L 103 104   CO2 20 - 29 mmol/L 25 26   Glucose 65 - 99 mg/dL 126 (H) 127 (H)   Magnesium 1.6 - 2.3 mg/dL 1.9 1.7     Liver Virology and Transplant Immune Suppression Tacrolimus Level   Latest Ref Rng & Units 2.0 - 20.0 ng/mL   6/15/2020 3.8   3/20/2020 6.4   2/28/2020 3.0   1/31/2020 4.6       SEROLOGIES:  7/2018. HAV total positive, HBsurface antigne negative, anti-HBcore negative, anti-HBsurface negative, anti-HCV negative, Anti-HIV negative, CNV IgG negative, HSV IgG positive    Serologies Latest Ref Rng & Units 8/26/2019   Ferritin 30 - 400 ng/mL 153   Iron % Saturation 15 - 55 % 32     LIVER HISTOLOGY:  7/2014: Cirhosis with mild mixed macro- and microvesicular steatosis. 10/2018. Liver explant from [facility]. Multifocal moderately differentiated HCC.  2.3 cm HCC right lobe. 1.3 cm right lobe. No microscopic invasion. ENDOSCOPIC PROCEDURES:  Not available or performed    RADIOLOGY:  1/2019. CT scan abdomen with and without IV contrast.  Normal appearing liver. No liver mass lesions. Normal spleen. No ascites. 1/2019. CT scan chest.  No evidence of metastatic disease. 4/2019. CT scan of abdomen. Normal appearing liver. No liver mass or lesion. Concerning 8 mm lung mass. 4/2019. CT scan of chest. New 8 mm nodule in the right and middle lobe adjacent to the diaphragm concerning for metastatic disease. 7/2019. CT scan of abdomen. No liver mass lesions. Normal appearing liver.   7/2019. CT scan of chest.  8 mm nodule is no longer visualized. There is a 2 mm nodule in the right middle lobe too small to characterize. 10/2019. CT scan of chest. Stable 2 mm right lung nodule. 10/2019. CT scan of abdomen. Stable post liver transplant with no concerning mass or lesion. 5/2020. MRI of liver. No MRI evidence of hepatocellular carcinoma status post liver transplantation. Splenomegaly with large splenorenal shunt varices. Bilateral gynecomastia. Unremarkable exam otherwise. 5/2020. Chest CT. No pulmonary metastases. Massive splenorenal shunt. Splenomegaly. Post liver transplant. 6/2020. Ultrasound of liver with Duplex. Increased echogenicity of the liver. Patent hepatic veins and portal veins. Portal vein velocity within normal limits. Splenic varices. OTHER TESTING:  Not available or performed    FOLLOW-UP:  Pursuant to the emergency declaration under the [facility], [facility] waiver authority and the [employer] Act, this Virtual  Visit was conducted, with the patient's (and/or their legal guardian's) consent, to reduce the patient's risk of exposure to COVID-19 and provide necessary medical care. Services were provided through a video synchronous discussion virtually to substitute for an in-person clinic visit. The patient was located in their home. The provider was located in the [facility] office. Because of the COVID-19 epidemic a follow-up appointment will be performed via TeleHealth in 3 months for routine monitoring. All of the issues listed above in the Assessment and Plan were discussed with the patient. All questions were answered. The patient expressed a clear understanding of the above. Laboratory studies can be reduced to every 4 weeks to assess liver graft function and blood levels of immune suppression. The patient has his last follow-up appointment at the liver transplant center in December. [address] [address] 3 months       April LEO PerezNP-BC  [address] of [address] [address] [facility] [address] [address] L[address] Akhtar, [address] [place] [address] 22.  [address] [facility]

## 2020-06-22 PROBLEM — F41.1 GENERALIZED ANXIETY DISORDER: Status: ACTIVE | Noted: 2020-06-22

## 2020-06-25 ENCOUNTER — TELEPHONE (OUTPATIENT)
Dept: HEMATOLOGY | Age: 65
End: 2020-06-25

## 2020-06-25 ENCOUNTER — HOSPITAL ENCOUNTER (EMERGENCY)
Age: 65
Discharge: HOME | End: 2020-06-25
Attending: EMERGENCY MEDICINE | Admitting: EMERGENCY MEDICINE
Payer: MEDICARE

## 2020-06-25 VITALS
HEART RATE: 76 BPM | SYSTOLIC BLOOD PRESSURE: 183 MMHG | OXYGEN SATURATION: 98 % | RESPIRATION RATE: 18 BRPM | TEMPERATURE: 97.9 F | DIASTOLIC BLOOD PRESSURE: 65 MMHG

## 2020-06-25 DIAGNOSIS — I10 HYPERTENSION, UNSPECIFIED TYPE: Primary | ICD-10-CM

## 2020-06-25 DIAGNOSIS — Z94.4 H/O LIVER TRANSPLANT (HCC): ICD-10-CM

## 2020-06-25 PROCEDURE — 74011250637 HC RX REV CODE- 250/637: Performed by: EMERGENCY MEDICINE

## 2020-06-25 PROCEDURE — 99284 EMERGENCY DEPT VISIT MOD MDM: CPT

## 2020-06-25 RX ORDER — AMLODIPINE BESYLATE 5 MG/1
10 TABLET ORAL
Status: COMPLETED | OUTPATIENT
Start: 2020-06-25 | End: 2020-06-25

## 2020-06-25 RX ORDER — AMLODIPINE BESYLATE 10 MG/1
10 TABLET ORAL DAILY
Qty: 30 TAB | Refills: 0 | Status: SHIPPED | OUTPATIENT
Start: 2020-06-25 | End: 2020-07-25

## 2020-06-25 RX ORDER — LISINOPRIL 10 MG/1
20 TABLET ORAL
Status: COMPLETED | OUTPATIENT
Start: 2020-06-25 | End: 2020-06-25

## 2020-06-25 RX ORDER — LISINOPRIL 20 MG/1
20 TABLET ORAL 2 TIMES DAILY
Qty: 30 TAB | Refills: 0 | Status: SHIPPED | OUTPATIENT
Start: 2020-06-25 | End: 2020-07-10

## 2020-06-25 RX ADMIN — AMLODIPINE BESYLATE 10 MG: 5 TABLET ORAL at 14:30

## 2020-06-25 RX ADMIN — LISINOPRIL 20 MG: 10 TABLET ORAL at 14:30

## 2020-06-25 NOTE — ED TRIAGE NOTES
Patient comes to the ER c/o high blood pressure readings for the past week. Recently put on Prozac for anxiety. Reports taking BP 5x yesterday and having systolic reading of 984 last night.

## 2020-06-25 NOTE — TELEPHONE ENCOUNTER
Patient called saying his blood pressure has been running high this week. Per Dr. Madison Crouch direction, he took Norvasc last night and this morning; however, his blood pressure is currently 200/80. Patient reports he is waiting for his wife to get home and then he will be going to the ED. Patient wanted to let April know he started Prozac 6/19 and last dose was yesterday.

## 2020-06-25 NOTE — ED PROVIDER NOTES
Please note that this dictation was completed with [employer], the computer voice recognition software.  Quite often unanticipated grammatical, syntax, homophones, and other interpretive errors are inadvertently transcribed by the computer software.  Please disregard these errors.  Please excuse any errors that have escaped final proofreading. 77-year-old white male past medical history remarkable for liver transplant sees (Dr. Patito Hill/Dr. Nereyda Vasquez), ITP autoimmune disease, coronary artery disease, skin cancer removed from the forehead, kidney stones, low platelets, type 2 diabetes, GERD, hepatic encephalopathy, hypertension, obesity, and compliant with his medications but was recently switched by Dr. Nereyda Vasquez off of his amlodipine and started on lisinopril and recently started by his PCP on Friday on Paxil presents complaining of increased blood pressures since Friday. Patient states his is asymptomatic he has no neurologic symptoms no vision changes no numbness tingling no nausea vomiting diarrhea constipation. He states he has been compliant with all his medications spoke with Dr. Beckie Parekh yesterday evening and was told to take a dose of his amlodipine which he did. He said that was successful in lowering his blood pressure for a bit. He went to bed and slept normally last night awakened this morning noticed it was up again and spoke with Dr. Rukhsana Gauthier this morning told to take his amlodipine again and recheck his pressure in an hour and if it was up to let them know which he did. He was then instructed to come to the ER for further evaluation. Patient states he maybe has a slight headache global in nature since this morning. He has not taken anything for his headache.     pt denies HA, vison changes, diff swallowing, CP, SOB, Abd pain, F/Ch, N/V, D/Cons or other current systemic complaints    Social/ PSH reviewed in EMR    EMR Chart Reviewed             Past Medical History:   Diagnosis Date    Arthritis     Autoimmune disease ([facility]     ITP    CAD (coronary artery disease)     enlarged heart ?  [facility]     skin ca removed from forehead    Chronic kidney disease     kidney stones    Coagulation disorder ([facility]     low plts    Diabetes (HCC)     type 2    GERD (gastroesophageal reflux disease)     [facility]     Hypertension     Ill-defined condition     obesity per pt    Ill-defined condition     anemia    Liver disease     elevated liver enzymes    Liver transplant candidate     Pt is on the list for a liver transplant as of 8/2018    PUD (peptic ulcer disease)     stomach ulcers       Past Surgical History:   Procedure Laterality Date    ABDOMEN SURGERY PROC UNLISTED      ana    HX APPENDECTOMY      HX OTHER SURGICAL      mohs procedure for skin ca.     HX UROLOGICAL      vasectomy         Family History:   Problem Relation Age of Onset    Cancer Mother         multiple myeloma    Cancer Father         prostate    MS Sister     Cancer Maternal Grandmother         ?where    Cancer Maternal Grandfather         ?where    Heart Failure Paternal Grandmother     Cancer Paternal Grandfather         ? where       Social History     Socioeconomic History    Marital status:      Spouse name: Not on file    Number of children: Not on file    Years of education: Not on file    Highest education level: Not on file   Occupational History    Not on file   Social Needs    Financial resource strain: Not on file    Food insecurity     Worry: Not on file     Inability: Not on file   [employer] needs     Medical: Not on file     Non-medical: Not on file   Tobacco Use    Smoking status: Never Smoker    Smokeless tobacco: Never Used   Substance and Sexual Activity    Alcohol use: No    Drug use: No    Sexual activity: Not on file   Lifestyle    Physical activity     Days per week: Not on file     Minutes per session: Not on file    Stress: Not on file Relationships    Social connections     Talks on phone: Not on file     Gets together: Not on file     Attends [employer] service: Not on file     Active member of club or organization: Not on file     Attends meetings of clubs or organizations: Not on file     Relationship status: Not on file    Intimate partner violence     Fear of current or ex partner: Not on file     Emotionally abused: Not on file     Physically abused: Not on file     Forced sexual activity: Not on file   Other Topics Concern    Not on file   Social History Narrative    Not on file         ALLERGIES: Patient has no known allergies. Review of Systems   Constitutional: Negative for appetite change, chills and fever. HENT: Negative for drooling, trouble swallowing and voice change. Eyes: Negative for pain and visual disturbance. Respiratory: Negative for cough, chest tightness and shortness of breath. Cardiovascular: Negative for chest pain. Gastrointestinal: Negative for abdominal pain, constipation, diarrhea and vomiting. Genitourinary: Negative for dysuria and testicular pain. Skin: Negative for rash. Neurological: Negative for facial asymmetry and speech difficulty. All other systems reviewed and are negative. Vitals:    06/25/20 1304   BP: (!) 197/98   Pulse: 76   Resp: 18   Temp: 97.9 °F (36.6 °C)   SpO2: 98%            Physical Exam  Vitals signs and nursing note reviewed. Constitutional:       General: He is not in acute distress. Appearance: He is well-developed. Comments:  NAD, AxOx4, speaking in complete sentences    HENT:      Head: Normocephalic and atraumatic. Comments: Cn intact         Right Ear: External ear normal.      Left Ear: External ear normal.      Mouth/Throat:      Pharynx: No oropharyngeal exudate. Eyes:      General: No scleral icterus. Right eye: No discharge. Left eye: No discharge. Extraocular Movements: Extraocular movements intact. Conjunctiva/sclera: Conjunctivae normal.      Pupils: Pupils are equal, round, and reactive to light. Neck:      Musculoskeletal: Normal range of motion and neck supple. No muscular tenderness. Cardiovascular:      Rate and Rhythm: Normal rate and regular rhythm. Pulses: Normal pulses. Heart sounds: Normal heart sounds. No murmur. No friction rub. No gallop. Pulmonary:      Effort: Pulmonary effort is normal. No respiratory distress. Breath sounds: Normal breath sounds. No wheezing or rales. Chest:      Chest wall: No tenderness. Abdominal:      General: Bowel sounds are normal. There is no distension. Palpations: Abdomen is soft. There is no mass. Tenderness: There is no abdominal tenderness. There is no guarding or rebound. Comments: nttp     Genitourinary:     Comments: Pt denies urinary/ Testicular/ scrotal or penile  complaints  Musculoskeletal: Normal range of motion. General: No swelling, tenderness, deformity or signs of injury. Right lower leg: No edema. Left lower leg: No edema. Lymphadenopathy:      Cervical: No cervical adenopathy. Skin:     General: Skin is warm and dry. Capillary Refill: Capillary refill takes less than 2 seconds. Coloration: Skin is not jaundiced or pale. Findings: No bruising, erythema, lesion or rash. Neurological:      General: No focal deficit present. Mental Status: He is alert and oriented to person, place, and time. Cranial Nerves: No cranial nerve deficit. Sensory: No sensory deficit. Motor: No weakness.       Coordination: Coordination normal.      Gait: Gait normal.      Deep Tendon Reflexes: Reflexes normal.      Comments: pt has motor/ CV/ Sensation grossly intact to all extremities, R = L in strength;          MDM       Procedures    Chief Complaint   Patient presents with    Hypertension       1:28 PM  The patients presenting problems have been discussed, and they are in agreement with the care plan formulated and outlined with them. I have encouraged them to ask questions as they arise throughout their visit. MEDICATIONS GIVEN:  Medications - No data to display    LABS REVIEWED:  Labs Reviewed - No data to display    RADIOLOGY RESULTS:  The following have been ordered and reviewed:  _____________________________________________________________________  _____________________________________________________________________        PROCEDURES:        CONSULTATIONS:       PROGRESS NOTES:      DIAGNOSIS:    1. Hypertension, unspecified type    2. [facility]        PLAN:  1-htn - medications adjusted; Pt agrees w/ plans;       ED COURSE: The patients hospital course has been uncomplicated. CONSULT  NOTE  1:51 PM  Estefanía Mcgregor MD spoke with Dr Jaden Garnica (not Deep). Specialty: Hepatology  Discussed pt's hx, disposition, and available diagnostic and imaging results. Reviewed care plans. Consulting physician agrees with plans as outlined 'Im not sure what to do/ let me talk w/ D rodriges, I will call back or have him call you'; .      2:30 pm  Dr Jaden Garnica called back - 'please change his Lisinopril to 40 mgqd, add amlodopine 10 mg every day; '      Louis Ratliff's  results have been reviewed with him. He has been counseled regarding his diagnosis. He verbally conveys understanding and agreement of the signs, symptoms, diagnosis, treatment and prognosis and additionally agrees to Call/ Arrange follow up as recommended with Dr. Bebeto Borrego MD in 24 - 48 hours. He also agrees with the care-plan and conveys that all of his questions have been answered.   I have also put together some discharge instructions for him that include: 1) educational information regarding their diagnosis, 2) how to care for their diagnosis at home, as well a 3) list of reasons why they would want to return to the ED prior to their follow-up appointment, should their condition change or for concerns. Broderick Phillips

## 2020-06-25 NOTE — DISCHARGE INSTRUCTIONS
Patient Education        Acute High Blood Pressure: Care Instructions  Your Care Instructions     Acute high blood pressure is very high blood pressure. It's a serious problem. Very high blood pressure can damage your brain, heart, eyes, and kidneys. You may have been given medicines to lower your blood pressure. You may have gotten them as pills or through a needle in one of your veins. This is called an IV. And maybe you were given other medicines too. These can be needed when high blood pressure causes other problems. To keep your blood pressure at a lower level, you may need to make healthy lifestyle changes. And you will probably need to take medicines. Be sure to follow up with your doctor about your blood pressure and what you can do about it. Follow-up care is a key part of your treatment and safety. Be sure to make and go to all appointments, and call your doctor if you are having problems. It's also a good idea to know your test results and keep a list of the medicines you take. How can you care for yourself at home? · See your doctor as often as he or she recommends. This is to make sure your blood pressure is under control. You will probably go at least 2 times a year. But it may be more often at first.  · Take your blood pressure medicine exactly as prescribed. You may take one or more types. They include diuretics, beta-blockers, ACE inhibitors, calcium channel blockers, and angiotensin II receptor blockers. Call your doctor if you think you are having a problem with your medicine. · If you take blood pressure medicine, talk to your doctor before you take decongestants or anti-inflammatory medicine, such as ibuprofen. These can raise blood pressure. · Learn how to check your blood pressure at home. Check it often. · Ask your doctor if it's okay to drink alcohol. · Talk to your doctor about lifestyle changes that can help blood pressure.  These include being active and managing your weight. · Don't smoke. Smoking increases your risk for heart attack and stroke. When should you call for help? Call  911 anytime you think you may need emergency care. This may mean having symptoms that suggest that your blood pressure is causing a serious heart or blood vessel problem. Your blood pressure may be over 180/120. For example, call 911 if:  · You have symptoms of a heart attack. These may include:  ? Chest pain or pressure, or a strange feeling in the chest.  ? Sweating. ? Shortness of breath. ? Nausea or vomiting. ? Pain, pressure, or a strange feeling in the back, neck, jaw, or upper belly or in one or both shoulders or arms. ? Lightheadedness or sudden weakness. ? A fast or irregular heartbeat. · You have symptoms of a stroke. These may include:  ? Sudden numbness, tingling, weakness, or loss of movement in your face, arm, or leg, especially on only one side of your body. ? Sudden vision changes. ? Sudden trouble speaking. ? Sudden confusion or trouble understanding simple statements. ? Sudden problems with walking or balance. ? A sudden, severe headache that is different from past headaches. · You have severe back or belly pain. Do not wait until your blood pressure comes down on its own. Get help right away. Call your doctor now or seek immediate care if:  · Your blood pressure is much higher than normal (such as 180/120 or higher), but you don't have symptoms. · You think high blood pressure is causing symptoms, such as:  ? Severe headache.  ? Blurry vision. Watch closely for changes in your health, and be sure to contact your doctor if:  · Your blood pressure measures higher than your doctor recommends at least 2 times. That means the top number is higher or the bottom number is higher, or both. · You think you may be having side effects from your blood pressure medicine. Where can you learn more?   Go to http://kenny-raz.info/  Enter H919 in the search box to learn more about \"Acute High Blood Pressure: Care Instructions. \"  Current as of: December 16, 2019               Content Version: 12.5  © 8576-4228 [employer]. Care instructions adapted under license by [employer] (which disclaims liability or warranty for this information). If you have questions about a medical condition or this instruction, always ask your healthcare professional. [place] [address] any warranty or liability for your use of this information.

## 2020-09-01 ENCOUNTER — PATIENT OUTREACH (OUTPATIENT)
Dept: HEMATOLOGY | Age: 65
End: 2020-09-01

## 2020-09-01 DIAGNOSIS — Z94.4 H/O LIVER TRANSPLANT (HCC): Primary | ICD-10-CM

## 2020-09-01 RX ORDER — TACROLIMUS 1 MG/1
2 CAPSULE ORAL 2 TIMES DAILY
Qty: 360 CAP | Refills: 0 | Status: SHIPPED | OUTPATIENT
Start: 2020-09-01 | End: 2020-09-22 | Stop reason: DRUGHIGH

## 2020-09-01 NOTE — PROGRESS NOTES
Noted patient is due for labs and follow up this month and no appointment is scheduled. Spoke with patient and scheduled follow up appointment. Patient reports he's having labs drawn on 9/4 through Dr. Jacob Pratt and he asked for all labs to be done at the same time.      Faxed lab requisition to endocrinology office at [phone]

## 2020-09-22 ENCOUNTER — OFFICE VISIT (OUTPATIENT)
Dept: HEMATOLOGY | Age: 65
End: 2020-09-22
Payer: MEDICARE

## 2020-09-22 VITALS
TEMPERATURE: 97 F | DIASTOLIC BLOOD PRESSURE: 86 MMHG | OXYGEN SATURATION: 98 % | BODY MASS INDEX: 36.26 KG/M2 | SYSTOLIC BLOOD PRESSURE: 196 MMHG | WEIGHT: 259 LBS | HEART RATE: 67 BPM | HEIGHT: 71 IN | RESPIRATION RATE: 18 BRPM

## 2020-09-22 DIAGNOSIS — Z94.4 H/O LIVER TRANSPLANT (HCC): Primary | ICD-10-CM

## 2020-09-22 DIAGNOSIS — Z85.05 HISTORY OF HEPATOCELLULAR CARCINOMA: ICD-10-CM

## 2020-09-22 DIAGNOSIS — R91.1 SOLITARY PULMONARY NODULE: ICD-10-CM

## 2020-09-22 DIAGNOSIS — F41.1 GENERALIZED ANXIETY DISORDER: ICD-10-CM

## 2020-09-22 PROCEDURE — 99215 OFFICE O/P EST HI 40 MIN: CPT | Performed by: NURSE PRACTITIONER

## 2020-09-22 PROCEDURE — G0463 HOSPITAL OUTPT CLINIC VISIT: HCPCS | Performed by: NURSE PRACTITIONER

## 2020-09-22 RX ORDER — TACROLIMUS 1 MG/1
1 CAPSULE ORAL EVERY 12 HOURS
COMMUNITY
End: 2021-04-19

## 2020-09-22 RX ORDER — LISINOPRIL 20 MG/1
1 TABLET ORAL DAILY
COMMUNITY
Start: 2020-09-09

## 2020-09-22 NOTE — PROGRESS NOTES
[address] [place] [address], MD, MD Simone Solis PA-C Reymundo Furlong, [facility]-BC     Berna Aaron, [facility]   REINA Ribeiro-TASH [facility]       Yeison Weaver [facility] 136    at [address], [address] [place] [address], [facility] 22.    [phone]    FAX: [address] [place] [address]    at [address], [address]    [phone]    FAX: [phone]       Patient Care Team:  Herberth Espinosa MD as PCP - General (Family Medicine)  Herberth Espinosa MD as PCP - [facility]  Pieter Hogue MD (Nephrology)  Paulo Moore MD (Gastroenterology)  Ollie Paul MD as Physician (Internal Medicine)  Bernie Walls MD as Physician (Neurology)      Problem List  Date Reviewed: 6/22/2020          Codes Class Noted    Generalized anxiety disorder ICD-10-CM: F41.1  ICD-9-CM: 300.02  6/22/2020        Stage 2 chronic kidney disease ICD-10-CM: N18.2  ICD-9-CM: 585.2  2/5/2020        Severe obesity ([address] ICD-10-CM: E66.01  ICD-9-CM: 278.01  2/4/2020        H/O liver transplant ([address] ICD-10-CM: Z94.4  ICD-9-CM: V42.7  12/1/2018        Long-term use of immunosuppressant medication ICD-10-CM: Z79.899  ICD-9-CM: V58.69  12/1/2018        Liver transplant complication [facility]) MHI-23-CC: T86.40  ICD-9-CM: 996.82  12/1/2018        Neuropathy involving both lower extremities ICD-10-CM: G57.93  ICD-9-CM: 356.9  1/2/2018        CAMEJO (nonalcoholic steatohepatitis) ICD-10-CM: K75.81  ICD-9-CM: 571.8  11/2/2017        GERD (gastroesophageal reflux disease) (Chronic) ICD-10-CM: K21.9  ICD-9-CM: 530.81  2/22/2016        CAD (coronary artery disease) (Chronic) ICD-10-CM: I25.10  ICD-9-CM: 414.00  2/22/2016        DM type 2 (diabetes mellitus, type 2) ([address] (Chronic) ICD-10-CM: E11.9  ICD-9-CM: 250.00  2/22/2016              Keny Saab returns to the [address] for management of liver transplant graft function, to monitor and adjust immune suppression and to assess for recurrence of the primary liver disease. The active problem list, all pertinent past medical history, medications, liver histology, endoscopic studies, radiologic findings and laboratory findings related to the liver disorder were reviewed with the patient. The patient underwent a liver transplant at [facility] in 10/2018. He was found to have a small [address] in his liver explant. Post-transplant imaging are now being performed every 6 months with no recurrence. The patient is taking the following for immune suppression: Tacrolimus 1 mg BID. The patient discontinued sirolimus 1/2020 due to proteinuria, mouth ulcers and lower extremity edema. He continues to follow with Nephrology. Since the last office visit the patient was hospitalized with hypertensive crisis 7/2020. Amlodipine was added to lisinopril with normalization of BP. The patient discontinued amlodipine 1 month ago and the BP was again elevated today. He reports the home systolic readings average in the 150's. Recent imaging suggests a large splenorenal shunt. A duplex ultrasound was performed 6/2020. The results demonstrate patent hepatic veins, portal velocity was normal. This was reviewed by [facility] who felt no intervention was needed. The patient has no symptoms which can be attributed to the liver disorder. The patient has not experienced fatigue, fevers, chills, or pain in the right side over the liver. The patient completes all daily activities without any functional limitations. 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 The date of the LT was 10/2018.   Liver transaminases are normal.  ALP is normal.  Liver function is normal.  The platelet count is depressed. Hepatocellular carcinoma   This was present in native liver prior to undergoing liver transplant. There was viable HCC in the liver explant,  2 lesions in the right lobe. The most recent MRI 5/2020 demonstrated no liver mass lesions. The most recent chest CT 5/2020 demonstrated no metastases. No mention of previously seen pulmonary nodules. Repeat imaging was ordered today to be completed 11/2020. Immune Suppression  The patient was switched from Tacrolimus to Sirolimus because of an elevation in Scr. Sirolimus was subsequently discontinued 1/2020 for proteinuria, mouth ulcers and worsening lower extremity edema. He is currently on tacrolimus 1 mg BID. The Sr. Creatinine is stable ranging 1.39-1. 51. The immune suppression blood level was elevated on the most recent blood work. Advised the patient to repeat this in 1 week and be sure he has not taken his medication within 12 hours of blood draw. Anemia   This is of unclear etiology. The HB is stable in the 11-12.0 range. The Ferrtin is normal  The FE saturation is normal.    Hypertension  Discussed importance of having a well controlled BP. Patient will continue to monitor and follow up with PCP. Neutropenia   This has resolved when cellcept was discontinued. Thrombocytopenia   This is secondary to cirrhosis. Some patients do not recover the PLT back to normal after LT. The platelet count is adequate for the patient to undergo procedures without the need for platelet transfusion or platelet growth factors. Prevention of infections  The patient is more than 1 year out from transplant and is off PCP prophylaxis. Chronic kidney injury   This is a common adverse event of immune suppression. The Sr. creat has remained elevated but stable ranging 1.39-1. 51. Furosemide has been discontinued. The patient is being followed by nephrology.    NSAIDs should be avoided since these agents can worsen renal insufficiency. Lower Extremity Edema  This is now resolved. Hypercholesterolemia   This can be caused by immune suppression. Serum cholesterol will be monitored at periodic intervals. Hypertension   This is a common side effect of immune suppression. The patient reports improvement in the blood pressure on Lisinopril. Low serum magnesium   This is a common side effect of immune suppression. The serum Mag is in the normal range. No treatment is needed. Generalized Anxiety Disorder   Patient is currently on Prozac 10 mg, symptoms are well controlled. Osteoporosis   Osteoporosis is commin in patients with cirhrosis prior to liver transplant. The patient had a normal bone density prior to undergoing liver transplant. Monitoring for skin Cancer  The patient was counseled regarding increased risk of skin cancer in transplant recipients and need to have any new skin lesions evaluated by dermatology and removed if suspicious. The patient was instructed to see Dermatology annually examination. Vaccinations  Routine vaccinations against other bacterial and viral agents can be performed as long as this is with attentuatted virus. Live virus vaccines should not be administered. Annual flu vaccination should be administered. ALLERGIES  No Known Allergies    MEDICATIONS  Current Outpatient Medications   Medication Sig    glucose blood VI test strips (OneTouch Ultra Blue Test Strip) strip 1 Strip by Injection route daily.  lisinopriL (PRINIVIL, ZESTRIL) 20 mg tablet Take 1 Tab by mouth daily.  tacrolimus (PROGRAF) 1 mg capsule Take 1 mg by mouth every twelve (12) hours.  cholecalciferol (VITAMIN D3) (5000 Units/125 mcg) tab tablet Take  by mouth daily.  FLUoxetine (PROzac) 10 mg capsule Take 1 Cap by mouth daily.  glimepiride (AMARYL) 1 mg tablet Take 3 mg by mouth two (2) times a day.     metFORMIN (GLUCOPHAGE) 1,000 mg tablet Take 1,000 mg by mouth two (2) times a day.  aspirin delayed-release 81 mg tablet Take 81 mg by mouth daily.  levothyroxine (SYNTHROID) 75 mcg tablet Take 75 mcg by mouth Daily (before breakfast). No current facility-administered medications for this visit. SYSTEM REVIEW NOT RELATED TO LIVER DISEASE OR REVIEWED ABOVE:  Constitution systems: Negative for fever, chills, weight gain, weight loss. Eyes: Negative for visual changes. ENT: Negative for sore throat, painful swallowing. Respiratory: Negative for cough, hemoptysis, SOB. Cardiology: Negative for chest pain, palpitations. GI:  Negative for constipation or diarrhea. : Negative for urinary frequency, dysuria, hematuria, nocturia. Skin: Negative for rash. Hematology: Negative for easy bruising, blood clots. Musculo-skeletal: Negative for back pain, muscle pain, weakness. Neurologic: Negative for headaches, dizziness, vertigo, memory problems not related to HE. Psychology: Negative for anxiety, depression. FAMILY HISTORY:  The father  of prostate cancer. The mother  of multiple myeloma. There is no family history of liver disease.       SOCIAL HISTORY:  The patient is . The patient has 3 children. The patient occasional cigar. The patient has been abstinent from alcohol since 2016. The patient does not work, he is on social security. PHYSICAL EXAMINATION:  VS: per nursing note  General: No acute distress. Eyes: Sclera anicteric. ENT: No oral lesions. Thyroid normal.  Nodes: No adenopathy. Skin: No spider angiomata. No jaundice. No palmar erythema. Respiratory: Lungs clear to auscultation. Cardiovascular: Regular heart rate. No murmurs. No JVD. Abdomen: Soft non-tender, liver size normal to percussion/palpation. Spleen not palpable. No obvious ascites. Extremities: No edema. No muscle wasting. No gross arthritic changes. Neurologic: Alert and oriented.   Cranial nerves grossly intact. No asterixis. LABORATORY STUDIES:  From: 9/04/2020  AST/ALT/ALP/T Bili/ALB:18/20/67/0.6/3.9  WBC/HB/PLT/INR:6.8/11.0/122  NA/BUN/CREAT:143/27/1.54    9/04/2020 TAC level 11.5    Liver [facility] Ref Rng & Units 6/15/2020   WBC 3.4 - 10.8 x10E3/uL 5.9   ANC 1.4 - 7.0 x10E3/uL 3.9   HGB 13.0 - 17.7 g/dL 12.3 (L)    - 450 x10E3/uL 119 (L)   INR 0.8 - 1.2 0.9   AST 0 - 40 IU/L 17   ALT 0 - 44 IU/L 22   Alk Phos 39 - 117 IU/L 67   Bili, Total 0.0 - 1.2 mg/dL 0.8   Bili, Direct 0.00 - 0.40 mg/dL 0.22   Albumin 3.8 - 4.8 g/dL 3.8   BUN 8 - 27 mg/dL 26   Creat 0.76 - 1.27 mg/dL 1.46 (H)   Creat (iSTAT) 0.6 - 1.3 mg/dL    Na 134 - 144 mmol/L 143   K 3.5 - 5.2 mmol/L 4.5   Cl 96 - 106 mmol/L 107 (H)   CO2 20 - 29 mmol/L 24   Glucose 65 - 99 mg/dL 105 (H)   Magnesium 1.6 - 2.3 mg/dL 1.6     Liver [address] Ref Rng & Units 3/20/2020 2/28/2020   WBC 3.4 - 10.8 x10E3/uL 6.6 6.8   ANC 1.4 - 7.0 x10E3/uL 4.0 4.6   HGB 13.0 - 17.7 g/dL 12.5 (L) 11.8 (L)    - 450 x10E3/uL 129 (L) 124 (L)   INR 0.8 - 1.2 0.9 1.0   AST 0 - 40 IU/L 15 15   ALT 0 - 44 IU/L 16 15   Alk Phos 39 - 117 IU/L 80 69   Bili, Total 0.0 - 1.2 mg/dL 0.8 0.9   Bili, Direct 0.00 - 0.40 mg/dL 0.20 0.22   Albumin 3.8 - 4.8 g/dL 4.1 4.0   BUN 8 - 27 mg/dL 28 (H) 31 (H)   Creat 0.76 - 1.27 mg/dL 1.39 (H) 1.51 (H)   Creat (iSTAT) 0.6 - 1.3 mg/dL     Na 134 - 144 mmol/L 143 144   K 3.5 - 5.2 mmol/L 4.4 4.0   Cl 96 - 106 mmol/L 103 104   CO2 20 - 29 mmol/L 25 26   Glucose 65 - 99 mg/dL 126 (H) 127 (H)   Magnesium 1.6 - 2.3 mg/dL 1.9 1.7     Liver Virology and Transplant Immune Suppression Tacrolimus Level   Latest Ref Rng & Units 2.0 - 20.0 ng/mL   6/15/2020 3.8   3/20/2020 6.4   2/28/2020 3.0   1/31/2020 4.6       SEROLOGIES:  7/2018.   HAV total positive, HBsurface antigne negative, anti-HBcore negative, anti-HBsurface negative, anti-HCV negative, Anti-HIV negative, CNV IgG negative, HSV IgG positive    Serologies Latest Ref Rng & Units 8/26/2019   Ferritin 30 - 400 ng/mL 153   Iron % Saturation 15 - 55 % 32     LIVER HISTOLOGY:  7/2014: Cirhosis with mild mixed macro- and microvesicular steatosis. 10/2018. Liver explant from [facility]. Multifocal moderately differentiated HCC.  2.3 cm HCC right lobe. 1.3 cm right lobe. No microscopic invasion. ENDOSCOPIC PROCEDURES:  Not available or performed    RADIOLOGY:  1/2019. CT scan abdomen with and without IV contrast.  Normal appearing liver. No liver mass lesions. Normal spleen. No ascites. 1/2019. CT scan chest.  No evidence of metastatic disease. 4/2019. CT scan of abdomen. Normal appearing liver. No liver mass or lesion. Concerning 8 mm lung mass. 4/2019. CT scan of chest. New 8 mm nodule in the right and middle lobe adjacent to the diaphragm concerning for metastatic disease. 7/2019. CT scan of abdomen. No liver mass lesions. Normal appearing liver.   7/2019. CT scan of chest.  8 mm nodule is no longer visualized. There is a 2 mm nodule in the right middle lobe too small to characterize. 10/2019. CT scan of chest. Stable 2 mm right lung nodule. 10/2019. CT scan of abdomen. Stable post liver transplant with no concerning mass or lesion. 5/2020. MRI of liver. No MRI evidence of hepatocellular carcinoma status post liver transplantation. Splenomegaly with large splenorenal shunt varices. Bilateral gynecomastia. Unremarkable exam otherwise. 5/2020. Chest CT. No pulmonary metastases. Massive splenorenal shunt. Splenomegaly. Post liver transplant. 6/2020. Ultrasound of liver with Duplex. Increased echogenicity of the liver. Patent hepatic veins and portal veins. Portal vein velocity within normal limits. Splenic varices. OTHER TESTING:  Not available or performed    FOLLOW-UP:  All of the issues listed above in the Assessment and Plan were discussed with the patient. All questions were answered.   The patient expressed a clear understanding of the above. [facility] 87 in 3 months. April S.  LEO AaronNP-BC  [address] [id number] [place] Akhtar, [address] [facility] 22.  [facility]

## 2020-09-22 NOTE — PROGRESS NOTES
Room 1     Identified pt with two pt identifiers(name and ). Reviewed record in preparation for visit and have obtained necessary documentation. All patient medications has been reviewed. Chief Complaint   Patient presents with    Follow-up     H/O liver transplant ([address]        3 most recent [place] Screens 2020   Little interest or pleasure in doing things Not at all   Feeling down, depressed, irritable, or hopeless Not at all   Total Score PHQ 2 0     Abuse Screening Questionnaire 2020   Do you ever feel afraid of your partner? N   Are you in a relationship with someone who physically or mentally threatens you? N   Is it safe for you to go home? Y       Health Maintenance Due   Topic    Hepatitis C Screening     Foot Exam Q1     MICROALBUMIN Q1     Eye Exam Retinal or Dilated     Shingrix Vaccine Age 50> (1 of 2)    FOBT Q1Y Age 54-65     Medicare Yearly Exam     GLAUCOMA SCREENING Q2Y     Pneumococcal 65+ years (1 of 1 - PPSV23)    Lipid Screen     Flu Vaccine (1)    A1C test (Diabetic or Prediabetic)        Vitals:    20 1020   BP: (!) 196/86   Pulse: 67   Resp: 18   Temp: 97 °F (36.1 °C)   TempSrc: Temporal   SpO2: 98%   Weight: 259 lb (117.5 kg)   Height: 5' 11\" (1.803 m)   PainSc:   0 - No pain       Wt Readings from Last 3 Encounters:   20 259 lb (117.5 kg)   20 252 lb (114.3 kg)   20 250 lb 9.6 oz (113.7 kg)     Temp Readings from Last 3 Encounters:   20 97 °F (36.1 °C) (Temporal)   20 97.9 °F (36.6 °C)   20 98.1 °F (36.7 °C) (Tympanic)     BP Readings from Last 3 Encounters:   20 (!) 196/86   20 183/65   20 155/65     Pulse Readings from Last 3 Encounters:   20 67   20 76   20 70       Coordination of Care Questionnaire:   1) Have you been to an emergency room, urgent care, or hospitalized since your last visit?   no       2. Have seen or consulted any other health care provider since your last visit? NO    3) Do you have an Advanced Directive/ Living Will in place? YES  If yes, do we have a copy on file YES  If no, would you like information NO    Patient is accompanied by self I have received verbal consent from Chuck Cam to discuss any/all medical information while they are present in the room.

## 2020-10-02 ENCOUNTER — HOSPITAL ENCOUNTER (OUTPATIENT)
Dept: LAB | Age: 65
Discharge: HOME | End: 2020-10-02
Payer: MEDICARE

## 2020-10-02 LAB
ALBUMIN SERPL-MCNC: 3.6 G/DL (ref 3.5–5)
ALBUMIN/GLOB SERPL: 1.3 {RATIO} (ref 1.1–2.2)
ALP SERPL-CCNC: 79 U/L (ref 45–117)
ALT SERPL-CCNC: 32 U/L (ref 12–78)
ANION GAP SERPL CALC-SCNC: 5 MMOL/L (ref 5–15)
AST SERPL-CCNC: 22 U/L (ref 15–37)
BASOPHILS # BLD: 0.1 K/UL (ref 0–0.1)
BASOPHILS NFR BLD: 2 % (ref 0–1)
BILIRUB DIRECT SERPL-MCNC: 0.2 MG/DL (ref 0–0.2)
BILIRUB SERPL-MCNC: 0.9 MG/DL (ref 0.2–1)
BUN SERPL-MCNC: 30 MG/DL (ref 6–20)
BUN/CREAT SERPL: 19 (ref 12–20)
CALCIUM SERPL-MCNC: 8.9 MG/DL (ref 8.5–10.1)
CHLORIDE SERPL-SCNC: 109 MMOL/L (ref 97–108)
CO2 SERPL-SCNC: 30 MMOL/L (ref 21–32)
COMMENT, HOLDF: NORMAL
CREAT SERPL-MCNC: 1.57 MG/DL (ref 0.7–1.3)
DIFFERENTIAL METHOD BLD: ABNORMAL
EOSINOPHIL # BLD: 0.1 K/UL (ref 0–0.4)
EOSINOPHIL NFR BLD: 2 % (ref 0–7)
ERYTHROCYTE [DISTWIDTH] IN BLOOD BY AUTOMATED COUNT: 13.2 % (ref 11.5–14.5)
GLOBULIN SER CALC-MCNC: 2.7 G/DL (ref 2–4)
GLUCOSE SERPL-MCNC: 150 MG/DL (ref 65–100)
HCT VFR BLD AUTO: 33.4 % (ref 36.6–50.3)
HGB BLD-MCNC: 11.2 G/DL (ref 12.1–17)
IMM GRANULOCYTES # BLD AUTO: 0 K/UL
IMM GRANULOCYTES NFR BLD AUTO: 0 %
INR PPP: 0.9 (ref 0.9–1.1)
LYMPHOCYTES # BLD: 1.2 K/UL (ref 0.8–3.5)
LYMPHOCYTES NFR BLD: 23 % (ref 12–49)
MAGNESIUM SERPL-MCNC: 1.8 MG/DL (ref 1.6–2.4)
MCH RBC QN AUTO: 31.2 PG (ref 26–34)
MCHC RBC AUTO-ENTMCNC: 33.5 G/DL (ref 30–36.5)
MCV RBC AUTO: 93 FL (ref 80–99)
MONOCYTES # BLD: 0.2 K/UL (ref 0–1)
MONOCYTES NFR BLD: 3 % (ref 5–13)
NEUTS SEG # BLD: 3.8 K/UL (ref 1.8–8)
NEUTS SEG NFR BLD: 70 % (ref 32–75)
NRBC # BLD: 0 K/UL (ref 0–0.01)
NRBC BLD-RTO: 0 PER 100 WBC
PLATELET # BLD AUTO: 113 K/UL (ref 150–400)
PMV BLD AUTO: 9.8 FL (ref 8.9–12.9)
POTASSIUM SERPL-SCNC: 4.5 MMOL/L (ref 3.5–5.1)
PROT SERPL-MCNC: 6.3 G/DL (ref 6.4–8.2)
PROTHROMBIN TIME: 9.8 SEC (ref 9–11.1)
RBC # BLD AUTO: 3.59 M/UL (ref 4.1–5.7)
RBC MORPH BLD: ABNORMAL
SAMPLES BEING HELD,HOLD: NORMAL
SODIUM SERPL-SCNC: 144 MMOL/L (ref 136–145)
WBC # BLD AUTO: 5.4 K/UL (ref 4.1–11.1)

## 2020-10-02 PROCEDURE — 85025 COMPLETE CBC W/AUTO DIFF WBC: CPT

## 2020-10-02 PROCEDURE — 85610 PROTHROMBIN TIME: CPT

## 2020-10-02 PROCEDURE — 80048 BASIC METABOLIC PNL TOTAL CA: CPT

## 2020-10-02 PROCEDURE — 80076 HEPATIC FUNCTION PANEL: CPT

## 2020-10-02 PROCEDURE — 80197 ASSAY OF TACROLIMUS: CPT

## 2020-10-02 PROCEDURE — 83735 ASSAY OF MAGNESIUM: CPT

## 2020-10-03 LAB — TACROLIMUS, UTACRT: 4.8 NG/ML

## 2020-10-07 NOTE — PROGRESS NOTES
My chart message sent regarding blood work results. The hemoglobin was slightly lower at 11.2, he is seeing hematology for this. The creatinine was 1.57, he is seeing nephrology for this. Liver numbers were all stable and the TAC level was in therapeutic range.

## 2020-12-04 ENCOUNTER — HOSPITAL ENCOUNTER (OUTPATIENT)
Dept: CT IMAGING | Age: 65
Discharge: HOME | End: 2020-12-04
Attending: NURSE PRACTITIONER
Payer: MEDICARE

## 2020-12-04 DIAGNOSIS — Z94.4 H/O LIVER TRANSPLANT (HCC): ICD-10-CM

## 2020-12-04 DIAGNOSIS — Z85.05 HISTORY OF HEPATOCELLULAR CARCINOMA: ICD-10-CM

## 2020-12-04 DIAGNOSIS — R91.1 SOLITARY PULMONARY NODULE: ICD-10-CM

## 2020-12-04 PROCEDURE — 74011000258 HC RX REV CODE- 258: Performed by: NURSE PRACTITIONER

## 2020-12-04 PROCEDURE — 71260 CT THORAX DX C+: CPT

## 2020-12-04 PROCEDURE — 74011000636 HC RX REV CODE- 636: Performed by: NURSE PRACTITIONER

## 2020-12-04 RX ORDER — SODIUM CHLORIDE 0.9 % (FLUSH) 0.9 %
10 SYRINGE (ML) INJECTION ONCE
Status: COMPLETED | OUTPATIENT
Start: 2020-12-04 | End: 2020-12-04

## 2020-12-04 RX ADMIN — SODIUM CHLORIDE 50 ML: 900 INJECTION, SOLUTION INTRAVENOUS at 10:43

## 2020-12-04 RX ADMIN — IOPAMIDOL 100 ML: 612 INJECTION, SOLUTION INTRAVENOUS at 10:43

## 2020-12-04 RX ADMIN — Medication 10 ML: at 10:43

## 2020-12-09 DIAGNOSIS — F41.9 ANXIETY: ICD-10-CM

## 2020-12-10 ENCOUNTER — HOSPITAL ENCOUNTER (OUTPATIENT)
Dept: MRI IMAGING | Age: 65
Discharge: HOME | End: 2020-12-10
Attending: NURSE PRACTITIONER
Payer: MEDICARE

## 2020-12-10 VITALS — WEIGHT: 250 LBS | BODY MASS INDEX: 34.87 KG/M2

## 2020-12-10 DIAGNOSIS — Z94.4 H/O LIVER TRANSPLANT (HCC): ICD-10-CM

## 2020-12-10 DIAGNOSIS — Z94.4 H/O LIVER TRANSPLANT (HCC): Primary | ICD-10-CM

## 2020-12-10 PROCEDURE — 74183 MRI ABD W/O CNTR FLWD CNTR: CPT

## 2020-12-10 PROCEDURE — 74011250636 HC RX REV CODE- 250/636: Performed by: NURSE PRACTITIONER

## 2020-12-10 PROCEDURE — A9585 GADOBUTROL INJECTION: HCPCS | Performed by: NURSE PRACTITIONER

## 2020-12-10 RX ADMIN — GADOBUTROL 14 ML: 604.72 INJECTION INTRAVENOUS at 10:36

## 2020-12-11 LAB
ALBUMIN SERPL-MCNC: 3.8 G/DL (ref 3.5–5)
ALBUMIN/GLOB SERPL: 1.4 {RATIO} (ref 1.1–2.2)
ALP SERPL-CCNC: 92 U/L (ref 45–117)
ALT SERPL-CCNC: 62 U/L (ref 12–78)
ANION GAP SERPL CALC-SCNC: 4 MMOL/L (ref 5–15)
AST SERPL-CCNC: 32 U/L (ref 15–37)
BASOPHILS # BLD: 0.1 K/UL (ref 0–0.1)
BASOPHILS NFR BLD: 2 % (ref 0–1)
BILIRUB DIRECT SERPL-MCNC: 0.2 MG/DL (ref 0–0.2)
BILIRUB SERPL-MCNC: 0.8 MG/DL (ref 0.2–1)
BUN SERPL-MCNC: 29 MG/DL (ref 6–20)
BUN/CREAT SERPL: 18 (ref 12–20)
CALCIUM SERPL-MCNC: 9 MG/DL (ref 8.5–10.1)
CHLORIDE SERPL-SCNC: 109 MMOL/L (ref 97–108)
CO2 SERPL-SCNC: 30 MMOL/L (ref 21–32)
COMMENT, HOLDF: NORMAL
CREAT SERPL-MCNC: 1.63 MG/DL (ref 0.7–1.3)
DIFFERENTIAL METHOD BLD: ABNORMAL
EOSINOPHIL # BLD: 0.6 K/UL (ref 0–0.4)
EOSINOPHIL NFR BLD: 10 % (ref 0–7)
ERYTHROCYTE [DISTWIDTH] IN BLOOD BY AUTOMATED COUNT: 13.7 % (ref 11.5–14.5)
GLOBULIN SER CALC-MCNC: 2.8 G/DL (ref 2–4)
GLUCOSE SERPL-MCNC: 153 MG/DL (ref 65–100)
HCT VFR BLD AUTO: 36.6 % (ref 36.6–50.3)
HGB BLD-MCNC: 12.2 G/DL (ref 12.1–17)
IMM GRANULOCYTES # BLD AUTO: 0 K/UL
IMM GRANULOCYTES NFR BLD AUTO: 0 %
LYMPHOCYTES # BLD: 2.9 K/UL (ref 0.8–3.5)
LYMPHOCYTES NFR BLD: 50 % (ref 12–49)
MAGNESIUM SERPL-MCNC: 1.7 MG/DL (ref 1.6–2.4)
MCH RBC QN AUTO: 31.1 PG (ref 26–34)
MCHC RBC AUTO-ENTMCNC: 33.3 G/DL (ref 30–36.5)
MCV RBC AUTO: 93.4 FL (ref 80–99)
MONOCYTES # BLD: 0.3 K/UL (ref 0–1)
MONOCYTES NFR BLD: 6 % (ref 5–13)
NEUTS SEG # BLD: 1.9 K/UL (ref 1.8–8)
NEUTS SEG NFR BLD: 32 % (ref 32–75)
NRBC # BLD: 0 K/UL (ref 0–0.01)
NRBC BLD-RTO: 0 PER 100 WBC
PATH REV BLD -IMP: ABNORMAL
PLATELET # BLD AUTO: 117 K/UL (ref 150–400)
PMV BLD AUTO: 10 FL (ref 8.9–12.9)
POTASSIUM SERPL-SCNC: 4.8 MMOL/L (ref 3.5–5.1)
PROT SERPL-MCNC: 6.6 G/DL (ref 6.4–8.2)
RBC # BLD AUTO: 3.92 M/UL (ref 4.1–5.7)
RBC MORPH BLD: ABNORMAL
SAMPLES BEING HELD,HOLD: NORMAL
SODIUM SERPL-SCNC: 143 MMOL/L (ref 136–145)
TACROLIMUS, UTACRT: 4 NG/ML
TOTAL CELLS COUNTED SPEC: 50
WBC # BLD AUTO: 5.8 K/UL (ref 4.1–11.1)
WBC MORPH BLD: ABNORMAL

## 2020-12-11 RX ORDER — FLUOXETINE 10 MG/1
CAPSULE ORAL
Qty: 30 CAP | Refills: 5 | Status: SHIPPED | OUTPATIENT
Start: 2020-12-11 | End: 2021-06-18

## 2020-12-11 NOTE — PROGRESS NOTES
My chart message sent with the blood work results which are stable. Continue tacrolimus at the current dose.

## 2020-12-21 ENCOUNTER — VIRTUAL VISIT (OUTPATIENT)
Dept: HEMATOLOGY | Age: 65
End: 2020-12-21
Payer: MEDICARE

## 2020-12-21 DIAGNOSIS — F41.1 GENERALIZED ANXIETY DISORDER: ICD-10-CM

## 2020-12-21 DIAGNOSIS — Z79.60 LONG-TERM USE OF IMMUNOSUPPRESSANT MEDICATION: ICD-10-CM

## 2020-12-21 DIAGNOSIS — Z94.4 H/O LIVER TRANSPLANT (HCC): Primary | ICD-10-CM

## 2020-12-21 PROCEDURE — G0463 HOSPITAL OUTPT CLINIC VISIT: HCPCS | Performed by: NURSE PRACTITIONER

## 2020-12-21 PROCEDURE — 99215 OFFICE O/P EST HI 40 MIN: CPT | Performed by: NURSE PRACTITIONER

## 2020-12-21 NOTE — PROGRESS NOTES
[address] [place] [address], MD, MD Taylor Henderson PA-C Izella Right, [facility]     Berna Aaron, [facility]   Chelsea Morales PTASH    Cathy Julio, [facility]       Yeison Deputado [address]    at [address], [address] [place] [address], [facility] 22.    [phone]    FAX: [address] [place] [address], [address]    [phone]    FAX: [phone]       Patient Care Team:  Jesus Castaneda MD as PCP - General (Family Medicine)  Jesus Castaneda MD as PCP - [facility]  Mikel Latham MD (Nephrology)  Ray Guido MD (Gastroenterology)  Yari Ryan MD as Physician (Internal Medicine)  Dede Pitts MD as Physician (Neurology)      Problem List  Date Reviewed: 9/23/2020          Codes Class Noted    Generalized anxiety disorder ICD-10-CM: F41.1  ICD-9-CM: 300.02  6/22/2020        Stage 2 chronic kidney disease ICD-10-CM: N18.2  ICD-9-CM: 585.2  2/5/2020        Severe obesity ([facility] ICD-10-CM: E66.01  ICD-9-CM: 278.01  2/4/2020        H/O liver transplant ([facility] ICD-10-CM: Z94.4  ICD-9-CM: V42.7  12/1/2018        Long-term use of immunosuppressant medication ICD-10-CM: Z79.899  ICD-9-CM: V58.69  12/1/2018        Liver transplant complication [facility]) LGL-00-HV: T86.40  ICD-9-CM: 996.82  12/1/2018        Neuropathy involving both lower extremities ICD-10-CM: G57.93  ICD-9-CM: 356.9  1/2/2018        CAMEJO (nonalcoholic steatohepatitis) ICD-10-CM: K75.81  ICD-9-CM: 571.8  11/2/2017        GERD (gastroesophageal reflux disease) (Chronic) ICD-10-CM: K21.9  ICD-9-CM: 530.81  2/22/2016        CAD (coronary artery disease) (Chronic) ICD-10-CM: I25.10  ICD-9-CM: 414.00  2/22/2016        DM type 2 (diabetes mellitus, type 2) (HCC) (Chronic) ICD-10-CM: E11.9  ICD-9-CM: 250.00  2/22/2016            VIRTUAL TELEHEALTH VISIT PERFORMED DUE TO COVID-19 EPIDEMIC    CONSENT:  Mayte Mead, who was seen by synchronous, real-time, audio-video technology, and/or his healthcare decision maker, is aware that this patient-initiated, Telehealth encounter on 12/21/2020 is a billable service, with coverage as determined by his insurance carrier. He is aware that he may receive a bill and has provided verbal consent to proceed. This patient was evaluated during a Virtual Telehealth visit. A caregiver was present if appropriate. Due to this being a TeleHealth encounter performed during the OICID-42 public health emergency, the physical examination was limited to that listed in the [address] [place] [address] returns to the [employer] of [address] [place] [address] for management of liver transplant graft function, to monitor and adjust immune suppression and to assess for recurrence of the primary liver disease. The active problem list, all pertinent past medical history, medications, liver histology, endoscopic studies, radiologic findings and laboratory findings related to the liver disorder were reviewed with the patient. The patient underwent a liver transplant at [facility] in 10/2018. He was found to have a small [address] in his liver explant. Post-transplant imaging are now being performed every 6 months with no recurrence. The patient is taking the following for immune suppression: Tacrolimus 1 mg BID. The patient discontinued sirolimus 1/2020 due to proteinuria, mouth ulcers and lower extremity edema. He continues to follow with Nephrology. Since the last office visit the patient has continued to feel well overall. He has intermittent elevations in the blood pressure. Weight has been stable and he has no edema. Serum creatinine has continued to elevate.      Recent imaging suggests a large splenorenal shunt. A duplex ultrasound was performed 6/2020. The results demonstrate patent hepatic veins, portal velocity was normal. This was reviewed by [facility] who felt no intervention was needed. The patient has no symptoms which can be attributed to the liver disorder. The patient has not experienced fatigue, fevers, chills, or pain in the right side over the liver. The patient completes all daily activities without any functional limitations. 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 The date of the LT was 10/2018. Liver transaminases are normal.  ALP is normal.  Liver function is normal.  The platelet count is depressed. Hepatocellular carcinoma   This was present in native liver prior to undergoing liver transplant. There was viable HCC in the liver explant,  2 lesions in the right lobe. The most recent MRI 12/2020 demonstrated no liver mass lesions. The most recent chest CT 12/2020 demonstrated no metastases. Stable pulmonary nodule. Immune Suppression  The patient was switched from Tacrolimus to Sirolimus because of an elevation in Scr. Sirolimus was subsequently discontinued 1/2020 for proteinuria, mouth ulcers and worsening lower extremity edema. He is currently on tacrolimus 1 mg BID. The Sr. Creatinine is stable ranging 1.39-1. 63. The immune suppression blood level have been in the therapeutic range. Anemia   This is of unclear etiology. The HB is stable in the 11-12.0 range. The Ferrtin is normal  The FE saturation is normal.    Hypertension  Discussed importance of having a well controlled BP. Patient will continue to monitor and follow up with PCP. Neutropenia   This has resolved when cellcept was discontinued. Thrombocytopenia   This is secondary to cirrhosis. Some patients do not recover the PLT back to normal after LT.   The platelet count is adequate for the patient to undergo procedures without the need for platelet transfusion or platelet growth factors. Prevention of infections  The patient is more than 1 year out from transplant and is off PCP prophylaxis. Chronic kidney injury   This is a common adverse event of immune suppression. The Sr. creat has remained elevated but stable ranging 1.39-1. 63. Furosemide has been discontinued. The patient is being followed by nephrology. NSAIDs should be avoided since these agents can worsen renal insufficiency. Lower Extremity Edema  This is now resolved. Hypercholesterolemia   This can be caused by immune suppression. Serum cholesterol will be monitored at periodic intervals. Hypertension   This is a common side effect of immune suppression. The patient reports improvement in the blood pressure on Lisinopril. Low serum magnesium   This is a common side effect of immune suppression. The serum Mag is in the normal range. No treatment is needed. Generalized Anxiety Disorder   Patient is currently on Prozac 10 mg, symptoms are well controlled. Osteoporosis   Osteoporosis is commin in patients with cirhrosis prior to liver transplant. The patient had a normal bone density prior to undergoing liver transplant. Monitoring for skin Cancer  The patient was counseled regarding increased risk of skin cancer in transplant recipients and need to have any new skin lesions evaluated by dermatology and removed if suspicious. The patient was instructed to see Dermatology annually examination. Vaccinations  Routine vaccinations against other bacterial and viral agents can be performed as long as this is with attentuatted virus. Live virus vaccines should not be administered. Annual flu vaccination should be administered.         ALLERGIES  No Known Allergies    MEDICATIONS  Current Outpatient Medications   Medication Sig    FLUoxetine (PROzac) 10 mg capsule TAKE 1 CAPSULE BY MOUTH DAILY    glucose blood VI test strips (OneTouch Ultra Blue Test Strip) strip 1 Strip by Injection route daily.  lisinopriL (PRINIVIL, ZESTRIL) 20 mg tablet Take 1 Tab by mouth daily.  tacrolimus (PROGRAF) 1 mg capsule Take 1 mg by mouth every twelve (12) hours.  cholecalciferol (VITAMIN D3) (5000 Units/125 mcg) tab tablet Take  by mouth daily.  glimepiride (AMARYL) 1 mg tablet Take 3 mg by mouth two (2) times a day.  metFORMIN (GLUCOPHAGE) 1,000 mg tablet Take 1,000 mg by mouth two (2) times a day.  aspirin delayed-release 81 mg tablet Take 81 mg by mouth daily.  levothyroxine (SYNTHROID) 75 mcg tablet Take 75 mcg by mouth Daily (before breakfast). No current facility-administered medications for this visit. SYSTEM REVIEW NOT RELATED TO LIVER DISEASE OR REVIEWED ABOVE:  Constitution systems: Negative for fever, chills, weight gain, weight loss. Eyes: Negative for visual changes. ENT: Negative for sore throat, painful swallowing. Respiratory: Negative for cough, hemoptysis, SOB. Cardiology: Negative for chest pain, palpitations. GI:  Negative for constipation or diarrhea. : Negative for urinary frequency, dysuria, hematuria, nocturia. Skin: Negative for rash. Hematology: Negative for easy bruising, blood clots. Musculo-skeletal: Negative for back pain, muscle pain, weakness. Neurologic: Negative for headaches, dizziness, vertigo, memory problems not related to HE. Psychology: Negative for anxiety, depression. FAMILY HISTORY:  The father  of prostate cancer. The mother  of multiple myeloma. There is no family history of liver disease.       SOCIAL HISTORY:  The patient is . The patient has 3 children. The patient occasional cigar. The patient has been abstinent from alcohol since 2016. The patient does not work, he is on social security. PHYSICAL EXAMINATION PERFORMED BY VIRTUAL TELEHEALTH:  VS: Not performed   General: No acute distress. Eyes: Sclera anicteric. ENT: No oral lesions. Skin: No rashes. spider angiomata. No jaundice. Abdomen: No obvious distention suggesting ascites. Extremities: No edema. No muscle wasting. Neurologic: Alert and oriented. Cranial nerves grossly intact. LABORATORY STUDIES:  [facility] [id number]  [address] Units 12/10/2020 10/2/2020   WBC 4.1 - 11.1 K/uL 5.8 5.4   ANC 1.8 - 8.0 K/UL 1.9 3.8   HGB 12.1 - 17.0 g/dL 12.2 11.2 (L)    - 400 K/uL 117 (L) 113 (L)   INR 0.9 - 1.1    0.9   AST 15 - 37 U/L 32 22   ALT 12 - 78 U/L 62 32   Alk Phos 45 - 117 U/L 92 79   Bili, Total 0.2 - 1.0 MG/DL 0.8 0.9   Bili, Direct 0.0 - 0.2 MG/DL 0.2 0.2   Albumin 3.5 - 5.0 g/dL 3.8 3.6   BUN 6 - 20 MG/DL 29 (H) 30 (H)   Creat 0.70 - 1.30 MG/DL 1.63 (H) 1.57 (H)   Creat (iSTAT) 0.6 - 1.3 mg/dL     Na 136 - 145 mmol/L 143 144   K 3.5 - 5.1 mmol/L 4.8 4.5   Cl 97 - 108 mmol/L 109 (H) 109 (H)   CO2 21 - 32 mmol/L 30 30   Glucose 65 - 100 mg/dL 153 (H) 150 (H)   Magnesium 1.6 - 2.4 mg/dL 1.7 1.8     [facility] [address] [place] [address] Ref Rng & Units 6/15/2020   WBC 4.1 - 11.1 K/uL 5.9   ANC 1.8 - 8.0 K/UL 3.9   HGB 12.1 - 17.0 g/dL 12.3 (L)    - 400 K/uL 119 (L)   INR 0.9 - 1.1   0.9   AST 15 - 37 U/L 17   ALT 12 - 78 U/L 22   Alk Phos 45 - 117 U/L 67   Bili, Total 0.2 - 1.0 MG/DL 0.8   Bili, Direct 0.0 - 0.2 MG/DL 0.22   Albumin 3.5 - 5.0 g/dL 3.8   BUN 6 - 20 MG/DL 26   Creat 0.70 - 1.30 MG/DL 1.46 (H)   Creat (iSTAT) 0.6 - 1.3 mg/dL    Na 136 - 145 mmol/L 143   K 3.5 - 5.1 mmol/L 4.5   Cl 97 - 108 mmol/L 107 (H)   CO2 21 - 32 mmol/L 24   Glucose 65 - 100 mg/dL 105 (H)   Magnesium 1.6 - 2.4 mg/dL 1.6     From: 9/04/2020  AST/ALT/ALP/T Bili/ALB:18/20/67/0.6/3.9  WBC/HB/PLT/INR:6.8/11.0/122  NA/BUN/CREAT:143/27/1.54    9/04/2020 TAC level 11.5  12/2020.  TAC level 4.0    Liver Virology and Transplant Immune Suppression Tacrolimus Level   Latest Ref Rng & Units 2.0 - 20.0 ng/mL   6/15/2020 3.8   3/20/2020 6.4   2/28/2020 3.0   1/31/2020 4.6 SEROLOGIES:  7/2018. HAV total positive, HBsurface antigne negative, anti-HBcore negative, anti-HBsurface negative, anti-HCV negative, Anti-HIV negative, CNV IgG negative, HSV IgG positive    Serologies Latest Ref Rng & Units 8/26/2019   Ferritin 30 - 400 ng/mL 153   Iron % Saturation 15 - 55 % 32     LIVER HISTOLOGY:  7/2014: Cirhosis with mild mixed macro- and microvesicular steatosis. 10/2018. Liver explant from Health system. Multifocal moderately differentiated HCC.  2.3 cm HCC right lobe. 1.3 cm right lobe. No microscopic invasion. ENDOSCOPIC PROCEDURES:  Not available or performed    RADIOLOGY:  1/2019. CT scan abdomen with and without IV contrast.  Normal appearing liver. No liver mass lesions. Normal spleen. No ascites. 1/2019. CT scan chest.  No evidence of metastatic disease. 4/2019. CT scan of abdomen. Normal appearing liver. No liver mass or lesion. Concerning 8 mm lung mass. 4/2019. CT scan of chest. New 8 mm nodule in the right and middle lobe adjacent to the diaphragm concerning for metastatic disease. 7/2019. CT scan of abdomen. No liver mass lesions. Normal appearing liver.   7/2019. CT scan of chest.  8 mm nodule is no longer visualized. There is a 2 mm nodule in the right middle lobe too small to characterize. 10/2019. CT scan of chest. Stable 2 mm right lung nodule. 10/2019. CT scan of abdomen. Stable post liver transplant with no concerning mass or lesion. 5/2020. MRI of liver. No MRI evidence of hepatocellular carcinoma status post liver transplantation. Splenomegaly with large splenorenal shunt varices. Bilateral gynecomastia. Unremarkable exam otherwise. 5/2020. Chest CT. No pulmonary metastases. Massive splenorenal shunt. Splenomegaly. Post liver transplant. 6/2020. Ultrasound of liver with Duplex. Increased echogenicity of the liver. Patent hepatic veins and portal veins. Portal vein velocity within normal limits. Splenic varices. 12/2020. MRI of liver.  No MRI evidence of hepatocellular carcinoma recurrence status post liver transplant with persistent splenomegaly and large splenorenal shunt varices. 12/2020. CT of Chest. No acute process on CT. 2 mm anterior right lower lobe nodule is unchanged since 2019. Recommend CT chest in mid to late April, 2021 to document two-year stability of this likely benign nodule. No other nodule. Right middle lobe nodule from 2019 is no longer present. OTHER TESTING:  Not available or performed    FOLLOW-UP AFTER VIRTUAL VISIT:  Pursuant to the emergency declaration under the [facility] [place] [address] waiver authority and the [employer] Act, this Virtual  Visit was conducted, with the patient's (and/or their legal guardian's) consent, to reduce the patient's risk of exposure to COVID-19 and provide necessary medical care. Services were provided through a video synchronous discussion virtually to substitute for an in-person clinic visit. The patient was located in their home. The provider was located in the [place] [address] office. All of the issues listed above in the Assessment and Plan were discussed with the patient. All questions were answered. The patient expressed a clear understanding of the above. Because of the COVID-19 epidemic a follow-up appointment will be performed via TeleHealth in 3 months. Orders to obtain laboratory testing will be mailed to the patient and obtained 1 week prior to the next       April S.  Agnieszka, AGPCNP-BC  [address] [address] [place], [address] [facility] 22.  [address] [facility]

## 2021-03-25 RX ORDER — LISINOPRIL 20 MG/1
20 TABLET ORAL DAILY
Qty: 90 TAB | Refills: 3 | OUTPATIENT
Start: 2021-03-25

## 2021-04-19 RX ORDER — TACROLIMUS 1 MG/1
CAPSULE ORAL
Qty: 120 CAP | Refills: 0 | Status: SHIPPED | OUTPATIENT
Start: 2021-04-19 | End: 2021-06-18

## 2021-04-19 NOTE — TELEPHONE ENCOUNTER
Patient called this morning and left a message needing to speak to [facility] regarding an issue with taking his tacrolimus.

## 2021-04-20 NOTE — TELEPHONE ENCOUNTER
Patient reports he'll be out of tacrolimus on 4/21/21. Told him I'd see if another provider would ok a refill since April, NP is out of the office this week. Reminded him to get labs drawn prior to upcoming appointment. Received VO from [facility] and approved a one month supply of medication.

## 2021-04-22 DIAGNOSIS — Z94.4 H/O LIVER TRANSPLANT (HCC): Primary | ICD-10-CM

## 2021-05-05 LAB
ALBUMIN SERPL-MCNC: 4 G/DL (ref 3.5–5)
ALBUMIN/GLOB SERPL: 1.5 {RATIO} (ref 1.1–2.2)
ALP SERPL-CCNC: 82 U/L (ref 45–117)
ALT SERPL-CCNC: 34 U/L (ref 12–78)
ANION GAP SERPL CALC-SCNC: 4 MMOL/L (ref 5–15)
APPEARANCE UR: CLEAR
AST SERPL-CCNC: 16 U/L (ref 15–37)
BACTERIA URNS QL MICRO: NEGATIVE /HPF
BASOPHILS # BLD: 0 K/UL (ref 0–0.1)
BASOPHILS NFR BLD: 0 % (ref 0–1)
BILIRUB DIRECT SERPL-MCNC: 0.2 MG/DL (ref 0–0.2)
BILIRUB SERPL-MCNC: 0.7 MG/DL (ref 0.2–1)
BILIRUB UR QL: NEGATIVE
BUN SERPL-MCNC: 28 MG/DL (ref 6–20)
BUN/CREAT SERPL: 19 (ref 12–20)
CALCIUM SERPL-MCNC: 8.9 MG/DL (ref 8.5–10.1)
CHLORIDE SERPL-SCNC: 111 MMOL/L (ref 97–108)
CO2 SERPL-SCNC: 27 MMOL/L (ref 21–32)
COLOR UR: ABNORMAL
CREAT SERPL-MCNC: 1.47 MG/DL (ref 0.7–1.3)
DIFFERENTIAL METHOD BLD: ABNORMAL
EOSINOPHIL # BLD: 0.1 K/UL (ref 0–0.4)
EOSINOPHIL NFR BLD: 2 % (ref 0–7)
EPITH CASTS URNS QL MICRO: ABNORMAL /LPF
ERYTHROCYTE [DISTWIDTH] IN BLOOD BY AUTOMATED COUNT: 13.1 % (ref 11.5–14.5)
GLOBULIN SER CALC-MCNC: 2.7 G/DL (ref 2–4)
GLUCOSE SERPL-MCNC: 100 MG/DL (ref 65–100)
GLUCOSE UR STRIP.AUTO-MCNC: NEGATIVE MG/DL
HCT VFR BLD AUTO: 34.3 % (ref 36.6–50.3)
HGB BLD-MCNC: 11.4 G/DL (ref 12.1–17)
HGB UR QL STRIP: NEGATIVE
HYALINE CASTS URNS QL MICRO: ABNORMAL /LPF (ref 0–5)
IMM GRANULOCYTES # BLD AUTO: 0 K/UL
IMM GRANULOCYTES NFR BLD AUTO: 0 %
INR PPP: 1 (ref 0.9–1.1)
KETONES UR QL STRIP.AUTO: NEGATIVE MG/DL
LEUKOCYTE ESTERASE UR QL STRIP.AUTO: NEGATIVE
LYMPHOCYTES # BLD: 1.4 K/UL (ref 0.8–3.5)
LYMPHOCYTES NFR BLD: 23 % (ref 12–49)
MAGNESIUM SERPL-MCNC: 1.8 MG/DL (ref 1.6–2.4)
MCH RBC QN AUTO: 31.1 PG (ref 26–34)
MCHC RBC AUTO-ENTMCNC: 33.2 G/DL (ref 30–36.5)
MCV RBC AUTO: 93.5 FL (ref 80–99)
MONOCYTES # BLD: 0.4 K/UL (ref 0–1)
MONOCYTES NFR BLD: 6 % (ref 5–13)
NEUTS SEG # BLD: 4.4 K/UL (ref 1.8–8)
NEUTS SEG NFR BLD: 69 % (ref 32–75)
NITRITE UR QL STRIP.AUTO: NEGATIVE
NRBC # BLD: 0 K/UL (ref 0–0.01)
NRBC BLD-RTO: 0 PER 100 WBC
PH UR STRIP: 5 [PH] (ref 5–8)
PLATELET # BLD AUTO: 113 K/UL (ref 150–400)
PMV BLD AUTO: 10.1 FL (ref 8.9–12.9)
POTASSIUM SERPL-SCNC: 4.8 MMOL/L (ref 3.5–5.1)
PROT SERPL-MCNC: 6.7 G/DL (ref 6.4–8.2)
PROT UR STRIP-MCNC: 100 MG/DL
PROTHROMBIN TIME: 10.2 SEC (ref 9–11.1)
RBC # BLD AUTO: 3.67 M/UL (ref 4.1–5.7)
RBC #/AREA URNS HPF: ABNORMAL /HPF (ref 0–5)
RBC MORPH BLD: ABNORMAL
SODIUM SERPL-SCNC: 142 MMOL/L (ref 136–145)
SP GR UR REFRACTOMETRY: 1.02 (ref 1–1.03)
UROBILINOGEN UR QL STRIP.AUTO: 0.2 EU/DL (ref 0.2–1)
WBC # BLD AUTO: 6.3 K/UL (ref 4.1–11.1)
WBC URNS QL MICRO: ABNORMAL /HPF (ref 0–4)

## 2021-05-06 LAB
AFP L3 MFR SERPL: NORMAL % (ref 0–9.9)
AFP SERPL-MCNC: 1.4 NG/ML (ref 0–8)
TACROLIMUS, UTACRT: 7.3 NG/ML

## 2021-05-06 NOTE — PROGRESS NOTES
[address] Maryanne Rivera MD, Walt Phelps, [facility] Shankar Miramontes, [place]       Uriel Ramos, KENNETH Nicholas, MICHELE-BC   MELVA Peterson NP        at [facility]     [address] [facility], [address] [address]     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 9) with comorbidity ([address] E66.01       Excell Shearing returns to the [place] [address] regarding chronic HCV in the setting of cirrhosis. The active problem list, all pertinent past medical history, medications, radiologic findings and laboratory findings related to the liver disorder were reviewed with the patient. The patient is a 60 y.o.  male who was found to have fatty liver disease on liver biopsy in 2014. Serologic evaluation was either all negative or within the limits of normal.      Recent abdominal CT in December 2017 demonstrated changes consistent with cirrhosis with no liver masses suggestive of HCC.     An assessment of liver fibrosis with biopsy done 7/2014 showed cirrhosis and mild mixed macro- and microvesicular steatosis. LE edema. Resolved with step 1 diuretics. Patient continues on Xifaxan and lactulose daily. He has at least 2-3 BMs daily without diarrhea. He still had bouts of confusion and unsteadiness. He went to the ED in early May 2018 to address these issues. He was found to have a significantly elevated ammonia level, 294, and dehydrated. Patient was hospitalized for 3 days. During this hospitalization, liver transplant evaluation was initiated due to increased MELD score. All testing required for liver transplant evaluation has been completed. The last outstanding thing that patient has to complete is a dental extraction. This was put on hold due to significant thrombocytopenia of 49. Patient was prescribed and started Doptelet daily to increase platelets before procedure. Extraction is scheduled for 7/5/2018. He has had no side effects related to Doptelet. Since last office visit, patient had one episode of sluggishness and constipation. He increased his lactulose intake and resolved his symptoms. The patient has limitations in functional activities secondary to these symptoms. The patient has not experienced yellowing of the eyes or skin, pruritus, melena or hematochezia. ALLERGIES  No Known Allergies    MEDICATIONS  Current Outpatient Prescriptions   Medication Sig    metFORMIN (GLUCOPHAGE) 500 mg tablet Take 500 mg by mouth two (2) times daily (with meals).  spironolactone (ALDACTONE) 100 mg tablet Take 50 mg by mouth daily.  lactulose (CHRONULAC) 20 gram/30 mL soln solution Take 30 mL by mouth three (3) times daily. Adjust  dose as needed such that you have 2 loose/soft bowel movements a day without diarrhea.  rifAXIMin (XIFAXAN) 550 mg tablet Take 1 Tab by mouth two (2) times a day.  gabapentin (NEURONTIN) 300 mg capsule Take 1 Cap by mouth three (3) times daily.  (Patient taking differently: Take 300 mg by mouth daily.)    triamcinolone acetonide (KENALOG) 0.1 % topical cream Apply  to affected area two (2) times a day. use thin layer    cholecalciferol, vitamin D3, (VITAMIN D3) 2,000 unit tab Take 1 Tab by mouth two (2) times a day.  pantoprazole (PROTONIX) 40 mg tablet Take 40 mg by mouth daily.  glimepiride (AMARYL) 4 mg tablet Take 2 mg by mouth daily.  simvastatin (ZOCOR) 20 mg tablet Take 20 mg by mouth nightly.  levothyroxine (SYNTHROID) 50 mcg tablet Take 75 mcg by mouth Daily (before breakfast). No current facility-administered medications for this visit. SYSTEM REVIEW NOT RELATED TO LIVER DISEASE OR REVIEWED ABOVE:  Constitution systems: Negative for fever, chills, weight gain, weight loss. Eyes: Negative for visual changes. ENT: Negative for sore throat, painful swallowing. Respiratory: Negative for cough, hemoptysis, SOB. Cardiology: Negative for chest pain, palpitations. GI:  Negative for constipation or diarrhea. : Negative for urinary frequency, dysuria, hematuria, nocturia. Skin: Positive for LE skin discoloration. Negative for rash. Hematology: Negative for easy bruising, blood clots. Musculo-skeletal: Legs are hot below the knees. Neurologic:  No confusion. Psychology: Negative for anxiety, depression. FAMILY HISTORY:  The father  of prostate cancer. The mother  of multiple myeloma. There is no family history of liver disease. SOCIAL HISTORY:  The patient is . The patient has 3 children. The patient occasional cigar. The patient has been abstinent from alcohol since 2016. The patient is on social security. PHYSICAL EXAMINATION:  Visit Vitals    /63 (BP 1 Location: Right arm, BP Patient Position: Sitting)    Pulse 76    Temp 97.5 °F (36.4 °C) (Tympanic)    Wt 281 lb (127.5 kg)    SpO2 99%    BMI 41.5 kg/m2     General: No acute distress. Eyes: Sclera anicteric. ENT: No oral lesions. Nodes: No adenopathy. Skin: No spider angiomata. No jaundice. No palmar erythema. Respiratory: Lungs clear to auscultation. Cardiovascular:  Regular heart rate. No murmurs. No JVD. Abdomen: Soft non-tender. Liver size enlarged 4 finger widths below rib cage. Spleen enlarged. No obvious ascites. Extremities: No LE edema bilaterally. No muscle wasting. No gross arthritic changes. Neurologic: Alert and oriented. Cranial nerves grossly intact. No asterixis. LABORATORY STUDIES:  Liver [place] of [id number] & Units 5/24/2018 5/9/2018 5/8/2018   WBC 3.4 - 10.8 x10E3/uL 3.2 (L)     ANC 1.4 - 7.0 x10E3/uL 1.7     HGB 13.0 - 17.7 g/dL 7.9 (L)      - 379 x10E3/uL 58 (LL)     INR 0.8 - 1.2 1.5 (H)  1.5 (H)   AST 0 - 40 IU/L 30     ALT 0 - 44 IU/L 16     Alk Phos 39 - 117 IU/L 140 (H)     Bili, Total 0.0 - 1.2 mg/dL 3.4 (H)     Bili, Direct 0.00 - 0.40 mg/dL 0.83 (H)     Albumin 3.6 - 4.8 g/dL 3.6     BUN 8 - 27 mg/dL 22 22 (H) 24 (H)   BUN (iSTAT) 9 - 20 mg/dL      Creat 0.76 - 1.27 mg/dL 1.27 1.38 (H) 1.65 (H)   Creat (iSTAT) 0.6 - 1.3 mg/dL      Na 134 - 144 mmol/L 138 144 144   K 3.5 - 5.2 mmol/L 4.6 4.5 4.7   Cl 96 - 106 mmol/L 108 (H) 113 (H) 114 (H)   CO2 18 - 29 mmol/L 21 21 22   Glucose 65 - 99 mg/dL 301 (H) 166 (H) 93   Magnesium 1.6 - 2.4 mg/dL      Ammonia <32 UMOL/L   133 (H)     11/2017. MELD 15  12/2017. MELD 14  5/2018. MELD 18    Labs will be repeated on 7/3/2018    SEROLOGIES:  Serologies Latest Ref Rng & Units 5/24/2018   Ferritin 30 - 400 ng/mL 161   Iron % Saturation 15 - 55 % 61 (H)     LIVER HISTOLOGY:  7/2014: Cirhosis with mild mixed macro- and microvesicular steatosis. ENDOSCOPIC PROCEDURES:  2/2017. EGD by Dr Kapil Hill. No esophageal varices. 5/2018. EGD by Dr. Joanna Samuel. Normal esophagus. RADIOLOGY:  2/2017. Ultrasound of liver. Echogenic consistent with chronic liver disease. No liver mass lesions. No dilated bile ducts.  No ascites. 3/2017 CT scan of abdomen. Changes consistent with cirrhosis. No liver mass lesions. Splenomegaly. Splenorenal shunt. No ascites. 12/2017. CT scan of abdomen. Cirrhotic liver and splenomegaly, with evidence of portal hypertension. No acute findings. No suspicious liver masses. 6/2018. CT scan of abdomen. No enhancing masses are seen in the liver. OTHER TESTING:  Not available or performed    ASSESSMENT AND PLAN:  CAMEJO with cirrhosis. Liver transaminases are normal. Alkaline phosphate is elevated. Liver function is depressed. The platelet count is depressed. Current MELD is 18. Dental extraction. This has been scheduled for 7/5/2018 after the required time on Doptelet is complete. He is tolerating this medication well. This is the last outstanding requirement for liver transplant evaluation. Hepatic encephalopathy is now much better controlled on current doses of lactulose and Xifaxan. He achieves 2-3 BMs without diarrhea. Protein restriction was discussed. Encouraged patient to continue this. The patient has not developed ascites. Lower extremity edema and anasarca. Step 1 diuretics has been effective. Continue. LE discomfort/neuropathy. Neurontin improves his symptoms. Continue. The patient does not have esophageal varices by EGD in 5/2018. Next EGD will be in 5/2020. [address] screening. CT ruled out [address] in June 2018. He is up to date. Next imaging will be in December 2018. Liver transplant evaluation. He has completed the required testing. He will finish dental work (tooth extraction) next week with the use of Doptelet. His packet will be sent to [facility]'s transplant team and he will be seen by the team in the near future. Hypertension. Patient's BP is significantly elevated in the clinic today. Discussed the importance of regular follow up with their PCP to monitor/manage this. Patient verbalized understanding.      The patient was counseled regarding diet and exercise to achieve weight loss. Encouraged patient to follow a healthy diet and make sure his other medical conditions are well-controlled. The patient was told to to consume any food products and drinks containing fructose as this enhances hepatic fat synthesis. The patient was directed to continue all current medications at the current dosages. There are no contraindications for the patient to take any medications that are necessary for treatment of other medical issues including medications for diabetes mellitus and hypercholesterolemia. The patient was counseled regarding alcohol consumption and that this could contribute to fatty liver disease. The need for vaccination against viral hepatitis A and B will be assessed with serologic and instituted as appropriate. All of the above issues were discussed with the patient. All questions were answered. The patient expressed a clear understanding of the above. [address] [place] [address] in 6 weeks.     Napoleon Ge NP  [address] [address]  Ph: [phone]  Fax: [phone] [Annual] : an annual visit.

## 2021-05-11 ENCOUNTER — OFFICE VISIT (OUTPATIENT)
Dept: HEMATOLOGY | Age: 66
End: 2021-05-11
Payer: MEDICARE

## 2021-05-11 VITALS
HEART RATE: 74 BPM | DIASTOLIC BLOOD PRESSURE: 63 MMHG | SYSTOLIC BLOOD PRESSURE: 145 MMHG | OXYGEN SATURATION: 98 % | TEMPERATURE: 97.7 F | WEIGHT: 245 LBS | RESPIRATION RATE: 18 BRPM | BODY MASS INDEX: 34.3 KG/M2 | HEIGHT: 71 IN

## 2021-05-11 DIAGNOSIS — Z79.60 LONG-TERM USE OF IMMUNOSUPPRESSANT MEDICATION: ICD-10-CM

## 2021-05-11 DIAGNOSIS — Z94.4 H/O LIVER TRANSPLANT (HCC): Primary | ICD-10-CM

## 2021-05-11 DIAGNOSIS — Z85.05 HISTORY OF HEPATOCELLULAR CARCINOMA: ICD-10-CM

## 2021-05-11 DIAGNOSIS — R91.1 PULMONARY NODULE: ICD-10-CM

## 2021-05-11 PROCEDURE — G8417 CALC BMI ABV UP PARAM F/U: HCPCS | Performed by: NURSE PRACTITIONER

## 2021-05-11 PROCEDURE — 99214 OFFICE O/P EST MOD 30 MIN: CPT | Performed by: NURSE PRACTITIONER

## 2021-05-11 PROCEDURE — G0463 HOSPITAL OUTPT CLINIC VISIT: HCPCS | Performed by: NURSE PRACTITIONER

## 2021-05-11 PROCEDURE — G8427 DOCREV CUR MEDS BY ELIG CLIN: HCPCS | Performed by: NURSE PRACTITIONER

## 2021-05-11 PROCEDURE — 1101F PT FALLS ASSESS-DOCD LE1/YR: CPT | Performed by: NURSE PRACTITIONER

## 2021-05-11 PROCEDURE — G8536 NO DOC ELDER MAL SCRN: HCPCS | Performed by: NURSE PRACTITIONER

## 2021-05-11 PROCEDURE — 3017F COLORECTAL CA SCREEN DOC REV: CPT | Performed by: NURSE PRACTITIONER

## 2021-05-11 PROCEDURE — G8432 DEP SCR NOT DOC, RNG: HCPCS | Performed by: NURSE PRACTITIONER

## 2021-05-11 RX ORDER — AMLODIPINE BESYLATE 10 MG/1
TABLET ORAL DAILY
COMMUNITY

## 2021-05-11 NOTE — PROGRESS NOTES
Victorino Neri MD, MD Leonor Palafox PA-C [place], ACNP-BC     April S Agnieszka, AGPCNP-BC   Kathrine Enriquez, FNP-C    Moy Gonzales, [facility]-BC       [address]    at [address], [address] [place] [address], [facility] 22.    [phone]    FAX: [address] [place] [address], [address]    [phone]    FAX: [phone]       Patient Care Team:  Eloina Moya MD as PCP - General (Family Medicine)  Eloina Moya MD as PCP - [facility] Provider  Bora Crespo MD (Nephrology)  Leonel Engel MD (Gastroenterology)  Natasha Hugo MD as Physician (Internal Medicine)  Suzette Jasso MD as Physician (Neurology)      Problem List  Date Reviewed: 5/12/2021          Codes Class Noted    Generalized anxiety disorder ICD-10-CM: F41.1  ICD-9-CM: 300.02  6/22/2020        Stage 2 chronic kidney disease ICD-10-CM: N18.2  ICD-9-CM: 585.2  2/5/2020        Severe obesity ([facility] ICD-10-CM: E66.01  ICD-9-CM: 278.01  2/4/2020        H/O liver transplant ([facility] ICD-10-CM: Z94.4  ICD-9-CM: V42.7  12/1/2018        Long-term use of immunosuppressant medication ICD-10-CM: Z79.899  ICD-9-CM: V58.69  12/1/2018        Liver transplant complication [facility]) [id number]: T86.40  ICD-9-CM: 996.82  12/1/2018        Neuropathy involving both lower extremities ICD-10-CM: G57.93  ICD-9-CM: 356.9  1/2/2018        CAMEJO (nonalcoholic steatohepatitis) ICD-10-CM: K75.81  ICD-9-CM: 571.8  11/2/2017        GERD (gastroesophageal reflux disease) (Chronic) ICD-10-CM: K21.9  ICD-9-CM: 530.81  2/22/2016        CAD (coronary artery disease) (Chronic) ICD-10-CM: I25.10  ICD-9-CM: 414.00  2/22/2016        DM type 2 (diabetes mellitus, type 2) ([facility] (Chronic) ICD-10-CM: E11.9  ICD-9-CM: 250.00  2/22/2016            Faby Asher returns to the [address] for management of liver transplant graft function, to monitor and adjust immune suppression and to assess for recurrence of the primary liver disease. The active problem list, all pertinent past medical history, medications, liver histology, endoscopic studies, radiologic findings and laboratory findings related to the liver disorder were reviewed with the patient. The patient underwent a liver transplant at [facility] in 10/2018. He was found to have a small [address] in his liver explant. Post-transplant imaging are now being performed every 6 months with no recurrence. The patient is taking the following for immune suppression: Tacrolimus 1 mg BID. The patient discontinued sirolimus 1/2020 due to proteinuria, mouth ulcers and lower extremity edema. He continues to follow with Nephrology. Since the last office visit the patient has had a 14 lb weight loss by decreasing sweets at night. He feels well overall with improvement in blood pressure and glucose levels. Of note, the patient was vaccinated against Covid with the Pfizer vaccine. The last injection was 3/27/2021. A duplex ultrasound was performed 6/2020. The results demonstrate patent hepatic veins, portal velocity was normal. This was reviewed by [facility] who felt no intervention was needed. The patient has no symptoms which can be attributed to the liver disorder. The patient has not experienced fatigue, fevers, chills, or pain in the right side over the liver. The patient completes all daily activities without any functional limitations. 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 The date of the LT was 10/2018. Have performed laboratory testing to monitor liver function and degree of liver injury.  This included BMP, hepatic panel, CBC with platelet count and INR. Laboratory testing from 5/05/2021 reviewed in detail. The liver transaminases, ALP, and liver function are all normal. The platelet count is depressed. Hepatocellular carcinoma   This was present in native liver prior to undergoing liver transplant. There was viable HCC in the liver explant,  2 lesions in the right lobe. The most recent MRI 12/2020 demonstrated no liver mass lesions. The most recent chest CT 12/2020 demonstrated no metastases. Stable pulmonary nodule. Chest CT and MRI ordered to be completed 6/2021    Immune Suppression  The patient was switched from Tacrolimus to Sirolimus because of an elevation in Scr. Sirolimus was subsequently discontinued 1/2020 for proteinuria, mouth ulcers and worsening lower extremity edema. He is currently on tacrolimus 1 mg BID. The Sr. Creatinine is stable ranging 1.47-1. 63. The TAC level was slightly elevated at 7.4, patient admits to taking the medication prior to the blood work. Will change dose of TAC to 1 mg BID alternating with 1 mg daily every other day to preserve renal function. Anemia   This is of unclear etiology. The HB is stable in the 11-12.0 range. The Ferrtin is normal  The FE saturation is normal.    Hypertension  Discussed importance of having a well controlled BP. Patient will continue to monitor and follow up with PCP. Neutropenia   This has resolved when cellcept was discontinued. Thrombocytopenia   This is secondary to cirrhosis. Some patients do not recover the PLT back to normal after LT. The platelet count is adequate for the patient to undergo procedures without the need for platelet transfusion or platelet growth factors. Prevention of infections  The patient is more than 1 year out from transplant and is off PCP prophylaxis. Chronic kidney injury   This is a common adverse event of immune suppression.   The Sr. creat has remained elevated but stable ranging 1.39-1. 63. Furosemide has been discontinued. The patient is being followed by nephrology. NSAIDs should be avoided since these agents can worsen renal insufficiency. Lower Extremity Edema  This is now resolved. Hypercholesterolemia   This can be caused by immune suppression. Serum cholesterol will be monitored at periodic intervals. Hypertension   This is a common side effect of immune suppression. The patient reports improvement in the blood pressure on Lisinopril. Low serum magnesium   This is a common side effect of immune suppression. The serum Mag is in the normal range. No treatment is needed. Generalized Anxiety Disorder   Patient is currently on Prozac 10 mg, symptoms are well controlled. Osteoporosis   Osteoporosis is commin in patients with cirhrosis prior to liver transplant. The patient had a normal bone density prior to undergoing liver transplant. Monitoring for skin Cancer  The patient was counseled regarding increased risk of skin cancer in transplant recipients and need to have any new skin lesions evaluated by dermatology and removed if suspicious. The patient was instructed to see Dermatology annually examination. Vaccinations  Routine vaccinations against other bacterial and viral agents can be performed as long as this is with attentuatted virus. Live virus vaccines should not be administered. Annual flu vaccination should be administered. ALLERGIES  No Known Allergies    MEDICATIONS  Current Outpatient Medications   Medication Sig    amLODIPine (NORVASC) 10 mg tablet Take  by mouth daily.  tacrolimus (PROGRAF) 1 mg capsule TAKE 2 CAPSULES BY MOUTH TWICE DAILY    FLUoxetine (PROzac) 10 mg capsule TAKE 1 CAPSULE BY MOUTH DAILY    glucose blood VI test strips (OneTouch Ultra Blue Test Strip) strip 1 Strip by Injection route daily.  lisinopriL (PRINIVIL, ZESTRIL) 20 mg tablet Take 1 Tab by mouth daily.     cholecalciferol (VITAMIN D3) (5000 Units/125 mcg) tab tablet Take  by mouth daily.  glimepiride (AMARYL) 1 mg tablet Take 3 mg by mouth two (2) times a day.  metFORMIN (GLUCOPHAGE) 1,000 mg tablet Take 1,000 mg by mouth two (2) times a day.  aspirin delayed-release 81 mg tablet Take 81 mg by mouth daily.  levothyroxine (SYNTHROID) 75 mcg tablet Take 75 mcg by mouth Daily (before breakfast). No current facility-administered medications for this visit. SYSTEM REVIEW NOT RELATED TO LIVER DISEASE OR REVIEWED ABOVE:  Constitution systems: Negative for fever, chills, weight gain, weight loss. Eyes: Negative for visual changes. ENT: Negative for sore throat, painful swallowing. Respiratory: Negative for cough, hemoptysis, SOB. Cardiology: Negative for chest pain, palpitations. GI:  Negative for constipation or diarrhea. : Negative for urinary frequency, dysuria, hematuria, nocturia. Skin: Negative for rash. Hematology: Negative for easy bruising, blood clots. Musculo-skelatal: Negative for back pain, muscle pain, weakness. Neurologic: Negative for headaches, dizziness, vertigo, memory problems not related to HE. Psychology: Negative for anxiety, depression. FAMILY HISTORY:  The father  of prostate cancer. The mother  of multiple myeloma. There is no family history of liver disease.       SOCIAL HISTORY:  The patient is . The patient has 3 children. The patient occasional cigar. The patient has been abstinent from alcohol since 2016. The patient does not work, he is on social security. PHYSICAL EXAMINATION:  Visit Vitals  BP (!) 145/63 (BP 1 Location: Right upper arm, BP Patient Position: Sitting, BP Cuff Size: Adult)   Pulse 74   Temp 97.7 °F (36.5 °C) (Temporal)   Resp 18   Ht 5' 11\" (1.803 m)   Wt 245 lb (111.1 kg)   SpO2 98%   BMI 34.17 kg/m²       General: No acute distress. Eyes: Sclera anicteric. ENT: No oral lesions.   Thyroid normal.  Nodes: No adenopathy. Skin: No spider angiomata. No jaundice. No palmar erythema. Respiratory: Lungs clear to auscultation. Cardiovascular: Regular heart rate. No murmurs. No JVD. Abdomen: Soft non-tender, liver size normal to percussion/palpation. Spleen not palpable. No obvious ascites. Extremities: No edema. No muscle wasting. No gross arthritic changes. Neurologic: Alert and oriented. Cranial nerves grossly intact. No asterixis. LABORATORY STUDIES:  Liver [place] of [address] Units 2021 12/10/2020   WBC 4.1 - 11.1 K/uL 6.3 5.8   ANC 1.8 - 8.0 K/UL 4.4 1.9   HGB 12.1 - 17.0 g/dL 11.4 (L) 12.2    - 400 K/uL 113 (L) 117 (L)   INR 0.9 - 1.1   1.0    AST 15 - 37 U/L 16 32   ALT 12 - 78 U/L 34 62   Alk Phos 45 - 117 U/L 82 92   Bili, Total 0.2 - 1.0 MG/DL 0.7 0.8   Bili, Direct 0.0 - 0.2 MG/DL 0.2 0.2   Albumin 3.5 - 5.0 g/dL 4.0 3.8   BUN 6 - 20 MG/DL 28 (H) 29 (H)   Creat 0.70 - 1.30 MG/DL 1.47 (H) 1.63 (H)   Creat (iSTAT) 0.6 - 1.3 mg/dL     Na 136 - 145 mmol/L 142 143   K 3.5 - 5.1 mmol/L 4.8 4.8   Cl 97 - 108 mmol/L 111 (H) 109 (H)   CO2 21 - 32 mmol/L 27 30   Glucose 65 - 100 mg/dL 100 153 (H)   Magnesium 1.6 - 2.4 mg/dL 1.8 1.7       Liver Virology and Transplant Immune Suppression Tacrolimus   Latest Ref Rng & Units Ther Rn.0-15.0 ng/mL   2021 7.3   12/10/2020 4.0 (L)   10/2/2020 4.8 (L)     Laboratory testing from 2021 reviewed in detail. Additional testing included to evaluate progression or regression of disease. SEROLOGIES:  2018. HAV total positive, HBsurface antigne negative, anti-HBcore negative, anti-HBsurface negative, anti-HCV negative, Anti-HIV negative, CMV IgG negative, HSV IgG positive    Serologies Latest Ref Rng & Units 2019   Ferritin 30 - 400 ng/mL 153   Iron % Saturation 15 - 55 % 32     LIVER HISTOLOGY:  2014: Cirhosis with mild mixed macro- and microvesicular steatosis. 10/2018. Liver explant from [facility]. Multifocal moderately differentiated HCC.  2.3 cm HCC right lobe. 1.3 cm right lobe. No microscopic invasion. ENDOSCOPIC PROCEDURES:  Not available or performed    RADIOLOGY:  5/2020. MRI of liver. No MRI evidence of hepatocellular carcinoma status post liver transplantation. Splenomegaly with large splenorenal shunt varices. Bilateral gynecomastia. Unremarkable exam otherwise. 5/2020. Chest CT. No pulmonary metastases. Massive splenorenal shunt. Splenomegaly. Post liver transplant. 6/2020. Ultrasound of liver with Duplex. Increased echogenicity of the liver. Patent hepatic veins and portal veins. Portal vein velocity within normal limits. Splenic varices. 12/2020. MRI of liver. No MRI evidence of hepatocellular carcinoma recurrence status post liver transplant with persistent splenomegaly and large splenorenal shunt varices. 12/2020. CT of Chest. No acute process on CT. 2 mm anterior right lower lobe nodule is unchanged since 2019. Recommend CT chest in mid to late April, 2021 to document two-year stability of this likely benign nodule. No other nodule. Right middle lobe nodule from 2019 is no longer present. OTHER TESTING:  Not available or performed    FOLLOW-UP:  All of the issues listed above in the Assessment and Plan were discussed with the patient. All questions were answered. The patient expressed a clear understanding of the above. [facility] 87 in 3 months. MARISOL [facility] of [address] [address] [address], [address] [place] [address] 22.  [address] [facility]

## 2021-05-11 NOTE — PROGRESS NOTES
Identified pt with two pt identifiers(name and ). Reviewed record in preparation for visit and have obtained necessary documentation. Chief Complaint   Patient presents with    Fatty Liver     f/u      Vitals:    21 1407   BP: (!) 145/63   Pulse: 74   Resp: 18   Temp: 97.7 °F (36.5 °C)   TempSrc: Temporal   SpO2: 98%   Weight: 245 lb (111.1 kg)   Height: 5' 11\" (1.803 m)   PainSc:   0 - No pain       Health Maintenance Review: Patient reminded of \"due or due soon\" health maintenance. I have asked the patient to contact his/her primary care provider (PCP) for follow-up on his/her health maintenance. Coordination of Care Questionnaire:  :   1) Have you been to an emergency room, urgent care, or hospitalized since your last visit? If yes, where when, and reason for visit? no       2. Have seen or consulted any other health care provider since your last visit? If yes, where when, and reason for visit? YES, regular f/u visits with health care team      Patient is accompanied by self I have received verbal consent from Faby Asher to discuss any/all medical information while they are present in the room.

## 2021-05-14 ENCOUNTER — HOSPITAL ENCOUNTER (OUTPATIENT)
Dept: MRI IMAGING | Age: 66
Discharge: HOME | End: 2021-05-14
Attending: NURSE PRACTITIONER
Payer: MEDICARE

## 2021-05-14 ENCOUNTER — HOSPITAL ENCOUNTER (OUTPATIENT)
Dept: CT IMAGING | Age: 66
Discharge: HOME | End: 2021-05-14
Attending: NURSE PRACTITIONER
Payer: MEDICARE

## 2021-05-14 DIAGNOSIS — Z94.4 H/O LIVER TRANSPLANT (HCC): ICD-10-CM

## 2021-05-14 DIAGNOSIS — R91.1 PULMONARY NODULE: ICD-10-CM

## 2021-05-14 DIAGNOSIS — Z85.05 HISTORY OF HEPATOCELLULAR CARCINOMA: ICD-10-CM

## 2021-05-14 PROCEDURE — A9585 GADOBUTROL INJECTION: HCPCS | Performed by: NURSE PRACTITIONER

## 2021-05-14 PROCEDURE — 74183 MRI ABD W/O CNTR FLWD CNTR: CPT

## 2021-05-14 PROCEDURE — 74011000636 HC RX REV CODE- 636: Performed by: NURSE PRACTITIONER

## 2021-05-14 PROCEDURE — 71260 CT THORAX DX C+: CPT

## 2021-05-14 PROCEDURE — 74011250636 HC RX REV CODE- 250/636: Performed by: NURSE PRACTITIONER

## 2021-05-14 RX ADMIN — IOPAMIDOL 100 ML: 612 INJECTION, SOLUTION INTRAVENOUS at 17:34

## 2021-05-14 RX ADMIN — GADOBUTROL 11 ML: 604.72 INJECTION INTRAVENOUS at 18:02

## 2021-05-17 NOTE — PROGRESS NOTES
My chart message sent to the patient regarding the chest CT which showed a stable pulmonary nodule and an otherwise normal exam.

## 2021-05-21 DIAGNOSIS — Z85.05 HISTORY OF HEPATOCELLULAR CARCINOMA: ICD-10-CM

## 2021-05-21 DIAGNOSIS — K75.81 NASH (NONALCOHOLIC STEATOHEPATITIS): Primary | ICD-10-CM

## 2021-05-21 NOTE — PROGRESS NOTES
My chart message sent to the patient regarding the MRI results. There is an area of concern, LRADs 3. Will repeat imaging in 3 months to further evaluate.

## 2021-06-18 DIAGNOSIS — F41.9 ANXIETY: ICD-10-CM

## 2021-06-18 RX ORDER — TACROLIMUS 1 MG/1
CAPSULE ORAL
Qty: 360 CAPSULE | Refills: 5 | Status: SHIPPED | OUTPATIENT
Start: 2021-06-18 | End: 2021-09-10 | Stop reason: DRUGHIGH

## 2021-06-18 RX ORDER — FLUOXETINE 10 MG/1
CAPSULE ORAL
Qty: 30 CAPSULE | Refills: 5 | Status: SHIPPED | OUTPATIENT
Start: 2021-06-18 | End: 2021-12-21 | Stop reason: SDUPTHER

## 2021-06-22 ENCOUNTER — APPOINTMENT (OUTPATIENT)
Dept: CT IMAGING | Age: 66
End: 2021-06-22
Attending: EMERGENCY MEDICINE
Payer: MEDICARE

## 2021-06-22 ENCOUNTER — PATIENT MESSAGE (OUTPATIENT)
Dept: HEMATOLOGY | Age: 66
End: 2021-06-22

## 2021-06-22 ENCOUNTER — HOSPITAL ENCOUNTER (EMERGENCY)
Age: 66
Discharge: HOME | End: 2021-06-22
Attending: EMERGENCY MEDICINE
Payer: MEDICARE

## 2021-06-22 VITALS
SYSTOLIC BLOOD PRESSURE: 152 MMHG | TEMPERATURE: 97.9 F | DIASTOLIC BLOOD PRESSURE: 79 MMHG | RESPIRATION RATE: 18 BRPM | OXYGEN SATURATION: 98 % | HEART RATE: 96 BPM

## 2021-06-22 DIAGNOSIS — S22.000A THORACIC COMPRESSION FRACTURE, CLOSED, INITIAL ENCOUNTER (HCC): Primary | ICD-10-CM

## 2021-06-22 PROCEDURE — 72131 CT LUMBAR SPINE W/O DYE: CPT

## 2021-06-22 PROCEDURE — 99282 EMERGENCY DEPT VISIT SF MDM: CPT

## 2021-06-22 PROCEDURE — 96372 THER/PROPH/DIAG INJ SC/IM: CPT

## 2021-06-22 PROCEDURE — 74011250636 HC RX REV CODE- 250/636: Performed by: EMERGENCY MEDICINE

## 2021-06-22 RX ORDER — NAPROXEN 500 MG/1
500 TABLET ORAL 2 TIMES DAILY WITH MEALS
Qty: 20 TABLET | Refills: 0 | Status: SHIPPED | OUTPATIENT
Start: 2021-06-22 | End: 2021-12-21

## 2021-06-22 RX ORDER — KETOROLAC TROMETHAMINE 30 MG/ML
60 INJECTION, SOLUTION INTRAMUSCULAR; INTRAVENOUS
Status: COMPLETED | OUTPATIENT
Start: 2021-06-22 | End: 2021-06-22

## 2021-06-22 RX ORDER — TRAMADOL HYDROCHLORIDE 50 MG/1
50 TABLET ORAL
Qty: 15 TABLET | Refills: 0 | Status: SHIPPED | OUTPATIENT
Start: 2021-06-22 | End: 2021-06-25

## 2021-06-22 RX ADMIN — KETOROLAC TROMETHAMINE 60 MG: 30 INJECTION, SOLUTION INTRAMUSCULAR; INTRAVENOUS at 04:29

## 2021-06-22 NOTE — ED NOTES
Patient received discharge instructions by MD and RN. Reviewed discharge instructions with patient. Patient verbalized understanding of discharge teaching. Patient left ED via ambulatory w/ back brace. Adjusted back brace to fit patient and education provided.

## 2021-06-22 NOTE — ED TRIAGE NOTES
PT arrives from home with CC of back pain. Pt states he fell last Tuesday but was already going to therapy before so didn't call his doctor. But now the pain in his lower back is unbearable.

## 2021-06-22 NOTE — ED PROVIDER NOTES
70-year-old male with a past medical history significant for arthritis, ITP, hepatic encephalopathy, GERD, liver transplant peptic ulcer disease, who presents to the ED after he fell a week ago landed on his back and has had physical therapy after his fall. Complaining of increasing low back pain described as dull and throbbing nature, severity 7 out of 10, worse with movement, relieved by resting still. The patient has been taking Flexeril over the last 24 hours without any significant relief or discomfort. He denies any headache, fever chills, sore throat, cough or congestion, neck pain or stiffness, chest pain, shortness of breath, vomiting, diarrhea, constipation, dysuria, dizziness, extremity weakness or numbness, loss of bladder or rectal tone. The patient ambulates with a cane. Past Medical History:   Diagnosis Date    Arthritis     Autoimmune disease ([address]     ITP    CAD (coronary artery disease)     enlarged heart ?  Cancer ([address]     skin ca removed from forehead    Chronic kidney disease     kidney stones    Coagulation disorder ([address]     low plts    Diabetes (HCC)     type 2    GERD (gastroesophageal reflux disease)     Hepatic encephalopathy ([address]     Hypertension     Ill-defined condition     obesity per pt    Ill-defined condition     anemia    Liver disease     elevated liver enzymes    Liver transplant candidate     Pt is on the list for a liver transplant as of 8/2018    PUD (peptic ulcer disease)     stomach ulcers       Past Surgical History:   Procedure Laterality Date    HX APPENDECTOMY      HX OTHER SURGICAL      mohs procedure for skin ca.     HX UROLOGICAL      vasectomy    DE ABDOMEN SURGERY PROC UNLISTED      ana         Family History:   Problem Relation Age of Onset   Carlyn Orourke Cancer Mother         multiple myeloma    Cancer Father         prostate    MS Sister     Cancer Maternal Grandmother         ?where    Cancer Maternal Grandfather         ?where   Carlyn Orourke Heart Failure Paternal Grandmother     Cancer Paternal Grandfather         ? where       Social History     Socioeconomic History    Marital status:      Spouse name: Not on file    Number of children: Not on file    Years of education: Not on file    Highest education level: Not on file   Occupational History    Not on file   Tobacco Use    Smoking status: Never Smoker    Smokeless tobacco: Never Used   Vaping Use    Vaping Use: Never used   Substance and Sexual Activity    Alcohol use: No    Drug use: No    Sexual activity: Not on file   Other Topics Concern    Not on file   Social History Narrative    Not on file     Social Determinants of Health     Financial Resource Strain:     Difficulty of Paying Living Expenses:    Food Insecurity:     Worried About Running Out of Food in the Last Year:     [address] in the Last Year:    Transportation Needs:     Lack of Transportation (Medical):  Lack of Transportation (Non-Medical):    Physical Activity:     Days of Exercise per Week:     Minutes of Exercise per Session:    Stress:     Feeling of Stress :    Social Connections:     Frequency of Communication with Friends and Family:     Frequency of Social Gatherings with Friends and Family:     Attends Baptism Services:     Active Member of Clubs or Organizations:     Attends Club or Organization Meetings:     Marital Status:    Intimate Partner Violence:     Fear of Current or Ex-Partner:     Emotionally Abused:     Physically Abused:     Sexually Abused: ALLERGIES: Patient has no known allergies. Review of Systems   All other systems reviewed and are negative. Vitals:    06/22/21 0358 06/22/21 0411   BP: (!) 152/79    Pulse: 96    Resp: 18    Temp: 97.9 °F (36.6 °C)    SpO2: 98% 98%            Physical Exam  Vitals and nursing note reviewed. Exam conducted with a chaperone present.         CONSTITUTIONAL: Well-appearing; well-nourished; in no apparent distress  HEAD: Normocephalic; atraumatic  EYES: PERRL; EOM intact; conjunctiva and sclera are clear bilaterally. ENT: No rhinorrhea; normal pharynx with no tonsillar hypertrophy; mucous membranes pink/moist, no erythema, no exudate. NECK: Supple; non-tender; no cervical lymphadenopathy  CARD: Normal S1, S2; no murmurs, rubs, or gallops. Regular rate and rhythm. RESP: Normal respiratory effort; breath sounds clear and equal bilaterally; no wheezes, rhonchi, or rales. ABD: Normal bowel sounds; non-distended; non-tender; no palpable organomegaly, no masses, no bruits. Back Exam: Normal inspection; lumbosacral vertebral point tenderness, no CVA tenderness. Decreased lateral motion secondary to pain. EXT: Normal ROM in all four extremities; non-tender to palpation; no swelling or deformity; distal pulses are normal, no edema. SKIN: Warm; dry; no rash. NEURO:Alert and oriented x 3, coherent, GEORGE-XII grossly intact, sensory and motor are non-focal.      MDM  Number of Diagnoses or Management Options  Diagnosis management comments: Assessment: 76-year-old male who presents to the ED status post fall for evaluation for low back pain suspect back strain/contusion rule out vertebral spine disease/fracture. The patient has no focal neurological deficit on exam.  My suspicion for cauda equina syndrome is extremely low at this time. Plan: Analgesia/CT scan of the lumbar spine/education, reassurance, symptomatic treatment/ Monitor and Reevaluate.          Amount and/or Complexity of Data Reviewed  Clinical lab tests: ordered and reviewed  Tests in the radiology section of CPT®: ordered and reviewed  Tests in the medicine section of CPT®: reviewed and ordered  Discussion of test results with the performing providers: yes  Decide to obtain previous medical records or to obtain history from someone other than the patient: yes  Obtain history from someone other than the patient: yes  Review and summarize past medical records: yes  Discuss the patient with other providers: yes  Independent visualization of images, tracings, or specimens: yes    Risk of Complications, Morbidity, and/or Mortality  Presenting problems: moderate  Diagnostic procedures: moderate  Management options: moderate    Patient Progress  Patient progress: stable         Procedures    Progress Note:   Pt has been reexamined by Padma Gordon MD. Pt is feeling much better. Symptoms have improved. All available results have been reviewed with pt and any available family. Pt understands sx, dx, and tx in ED. Care plan has been outlined and questions have been answered. Pt is ready to go home. Will send home on thoracic compression fracture instruction. Back brace instruction. Prescription of tramadol for pain as needed. . Outpatient referral with [address] [place] [address] for reevaluation and further treatment as needed. Written by Padma Gordon MD,5:29 AM    .   .

## 2021-06-23 ENCOUNTER — PATIENT MESSAGE (OUTPATIENT)
Dept: HEMATOLOGY | Age: 66
End: 2021-06-23

## 2021-06-23 NOTE — TELEPHONE ENCOUNTER
Patient went to the ED yesterday due to continued pain after a fall last week. He has a T12 compression fracture and saw ortho regarding treatment options. Patient said he does not want to have surgery because his wife is having knee surgery in 2 weeks. He opted to allow it to heal on its own, which may take a couple months. He said he was told he can take Tylenol 3,000 mg daily, but he wants to know if that's ok. Patient also said his tacrolimus level was drawn through Dr. Deysi Ellsworth office and he was told the level is low. He wants to know what he needs to do. Reviewed the above with April. [employer] message sent to patient with her recommendations.

## 2021-08-09 ENCOUNTER — TRANSCRIBE ORDER (OUTPATIENT)
Dept: SCHEDULING | Age: 66
End: 2021-08-09

## 2021-08-09 DIAGNOSIS — M54.50 ACUTE MIDLINE LOW BACK PAIN WITHOUT SCIATICA: ICD-10-CM

## 2021-08-09 DIAGNOSIS — S22.080D: Primary | ICD-10-CM

## 2021-08-19 ENCOUNTER — HOSPITAL ENCOUNTER (OUTPATIENT)
Dept: MRI IMAGING | Age: 66
Discharge: HOME | End: 2021-08-19
Attending: PHYSICAL MEDICINE & REHABILITATION
Payer: MEDICARE

## 2021-08-19 ENCOUNTER — HOSPITAL ENCOUNTER (OUTPATIENT)
Dept: MRI IMAGING | Age: 66
Discharge: HOME | End: 2021-08-19
Attending: NURSE PRACTITIONER
Payer: MEDICARE

## 2021-08-19 VITALS — WEIGHT: 241 LBS | BODY MASS INDEX: 33.61 KG/M2

## 2021-08-19 DIAGNOSIS — Z85.05 HISTORY OF HEPATOCELLULAR CARCINOMA: ICD-10-CM

## 2021-08-19 DIAGNOSIS — K75.81 NASH (NONALCOHOLIC STEATOHEPATITIS): ICD-10-CM

## 2021-08-19 DIAGNOSIS — M54.50 ACUTE MIDLINE LOW BACK PAIN WITHOUT SCIATICA: ICD-10-CM

## 2021-08-19 DIAGNOSIS — S22.080D: ICD-10-CM

## 2021-08-19 PROCEDURE — A9585 GADOBUTROL INJECTION: HCPCS | Performed by: NURSE PRACTITIONER

## 2021-08-19 PROCEDURE — 74011250636 HC RX REV CODE- 250/636: Performed by: NURSE PRACTITIONER

## 2021-08-19 PROCEDURE — 74183 MRI ABD W/O CNTR FLWD CNTR: CPT

## 2021-08-19 PROCEDURE — 72146 MRI CHEST SPINE W/O DYE: CPT

## 2021-08-19 RX ADMIN — GADOBUTROL 10 ML: 604.72 INJECTION INTRAVENOUS at 17:14

## 2021-08-20 ENCOUNTER — PATIENT MESSAGE (OUTPATIENT)
Dept: HEMATOLOGY | Age: 66
End: 2021-08-20

## 2021-08-20 DIAGNOSIS — Z94.4 H/O LIVER TRANSPLANT (HCC): Primary | ICD-10-CM

## 2021-08-20 NOTE — PROGRESS NOTES
My chart message sent regarding the MRI which shows an unchanged lesion. No other concerning findings.

## 2021-08-20 NOTE — TELEPHONE ENCOUNTER
Spoke with patient to schedule labs for his next blood draw before seeing April in the clinic on 9/13. Labs were ordered and requisition printed to have ready for the patient when he comes to the office to have labwork completed. He states he will come early on 9/1 for labs. I reviewed lab results with him that Dr. Nathalia Hewitt documented in his last note, and shared that his Tac level was 1.5 which is low. Patient shared with me that with the last boodwork in May, his Tac dosage was changed to 1mg every morning and 1mg every other day in the evening. He would like to keep his levels lower to help with his kidneys, but agrees that result was too low. He will go back to his original dosage of 1mg in the am and 1mg in the pm. We will recheck blood work on 9/1 to see what his tac level looks like. Patient also asked about checking a COVID antibody test. He recently received his 3rd COVID vaccine and wondered if it would be helpful to get the test completed. I shared with him that the [facility] doesn't currently recommend testing for antibodies after vaccination. He understood and verbalized understanding.

## 2021-09-01 LAB
ALBUMIN SERPL-MCNC: 3.6 G/DL (ref 3.5–5)
ALBUMIN/GLOB SERPL: 1.2 {RATIO} (ref 1.1–2.2)
ALP SERPL-CCNC: 73 U/L (ref 45–117)
ALT SERPL-CCNC: 27 U/L (ref 12–78)
ANION GAP SERPL CALC-SCNC: 6 MMOL/L (ref 5–15)
AST SERPL-CCNC: 12 U/L (ref 15–37)
BILIRUB DIRECT SERPL-MCNC: 0.2 MG/DL (ref 0–0.2)
BILIRUB SERPL-MCNC: 0.7 MG/DL (ref 0.2–1)
BUN SERPL-MCNC: 43 MG/DL (ref 6–20)
BUN/CREAT SERPL: 23 (ref 12–20)
CALCIUM SERPL-MCNC: 8.3 MG/DL (ref 8.5–10.1)
CHLORIDE SERPL-SCNC: 110 MMOL/L (ref 97–108)
CO2 SERPL-SCNC: 26 MMOL/L (ref 21–32)
CREAT SERPL-MCNC: 1.85 MG/DL (ref 0.7–1.3)
GLOBULIN SER CALC-MCNC: 2.9 G/DL (ref 2–4)
GLUCOSE SERPL-MCNC: 201 MG/DL (ref 65–100)
INR PPP: 1 (ref 0.9–1.1)
MAGNESIUM SERPL-MCNC: 1.6 MG/DL (ref 1.6–2.4)
POTASSIUM SERPL-SCNC: 4.9 MMOL/L (ref 3.5–5.1)
PROT SERPL-MCNC: 6.5 G/DL (ref 6.4–8.2)
PROTHROMBIN TIME: 10.4 SEC (ref 9–11.1)
SODIUM SERPL-SCNC: 142 MMOL/L (ref 136–145)

## 2021-09-02 LAB
AFP L3 MFR SERPL: NORMAL % (ref 0–9.9)
AFP SERPL-MCNC: 1.6 NG/ML (ref 0–8)
TACROLIMUS, UTACRT: 4.1 NG/ML

## 2021-09-03 NOTE — PROGRESS NOTES
My chart message sent to the patient regarding the blood work results. The liver numbers are normal. The TAC level was therapeutic. The Sr. Creatinine elevated.

## 2021-09-10 ENCOUNTER — TELEPHONE (OUTPATIENT)
Dept: HEMATOLOGY | Age: 66
End: 2021-09-10

## 2021-09-10 DIAGNOSIS — Z94.4 H/O LIVER TRANSPLANT (HCC): Primary | ICD-10-CM

## 2021-09-10 RX ORDER — TACROLIMUS 0.5 MG/1
0.5 CAPSULE ORAL 2 TIMES DAILY
Qty: 60 CAPSULE | Refills: 3 | Status: SHIPPED | OUTPATIENT
Start: 2021-09-10 | End: 2021-12-21 | Stop reason: SDUPTHER

## 2021-09-10 NOTE — TELEPHONE ENCOUNTER
Contacted patient to review lab results as discussed with Dr. Ben Oneill. Creatinine is elevated. Decrease Prograf to 0.5mg twice a day. Patient states he does not have 0.5mg capsules. I will ask Dr. Ben Oneill to escribe the order to his pharmacy. Start taking the decreased dose this evening if possible and re-check labs on Wednesday of next week. Patient verbalized understanding.

## 2021-09-13 ENCOUNTER — OFFICE VISIT (OUTPATIENT)
Dept: HEMATOLOGY | Age: 66
End: 2021-09-13
Payer: MEDICARE

## 2021-09-13 VITALS
SYSTOLIC BLOOD PRESSURE: 148 MMHG | RESPIRATION RATE: 17 BRPM | OXYGEN SATURATION: 97 % | TEMPERATURE: 96.7 F | HEART RATE: 71 BPM | WEIGHT: 244 LBS | DIASTOLIC BLOOD PRESSURE: 62 MMHG | HEIGHT: 71 IN | BODY MASS INDEX: 34.16 KG/M2

## 2021-09-13 DIAGNOSIS — Z79.60 LONG-TERM USE OF IMMUNOSUPPRESSANT MEDICATION: ICD-10-CM

## 2021-09-13 DIAGNOSIS — Z94.4 H/O LIVER TRANSPLANT (HCC): Primary | ICD-10-CM

## 2021-09-13 DIAGNOSIS — K75.81 NASH (NONALCOHOLIC STEATOHEPATITIS): ICD-10-CM

## 2021-09-13 PROCEDURE — 3017F COLORECTAL CA SCREEN DOC REV: CPT | Performed by: NURSE PRACTITIONER

## 2021-09-13 PROCEDURE — G8427 DOCREV CUR MEDS BY ELIG CLIN: HCPCS | Performed by: NURSE PRACTITIONER

## 2021-09-13 PROCEDURE — 1101F PT FALLS ASSESS-DOCD LE1/YR: CPT | Performed by: NURSE PRACTITIONER

## 2021-09-13 PROCEDURE — G8536 NO DOC ELDER MAL SCRN: HCPCS | Performed by: NURSE PRACTITIONER

## 2021-09-13 PROCEDURE — G8432 DEP SCR NOT DOC, RNG: HCPCS | Performed by: NURSE PRACTITIONER

## 2021-09-13 PROCEDURE — G8417 CALC BMI ABV UP PARAM F/U: HCPCS | Performed by: NURSE PRACTITIONER

## 2021-09-13 PROCEDURE — G0463 HOSPITAL OUTPT CLINIC VISIT: HCPCS | Performed by: NURSE PRACTITIONER

## 2021-09-13 PROCEDURE — 99214 OFFICE O/P EST MOD 30 MIN: CPT | Performed by: NURSE PRACTITIONER

## 2021-09-13 NOTE — PROGRESS NOTES
Christiane Ariza MD, MD Laura Trotter PA-C Urban Parr, [facility]     April ANGIE Aaron, [facility]   Luis Fry [facility]TASH Alfaro, [facility]       Yeisonkhalida Weaver [facility] 136    at [address]    217 [facility], [address] [place] [address], [facility] 22.    [phone]    FAX: [address] [place] [address], [address]    [phone]    FAX: [phone]       Patient Care Team:  Quinn Cevallos MD as PCP - General (Family Medicine)  Quinn Cevallos MD as PCP - [facility]  Christos Reid MD (Nephrology)  Tahir Puga MD (Gastroenterology)  Kaylee Stauffer MD as Physician (Internal Medicine)  Isabela Yap MD as Physician (Neurology)      Problem List  Date Reviewed: 5/12/2021        Codes Class Noted    Generalized anxiety disorder ICD-10-CM: F41.1  ICD-9-CM: 300.02  6/22/2020        Stage 2 chronic kidney disease ICD-10-CM: N18.2  ICD-9-CM: 585.2  2/5/2020        Severe obesity ([facility] ICD-10-CM: E66.01  ICD-9-CM: 278.01  2/4/2020        H/O liver transplant ([facility].) ICD-10-CM: Z94.4  ICD-9-CM: V42.7  12/1/2018        Long-term use of immunosuppressant medication ICD-10-CM: Z79.899  ICD-9-CM: V58.69  12/1/2018        Liver transplant complication [facility]) FCC-10-KJ: T86.40  ICD-9-CM: 996.82  12/1/2018        Neuropathy involving both lower extremities ICD-10-CM: G57.93  ICD-9-CM: 356.9  1/2/2018        CAMEJO (nonalcoholic steatohepatitis) ICD-10-CM: K75.81  ICD-9-CM: 571.8  11/2/2017        GERD (gastroesophageal reflux disease) (Chronic) ICD-10-CM: K21.9  ICD-9-CM: 530.81  2/22/2016        CAD (coronary artery disease) (Chronic) ICD-10-CM: I25.10  ICD-9-CM: 414.00  2/22/2016        DM type 2 (diabetes mellitus, type 2) ([address] (Chronic) ICD-10-CM: E11.9  ICD-9-CM: 250.00  2/22/2016            Kee Sunshine returns to the [address] for management of liver transplant graft function, to monitor and adjust immune suppression and to assess for recurrence of the primary liver disease. The active problem list, all pertinent past medical history, medications, liver histology, endoscopic studies, radiologic findings and laboratory findings related to the liver disorder were reviewed with the patient. The patient underwent a liver transplant at [facility] [place] in 10/2018. He was found to have a small [address] in his liver explant. Post-transplant imaging are now being performed every 6 months with no recurrence. There was a new finding of a questionable LIRADS 5 lesion 6/2021, imaging was unchanged 8/2021. The patient is taking the following for immune suppression: Tacrolimus 0.5 mg twice daily. This was a recent change 9/2021. The patient discontinued sirolimus 1/2020 due to proteinuria, mouth ulcers and lower extremity edema. He continues to follow with Nephrology. Since the last office visit the patient has been experiencing ongoing back pain. He had a fall in June which has exacerbated the pain. Therapy has been slightly improving symptoms. A duplex ultrasound was performed 6/2020. The results demonstrate patent hepatic veins, portal velocity was normal. This was reviewed by [facility] who felt no intervention was needed. The patient has no symptoms which can be attributed to the liver disorder. The patient has not experienced fatigue, fevers, chills, or pain in the right side over the liver. The patient completes all daily activities without any functional limitations. 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 The date of the LT was 10/2018.     Have performed laboratory testing to monitor liver function and degree of liver injury. This included BMP, hepatic panel, CBC with platelet count, magnesioum, tacrolimus and INR. Laboratory testing from 9/01/2021 reviewed in detail. Follow-up testing will be completed on Wednesday. The liver transaminases, ALP, liver and liver function are normal. The platelet count is depressed. Hepatocellular carcinoma   This was present in native liver prior to undergoing liver transplant. There was viable HCC in the liver explant,  2 lesions in the right lobe. 12/2020 MRI of liver demonstrated no liver mass lesions. 12/2020 Chest CT demonstrated no metastases. Stable pulmonary nodule. 5/2021. MRI of liver demonstrated a new 5 x 9 mm lesion with no washout LIRADS 3.   5/2021. Chest CT. Stable 2 pulmonary nodule. Immune Suppression  The patient was switched from Tacrolimus to Sirolimus because of an elevation in Scr. Sirolimus was subsequently discontinued 1/2020 for proteinuria, mouth ulcers and worsening lower extremity edema. There was a decrease in TAC level to 1.5 on 8/2021. He had decreased dose of TAC to 1 mg in am and 1 mg in pm every other day. Repeat testing was 4.1. The dose was subsequently changed to 0.5 mg twice daily 9/2021. The Sr. Creatinine 9/2021 was elevated to 1.8. Follow-up testing will be completed 9/15/2021. Will adjust dose accordingly to maintain level 3-5. Anemia   This is of unclear etiology. The HB is stable in the 11-12.0 range. The Ferrtin is normal  The FE saturation is normal.    Neutropenia   This resolved when cellcept was discontinued. Thrombocytopenia   This is secondary to cirrhosis. Some patients do not recover the PLT back to normal after LT. The platelet count is adequate for the patient to undergo procedures without the need for platelet transfusion or platelet growth factors. Prevention of infections  The patient is more than 1 year out from transplant and is off PCP prophylaxis.      Chronic kidney injury   This is a common adverse event of immune suppression. The Sr. creat has remained elevated but stable ranging 1.47-1. 85. The patient is being followed by nephrology. NSAIDs should be avoided since these agents can worsen renal insufficiency. Lower Extremity Edema  This is now resolved. Hypercholesterolemia   This can be caused by immune suppression. Serum cholesterol will be monitored at periodic intervals. Hypertension   This is a common side effect of immune suppression. The patient reports improvement in the blood pressure on Lisinopril. Currently managed by the PCP. Low serum magnesium   This is a common side effect of immune suppression. The serum Mag is in the normal range. No treatment is needed. Generalized Anxiety Disorder   Patient is currently on Prozac 10 mg, symptoms are well controlled. Osteoporosis   Osteoporosis is commin in patients with cirhrosis prior to liver transplant. The patient had a normal bone density prior to undergoing liver transplant. Monitoring for skin Cancer  The patient was counseled regarding increased risk of skin cancer in transplant recipients and need to have any new skin lesions evaluated by dermatology and removed if suspicious. The patient was instructed to see Dermatology annually examination. Vaccinations  Routine vaccinations against other bacterial and viral agents can be performed as long as this is with attentuatted virus. Live virus vaccines should not be administered. Annual flu vaccination should be administered. ALLERGIES  No Known Allergies    MEDICATIONS  Current Outpatient Medications   Medication Sig    tacrolimus (Prograf) 0.5 mg capsule Take 1 Capsule by mouth two (2) times a day.  naproxen (Naprosyn) 500 mg tablet Take 1 Tablet by mouth two (2) times daily (with meals).  FLUoxetine (PROzac) 10 mg capsule TAKE 1 CAPSULE BY MOUTH DAILY    amLODIPine (NORVASC) 10 mg tablet Take  by mouth daily.     glucose blood VI test strips (OneTouch Ultra Blue Test Strip) strip 1 Strip by Injection route daily.  lisinopriL (PRINIVIL, ZESTRIL) 20 mg tablet Take 1 Tab by mouth daily.  cholecalciferol (VITAMIN D3) (5000 Units/125 mcg) tab tablet Take  by mouth daily.  glimepiride (AMARYL) 1 mg tablet Take 3 mg by mouth two (2) times a day.  metFORMIN (GLUCOPHAGE) 1,000 mg tablet Take 1,000 mg by mouth two (2) times a day.  aspirin delayed-release 81 mg tablet Take 81 mg by mouth daily.  levothyroxine (SYNTHROID) 75 mcg tablet Take 75 mcg by mouth Daily (before breakfast). No current facility-administered medications for this visit. SYSTEM REVIEW NOT RELATED TO LIVER DISEASE OR REVIEWED ABOVE:  Constitution systems: Negative for fever, chills, weight gain, weight loss. Eyes: Negative for visual changes. ENT: Negative for sore throat, painful swallowing. Respiratory: Negative for cough, hemoptysis, SOB. Cardiology: Negative for chest pain, palpitations. GI:  Negative for constipation or diarrhea. : Negative for urinary frequency, dysuria, hematuria, nocturia. Skin: Negative for rash. Hematology: Negative for easy bruising, blood clots. Musculo-skeletal: Negative for back pain, muscle pain, weakness. Neurologic: Negative for headaches, dizziness, vertigo, memory problems not related to HE. Psychology: Negative for anxiety, depression. FAMILY HISTORY:  The father  of prostate cancer. The mother  of multiple myeloma. There is no family history of liver disease.       SOCIAL HISTORY:  The patient is . The patient has 3 children. The patient occasional cigar. The patient has been abstinent from alcohol since 2016. The patient does not work, he is on social security.     PHYSICAL EXAMINATION:  Visit Vitals  BP (!) 148/62 (BP 1 Location: Right upper arm, BP Patient Position: Sitting, BP Cuff Size: Large adult)   Pulse 71   Temp (!) 96.7 °F (35.9 °C) (Temporal)   Resp 17   Ht 5' 11\" (1.803 m)   Wt 244 lb (110.7 kg)   SpO2 97%   BMI 34.03 kg/m²       General: No acute distress. Eyes: Sclera anicteric. ENT: No oral lesions. Thyroid normal.  Nodes: No adenopathy. Skin: No spider angiomata. No jaundice. No palmar erythema. Respiratory: Lungs clear to auscultation. Cardiovascular: Regular heart rate. No murmurs. No JVD. Abdomen: Soft non-tender, liver size normal to percussion/palpation. Spleen not palpable. No obvious ascites. Extremities: No edema. No muscle wasting. No gross arthritic changes. Neurologic: Alert and oriented. Cranial nerves grossly intact. No asterixis. LABORATORY STUDIES:  Liver [place] of [address] Units 2021 2021 12/10/2020   WBC 4.1 - 11.1 K/uL  6.3 5.8   ANC 1.8 - 8.0 K/UL  4.4 1.9   HGB 12.1 - 17.0 g/dL  11.4 (L) 12.2    - 400 K/uL  113 (L) 117 (L)   INR 0.9 - 1.1   1.0 1.0    AST 15 - 37 U/L 12 (L) 16 32   ALT 12 - 78 U/L 27 34 62   Alk Phos 45 - 117 U/L 73 82 92   Bili, Total 0.2 - 1.0 MG/DL 0.7 0.7 0.8   Bili, Direct 0.0 - 0.2 MG/DL 0.2 0.2 0.2   Albumin 3.5 - 5.0 g/dL 3.6 4.0 3.8   BUN 6 - 20 MG/DL 43 (H) 28 (H) 29 (H)   Creat 0.70 - 1.30 MG/DL 1.85 (H) 1.47 (H) 1.63 (H)   Creat (iSTAT) 0.6 - 1.3 mg/dL      Na 136 - 145 mmol/L 142 142 143   K 3.5 - 5.1 mmol/L 4.9 4.8 4.8   Cl 97 - 108 mmol/L 110 (H) 111 (H) 109 (H)   CO2 21 - 32 mmol/L 26 27 30   Glucose 65 - 100 mg/dL 201 (H) 100 153 (H)   Magnesium 1.6 - 2.4 mg/dL 1.6 1.8 1.7     Liver Virology and Transplant Immune Suppression Tacrolimus   Latest Ref Rng & Units Ther Rn.0-15.0 ng/mL   2021 4.1 (L)   2021 7.3   12/10/2020 4.0 (L)   10/2/2020 4.8 (L)     Cancer Screening Latest Ref Rng & Units 2021 2021 3/20/2020   AFP, Serum 0.0 - 8.0 ng/mL 1.6 1.4 1.6   AFP-L3% 0.0 - 9.9 % Comment Comment Comment     Laboratory testing from 2021 reviewed in detail.  Additional testing included to evaluate progression or regression of disease. Laboratory testing results from Wednesday will be communicated by My Chart. SEROLOGIES:  7/2018. HAV total positive, HBsurface antigne negative, anti-HBcore negative, anti-HBsurface negative, anti-HCV negative, Anti-HIV negative, CMV IgG negative, HSV IgG positive    Serologies Latest Ref Rng & Units 8/26/2019   Ferritin 30 - 400 ng/mL 153   Iron % Saturation 15 - 55 % 32     LIVER HISTOLOGY:  7/2014: Cirhosis with mild mixed macro- and microvesicular steatosis. 10/2018. Liver explant from [facility]. Multifocal moderately differentiated HCC.  2.3 cm HCC right lobe. 1.3 cm right lobe. No microscopic invasion. ENDOSCOPIC PROCEDURES:  Not available or performed    RADIOLOGY:  5/2020. MRI of liver. No MRI evidence of hepatocellular carcinoma status post liver transplantation. Splenomegaly with large splenorenal shunt varices. Bilateral gynecomastia. Unremarkable exam otherwise. 5/2020. Chest CT. No pulmonary metastases. Massive splenorenal shunt. Splenomegaly. Post liver transplant. 6/2020. Ultrasound of liver with Duplex. Increased echogenicity of the liver. Patent hepatic veins and portal veins. Portal vein velocity within normal limits. Splenic varices. 12/2020. MRI of liver. No MRI evidence of hepatocellular carcinoma recurrence status post liver transplant with persistent splenomegaly and large splenorenal shunt varices. 12/2020. CT of Chest. No acute process on CT. 2 mm anterior right lower lobe nodule is unchanged since 2019. Recommend CT chest in mid to late April, 2021 to document two-year stability of this likely benign nodule. No other nodule. Right middle lobe nodule from 2019 is no longer present. 5/2021. MRI of liver. New area of enhancement of 5 x 8 mm with no washout LIRADS 3. No recurrence of HCC. Splenomegaly with large shunt. 5/2021. Chest CT. Pulmonary nodule unchanged.      OTHER TESTING:  Not available or performed    FOLLOW-UP:  All of the issues listed above in the Assessment and Plan were discussed with the patient. All questions were answered. The patient expressed a clear understanding of the above. [address] [facility] 87 in 3 months. April MARISOL [facility] of [address] [address] [address], [address] [facility] 22.  201 [facility]

## 2021-09-13 NOTE — PROGRESS NOTES
Identified pt with two pt identifiers(name and ). Reviewed record in preparation for visit and have obtained necessary documentation. Chief Complaint   Patient presents with    Fatty Liver     f/u      Vitals:    21 1307   BP: (!) 148/62   Pulse: 71   Resp: 17   Temp: (!) 96.7 °F (35.9 °C)   TempSrc: Temporal   SpO2: 97%   Weight: 244 lb (110.7 kg)   Height: 5' 11\" (1.803 m)   PainSc:   0 - No pain       Health Maintenance Review: Patient reminded of \"due or due soon\" health maintenance. I have asked the patient to contact his/her primary care provider (PCP) for follow-up on his/her health maintenance. Coordination of Care Questionnaire:  :   1) Have you been to an emergency room, urgent care, or hospitalized since your last visit? If yes, where when, and reason for visit? yes 7/10/21 [facility] for [facility] and back injury      2. Have seen or consulted any other health care provider since your last visit? If yes, where when, and reason for visit? YES, Dr Shruthi Castle f/u      Patient is accompanied by self I have received verbal consent from Ora Bell to discuss any/all medical information while they are present in the room.

## 2021-09-15 LAB
ANION GAP SERPL CALC-SCNC: 5 MMOL/L (ref 5–15)
BUN SERPL-MCNC: 29 MG/DL (ref 6–20)
BUN/CREAT SERPL: 20 (ref 12–20)
CALCIUM SERPL-MCNC: 9.2 MG/DL (ref 8.5–10.1)
CHLORIDE SERPL-SCNC: 109 MMOL/L (ref 97–108)
CO2 SERPL-SCNC: 27 MMOL/L (ref 21–32)
CREAT SERPL-MCNC: 1.46 MG/DL (ref 0.7–1.3)
ERYTHROCYTE [DISTWIDTH] IN BLOOD BY AUTOMATED COUNT: 13.2 % (ref 11.5–14.5)
GLUCOSE SERPL-MCNC: 106 MG/DL (ref 65–100)
HCT VFR BLD AUTO: 37.2 % (ref 36.6–50.3)
HGB BLD-MCNC: 12.5 G/DL (ref 12.1–17)
INR PPP: 1 (ref 0.9–1.1)
MAGNESIUM SERPL-MCNC: 1.8 MG/DL (ref 1.6–2.4)
MCH RBC QN AUTO: 31.3 PG (ref 26–34)
MCHC RBC AUTO-ENTMCNC: 33.6 G/DL (ref 30–36.5)
MCV RBC AUTO: 93.2 FL (ref 80–99)
NRBC # BLD: 0 K/UL (ref 0–0.01)
NRBC BLD-RTO: 0 PER 100 WBC
PLATELET # BLD AUTO: 106 K/UL (ref 150–400)
PMV BLD AUTO: 9.9 FL (ref 8.9–12.9)
POTASSIUM SERPL-SCNC: 4.9 MMOL/L (ref 3.5–5.1)
PROTHROMBIN TIME: 10.2 SEC (ref 9–11.1)
RBC # BLD AUTO: 3.99 M/UL (ref 4.1–5.7)
SODIUM SERPL-SCNC: 141 MMOL/L (ref 136–145)
WBC # BLD AUTO: 7 K/UL (ref 4.1–11.1)

## 2021-09-16 LAB — TACROLIMUS, UTACRT: 2.3 NG/ML

## 2021-09-20 NOTE — PROGRESS NOTES
My chart message sent to the patient regarding the blood work results. The hepatic panel was not done for unknown reasons. The kidney numbers have improved and the TAC level was 23. Will repeat testing again in 4 weeks.

## 2021-10-25 DIAGNOSIS — T50.905A DRUG-INDUCED OSTEOPENIA: ICD-10-CM

## 2021-10-25 DIAGNOSIS — M48.04 SPINAL STENOSIS OF THORACIC REGION: ICD-10-CM

## 2021-10-25 DIAGNOSIS — Z94.4 HISTORY OF TRANSPLANTATION, LIVER (HCC): Primary | ICD-10-CM

## 2021-10-25 DIAGNOSIS — M85.80 DRUG-INDUCED OSTEOPENIA: ICD-10-CM

## 2021-10-25 DIAGNOSIS — M81.0 AGE-RELATED OSTEOPOROSIS WITHOUT CURRENT PATHOLOGICAL FRACTURE: ICD-10-CM

## 2021-10-28 ENCOUNTER — TELEPHONE (OUTPATIENT)
Dept: HEMATOLOGY | Age: 66
End: 2021-10-28

## 2021-10-28 DIAGNOSIS — B00.2 PRIMARY HSV INFECTION OF MOUTH: Primary | ICD-10-CM

## 2021-10-28 DIAGNOSIS — Z94.4 H/O LIVER TRANSPLANT (HCC): Primary | ICD-10-CM

## 2021-10-28 DIAGNOSIS — Z94.4 H/O LIVER TRANSPLANT (HCC): ICD-10-CM

## 2021-10-28 DIAGNOSIS — Z79.60 LONG-TERM USE OF IMMUNOSUPPRESSANT MEDICATION: ICD-10-CM

## 2021-10-28 RX ORDER — ACYCLOVIR 800 MG/1
400 TABLET ORAL 3 TIMES DAILY
Qty: 11 TABLET | Refills: 0 | Status: SHIPPED | OUTPATIENT
Start: 2021-10-28 | End: 2021-11-04

## 2021-10-28 NOTE — TELEPHONE ENCOUNTER
Called patient to respond back to email from [address]. April is out of the office for a few days so I spoke with Dr. Yves Primrose about patient's eruption of cold sores. Dr. Yves Primrose prescribed Acyclovir. Patient wishes to have that prescription sent to [address]

## 2021-11-04 ENCOUNTER — PATIENT MESSAGE (OUTPATIENT)
Dept: HEMATOLOGY | Age: 66
End: 2021-11-04

## 2021-11-11 ENCOUNTER — PATIENT MESSAGE (OUTPATIENT)
Dept: HEMATOLOGY | Age: 66
End: 2021-11-11

## 2021-11-11 ENCOUNTER — HOSPITAL ENCOUNTER (OUTPATIENT)
Dept: MAMMOGRAPHY | Age: 66
Discharge: HOME | End: 2021-11-11
Attending: NURSE PRACTITIONER
Payer: MEDICARE

## 2021-11-11 DIAGNOSIS — M85.80 DRUG-INDUCED OSTEOPENIA: ICD-10-CM

## 2021-11-11 DIAGNOSIS — M81.0 AGE-RELATED OSTEOPOROSIS WITHOUT CURRENT PATHOLOGICAL FRACTURE: ICD-10-CM

## 2021-11-11 DIAGNOSIS — Z94.4 HISTORY OF TRANSPLANTATION, LIVER (HCC): ICD-10-CM

## 2021-11-11 DIAGNOSIS — T50.905A DRUG-INDUCED OSTEOPENIA: ICD-10-CM

## 2021-11-11 PROCEDURE — 77080 DXA BONE DENSITY AXIAL: CPT

## 2021-11-11 NOTE — PROGRESS NOTES
Bone density showing change in bone loss since the last exam. He will need to follow-up with endocrinology for treatment.

## 2021-11-11 NOTE — PROGRESS NOTES
Kathia Milton        NST:reactive  Start:935  Stop:1100    Physician: Dr. Zamudio for Dr. Fletcher  Reason For Test: rule out  labor  MICHELLE:2020  Gestational Age:30w 2d    Comments:        Sandra Gruber       Patient will need to see endocrinology for further follow-up on decreased bone loss

## 2021-11-12 NOTE — TELEPHONE ENCOUNTER
Spoke to Mr. Javed Vail and reviewed Dexa Scan results. Berna Aaron would like him to see his Endocrinologist for treatment based on the results. Patient verbalized understanding. Results routed to Dr. Bethel Wynn.

## 2021-12-15 LAB
ANION GAP SERPL CALC-SCNC: 5 MMOL/L (ref 5–15)
BUN SERPL-MCNC: 28 MG/DL (ref 6–20)
BUN/CREAT SERPL: 18 (ref 12–20)
CALCIUM SERPL-MCNC: 9.4 MG/DL (ref 8.5–10.1)
CHLORIDE SERPL-SCNC: 106 MMOL/L (ref 97–108)
CO2 SERPL-SCNC: 28 MMOL/L (ref 21–32)
CREAT SERPL-MCNC: 1.57 MG/DL (ref 0.7–1.3)
ERYTHROCYTE [DISTWIDTH] IN BLOOD BY AUTOMATED COUNT: 13.7 % (ref 11.5–14.5)
GLUCOSE SERPL-MCNC: 159 MG/DL (ref 65–100)
HCT VFR BLD AUTO: 34.4 % (ref 36.6–50.3)
HGB BLD-MCNC: 11.7 G/DL (ref 12.1–17)
INR PPP: 1 (ref 0.9–1.1)
MAGNESIUM SERPL-MCNC: 1.9 MG/DL (ref 1.6–2.4)
MCH RBC QN AUTO: 31.8 PG (ref 26–34)
MCHC RBC AUTO-ENTMCNC: 34 G/DL (ref 30–36.5)
MCV RBC AUTO: 93.5 FL (ref 80–99)
NRBC # BLD: 0 K/UL (ref 0–0.01)
NRBC BLD-RTO: 0 PER 100 WBC
PLATELET # BLD AUTO: 111 K/UL (ref 150–400)
PMV BLD AUTO: 9.7 FL (ref 8.9–12.9)
POTASSIUM SERPL-SCNC: 4.2 MMOL/L (ref 3.5–5.1)
PROTHROMBIN TIME: 10.2 SEC (ref 9–11.1)
RBC # BLD AUTO: 3.68 M/UL (ref 4.1–5.7)
SODIUM SERPL-SCNC: 139 MMOL/L (ref 136–145)
WBC # BLD AUTO: 6.3 K/UL (ref 4.1–11.1)

## 2021-12-16 ENCOUNTER — TELEPHONE (OUTPATIENT)
Dept: HEMATOLOGY | Age: 66
End: 2021-12-16

## 2021-12-16 LAB
ALBUMIN SERPL-MCNC: 3.9 G/DL (ref 3.5–5)
ALBUMIN/GLOB SERPL: 1.3 {RATIO} (ref 1.1–2.2)
ALP SERPL-CCNC: 82 U/L (ref 45–117)
ALT SERPL-CCNC: 31 U/L (ref 12–78)
AST SERPL-CCNC: 14 U/L (ref 15–37)
BILIRUB DIRECT SERPL-MCNC: 0.2 MG/DL (ref 0–0.2)
BILIRUB SERPL-MCNC: 0.9 MG/DL (ref 0.2–1)
GLOBULIN SER CALC-MCNC: 3 G/DL (ref 2–4)
PROT SERPL-MCNC: 6.9 G/DL (ref 6.4–8.2)
TACROLIMUS, UTACRT: 5.8 NG/ML

## 2021-12-16 NOTE — TELEPHONE ENCOUNTER
Called patient to review labs. Tac level is good at 5.8, LFTs are normal. No changes to medications at this time. Patient comes in for F/U appt on 12/21.

## 2021-12-17 LAB
AFP L3 MFR SERPL: NORMAL % (ref 0–9.9)
AFP SERPL-MCNC: 1.7 NG/ML (ref 0–8)

## 2021-12-21 ENCOUNTER — OFFICE VISIT (OUTPATIENT)
Dept: HEMATOLOGY | Age: 66
End: 2021-12-21
Payer: MEDICARE

## 2021-12-21 VITALS
HEIGHT: 71 IN | TEMPERATURE: 95.8 F | BODY MASS INDEX: 35.14 KG/M2 | WEIGHT: 251 LBS | SYSTOLIC BLOOD PRESSURE: 144 MMHG | DIASTOLIC BLOOD PRESSURE: 63 MMHG

## 2021-12-21 DIAGNOSIS — Z87.898 H/O MULTIPLE PULMONARY NODULES: ICD-10-CM

## 2021-12-21 DIAGNOSIS — Z85.05 HISTORY OF HEPATOCELLULAR CARCINOMA: ICD-10-CM

## 2021-12-21 DIAGNOSIS — F41.9 ANXIETY: ICD-10-CM

## 2021-12-21 DIAGNOSIS — C22.0 LIVER CELL CARCINOMA (HCC): ICD-10-CM

## 2021-12-21 DIAGNOSIS — Z79.60 LONG-TERM USE OF IMMUNOSUPPRESSANT MEDICATION: ICD-10-CM

## 2021-12-21 DIAGNOSIS — Z94.4 H/O LIVER TRANSPLANT (HCC): Primary | ICD-10-CM

## 2021-12-21 PROCEDURE — G8417 CALC BMI ABV UP PARAM F/U: HCPCS | Performed by: NURSE PRACTITIONER

## 2021-12-21 PROCEDURE — 99214 OFFICE O/P EST MOD 30 MIN: CPT | Performed by: NURSE PRACTITIONER

## 2021-12-21 PROCEDURE — 1101F PT FALLS ASSESS-DOCD LE1/YR: CPT | Performed by: NURSE PRACTITIONER

## 2021-12-21 PROCEDURE — G0463 HOSPITAL OUTPT CLINIC VISIT: HCPCS | Performed by: NURSE PRACTITIONER

## 2021-12-21 PROCEDURE — G8536 NO DOC ELDER MAL SCRN: HCPCS | Performed by: NURSE PRACTITIONER

## 2021-12-21 PROCEDURE — G8427 DOCREV CUR MEDS BY ELIG CLIN: HCPCS | Performed by: NURSE PRACTITIONER

## 2021-12-21 PROCEDURE — 3017F COLORECTAL CA SCREEN DOC REV: CPT | Performed by: NURSE PRACTITIONER

## 2021-12-21 PROCEDURE — G8432 DEP SCR NOT DOC, RNG: HCPCS | Performed by: NURSE PRACTITIONER

## 2021-12-21 RX ORDER — FLUOXETINE 10 MG/1
10 CAPSULE ORAL DAILY
Qty: 90 CAPSULE | Refills: 3 | Status: SHIPPED | OUTPATIENT
Start: 2021-12-21

## 2021-12-21 RX ORDER — MUPIROCIN 20 MG/G
OINTMENT TOPICAL
COMMUNITY
Start: 2021-12-09 | End: 2022-03-22

## 2021-12-21 RX ORDER — ALENDRONATE SODIUM 70 MG/1
TABLET ORAL
COMMUNITY
Start: 2021-12-03 | End: 2022-03-22

## 2021-12-21 RX ORDER — TACROLIMUS 0.5 MG/1
0.5 CAPSULE ORAL 2 TIMES DAILY
Qty: 180 CAPSULE | Refills: 3 | Status: SHIPPED | OUTPATIENT
Start: 2021-12-21 | End: 2022-03-22 | Stop reason: DRUGHIGH

## 2021-12-21 RX ORDER — TACROLIMUS 0.5 MG/1
CAPSULE ORAL
COMMUNITY
End: 2021-12-21

## 2021-12-21 NOTE — PROGRESS NOTES
[address] [place] [address], MD, MD Kevin Goldstein PA-C Almetta Gaudier, [facility]-BC     Berna Aaron [facility]   REINA Guzmán-TASH Blake Asp, [facility]-BC       Yeison Handley [address]    at [facility]    217 [facility], 2000 [facility], [facility] 22.    [phone]    FAX: 126 [facility]    at [address], [address], [address]    [phone]    FAX: [phone]       Patient Care Team:  Rich Arnett MD as PCP - General (Family Medicine)  Rich Arnett MD as PCP - [facility] Provider  Hawa Alvarez MD (Nephrology)  Christiana Harris MD (Gastroenterology)  Bhavana Chapman MD as Physician (Internal Medicine)  Kandi Uribe MD as Physician (Neurology)  Areli Barragan RN as Care Coordinator (Hepatology)      Problem List  Date Reviewed: 9/14/2021          Codes Class Noted    Generalized anxiety disorder ICD-10-CM: F41.1  ICD-9-CM: 300.02  6/22/2020        Stage 2 chronic kidney disease ICD-10-CM: N18.2  ICD-9-CM: 585.2  2/5/2020        Severe obesity ([facility] ICD-10-CM: E66.01  ICD-9-CM: 278.01  2/4/2020        H/O liver transplant ([facility].) ICD-10-CM: Z94.4  ICD-9-CM: V42.7  12/1/2018        Long-term use of immunosuppressant medication ICD-10-CM: Z79.899  ICD-9-CM: V58.69  12/1/2018        Liver transplant complication ([facility] XPR-02-FT: T86.40  ICD-9-CM: 996.82  12/1/2018        Neuropathy involving both lower extremities ICD-10-CM: G57.93  ICD-9-CM: 356.9  1/2/2018        CAMEJO (nonalcoholic steatohepatitis) ICD-10-CM: K75.81  ICD-9-CM: 571.8  11/2/2017        GERD (gastroesophageal reflux disease) (Chronic) ICD-10-CM: K21.9  ICD-9-CM: 530.81  2/22/2016        CAD (coronary artery disease) (Chronic) ICD-10-CM: I25.10  ICD-9-CM: 414.00  2/22/2016        DM type 2 (diabetes mellitus, type 2) ([address] (Chronic) ICD-10-CM: E11.9  ICD-9-CM: 250.00  2/22/2016              Evelin Hughes returns to the [address] for management of liver transplant graft function, to monitor and adjust immune suppression and to assess for recurrence of the primary liver disease. The active problem list, all pertinent past medical history, medications, liver histology, endoscopic studies, radiologic findings and laboratory findings related to the liver disorder were reviewed with the patient. The patient underwent a liver transplant at [facility] in 10/2018. He was found to have a small [address] in his liver explant. Post-transplant imaging are now being performed every 6 months with no recurrence. There was a new finding of a questionable LIRADS 5 lesion 6/2021, imaging was unchanged 8/2021. The patient is taking the following for immune suppression: Tacrolimus 0.5 mg twice daily. This was a recent change 9/2021. The patient discontinued sirolimus 1/2020 due to proteinuria, mouth ulcers and lower extremity edema. He continues to follow with Nephrology. Since the last office visit the patient completed PT and back pain has improved. He had an abnormal bone density and was started on Fosamax. The patient reports having short term memory loss which may be related to TAC or residual effects from HE prior to transplant. A duplex ultrasound was performed 6/2020. The results demonstrate patent hepatic veins, portal velocity was normal. This was reviewed by [facility] who felt no intervention was needed. The patient has no symptoms which can be attributed to the liver disorder. The patient has not experienced fatigue, fevers, chills, or pain in the right side over the liver. The patient completes all daily activities without any functional limitations.       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 The date of the LT was 10/2018. Have performed laboratory testing to monitor liver function and degree of liver injury. This included BMP, hepatic panel, CBC with platelet count and INR. Laboratory testing from 12/15/2021 reviewed in detail. The liver transaminases, ALP and liver function are normal. The platelet count is depressed. Hepatocellular carcinoma   This was present in native liver prior to undergoing liver transplant. There was viable HCC in the liver explant,  2 lesions in the right lobe. 12/2020 MRI of liver demonstrated no liver mass lesions. 12/2020 Chest CT demonstrated no metastases. Stable pulmonary nodule. 5/2021. MRI of liver demonstrated a new 5 x 9 mm lesion with no washout LIRADS 3.   5/2021. Chest CT. Stable 2 pulmonary nodule. 8/2021. MRI of liver No change in 5 and 8 mm focus of enhancement. Large splenorenal varices. Will continue screening up to year 5 which will be 10/2023. Immune Suppression  The patient was switched from Tacrolimus to Sirolimus because of an elevation in Scr. Sirolimus was subsequently discontinued 1/2020 for proteinuria, mouth ulcers and worsening lower extremity edema. He is currently taking TAC 0.5 mg twice daily. Sr. Creatinine is stable ranging 1.4-1.5.   TAC is therapeutic at 5.8. Anemia   This is of unclear etiology. The HB is stable in the 11-12.0 range. The Ferrtin is normal  The FE saturation is normal.    Neutropenia   This resolved when cellcept was discontinued. Thrombocytopenia   This is secondary to cirrhosis. Some patients do not recover the PLT back to normal after LT. The platelet count is adequate for the patient to undergo procedures without the need for platelet transfusion or platelet growth factors. Chronic kidney injury   This is a common adverse event of immune suppression. The Sr. creat has remained elevated but stable ranging 1.47-1. 85. The patient is being followed by nephrology. NSAIDs should be avoided since these agents can worsen renal insufficiency. Lower Extremity Edema  This is now resolved. Hypercholesterolemia   This can be caused by immune suppression. Serum cholesterol will be monitored at periodic intervals. Hypertension   This is a common side effect of immune suppression. The patient reports improvement in the blood pressure on Lisinopril. Currently managed by the PCP. Low serum magnesium   This is a common side effect of immune suppression. The serum Mag is in the normal range. No treatment is needed. Generalized Anxiety Disorder   Patient is currently on Prozac 10 mg, symptoms are well controlled. Osteoporosis   Osteoporosis is commin in patients with cirhrosis prior to liver transplant. The most recent DEXA scan 11/2021 demonstrates a 5.9% bone loss in the hips. Fosamax was started 11/2021. Monitoring for skin Cancer  The patient was counseled regarding increased risk of skin cancer in transplant recipients and need to have any new skin lesions evaluated by dermatology and removed if suspicious. The patient was instructed to see Dermatology annually examination. Vaccinations  Routine vaccinations against other bacterial and viral agents can be performed as long as this is with attentuatted virus. Live virus vaccines should not be administered. Annual flu vaccination should be administered. ALLERGIES  No Known Allergies    MEDICATIONS  Current Outpatient Medications   Medication Sig    alendronate (FOSAMAX) 70 mg tablet TAKE 1 TABLET BY MOUTH 1 TIME A WEEK 30 MINUTES BEFORE FIRST FOOD OR BEVERAGE OR MEDICINE OF THE DAY WITH WATER    mupirocin (BACTROBAN) 2 % ointment APPLY TOPICALLY TO THE AFFECTED AREA EVERY DAY    tacrolimus (Prograf) 0.5 mg capsule Take 1 Capsule by mouth two (2) times a day.     FLUoxetine (PROzac) 10 mg capsule TAKE 1 CAPSULE BY MOUTH DAILY    amLODIPine (NORVASC) 10 mg tablet Take  by mouth daily.  glucose blood VI test strips (OneTouch Ultra Blue Test Strip) strip 1 Strip by Injection route daily.  lisinopriL (PRINIVIL, ZESTRIL) 20 mg tablet Take 1 Tablet by mouth daily. Every other day    cholecalciferol (VITAMIN D3) (5000 Units/125 mcg) tab tablet Take  by mouth daily.  glimepiride (AMARYL) 1 mg tablet Take 3 mg by mouth two (2) times a day.  metFORMIN (GLUCOPHAGE) 1,000 mg tablet Take 1,000 mg by mouth two (2) times a day.  aspirin delayed-release 81 mg tablet Take 81 mg by mouth daily.  levothyroxine (SYNTHROID) 75 mcg tablet Take 75 mcg by mouth Daily (before breakfast).  tacrolimus (PROGRAF) 0.5 mg capsule 1 capsule (Patient not taking: Reported on 2021)    naproxen (Naprosyn) 500 mg tablet Take 1 Tablet by mouth two (2) times daily (with meals). No current facility-administered medications for this visit. SYSTEM REVIEW NOT RELATED TO LIVER DISEASE OR REVIEWED ABOVE:  Constitution systems: Negative for fever, chills, weight gain, weight loss. Eyes: Negative for visual changes. ENT: Negative for sore throat, painful swallowing. Respiratory: Negative for cough, hemoptysis, SOB. Cardiology: Negative for chest pain, palpitations. GI:  Negative for constipation or diarrhea. : Negative for urinary frequency, dysuria, hematuria, nocturia. Skin: Negative for rash. Hematology: Negative for easy bruising, blood clots. Musculo-skeletal: Negative for back pain, muscle pain, weakness. Neurologic: Negative for headaches, dizziness, vertigo, memory problems not related to HE. Psychology: Negative for anxiety, depression. FAMILY HISTORY:  The father  of prostate cancer. The mother  of multiple myeloma. There is no family history of liver disease.       SOCIAL HISTORY:  The patient is . The patient has 3 children. The patient occasional cigar.      The patient has been abstinent from alcohol since 4/2016. The patient does not work, he is on social security. PHYSICAL EXAMINATION:  Visit Vitals  BP (!) 144/63 (BP 1 Location: Right arm, BP Patient Position: Sitting)   Temp (!) 95.8 °F (35.4 °C) (Temporal)   Ht 5' 11\" (1.803 m)   Wt 251 lb (113.9 kg)   BMI 35.01 kg/m²       General: No acute distress. Eyes: Sclera anicteric. ENT: No oral lesions. Thyroid normal.  Nodes: No adenopathy. Skin: No spider angiomata. No jaundice. No palmar erythema. Respiratory: Lungs clear to auscultation. Cardiovascular: Regular heart rate. No murmurs. No JVD. Abdomen: Soft non-tender, liver size normal to percussion/palpation. Spleen not palpable. No obvious ascites. Extremities: No edema. No muscle wasting. No gross arthritic changes. Neurologic: Alert and oriented. Cranial nerves grossly intact. No asterixis.     LABORATORY STUDIES:  Liver [place] of [id number] & Units 12/15/2021 9/15/2021   WBC 4.1 - 11.1 K/uL 6.3 7.0   ANC 1.8 - 8.0 K/UL     HGB 12.1 - 17.0 g/dL 11.7 (L) 12.5    - 400 K/uL 111 (L) 106 (L)   INR 0.9 - 1.1   1.0 1.0   AST 15 - 37 U/L 14 (L)    ALT 12 - 78 U/L 31    Alk Phos 45 - 117 U/L 82    Bili, Total 0.2 - 1.0 MG/DL 0.9    Bili, Direct 0.0 - 0.2 MG/DL 0.2    Albumin 3.5 - 5.0 g/dL 3.9    BUN 6 - 20 MG/DL 28 (H) 29 (H)   Creat 0.70 - 1.30 MG/DL 1.57 (H) 1.46 (H)   Creat (iSTAT) 0.6 - 1.3 mg/dL     Na 136 - 145 mmol/L 139 141   K 3.5 - 5.1 mmol/L 4.2 4.9   Cl 97 - 108 mmol/L 106 109 (H)   CO2 21 - 32 mmol/L 28 27   Glucose 65 - 100 mg/dL 159 (H) 106 (H)   Magnesium 1.6 - 2.4 mg/dL 1.9 1.8     Liver [place] [facility] Latest Ref Rng & Units 9/1/2021   WBC 4.1 - 11.1 K/uL    ANC 1.8 - 8.0 K/UL    HGB 12.1 - 17.0 g/dL     - 400 K/uL    INR 0.9 - 1.1   1.0   AST 15 - 37 U/L 12 (L)   ALT 12 - 78 U/L 27   Alk Phos 45 - 117 U/L 73   Bili, Total 0.2 - 1.0 MG/DL 0.7   Bili, Direct 0.0 - 0.2 MG/DL 0.2   Albumin 3.5 - 5.0 g/dL 3.6   BUN 6 - 20 MG/DL 43 (H)   Creat 0.70 - 1.30 MG/DL 1.85 (H)   Creat (iSTAT) 0.6 - 1.3 mg/dL    Na 136 - 145 mmol/L 142   K 3.5 - 5.1 mmol/L 4.9   Cl 97 - 108 mmol/L 110 (H)   CO2 21 - 32 mmol/L 26   Glucose 65 - 100 mg/dL 201 (H)   Magnesium 1.6 - 2.4 mg/dL 1.6     Liver Virology and Transplant Immune Suppression Tacrolimus   Latest Ref Rng & Units Ther Rn.0-15.0 ng/mL   12/15/2021 5.8   9/15/2021 2.3 (L)   2021 4.1 (L)   2021 7.3     Cancer Screening Latest Ref Rng & Units 12/15/2021 2021 2021   AFP, Serum 0.0 - 8.0 ng/mL 1.7 1.6 1.4   AFP-L3% 0.0 - 9.9 % Comment Comment Comment     Laboratory testing from 12/15/2021 reviewed in detail. Additional testing included to evaluate progression or regression of disease. SEROLOGIES:  2018. HAV total positive, HBsurface antigne negative, anti-HBcore negative, anti-HBsurface negative, anti-HCV negative, Anti-HIV negative, CMV IgG negative, HSV IgG positive    Serologies Latest Ref Rng & Units 2019   Ferritin 30 - 400 ng/mL 153   Iron % Saturation 15 - 55 % 32     LIVER HISTOLOGY:  2014: Cirhosis with mild mixed macro- and microvesicular steatosis. 10/2018. Liver explant from [facility]. Multifocal moderately differentiated HCC.  2.3 cm HCC right lobe. 1.3 cm right lobe. No microscopic invasion. ENDOSCOPIC PROCEDURES:  Not available or performed    RADIOLOGY:  2020. MRI of liver. No MRI evidence of hepatocellular carcinoma status post liver transplantation. Splenomegaly with large splenorenal shunt varices. Bilateral gynecomastia. Unremarkable exam otherwise. 2020. Chest CT. No pulmonary metastases. Massive splenorenal shunt. Splenomegaly. Post liver transplant. 2020. Ultrasound of liver with Duplex. Increased echogenicity of the liver. Patent hepatic veins and portal veins. Portal vein velocity within normal limits. Splenic varices. 2020. MRI of liver.  No MRI evidence of hepatocellular carcinoma recurrence status post liver transplant with persistent splenomegaly and large splenorenal shunt varices. 12/2020. CT of Chest. No acute process on CT. 2 mm anterior right lower lobe nodule is unchanged since 2019. Recommend CT chest in mid to late April, 2021 to document two-year stability of this likely benign nodule. No other nodule. Right middle lobe nodule from 2019 is no longer present. 5/2021. MRI of liver. New area of enhancement of 5 x 8 mm with no washout LIRADS 3. No recurrence of HCC. Splenomegaly with large shunt. 5/2021. Chest CT. Pulmonary nodule unchanged. 8/2021. MRI of liver. Unchanged focus of 5 and 8 mm lesion. No recurrence of HCC. Splenomegaly with large spleno-renal shunt. OTHER TESTING:  Not available or performed    FOLLOW-UP:  All of the issues listed above in the Assessment and Plan were discussed with the patient. All questions were answered. The patient expressed a clear understanding of the above. [facility] 87 in 3 months. Imaging will be ordered to be completed piror to the next office visit. MARISOL Kilgore-BC  [address] [id number] [place], 2000 [facility] 22.  201 [facility]

## 2021-12-21 NOTE — PROGRESS NOTES
Room 2     Identified pt with two pt identifiers(name and ). Reviewed record in preparation for visit and have obtained necessary documentation. All patient medications has been reviewed. Chief Complaint   Patient presents with    Fatty Liver     3 month f/u; pt states everything has been good        3 most recent PHQ Screens 2021   Little interest or pleasure in doing things Not at all   Feeling down, depressed, irritable, or hopeless Not at all   Total Score PHQ 2 0     Abuse Screening Questionnaire 2020   Do you ever feel afraid of your partner? N   Are you in a relationship with someone who physically or mentally threatens you? N   Is it safe for you to go home? Y       Health Maintenance Due   Topic    Hepatitis C Screening     Foot Exam Q1     Eye Exam Retinal or Dilated     Shingrix Vaccine Age 50> (1 of 2)    Medicare Yearly Exam     Pneumococcal 65+ years (1 of 1 - PPSV23)    Flu Vaccine (1)    A1C test (Diabetic or Prediabetic)     MICROALBUMIN Q1     Lipid Screen          Health Maintenance Review: Patient reminded of \"due or due soon\" health maintenance. I have asked the patient to contact his/her primary care provider (PCP) for follow-up on his/her health maintenance.     Vitals:    21 1055 21 1107   BP: (!) 164/70 (!) 144/63   Temp: (!) 95.8 °F (35.4 °C)    TempSrc: Temporal    Weight: 251 lb (113.9 kg)    Height: 5' 11\" (1.803 m)    PainSc:   0 - No pain        Wt Readings from Last 3 Encounters:   21 251 lb (113.9 kg)   21 244 lb (110.7 kg)   21 241 lb (109.3 kg)     Temp Readings from Last 3 Encounters:   21 (!) 95.8 °F (35.4 °C) (Temporal)   21 (!) 96.7 °F (35.9 °C) (Temporal)   21 97.9 °F (36.6 °C)     BP Readings from Last 3 Encounters:   21 (!) 144/63   21 (!) 148/62   21 (!) 152/79     Pulse Readings from Last 3 Encounters:   21 71   21 96   21 74       Coordination of Care Questionnaire:   1) Have you been to an emergency room, urgent care, or hospitalized since your last visit?   no       2. Have seen or consulted any other health care provider since your last visit? NO    Patient is accompanied by self and wife I have received verbal consent from Shon Desir to discuss any/all medical information while they are present in the room.

## 2022-03-02 ENCOUNTER — HOSPITAL ENCOUNTER (OUTPATIENT)
Dept: CT IMAGING | Age: 67
Discharge: HOME | End: 2022-03-02
Attending: NURSE PRACTITIONER
Payer: MEDICARE

## 2022-03-02 ENCOUNTER — HOSPITAL ENCOUNTER (OUTPATIENT)
Dept: MRI IMAGING | Age: 67
Discharge: HOME | End: 2022-03-02
Attending: NURSE PRACTITIONER
Payer: MEDICARE

## 2022-03-02 VITALS — BODY MASS INDEX: 34.03 KG/M2 | WEIGHT: 244 LBS

## 2022-03-02 DIAGNOSIS — Z85.05 HISTORY OF HEPATOCELLULAR CARCINOMA: ICD-10-CM

## 2022-03-02 DIAGNOSIS — C22.0 LIVER CELL CARCINOMA (HCC): ICD-10-CM

## 2022-03-02 DIAGNOSIS — Z87.898 H/O MULTIPLE PULMONARY NODULES: ICD-10-CM

## 2022-03-02 PROCEDURE — 74011250636 HC RX REV CODE- 250/636: Performed by: NURSE PRACTITIONER

## 2022-03-02 PROCEDURE — 71260 CT THORAX DX C+: CPT

## 2022-03-02 PROCEDURE — 74183 MRI ABD W/O CNTR FLWD CNTR: CPT

## 2022-03-02 PROCEDURE — A9575 INJ GADOTERATE MEGLUMI 0.1ML: HCPCS | Performed by: NURSE PRACTITIONER

## 2022-03-02 PROCEDURE — 74011000636 HC RX REV CODE- 636: Performed by: NURSE PRACTITIONER

## 2022-03-02 RX ORDER — GADOTERATE MEGLUMINE 376.9 MG/ML
20 INJECTION INTRAVENOUS ONCE
Status: COMPLETED | OUTPATIENT
Start: 2022-03-02 | End: 2022-03-02

## 2022-03-02 RX ADMIN — GADOTERATE MEGLUMINE 20 ML: 376.9 INJECTION INTRAVENOUS at 11:41

## 2022-03-02 RX ADMIN — IOPAMIDOL 100 ML: 612 INJECTION, SOLUTION INTRAVENOUS at 11:58

## 2022-03-09 LAB
ANION GAP SERPL CALC-SCNC: 3 MMOL/L (ref 5–15)
BUN SERPL-MCNC: 25 MG/DL (ref 6–20)
BUN/CREAT SERPL: 15 (ref 12–20)
CALCIUM SERPL-MCNC: 9.4 MG/DL (ref 8.5–10.1)
CHLORIDE SERPL-SCNC: 109 MMOL/L (ref 97–108)
CO2 SERPL-SCNC: 28 MMOL/L (ref 21–32)
CREAT SERPL-MCNC: 1.65 MG/DL (ref 0.7–1.3)
ERYTHROCYTE [DISTWIDTH] IN BLOOD BY AUTOMATED COUNT: 13.9 % (ref 11.5–14.5)
GLUCOSE SERPL-MCNC: 153 MG/DL (ref 65–100)
HCT VFR BLD AUTO: 32.8 % (ref 36.6–50.3)
HGB BLD-MCNC: 11.2 G/DL (ref 12.1–17)
INR PPP: 1 (ref 0.9–1.1)
MAGNESIUM SERPL-MCNC: 1.7 MG/DL (ref 1.6–2.4)
MCH RBC QN AUTO: 32.4 PG (ref 26–34)
MCHC RBC AUTO-ENTMCNC: 34.1 G/DL (ref 30–36.5)
MCV RBC AUTO: 94.8 FL (ref 80–99)
NRBC # BLD: 0 K/UL (ref 0–0.01)
NRBC BLD-RTO: 0 PER 100 WBC
PLATELET # BLD AUTO: 99 K/UL (ref 150–400)
PMV BLD AUTO: 9.5 FL (ref 8.9–12.9)
POTASSIUM SERPL-SCNC: 4.7 MMOL/L (ref 3.5–5.1)
PROTHROMBIN TIME: 10.1 SEC (ref 9–11.1)
RBC # BLD AUTO: 3.46 M/UL (ref 4.1–5.7)
SODIUM SERPL-SCNC: 140 MMOL/L (ref 136–145)
WBC # BLD AUTO: 4.9 K/UL (ref 4.1–11.1)

## 2022-03-10 LAB
ALBUMIN SERPL-MCNC: 3.9 G/DL (ref 3.5–5)
ALBUMIN/GLOB SERPL: 1.4 {RATIO} (ref 1.1–2.2)
ALP SERPL-CCNC: 72 U/L (ref 45–117)
ALT SERPL-CCNC: 34 U/L (ref 12–78)
AST SERPL-CCNC: 14 U/L (ref 15–37)
BILIRUB DIRECT SERPL-MCNC: 0.3 MG/DL (ref 0–0.2)
BILIRUB SERPL-MCNC: 1.1 MG/DL (ref 0.2–1)
GLOBULIN SER CALC-MCNC: 2.7 G/DL (ref 2–4)
PROT SERPL-MCNC: 6.6 G/DL (ref 6.4–8.2)
TACROLIMUS, UTACRT: 2.8 NG/ML

## 2022-03-18 PROBLEM — N18.2 STAGE 2 CHRONIC KIDNEY DISEASE: Status: ACTIVE | Noted: 2020-02-05

## 2022-03-18 PROBLEM — Z79.60 LONG-TERM USE OF IMMUNOSUPPRESSANT MEDICATION: Status: ACTIVE | Noted: 2018-12-01

## 2022-03-18 PROBLEM — K75.81 NASH (NONALCOHOLIC STEATOHEPATITIS): Status: ACTIVE | Noted: 2017-11-02

## 2022-03-19 PROBLEM — G57.93 NEUROPATHY INVOLVING BOTH LOWER EXTREMITIES: Status: ACTIVE | Noted: 2018-01-02

## 2022-03-19 PROBLEM — T86.40 LIVER TRANSPLANT COMPLICATION (HCC): Status: ACTIVE | Noted: 2018-12-01

## 2022-03-19 PROBLEM — E66.01 SEVERE OBESITY (HCC): Status: ACTIVE | Noted: 2020-02-04

## 2022-03-19 PROBLEM — F41.1 GENERALIZED ANXIETY DISORDER: Status: ACTIVE | Noted: 2020-06-22

## 2022-03-20 PROBLEM — Z94.4 H/O LIVER TRANSPLANT (HCC): Status: ACTIVE | Noted: 2018-12-01

## 2022-03-22 ENCOUNTER — OFFICE VISIT (OUTPATIENT)
Dept: HEMATOLOGY | Age: 67
End: 2022-03-22
Payer: MEDICARE

## 2022-03-22 VITALS
WEIGHT: 247 LBS | TEMPERATURE: 97.1 F | HEIGHT: 71 IN | HEART RATE: 81 BPM | DIASTOLIC BLOOD PRESSURE: 58 MMHG | BODY MASS INDEX: 34.58 KG/M2 | OXYGEN SATURATION: 100 % | SYSTOLIC BLOOD PRESSURE: 129 MMHG

## 2022-03-22 DIAGNOSIS — Z79.60 LONG-TERM USE OF IMMUNOSUPPRESSANT MEDICATION: Primary | ICD-10-CM

## 2022-03-22 DIAGNOSIS — Z94.4 H/O LIVER TRANSPLANT (HCC): ICD-10-CM

## 2022-03-22 DIAGNOSIS — D69.6 THROMBOCYTOPENIA, UNSPECIFIED (HCC): ICD-10-CM

## 2022-03-22 DIAGNOSIS — R16.0 LIVER MASS: ICD-10-CM

## 2022-03-22 PROCEDURE — G8432 DEP SCR NOT DOC, RNG: HCPCS | Performed by: NURSE PRACTITIONER

## 2022-03-22 PROCEDURE — G8536 NO DOC ELDER MAL SCRN: HCPCS | Performed by: NURSE PRACTITIONER

## 2022-03-22 PROCEDURE — 3017F COLORECTAL CA SCREEN DOC REV: CPT | Performed by: NURSE PRACTITIONER

## 2022-03-22 PROCEDURE — G8417 CALC BMI ABV UP PARAM F/U: HCPCS | Performed by: NURSE PRACTITIONER

## 2022-03-22 PROCEDURE — G0463 HOSPITAL OUTPT CLINIC VISIT: HCPCS | Performed by: NURSE PRACTITIONER

## 2022-03-22 PROCEDURE — G8427 DOCREV CUR MEDS BY ELIG CLIN: HCPCS | Performed by: NURSE PRACTITIONER

## 2022-03-22 PROCEDURE — 1101F PT FALLS ASSESS-DOCD LE1/YR: CPT | Performed by: NURSE PRACTITIONER

## 2022-03-22 PROCEDURE — 99214 OFFICE O/P EST MOD 30 MIN: CPT | Performed by: NURSE PRACTITIONER

## 2022-03-22 RX ORDER — TACROLIMUS 0.5 MG/1
CAPSULE ORAL
Qty: 270 CAPSULE | Refills: 3 | Status: SHIPPED | OUTPATIENT
Start: 2022-03-22

## 2022-03-22 NOTE — PROGRESS NOTES
[address] [place] [address], Nikhil PEREZ, Devin Miramontes, [place]      KENNETH Mock, ACNP-BC     Berna Aaron, AGPCNP-BC   REINA Quevedo-TASH Louie, [facility]-BC       Yeison Deputado [address]    at [address], [address] [place] [address], [facility] 22.    [phone]    FAX: [address] [place] [address]    at [address], [address]    [phone]    FAX: [phone]       Patient Care Team:  Amairani Barnes MD as PCP - General (Family Medicine)  Amairani Barnes MD as PCP - [address] [place] [address] Provider  Manolo Rodriguez MD (Nephrology)  Khadar Hogue MD (Gastroenterology)  Benjamin Michael MD as Physician (Internal Medicine)  Tanna Kenney MD as Physician (Neurology)  Stephanie Olea RN as Care Coordinator (Hepatology)      Problem List  Date Reviewed: 2021          Codes Class Noted    Thrombocytopenia, unspecified ICD-10-CM: D69.6  ICD-9-CM: 287.5  3/22/2022        Generalized anxiety disorder ICD-10-CM: F41.1  ICD-9-CM: 300.02  2020        Stage 2 chronic kidney disease ICD-10-CM: N18.2  ICD-9-CM: 585.2  2020        Severe obesity ([facility].) ICD-10-CM: E66.01  ICD-9-CM: 278.01  2020        H/O liver transplant ([facility] ICD-10-CM: Z94.4  ICD-9-CM: V42.7  2018        Long-term use of immunosuppressant medication ICD-10-CM: Z79.899  ICD-9-CM: V58.69  2018        Liver transplant complication ([facility].) -67-CN: T86.40  ICD-9-CM: 996.82  2018        Neuropathy involving both lower extremities ICD-10-CM: G57.93  ICD-9-CM: 356.9  2018        CAMEJO (nonalcoholic steatohepatitis) ICD-10-CM: K75.81  ICD-9-CM: 571.8  2017        GERD (gastroesophageal reflux disease) (Chronic) ICD-10-CM: K21.9  ICD-9-CM: 530.81  2016        CAD (coronary artery disease) (Chronic) ICD-10-CM: I25.10  ICD-9-CM: 414.00  2/22/2016        DM type 2 (diabetes mellitus, type 2) (HCC) (Chronic) ICD-10-CM: E11.9  ICD-9-CM: 250.00  2/22/2016              Brandyn Funez returns to the [address] for management of liver transplant graft function, to monitor and adjust immune suppression and to assess for recurrence of the primary liver disease. The active problem list, all pertinent past medical history, medications, liver histology, endoscopic studies, radiologic findings and laboratory findings related to the liver disorder were reviewed with the patient. The patient underwent a liver transplant at [facility] [place] in 10/2018. He was found to have a small [address] in his liver explant. Post-transplant imaging are now being performed every 6 months with no recurrence. There was a new finding of a questionable LIRADS 5 lesion 6/2021, imaging was unchanged 8/2021. The patient is taking the following for immune suppression: Tacrolimus 0.5 mg twice daily. This was a recent change 9/2021. The patient discontinued sirolimus 1/2020 due to proteinuria, mouth ulcers and lower extremity edema. He continues to follow with Nephrology. Since the last office visit the patient was started on Risedronate after DEXA demonstrated a significant increase in bone loss. The patient has no symptoms which can be attributed to the liver disorder. The patient has not experienced fatigue, fevers, chills, or pain in the right side over the liver. The patient completes all daily activities without any functional limitations. 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 The date of the LT was 10/2018. Have performed laboratory testing to monitor liver function and degree of liver injury. This included BMP, hepatic panel, CBC with platelet count and INR.  Laboratory testing from 3/09/2022 reviewed in detail. The liver transaminases and ALP are normal. The liver function is normal. The platelet count is depressed. Hepatocellular carcinoma   This was present in native liver prior to undergoing liver transplant. There was viable HCC in the liver explant,  2 lesions in the right lobe. 12/2020 MRI of liver demonstrated no liver mass lesions. 12/2020 Chest CT demonstrated no metastases. Stable pulmonary nodule. 5/2021. MRI of liver demonstrated a new 5 x 9 mm lesion with no washout LIRADS 3.   5/2021. Chest CT. Stable 2 pulmonary nodule. 8/2021. MRI of liver No change in 5 and 8 mm focus of enhancement. Large splenorenal varices. 3/2022. MRI of liver. Small focus 9 x 5 mm of arterial enhancement unchanged. 3/2022. CT of Chest. Previously seen granulomas not visualized. Will continue screening up to year 5 which will be 10/2023. Immune Suppression  The patient was switched from Tacrolimus to Sirolimus because of an elevation in Scr. Sirolimus was subsequently discontinued 1/2020 for proteinuria, mouth ulcers and worsening lower extremity edema. He is currently taking 0.5 mg of TAC twice daily. The level of 2.8 was not a trough. This is not in therapeutic range. Will increase to 1 mg in am and 0.5 mg in pm.     Anemia   This is of unclear etiology, it;s most likely due to CKD. The HGB remains stable of 11-12. The Ferrtin is normal  The FE saturation is normal.    Neutropenia   This resolved when cellcept was discontinued. Thrombocytopenia   This is secondary to cirrhosis. Some patients do not recover the PLT back to normal after LT. The platelet count is adequate for the patient to undergo procedures without the need for platelet transfusion or platelet growth factors. Chronic kidney injury   This is a common adverse event of immune suppression. The Sr.  Creatinine has remained elevated but stable in the 1.65-1.85 rand  The Sr. creat has remained elevated but stable ranging 1.47-1. 85. The patient is being followed by nephrology. NSAIDs should be avoided since these agents can worsen renal insufficiency. Lower Extremity Edema  This is now resolved. Hypercholesterolemia   This can be caused by immune suppression. Serum cholesterol will be monitored at periodic intervals. Hypertension   This is a common side effect of immune suppression. The patient reports improvement in the blood pressure on Lisinopril. Currently managed by the PCP. Low serum magnesium   This is a common side effect of immune suppression. The serum Mag is in the normal range. No treatment is needed. Generalized Anxiety Disorder   Patient is currently on Prozac 10 mg, symptoms are well controlled. Osteoporosis   Osteoporosis is commin in patients with cirhrosis prior to liver transplant. The most recent DEXA scan 11/2021 demonstrates a 5.9% bone loss in the hips. Fosamax was started 11/2021. Monitoring for skin Cancer  The patient was counseled regarding increased risk of skin cancer in transplant recipients and need to have any new skin lesions evaluated by dermatology and removed if suspicious. The patient was instructed to see Dermatology annually examination. Vaccinations  Routine vaccinations against other bacterial and viral agents can be performed as long as this is with attentuatted virus. Live virus vaccines should not be administered. Annual flu vaccination should be administered. ALLERGIES  No Known Allergies    MEDICATIONS  Current Outpatient Medications   Medication Sig    risedronate sod/calcium carb (RISEDRONATE-CALCIUM PO)     tacrolimus (PROGRAF) 0.5 mg capsule Take 2 cap in am and 1 cap in pm    FLUoxetine (PROzac) 10 mg capsule Take 1 Capsule by mouth daily.  amLODIPine (NORVASC) 10 mg tablet Take  by mouth daily.  glucose blood VI test strips (OneTouch Ultra Blue Test Strip) strip 1 Strip by Injection route daily.     lisinopriL (PRINIVIL, ZESTRIL) 20 mg tablet Take 1 Tablet by mouth daily. Every other day    cholecalciferol (VITAMIN D3) (5000 Units/125 mcg) tab tablet Take  by mouth daily.  glimepiride (AMARYL) 1 mg tablet Take 3 mg by mouth two (2) times a day.  metFORMIN (GLUCOPHAGE) 1,000 mg tablet Take 1,000 mg by mouth two (2) times a day.  aspirin delayed-release 81 mg tablet Take 81 mg by mouth daily.  levothyroxine (SYNTHROID) 75 mcg tablet Take 75 mcg by mouth Daily (before breakfast). No current facility-administered medications for this visit. SYSTEM REVIEW NOT RELATED TO LIVER DISEASE OR REVIEWED ABOVE:  Constitution systems: Negative for fever, chills, weight gain, weight loss. Eyes: Negative for visual changes. ENT: Negative for sore throat, painful swallowing. Respiratory: Negative for cough, hemoptysis, SOB. Cardiology: Negative for chest pain, palpitations. GI:  Negative for constipation or diarrhea. : Negative for urinary frequency, dysuria, hematuria, nocturia. Skin: Negative for rash. Hematology: Negative for easy bruising, blood clots. Musculo-skeletal: Negative for back pain, muscle pain, weakness. Neurologic: Negative for headaches, dizziness, vertigo, memory problems not related to HE. Psychology: Negative for anxiety, depression. FAMILY HISTORY:  The father  of prostate cancer. The mother  of multiple myeloma. There is no family history of liver disease.       SOCIAL HISTORY:  The patient is . The patient has 3 children. The patient occasional cigar. The patient has been abstinent from alcohol since 2016. The patient does not work, he is on social security.     PHYSICAL EXAMINATION:  Visit Vitals  BP (!) 129/58 (BP 1 Location: Right arm, BP Patient Position: Sitting, BP Cuff Size: Adult)   Pulse 81   Temp 97.1 °F (36.2 °C) (Temporal)   Ht 5' 11\" (1.803 m)   Wt 247 lb (112 kg)   SpO2 100%   BMI 34.45 kg/m²       General: No acute distress. Eyes: Sclera anicteric. ENT: No oral lesions. Thyroid normal.  Nodes: No adenopathy. Skin: No spider angiomata. No jaundice. No palmar erythema. Respiratory: Lungs clear to auscultation. Cardiovascular: Regular heart rate. No murmurs. No JVD. Abdomen: Soft non-tender, liver size normal to percussion/palpation. Spleen not palpable. No obvious ascites. Extremities: No edema. No muscle wasting. No gross arthritic changes. Neurologic: Alert and oriented. Cranial nerves grossly intact. No asterixis. LABORATORY STUDIES:  Liver [place] [address] 3/9/2022 12/15/2021   WBC 4.1 - 11.1 K/uL 4.9 6.3   ANC 1.8 - 8.0 K/UL     HGB 12.1 - 17.0 g/dL 11.2 (L) 11.7 (L)    - 400 K/uL 99 (L) 111 (L)   INR 0.9 - 1.1   1.0 1.0   AST 15 - 37 U/L 14 (L) 14 (L)   ALT 12 - 78 U/L 34 31   Alk Phos 45 - 117 U/L 72 82   Bili, Total 0.2 - 1.0 MG/DL 1.1 (H) 0.9   Bili, Direct 0.0 - 0.2 MG/DL 0.3 (H) 0.2   Albumin 3.5 - 5.0 g/dL 3.9 3.9   BUN 6 - 20 MG/DL 25 (H) 28 (H)   Creat 0.70 - 1.30 MG/DL 1.65 (H) 1.57 (H)   Creat (iSTAT) 0.6 - 1.3 mg/dL     Na 136 - 145 mmol/L 140 139   K 3.5 - 5.1 mmol/L 4.7 4.2   Cl 97 - 108 mmol/L 109 (H) 106   CO2 21 - 32 mmol/L 28 28   Glucose 65 - 100 mg/dL 153 (H) 159 (H)   Magnesium 1.6 - 2.4 mg/dL 1.7 1.9     Liver Virology and Transplant Immune Suppression Tacrolimus   Latest Ref Rng & Units Ther Rn.0-15.0 ng/mL   3/9/2022 2.8 (L)   12/15/2021 5.8   9/15/2021 2.3 (L)   2021 4.1 (L)     Cancer Screening Latest Ref Rng & Units 12/15/2021 2021 2021   AFP, Serum 0.0 - 8.0 ng/mL 1.7 1.6 1.4   AFP-L3% 0.0 - 9.9 % Comment Comment Comment     Laboratory testing from 3/09/2022 reviewed in detail. Additional testing included to evaluate progression or regression of disease. SEROLOGIES:  2018.   HAV total positive, HBsurface antigne negative, anti-HBcore negative, anti-HBsurface negative, anti-HCV negative, Anti-HIV negative, CMV IgG negative, HSV IgG positive    Serologies Latest Ref Rng & Units 8/26/2019   Ferritin 30 - 400 ng/mL 153   Iron % Saturation 15 - 55 % 32     LIVER HISTOLOGY:  7/2014: Cirhosis with mild mixed macro- and microvesicular steatosis. 10/2018. Liver explant from [facility]. Multifocal moderately differentiated HCC.  2.3 cm HCC right lobe. 1.3 cm right lobe. No microscopic invasion. ENDOSCOPIC PROCEDURES:  Not available or performed    RADIOLOGY:  5/2020. MRI of liver. No MRI evidence of hepatocellular carcinoma status post liver transplantation. Splenomegaly with large splenorenal shunt varices. Bilateral gynecomastia. Unremarkable exam otherwise. 5/2020. Chest CT. No pulmonary metastases. Massive splenorenal shunt. Splenomegaly. Post liver transplant. 6/2020. Ultrasound of liver with Duplex. Increased echogenicity of the liver. Patent hepatic veins and portal veins. Portal vein velocity within normal limits. Splenic varices. 12/2020. MRI of liver. No MRI evidence of hepatocellular carcinoma recurrence status post liver transplant with persistent splenomegaly and large splenorenal shunt varices. 12/2020. CT of Chest. No acute process on CT. 2 mm anterior right lower lobe nodule is unchanged since 2019. Recommend CT chest in mid to late April, 2021 to document two-year stability of this likely benign nodule. No other nodule. Right middle lobe nodule from 2019 is no longer present. 5/2021. MRI of liver. New area of enhancement of 5 x 8 mm with no washout LIRADS 3. No recurrence of HCC. Splenomegaly with large shunt. 5/2021. Chest CT. Pulmonary nodule unchanged. 8/2021. MRI of liver. Unchanged focus of 5 and 8 mm lesion. No recurrence of HCC. Splenomegaly with large spleno-renal shunt. See above for updated imaging     OTHER TESTING:  Not available or performed    FOLLOW-UP:  All of the issues listed above in the Assessment and Plan were discussed with the patient. All questions were answered.   The patient expressed a clear understanding of the above. [facility] in 3 months for ongoing monitoring. April S.  Agnieszka, [facility] of [address] [id number] Leno Akhtar, [address] [place] [address] 22.  [address] [facility]

## 2022-03-22 NOTE — PROGRESS NOTES
Identified pt with two pt identifiers(name and ). Reviewed record in preparation for visit and have obtained necessary documentation. Chief Complaint   Patient presents with    Other     H/o liver transplant/3month f/u with April      Vitals:    22 1019   BP: (!) 129/58   Pulse: 81   Temp: 97.1 °F (36.2 °C)   TempSrc: Temporal   SpO2: 100%   Weight: 247 lb (112 kg)   Height: 5' 11\" (1.803 m)   PainSc:   0 - No pain       Health Maintenance Review: Patient reminded of \"due or due soon\" health maintenance. I have asked the patient to contact his/her primary care provider (PCP) for follow-up on his/her health maintenance. Coordination of Care Questionnaire:  :   1) Have you been to an emergency room, urgent care, or hospitalized since your last visit? If yes, where when, and reason for visit? no       2. Have seen or consulted any other health care provider since your last visit? If yes, where when, and reason for visit? NO      Patient is accompanied by spouse I have received verbal consent from Wayne Mejia to discuss any/all medical information while they are present in the room.

## 2022-03-24 PROBLEM — D69.6 THROMBOCYTOPENIA, UNSPECIFIED (HCC): Status: ACTIVE | Noted: 2022-03-22

## 2022-04-09 LAB
ALBUMIN SERPL-MCNC: 4.8 G/DL (ref 3.8–4.8)
ALP SERPL-CCNC: 73 IU/L (ref 44–121)
ALT SERPL-CCNC: 22 IU/L (ref 0–44)
AST SERPL-CCNC: 17 IU/L (ref 0–40)
BASOPHILS # BLD AUTO: 0 X10E3/UL (ref 0–0.2)
BASOPHILS NFR BLD AUTO: 1 %
BILIRUB DIRECT SERPL-MCNC: 0.28 MG/DL (ref 0–0.4)
BILIRUB SERPL-MCNC: 1.3 MG/DL (ref 0–1.2)
BUN SERPL-MCNC: 29 MG/DL (ref 8–27)
BUN/CREAT SERPL: 18 (ref 10–24)
CALCIUM SERPL-MCNC: 9.6 MG/DL (ref 8.6–10.2)
CHLORIDE SERPL-SCNC: 102 MMOL/L (ref 96–106)
CO2 SERPL-SCNC: 23 MMOL/L (ref 20–29)
CREAT SERPL-MCNC: 1.6 MG/DL (ref 0.76–1.27)
EGFR: 47 ML/MIN/1.73
EOSINOPHIL # BLD AUTO: 0.4 X10E3/UL (ref 0–0.4)
EOSINOPHIL NFR BLD AUTO: 7 %
ERYTHROCYTE [DISTWIDTH] IN BLOOD BY AUTOMATED COUNT: 13 % (ref 11.6–15.4)
GLUCOSE SERPL-MCNC: 199 MG/DL (ref 65–99)
HCT VFR BLD AUTO: 34.2 % (ref 37.5–51)
HGB BLD-MCNC: 11.6 G/DL (ref 13–17.7)
IMM GRANULOCYTES # BLD AUTO: 0.1 X10E3/UL (ref 0–0.1)
IMM GRANULOCYTES NFR BLD AUTO: 1 %
INR PPP: 1 (ref 0.9–1.2)
LYMPHOCYTES # BLD AUTO: 1.4 X10E3/UL (ref 0.7–3.1)
LYMPHOCYTES NFR BLD AUTO: 26 %
MAGNESIUM SERPL-MCNC: 1.6 MG/DL (ref 1.6–2.3)
MCH RBC QN AUTO: 31.6 PG (ref 26.6–33)
MCHC RBC AUTO-ENTMCNC: 33.9 G/DL (ref 31.5–35.7)
MCV RBC AUTO: 93 FL (ref 79–97)
MONOCYTES # BLD AUTO: 0.3 X10E3/UL (ref 0.1–0.9)
MONOCYTES NFR BLD AUTO: 6 %
MORPHOLOGY BLD-IMP: ABNORMAL
NEUTROPHILS # BLD AUTO: 3.2 X10E3/UL (ref 1.4–7)
NEUTROPHILS NFR BLD AUTO: 59 %
PLATELET # BLD AUTO: 88 X10E3/UL (ref 150–450)
POTASSIUM SERPL-SCNC: 4.6 MMOL/L (ref 3.5–5.2)
PROT SERPL-MCNC: 7.2 G/DL (ref 6–8.5)
PROTHROMBIN TIME: 10.1 SEC (ref 9.1–12)
RBC # BLD AUTO: 3.67 X10E6/UL (ref 4.14–5.8)
SODIUM SERPL-SCNC: 139 MMOL/L (ref 134–144)
WBC # BLD AUTO: 5.4 X10E3/UL (ref 3.4–10.8)

## 2022-04-11 LAB — TACROLIMUS, 700249: 2.2 NG/ML (ref 2–20)

## 2022-04-13 LAB
AFP L3 MFR SERPL: NORMAL % (ref 0–9.9)
AFP SERPL-MCNC: 1.7 NG/ML (ref 0–8.4)

## 2022-06-09 ENCOUNTER — TELEPHONE (OUTPATIENT)
Dept: HEMATOLOGY | Age: 67
End: 2022-06-09

## 2022-06-09 DIAGNOSIS — Z79.60 LONG-TERM USE OF IMMUNOSUPPRESSANT MEDICATION: ICD-10-CM

## 2022-06-09 DIAGNOSIS — Z94.4 H/O LIVER TRANSPLANT (HCC): Primary | ICD-10-CM

## 2022-06-10 LAB
ALBUMIN SERPL-MCNC: 4.1 G/DL (ref 3.8–4.8)
ALP SERPL-CCNC: 79 IU/L (ref 44–121)
ALT SERPL-CCNC: 20 IU/L (ref 0–44)
AST SERPL-CCNC: 20 IU/L (ref 0–40)
BASOPHILS # BLD AUTO: 0.2 X10E3/UL (ref 0–0.2)
BASOPHILS NFR BLD AUTO: 5 %
BILIRUB DIRECT SERPL-MCNC: 0.35 MG/DL (ref 0–0.4)
BILIRUB SERPL-MCNC: 1.3 MG/DL (ref 0–1.2)
BUN SERPL-MCNC: 33 MG/DL (ref 8–27)
BUN/CREAT SERPL: 20 (ref 10–24)
CALCIUM SERPL-MCNC: 9.4 MG/DL (ref 8.6–10.2)
CHLORIDE SERPL-SCNC: 103 MMOL/L (ref 96–106)
CO2 SERPL-SCNC: 22 MMOL/L (ref 20–29)
CREAT SERPL-MCNC: 1.67 MG/DL (ref 0.76–1.27)
EGFR: 45 ML/MIN/1.73
EOSINOPHIL # BLD AUTO: 0.4 X10E3/UL (ref 0–0.4)
EOSINOPHIL NFR BLD AUTO: 8 %
ERYTHROCYTE [DISTWIDTH] IN BLOOD BY AUTOMATED COUNT: 13.8 % (ref 11.6–15.4)
GLUCOSE SERPL-MCNC: 266 MG/DL (ref 65–99)
HCT VFR BLD AUTO: 32.1 % (ref 37.5–51)
HGB BLD-MCNC: 11.1 G/DL (ref 13–17.7)
IMM GRANULOCYTES # BLD AUTO: 0 X10E3/UL (ref 0–0.1)
IMM GRANULOCYTES NFR BLD AUTO: 1 %
INR PPP: 0.9 (ref 0.9–1.2)
LYMPHOCYTES # BLD AUTO: 1.2 X10E3/UL (ref 0.7–3.1)
LYMPHOCYTES NFR BLD AUTO: 24 %
MAGNESIUM SERPL-MCNC: 1.7 MG/DL (ref 1.6–2.3)
MCH RBC QN AUTO: 32.2 PG (ref 26.6–33)
MCHC RBC AUTO-ENTMCNC: 34.6 G/DL (ref 31.5–35.7)
MCV RBC AUTO: 93 FL (ref 79–97)
MONOCYTES # BLD AUTO: 0.3 X10E3/UL (ref 0.1–0.9)
MONOCYTES NFR BLD AUTO: 6 %
MORPHOLOGY BLD-IMP: ABNORMAL
NEUTROPHILS # BLD AUTO: 2.9 X10E3/UL (ref 1.4–7)
NEUTROPHILS NFR BLD AUTO: 56 %
PLATELET # BLD AUTO: 84 X10E3/UL (ref 150–450)
POTASSIUM SERPL-SCNC: 5.2 MMOL/L (ref 3.5–5.2)
PROT SERPL-MCNC: 6.4 G/DL (ref 6–8.5)
PROTHROMBIN TIME: 9.9 SEC (ref 9.1–12)
RBC # BLD AUTO: 3.45 X10E6/UL (ref 4.14–5.8)
SODIUM SERPL-SCNC: 140 MMOL/L (ref 134–144)
WBC # BLD AUTO: 5.1 X10E3/UL (ref 3.4–10.8)

## 2022-06-12 LAB — TACROLIMUS, 700249: 3.9 NG/ML (ref 2–20)

## 2022-06-16 ENCOUNTER — HOSPITAL ENCOUNTER (OUTPATIENT)
Dept: MRI IMAGING | Age: 67
Discharge: HOME | End: 2022-06-16
Attending: NURSE PRACTITIONER
Payer: MEDICARE

## 2022-06-16 ENCOUNTER — DOCUMENTATION ONLY (OUTPATIENT)
Dept: HEMATOLOGY | Age: 67
End: 2022-06-16

## 2022-06-16 VITALS — BODY MASS INDEX: 34.03 KG/M2 | WEIGHT: 244 LBS

## 2022-06-16 DIAGNOSIS — Z79.60 LONG-TERM USE OF IMMUNOSUPPRESSANT MEDICATION: ICD-10-CM

## 2022-06-16 DIAGNOSIS — Z94.4 H/O LIVER TRANSPLANT (HCC): ICD-10-CM

## 2022-06-16 DIAGNOSIS — R16.0 LIVER MASS: ICD-10-CM

## 2022-06-16 PROCEDURE — A9575 INJ GADOTERATE MEGLUMI 0.1ML: HCPCS | Performed by: NURSE PRACTITIONER

## 2022-06-16 PROCEDURE — 74183 MRI ABD W/O CNTR FLWD CNTR: CPT

## 2022-06-16 PROCEDURE — 74011250636 HC RX REV CODE- 250/636: Performed by: NURSE PRACTITIONER

## 2022-06-16 RX ORDER — GADOTERATE MEGLUMINE 376.9 MG/ML
20 INJECTION INTRAVENOUS ONCE
Status: COMPLETED | OUTPATIENT
Start: 2022-06-16 | End: 2022-06-16

## 2022-06-16 RX ADMIN — GADOTERATE MEGLUMINE 20 ML: 376.9 INJECTION INTRAVENOUS at 09:39

## 2022-06-16 NOTE — PROGRESS NOTES
Review MRI results from today with Ms. Aaron, faxed results to patient's Nephrologist - Dr. Nevaeh Decker at [phone], fax transmission report shows it was received.

## 2022-06-21 ENCOUNTER — TELEPHONE (OUTPATIENT)
Dept: HEMATOLOGY | Age: 67
End: 2022-06-21

## 2022-06-21 DIAGNOSIS — C22.0 LIVER CELL CARCINOMA (HCC): ICD-10-CM

## 2022-06-21 DIAGNOSIS — Z94.4 H/O LIVER TRANSPLANT (HCC): Primary | ICD-10-CM

## 2022-06-21 DIAGNOSIS — N28.9 KIDNEY LESION, NATIVE, LEFT: ICD-10-CM

## 2022-06-21 NOTE — TELEPHONE ENCOUNTER
Called patient to let him know that I had sent his records to [facility] - they do not have an office at [facility] or [facility], but they do at [facility]. I let him know that the office said they would have a doctor review the scan and then call him to schedule an appointment. Patient verbalized understanding.

## 2022-06-22 ENCOUNTER — OFFICE VISIT (OUTPATIENT)
Dept: HEMATOLOGY | Age: 67
End: 2022-06-22
Payer: MEDICARE

## 2022-06-22 VITALS
SYSTOLIC BLOOD PRESSURE: 149 MMHG | OXYGEN SATURATION: 98 % | BODY MASS INDEX: 34.3 KG/M2 | DIASTOLIC BLOOD PRESSURE: 69 MMHG | HEART RATE: 75 BPM | WEIGHT: 245 LBS | HEIGHT: 71 IN | TEMPERATURE: 97.6 F

## 2022-06-22 DIAGNOSIS — Z94.4 H/O LIVER TRANSPLANT (HCC): Primary | ICD-10-CM

## 2022-06-22 DIAGNOSIS — N28.89 RENAL MASS: ICD-10-CM

## 2022-06-22 DIAGNOSIS — N18.31 STAGE 3A CHRONIC KIDNEY DISEASE (HCC): ICD-10-CM

## 2022-06-22 DIAGNOSIS — Z79.60 LONG-TERM USE OF IMMUNOSUPPRESSANT MEDICATION: ICD-10-CM

## 2022-06-22 PROBLEM — N18.30 CHRONIC RENAL DISEASE, STAGE III (HCC): Status: ACTIVE | Noted: 2022-06-22

## 2022-06-22 PROCEDURE — G0463 HOSPITAL OUTPT CLINIC VISIT: HCPCS | Performed by: NURSE PRACTITIONER

## 2022-06-22 PROCEDURE — 1101F PT FALLS ASSESS-DOCD LE1/YR: CPT | Performed by: NURSE PRACTITIONER

## 2022-06-22 PROCEDURE — G8427 DOCREV CUR MEDS BY ELIG CLIN: HCPCS | Performed by: NURSE PRACTITIONER

## 2022-06-22 PROCEDURE — G8536 NO DOC ELDER MAL SCRN: HCPCS | Performed by: NURSE PRACTITIONER

## 2022-06-22 PROCEDURE — 1123F ACP DISCUSS/DSCN MKR DOCD: CPT | Performed by: NURSE PRACTITIONER

## 2022-06-22 PROCEDURE — G8417 CALC BMI ABV UP PARAM F/U: HCPCS | Performed by: NURSE PRACTITIONER

## 2022-06-22 PROCEDURE — G8432 DEP SCR NOT DOC, RNG: HCPCS | Performed by: NURSE PRACTITIONER

## 2022-06-22 PROCEDURE — 3017F COLORECTAL CA SCREEN DOC REV: CPT | Performed by: NURSE PRACTITIONER

## 2022-06-22 PROCEDURE — 99214 OFFICE O/P EST MOD 30 MIN: CPT | Performed by: NURSE PRACTITIONER

## 2022-06-22 RX ORDER — GLIMEPIRIDE 1 MG/1
TABLET ORAL
COMMUNITY

## 2022-06-22 NOTE — PROGRESS NOTES
[facility], [place], [address], [place], [place]      Carlita Silva, PADARIN Hogue, ACNP-BC     Berna Aaron, PCNP-BC   Adonay Lee, FNP-TASH Mao, [facility]-BC       Yeison Weaver [address]    at [address], [address] [place] [address], [facility] 22.    [phone]    FAX: [address] [place] [address] Avenue    at [address], [id number] [place] [address], 300 [facility] - [address]    [phone]    FAX: [phone]       Patient Care Team:  Carol Faustin MD as PCP - General (Family Medicine)  Carol Faustin MD as PCP - [address] [place] [address] Provider  Marielle Gauthier MD (Nephrology)  Ailyn Silva MD (Gastroenterology)  Marian Sanches MD as Physician (Internal Medicine Physician)  Sofy Caicedo MD as Physician (Neurology)  John Almeida RN as Care Coordinator (Hepatology)  Berna Aaron NP as Nurse Practitioner (Hepatology)      Problem List  Date Reviewed: 3/24/2022          Codes Class Noted    Thrombocytopenia, unspecified ICD-10-CM: D69.6  ICD-9-CM: 287.5  3/22/2022        Generalized anxiety disorder ICD-10-CM: F41.1  ICD-9-CM: 300.02  6/22/2020        Stage 2 chronic kidney disease ICD-10-CM: N18.2  ICD-9-CM: 585.2  2/5/2020        Severe obesity ([facility].) ICD-10-CM: E66.01  ICD-9-CM: 278.01  2/4/2020        H/O liver transplant [facility]) ICD-10-CM: Z94.4  ICD-9-CM: V42.7  12/1/2018        Long-term use of immunosuppressant medication ICD-10-CM: Z79.899  ICD-9-CM: V58.69  12/1/2018        Liver transplant complication ([facility].) BSJ-17-CM: T86.40  ICD-9-CM: 996.82  12/1/2018        Neuropathy involving both lower extremities ICD-10-CM: G57.93  ICD-9-CM: 356.9  1/2/2018        CAMEJO (nonalcoholic steatohepatitis) ICD-10-CM: J01.74  ICD-9-CM: 571.8  11/2/2017        GERD (gastroesophageal reflux disease) (Chronic) ICD-10-CM: K21.9  ICD-9-CM: 530.81  2/22/2016        CAD (coronary artery disease) (Chronic) ICD-10-CM: I25.10  ICD-9-CM: 414.00  2/22/2016        DM type 2 (diabetes mellitus, type 2) (HCC) (Chronic) ICD-10-CM: E11.9  ICD-9-CM: 250.00  2/22/2016              Coy Miranda returns to the [facility] for management of liver transplant graft function, to monitor and adjust immune suppression and to assess for recurrence of the primary liver disease. The active problem list, all pertinent past medical history, medications, liver histology, endoscopic studies, radiologic findings and laboratory findings related to the liver disorder were reviewed with the patient. The patient underwent a liver transplant at [facility] in 10/2018. He was found to have a small [address] in his liver explant. Post-transplant imaging are now being performed every 6 months with no recurrence. There was a new finding of a questionable LIRADS 5 lesion 6/2021, imaging was unchanged 8/2021. The patient is taking the following for immune suppression: Tacrolimus 0.5 mg twice daily. This was a recent change 9/2021. The patient discontinued sirolimus 1/2020 due to proteinuria, mouth ulcers and lower extremity edema. He continues to follow with Nephrology. Since the last office visit the patient had a surveillance MRI performed 6/2022 which demonstrated no recurrence of HCC. The was an incidental finding of a renal mass that was T2 Hyperintense. This was not noted on previous imaging but the radiologist felt it was present dating back to 2020 and has increased in size. The patient has no symptoms which can be attributed to the liver disorder. The patient has not experienced fatigue, fevers, chills, or pain in the right side over the liver. The patient completes all daily activities without any functional limitations.       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 The date of the LT was 10/2018. Have performed laboratory testing to monitor liver function and degree of liver injury. This included BMP, hepatic panel, CBC with platelet count and INR. Laboratory testing from 6/09/2022 reviewed in detail. The liver transaminases and ALP are normal. The liver function is normal. The T. Bili has trended upward slightly to 1.3. The platelet count is depressed. Hepatocellular carcinoma   This was present in native liver prior to undergoing liver transplant. There was viable HCC in the liver explant,  2 lesions in the right lobe. 12/2020 MRI of liver demonstrated no liver mass lesions. 12/2020 Chest CT demonstrated no metastases. Stable pulmonary nodule. 5/2021. MRI of liver demonstrated a new 5 x 9 mm lesion with no washout LIRADS 3.   5/2021. Chest CT. Stable 2 pulmonary nodule. 8/2021. MRI of liver No change in 5 and 8 mm focus of enhancement. Large splenorenal varices. 3/2022. MRI of liver. Small focus 9 x 5 mm of arterial enhancement unchanged. 3/2022. CT of Chest. Previously seen granulomas not visualized. Will continue screening up to year 5 which will be 10/2023.   6/2022. MRI of liver. No recurrence of HCC. New left renal mass concerning for renal cell carcinoma. Will refer patient to urology for further evaluation. Immune Suppression  The patient was switched from Tacrolimus to Sirolimus because of an elevation in Scr. Sirolimus was subsequently discontinued 1/2020 for proteinuria, mouth ulcers and worsening lower extremity edema. The patient is currently taking TAC at a dose of 1 mg in am an 0.5 mg in pm. The level is 3.9 which is adequate. Will continue at the same dose. Anemia   This is of unclear etiology, it;s most likely due to CKD. The HGB remains stable of 11-12. The Ferrtin is normal  The FE saturation is normal.    Neutropenia   This resolved when cellcept was discontinued.     Thrombocytopenia This is secondary to cirrhosis. Some patients do not recover the PLT back to normal after LT. He has a very large splenorenal shunt. This was evaluated by [facility] who feels no intervention is needed. The platelet count is adequate for the patient to undergo procedures without the need for platelet transfusion or platelet growth factors. Chronic kidney injury   This is a common adverse event of immune suppression. The Sr. Creatinine has remained elevated but stable in the 1.65-1.85 rand  The Sr. creat has remained elevated but stable ranging 1.47-1. 85. The patient is being followed by nephrology. NSAIDs should be avoided since these agents can worsen renal insufficiency. Lower Extremity Edema  This is now resolved. Hypercholesterolemia   This can be caused by immune suppression. Serum cholesterol will be monitored at periodic intervals. Hypertension   This is a common side effect of immune suppression. The patient reports improvement in the blood pressure on Lisinopril. Currently managed by the PCP. Low serum magnesium   This is a common side effect of immune suppression. The serum Mag is in the normal range. No treatment is needed. Generalized Anxiety Disorder   Patient is currently on Prozac 10 mg, symptoms are well controlled. Will continue at the current dose. Osteoporosis   Osteoporosis is commin in patients with cirhrosis prior to liver transplant. The most recent DEXA scan 11/2021 demonstrates a 5.9% bone loss in the hips. Fosamax was started 11/2021. Monitoring for skin Cancer  The patient was counseled regarding increased risk of skin cancer in transplant recipients and need to have any new skin lesions evaluated by dermatology and removed if suspicious. The patient was instructed to see Dermatology annually examination. Vaccinations  Routine vaccinations against other bacterial and viral agents can be performed as long as this is with attentuatted virus. Live virus vaccines should not be administered. Annual flu vaccination should be administered. ALLERGIES  No Known Allergies    MEDICATIONS  Current Outpatient Medications   Medication Sig    risedronate sod/calcium carb (RISEDRONATE-CALCIUM PO)     tacrolimus (PROGRAF) 0.5 mg capsule Take 2 cap in am and 1 cap in pm    FLUoxetine (PROzac) 10 mg capsule Take 1 Capsule by mouth daily.  amLODIPine (NORVASC) 10 mg tablet Take  by mouth daily.  glucose blood VI test strips (OneTouch Ultra Blue Test Strip) strip 1 Strip by Injection route daily.  lisinopriL (PRINIVIL, ZESTRIL) 20 mg tablet Take 1 Tablet by mouth daily. Every other day    cholecalciferol (VITAMIN D3) (5000 Units/125 mcg) tab tablet Take  by mouth daily.  aspirin delayed-release 81 mg tablet Take 81 mg by mouth daily.  levothyroxine (SYNTHROID) 75 mcg tablet Take 75 mcg by mouth Daily (before breakfast).  glimepiride (AMARYL) 1 mg tablet Take 3 mg by mouth two (2) times a day.  metFORMIN (GLUCOPHAGE) 1,000 mg tablet Take 1,000 mg by mouth two (2) times a day. No current facility-administered medications for this visit. SYSTEM REVIEW NOT RELATED TO LIVER DISEASE OR REVIEWED ABOVE:  Constitution systems: Negative for fever, chills, weight gain, weight loss. Eyes: Negative for visual changes. ENT: Negative for sore throat, painful swallowing. Respiratory: Negative for cough, hemoptysis, SOB. Cardiology: Negative for chest pain, palpitations. GI:  Negative for constipation or diarrhea. : Negative for urinary frequency, dysuria, hematuria, nocturia. Skin: Negative for rash. Hematology: Negative for easy bruising, blood clots. Musculo-skeletal: Negative for back pain, muscle pain, weakness. Neurologic: Negative for headaches, dizziness, vertigo, memory problems not related to HE. Psychology: Negative for anxiety, depression. FAMILY HISTORY:  The father  of prostate cancer.     The mother  of multiple myeloma. There is no family history of liver disease.       SOCIAL HISTORY:  The patient is . The patient has 3 children. The patient occasional cigar. The patient has been abstinent from alcohol since 4/2016. The patient does not work, he is on social security. PHYSICAL EXAMINATION:  Visit Vitals  BP (!) 149/69 (BP 1 Location: Right arm, BP Patient Position: Sitting, BP Cuff Size: Adult long)   Pulse 75   Temp 97.6 °F (36.4 °C) (Temporal)   Ht 5' 11\" (1.803 m)   Wt 245 lb (111.1 kg)   SpO2 98%   BMI 34.17 kg/m²       General: No acute distress. Eyes: Sclera anicteric. ENT: No oral lesions. Thyroid normal.  Nodes: No adenopathy. Skin: No spider angiomata. No jaundice. No palmar erythema. Respiratory: Lungs clear to auscultation. Cardiovascular: Regular heart rate. No murmurs. No JVD. Abdomen: Soft non-tender, liver size normal to percussion/palpation. Spleen not palpable. No obvious ascites. Extremities: No edema. No muscle wasting. No gross arthritic changes. Neurologic: Alert and oriented. Cranial nerves grossly intact. No asterixis.     LABORATORY STUDIES:  Liver [place] of [address] Units 6/9/2022 4/8/2022 3/9/2022   WBC 3.4 - 10.8 x10E3/uL 5.1 5.4 4.9   ANC 1.4 - 7.0 x10E3/uL 2.9 3.2    HGB 13.0 - 17.7 g/dL 11.1 (L) 11.6 (L) 11.2 (L)    - 450 x10E3/uL 84 (LL) 88 (LL) 99 (L)   INR 0.9 - 1.2 0.9 1.0 1.0   AST 0 - 40 IU/L 20 17 14 (L)   ALT 0 - 44 IU/L 20 22 34   Alk Phos 44 - 121 IU/L 79 73 72   Bili, Total 0.0 - 1.2 mg/dL 1.3 (H) 1.3 (H) 1.1 (H)   Bili, Direct 0.00 - 0.40 mg/dL 0.35 0.28 0.3 (H)   Albumin 3.8 - 4.8 g/dL 4.1 4.8 3.9   BUN 8 - 27 mg/dL 33 (H) 29 (H) 25 (H)   Creat 0.76 - 1.27 mg/dL 1.67 (H) 1.60 (H) 1.65 (H)   Creat (iSTAT) 0.6 - 1.3 mg/dL      Na 134 - 144 mmol/L 140 139 140   K 3.5 - 5.2 mmol/L 5.2 4.6 4.7   Cl 96 - 106 mmol/L 103 102 109 (H)   CO2 20 - 29 mmol/L 22 23 28   Glucose 65 - 99 mg/dL 266 (H) 199 (H) 153 (H)   Magnesium 1.6 - 2.3 mg/dL 1.7 1.6 1.7     Liver Virology and Transplant Immune Suppression Tacrolimus Level   Latest Ref Rng & Units 2.0 - 20.0 ng/mL   6/9/2022 3.9   4/8/2022 2.2     Cancer Screening Latest Ref Rng & Units 4/8/2022 12/15/2021 9/1/2021   AFP, Serum 0.0 - 8.4 ng/mL 1.7 1.7 1.6   AFP-L3% 0.0 - 9.9 % Comment Comment Comment     Laboratory testing from 6/09/2022 reviewed in detail. Additional testing included to evaluate progression or regression of disease. SEROLOGIES:  7/2018. HAV total positive, HBsurface antigne negative, anti-HBcore negative, anti-HBsurface negative, anti-HCV negative, Anti-HIV negative, CMV IgG negative, HSV IgG positive    Serologies Latest Ref Rng & Units 8/26/2019   Ferritin 30 - 400 ng/mL 153   Iron % Saturation 15 - 55 % 32     LIVER HISTOLOGY:  7/2014: Cirhosis with mild mixed macro- and microvesicular steatosis. 10/2018. Liver explant from [facility]. Multifocal moderately differentiated HCC.  2.3 cm HCC right lobe. 1.3 cm right lobe. No microscopic invasion. ENDOSCOPIC PROCEDURES:  Not available or performed    RADIOLOGY:  5/2020. MRI of liver. No MRI evidence of hepatocellular carcinoma status post liver transplantation. Splenomegaly with large splenorenal shunt varices. Bilateral gynecomastia. Unremarkable exam otherwise. 5/2020. Chest CT. No pulmonary metastases. Massive splenorenal shunt. Splenomegaly. Post liver transplant. 6/2020. Ultrasound of liver with Duplex. Increased echogenicity of the liver. Patent hepatic veins and portal veins. Portal vein velocity within normal limits. Splenic varices. 12/2020. MRI of liver. No MRI evidence of hepatocellular carcinoma recurrence status post liver transplant with persistent splenomegaly and large splenorenal shunt varices. 12/2020. CT of Chest. No acute process on CT. 2 mm anterior right lower lobe nodule is unchanged since 2019.  Recommend CT chest in mid to late April, 2021 to document two-year stability of this likely benign nodule. No other nodule. Right middle lobe nodule from 2019 is no longer present. 5/2021. MRI of liver. New area of enhancement of 5 x 8 mm with no washout LIRADS 3. No recurrence of HCC. Splenomegaly with large shunt. 5/2021. Chest CT. Pulmonary nodule unchanged. 8/2021. MRI of liver. Unchanged focus of 5 and 8 mm lesion. No recurrence of HCC. Splenomegaly with large spleno-renal shunt. See above for updated imaging     OTHER TESTING:  Not available or performed    FOLLOW-UP:  All of the issues listed above in the Assessment and Plan were discussed with the patient. All questions were answered. The patient expressed a clear understanding of the above. [facility] in 3 months for a Fibroscan. April S.  MARISOL Aaron-[facility] of [address] [facility] [address] [id number] [address], 2000 [facility] 22.  201 [facility]

## 2022-06-22 NOTE — PROGRESS NOTES
Identified pt with two pt identifiers(name and ). Reviewed record in preparation for visit and have obtained necessary documentation. Chief Complaint   Patient presents with    Other     CAMEJO H/o liver txp/3month fu with April      Vitals:    22 1407   BP: (!) 149/69   Pulse: 75   Temp: 97.6 °F (36.4 °C)   TempSrc: Temporal   SpO2: 98%   Weight: 245 lb (111.1 kg)   Height: 5' 11\" (1.803 m)   PainSc:   0 - No pain       Health Maintenance Review: Patient reminded of \"due or due soon\" health maintenance. I have asked the patient to contact his/her primary care provider (PCP) for follow-up on his/her health maintenance. Coordination of Care Questionnaire:  :   1) Have you been to an emergency room, urgent care, or hospitalized since your last visit? If yes, where when, and reason for visit? no       2. Have seen or consulted any other health care provider since your last visit? If yes, where when, and reason for visit? NO      Patient is accompanied by self I have received verbal consent from Manolo Koo to discuss any/all medical information while they are present in the room.

## 2022-06-26 PROBLEM — N28.89 RENAL MASS: Status: ACTIVE | Noted: 2022-06-26

## 2022-07-28 DIAGNOSIS — C22.0 LIVER CELL CARCINOMA (HCC): ICD-10-CM

## 2022-07-28 DIAGNOSIS — Z94.4 H/O LIVER TRANSPLANT (HCC): Primary | ICD-10-CM

## 2022-07-29 ENCOUNTER — DOCUMENTATION ONLY (OUTPATIENT)
Dept: HEMATOLOGY | Age: 67
End: 2022-07-29

## 2022-08-05 ENCOUNTER — TELEPHONE (OUTPATIENT)
Dept: HEMATOLOGY | Age: 67
End: 2022-08-05

## 2022-08-05 NOTE — TELEPHONE ENCOUNTER
FU with patient about appt with [facility], he saw  a doctor there and the MRI was reviewed, they were not at all concerned about the renal lesion on the scan and told him to FU in 1 year.

## 2022-09-26 ENCOUNTER — OFFICE VISIT (OUTPATIENT)
Dept: HEMATOLOGY | Age: 67
End: 2022-09-26
Payer: MEDICARE

## 2022-09-26 VITALS
WEIGHT: 239 LBS | HEART RATE: 70 BPM | BODY MASS INDEX: 33.46 KG/M2 | HEIGHT: 71 IN | DIASTOLIC BLOOD PRESSURE: 60 MMHG | OXYGEN SATURATION: 98 % | SYSTOLIC BLOOD PRESSURE: 142 MMHG | TEMPERATURE: 97.1 F

## 2022-09-26 DIAGNOSIS — N28.89 RENAL MASS: ICD-10-CM

## 2022-09-26 DIAGNOSIS — Z79.60 LONG-TERM USE OF IMMUNOSUPPRESSANT MEDICATION: ICD-10-CM

## 2022-09-26 DIAGNOSIS — N18.2 STAGE 2 CHRONIC KIDNEY DISEASE: ICD-10-CM

## 2022-09-26 DIAGNOSIS — K75.81 NASH (NONALCOHOLIC STEATOHEPATITIS): ICD-10-CM

## 2022-09-26 DIAGNOSIS — R19.7 DIARRHEA, UNSPECIFIED TYPE: ICD-10-CM

## 2022-09-26 DIAGNOSIS — Z94.4 H/O LIVER TRANSPLANT (HCC): Primary | ICD-10-CM

## 2022-09-26 PROCEDURE — G0463 HOSPITAL OUTPT CLINIC VISIT: HCPCS | Performed by: NURSE PRACTITIONER

## 2022-09-26 PROCEDURE — G8432 DEP SCR NOT DOC, RNG: HCPCS | Performed by: NURSE PRACTITIONER

## 2022-09-26 PROCEDURE — G8427 DOCREV CUR MEDS BY ELIG CLIN: HCPCS | Performed by: NURSE PRACTITIONER

## 2022-09-26 PROCEDURE — 3017F COLORECTAL CA SCREEN DOC REV: CPT | Performed by: NURSE PRACTITIONER

## 2022-09-26 PROCEDURE — G8417 CALC BMI ABV UP PARAM F/U: HCPCS | Performed by: NURSE PRACTITIONER

## 2022-09-26 PROCEDURE — 91200 LIVER ELASTOGRAPHY: CPT | Performed by: NURSE PRACTITIONER

## 2022-09-26 PROCEDURE — 99214 OFFICE O/P EST MOD 30 MIN: CPT | Performed by: NURSE PRACTITIONER

## 2022-09-26 PROCEDURE — G8536 NO DOC ELDER MAL SCRN: HCPCS | Performed by: NURSE PRACTITIONER

## 2022-09-26 PROCEDURE — 1123F ACP DISCUSS/DSCN MKR DOCD: CPT | Performed by: NURSE PRACTITIONER

## 2022-09-26 PROCEDURE — 1101F PT FALLS ASSESS-DOCD LE1/YR: CPT | Performed by: NURSE PRACTITIONER

## 2022-09-26 NOTE — PROGRESS NOTES
Identified pt with two pt identifiers(name and ). Reviewed record in preparation for visit and have obtained necessary documentation. Chief Complaint   Patient presents with    Follow-up     Fibroscan      Vitals:    22 0808 22 0816   BP: (!) 156/56 (!) 142/60   Pulse: 70 70   Temp: 97.1 °F (36.2 °C)    TempSrc: Temporal    SpO2: 98%    Weight: 239 lb (108.4 kg)    Height: 5' 11\" (1.803 m)    PainSc:   0 - No pain        Health Maintenance Review: Patient reminded of \"due or due soon\" health maintenance. I have asked the patient to contact his/her primary care provider (PCP) for follow-up on his/her health maintenance. Coordination of Care Questionnaire:  :   1) Have you been to an emergency room, urgent care, or hospitalized since your last visit? If yes, where when, and reason for visit? no       2. Have seen or consulted any other health care provider since your last visit? If yes, where when, and reason for visit? YES      Patient is accompanied by wife I have received verbal consent from Dorian Thomason to discuss any/all medical information while they are present in the room.

## 2022-09-26 NOTE — PROGRESS NOTES
[address] [facility] [place], [place], [address], [place], [place]      KENNETH Bernal, ACNP-BC     Berna Aaron, [facility]   Izora Hashimoto, MEAGANP-TASH Jackman, [facility]       Yeison Weaver [facility] [address]    at [address], [address] [place] [address], [facility] 22.    [phone]    FAX: [address] [place] [address]    at [address], [address]    [phone]    FAX: [phone]       Patient Care Team:  Calli Alfaro MD as PCP - General (Family Medicine)  Calli Alfaro MD as PCP - [facility] Provider  Jeanette Fuller MD (Nephrology)  Porfirio Palma MD (Gastroenterology)  Ofelia Nelson MD as Physician (Internal Medicine Physician)  Fausto Giron MD as Physician (Neurology)  Eloina Perdomo RN as Care Coordinator (Hepatology)  Berna Aaron NP as Nurse Practitioner (Hepatology)      Problem List  Date Reviewed: 6/26/2022            Codes Class Noted    Renal mass ICD-10-CM: N28.89  ICD-9-CM: 593.9  6/26/2022        Chronic renal disease, stage III ICD-10-CM: N18.30  ICD-9-CM: 585.3  6/22/2022        Thrombocytopenia, unspecified ICD-10-CM: D69.6  ICD-9-CM: 287.5  3/22/2022        Generalized anxiety disorder ICD-10-CM: F41.1  ICD-9-CM: 300.02  6/22/2020        Stage 2 chronic kidney disease ICD-10-CM: N18.2  ICD-9-CM: 585.2  2/5/2020        Severe obesity ([address] ICD-10-CM: E66.01  ICD-9-CM: 278.01  2/4/2020        H/O liver transplant [facility]) ICD-10-CM: Z94.4  ICD-9-CM: V42.7  12/1/2018        Long-term use of immunosuppressant medication ICD-10-CM: Z79.899  ICD-9-CM: V58.69  12/1/2018        Liver transplant complication [facility]) [id number]: T86.40  ICD-9-CM: 996.82  12/1/2018        Neuropathy involving both lower extremities ICD-10-CM: G57.93  ICD-9-CM: 356.9  1/2/2018        CAMEJO (nonalcoholic steatohepatitis) ICD-10-CM: K75.81  ICD-9-CM: 571.8  11/2/2017        GERD (gastroesophageal reflux disease) (Chronic) ICD-10-CM: K21.9  ICD-9-CM: 530.81  2/22/2016        CAD (coronary artery disease) (Chronic) ICD-10-CM: I25.10  ICD-9-CM: 414.00  2/22/2016        DM type 2 (diabetes mellitus, type 2) (HCC) (Chronic) ICD-10-CM: E11.9  ICD-9-CM: 250.00  2/22/2016           Bailey Good returns to the [address] for management of liver transplant graft function, to monitor and adjust immune suppression and to assess for recurrence of the primary liver disease. The active problem list, all pertinent past medical history, medications, liver histology, endoscopic studies, radiologic findings and laboratory findings related to the liver disorder were reviewed with the patient. The patient underwent a liver transplant at [facility] in 10/2018. He was found to have a small [address] in his liver explant. Post-transplant imaging are now being performed every 6 months with no recurrence. There was a new finding of a questionable LIRADS 5 lesion 6/2021, imaging was unchanged 8/2021. The patient is taking the following for immune suppression: Tacrolimus 0.5 mg twice daily. This was a recent change 9/2021. The patient discontinued sirolimus 1/2020 due to proteinuria, mouth ulcers and lower extremity edema. He continues to follow with Nephrology. The patient had a surveillance MRI performed 6/2022 which demonstrated no recurrence of HCC. The was an incidental finding of a renal mass that was T2 Hyperintense. This was not noted on previous imaging but the radiologist felt it was present dating back to 2020 and has increased in size. The MRI was reviewed by Urology who felt no treatment was needed at this time. Since the last office visit the patient notes fatigue, diarrhea and changes in thinking.  Weight has decreased 6 lbs. The patient has the following symptoms that may be related to liver disease: Fatigue. The patient has not experienced fatigue, fevers, chills, or pain in the right side over the liver. The patient completes all daily activities without any functional limitations. 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 The date of the LT was 10/2018. Have performed laboratory testing to monitor liver function and degree of liver injury. This included BMP, hepatic panel, CBC with platelet count and INR. Laboratory testing from 6/09/2022 reviewed in detail. Follow-up testing ordered today. The liver transaminases and ALP are normal. The liver function is normal. The platelet count is depressed. Assessment of liver fibrosis was performed with Fibroscan in the office today. The result was 8.1 kPa which correlates with stage 2 septal fibrosis. The CAP score of 134 suggests NO hepatic steatosis. Hepatocellular carcinoma   This was present in native liver prior to undergoing liver transplant. There was viable HCC in the liver explant,  2 lesions in the right lobe. 12/2020 MRI of liver demonstrated no liver mass lesions. 12/2020 Chest CT demonstrated no metastases. Stable pulmonary nodule. 5/2021. MRI of liver demonstrated a new 5 x 9 mm lesion with no washout LIRADS 3.   5/2021. Chest CT. Stable 2 pulmonary nodule. 8/2021. MRI of liver No change in 5 and 8 mm focus of enhancement. Large splenorenal varices. 3/2022. MRI of liver. Small focus 9 x 5 mm of arterial enhancement unchanged. 3/2022. CT of Chest. Previously seen granulomas not visualized. Will continue screening up to year 5 which will be 10/2023.   6/2022. MRI of liver. No recurrence of HCC. New left renal mass concerning for renal cell carcinoma. Urology evaluated and will await the next imaging scheduled for 10/2022. This will also evaluate for recurrence of HCC. Immune Suppression  The patient was switched from Tacrolimus to Sirolimus because of an elevation in Scr. Sirolimus was subsequently discontinued 1/2020 for proteinuria, mouth ulcers and worsening lower extremity edema. The patient is currently taking TAC at a dose of 1 mg in am an 0.5 mg in pm.   The level is adequate in the 2-3 range. Will continue at the current dose. Fatigue and Cognitive Changes  This does not appear to be due to liver disease. Will check TSH today. He may need to see neurology for further evaluation. Chronic Diarrhea  Advised he follow up with GI for further evaluation. Anemia   This is of unclear etiology, it;s most likely due to CKD. The HGB remains stable of 11-12. The Ferrtin is normal  The FE saturation is normal.    Neutropenia   This resolved when cellcept was discontinued. Thrombocytopenia   This is secondary to cirrhosis. Some patients do not recover the PLT back to normal after LT. He has a very large splenorenal shunt. This was evaluated by [facility] who feels no intervention is needed. The platelet count is adequate for the patient to undergo procedures without the need for platelet transfusion or platelet growth factors. Chronic kidney injury   This is a common adverse event of immune suppression. The Sr. Creatinine has remained elevated but stable in the 1.60-1.67 range. He is being followed by nephrology. The patient is being followed by nephrology. NSAIDs should be avoided since these agents can worsen renal insufficiency. Lower Extremity Edema  This is now resolved. Hypercholesterolemia   This can be caused by immune suppression. Serum cholesterol will be monitored at periodic intervals. Hypertension   This is a common side effect of immune suppression. The patient reports improvement in the blood pressure on Lisinopril. Currently managed by the PCP. Low serum magnesium   This is a common side effect of immune suppression. The serum Mag is in the normal range. No treatment is needed. Generalized Anxiety Disorder   Patient is currently on Prozac 10 mg, symptoms are well controlled. Will continue at the current dose. Osteoporosis   Osteoporosis is commin in patients with cirhrosis prior to liver transplant. The most recent DEXA scan 11/2021 demonstrates a 5.9% bone loss in the hips. Fosamax was started 11/2021. Monitoring for skin Cancer  The patient was counseled regarding increased risk of skin cancer in transplant recipients and need to have any new skin lesions evaluated by dermatology and removed if suspicious. The patient was instructed to see Dermatology annually examination. Vaccinations  Routine vaccinations against other bacterial and viral agents can be performed as long as this is with attentuatted virus. Live virus vaccines should not be administered. Annual flu vaccination should be administered. ALLERGIES  No Known Allergies    MEDICATIONS  Current Outpatient Medications   Medication Sig    glimepiride (AMARYL) 1 mg tablet Take  by mouth Daily (before breakfast). Take 2 mg in am 3 times per week and 1 mg in am 4 times per week. Take 1 mg in pm daily. risedronate sod/calcium carb (RISEDRONATE-CALCIUM PO)     tacrolimus (PROGRAF) 0.5 mg capsule Take 2 cap in am and 1 cap in pm    FLUoxetine (PROzac) 10 mg capsule Take 1 Capsule by mouth daily. amLODIPine (NORVASC) 10 mg tablet Take  by mouth daily. glucose blood VI test strips (OneTouch Ultra Blue Test Strip) strip 1 Strip by Injection route daily. lisinopriL (PRINIVIL, ZESTRIL) 20 mg tablet Take 1 Tablet by mouth daily. Every other day    cholecalciferol (VITAMIN D3) (5000 Units/125 mcg) tab tablet Take  by mouth daily. metFORMIN (GLUCOPHAGE) 1,000 mg tablet Take 1,000 mg by mouth two (2) times a day. aspirin delayed-release 81 mg tablet Take 81 mg by mouth daily.     levothyroxine (SYNTHROID) 75 mcg tablet Take 75 mcg by mouth Daily (before breakfast). No current facility-administered medications for this visit. SYSTEM REVIEW NOT RELATED TO LIVER DISEASE OR REVIEWED ABOVE:  Constitution systems: Negative for fever, chills, weight gain, weight loss. Eyes: Negative for visual changes. ENT: Negative for sore throat, painful swallowing. Respiratory: Negative for cough, hemoptysis, SOB. Cardiology: Negative for chest pain, palpitations. GI:  Negative for constipation or diarrhea. : Negative for urinary frequency, dysuria, hematuria, nocturia. Skin: Negative for rash. Hematology: Negative for easy bruising, blood clots. Musculo-skeletal: Negative for back pain, muscle pain, weakness. Neurologic: Negative for headaches, dizziness, vertigo, memory problems not related to HE. Psychology: Negative for anxiety, depression. FAMILY HISTORY:  The father  of prostate cancer. The mother  of multiple myeloma. There is no family history of liver disease. SOCIAL HISTORY:  The patient is . The patient has 3 children. The patient occasional cigar. The patient has been abstinent from alcohol since 2016. The patient does not work, he is on social security. PHYSICAL EXAMINATION:  Visit Vitals  BP (!) 142/60 (BP 1 Location: Right upper arm, BP Patient Position: Sitting, BP Cuff Size: Adult)   Pulse 70   Temp 97.1 °F (36.2 °C) (Temporal)   Ht 5' 11\" (1.803 m)   Wt 239 lb (108.4 kg)   SpO2 98%   BMI 33.33 kg/m²       General: No acute distress. Eyes: Sclera anicteric. Skin: No spider angiomata. No jaundice. No palmar erythema. Abdomen: Soft non-tender, liver size normal to percussion/palpation. Spleen not palpable. No obvious ascites. Extremities: No edema. No muscle wasting. No gross arthritic changes. Neurologic: Alert and oriented. Cranial nerves grossly intact. No asterixis.     LABORATORY STUDIES:  Liver [place] of [address] Units 2022 3/9/2022   WBC 3.4 - 10.8 x10E3/uL 5.1 5.4 4.9   ANC 1.4 - 7.0 x10E3/uL 2.9 3.2    HGB 13.0 - 17.7 g/dL 11.1 (L) 11.6 (L) 11.2 (L)    - 450 x10E3/uL 84 (LL) 88 (LL) 99 (L)   INR 0.9 - 1.2 0.9 1.0 1.0   AST 0 - 40 IU/L 20 17 14 (L)   ALT 0 - 44 IU/L 20 22 34   Alk Phos 44 - 121 IU/L 79 73 72   Bili, Total 0.0 - 1.2 mg/dL 1.3 (H) 1.3 (H) 1.1 (H)   Bili, Direct 0.00 - 0.40 mg/dL 0.35 0.28 0.3 (H)   Albumin 3.8 - 4.8 g/dL 4.1 4.8 3.9   BUN 8 - 27 mg/dL 33 (H) 29 (H) 25 (H)   Creat 0.76 - 1.27 mg/dL 1.67 (H) 1.60 (H) 1.65 (H)   Creat (iSTAT) 0.6 - 1.3 mg/dL      Na 134 - 144 mmol/L 140 139 140   K 3.5 - 5.2 mmol/L 5.2 4.6 4.7   Cl 96 - 106 mmol/L 103 102 109 (H)   CO2 20 - 29 mmol/L 22 23 28   Glucose 65 - 99 mg/dL 266 (H) 199 (H) 153 (H)   Magnesium 1.6 - 2.3 mg/dL 1.7 1.6 1.7     Liver Virology and Transplant Immune Suppression Tacrolimus Level   Latest Ref Rng & Units 2.0 - 20.0 ng/mL   6/9/2022 3.9   4/8/2022 2.2     Cancer Screening Latest Ref Rng & Units 4/8/2022 12/15/2021 9/1/2021   AFP, Serum 0.0 - 8.4 ng/mL 1.7 1.7 1.6   AFP-L3% 0.0 - 9.9 % Comment Comment Comment     Laboratory testing from 6/09/2022 reviewed in detail. Additional testing included to evaluate progression or regression of disease. Laboratory testing results from today will be communicated by My Chart. SEROLOGIES:  7/2018. HAV total positive, HBsurface antigne negative, anti-HBcore negative, anti-HBsurface negative, anti-HCV negative, Anti-HIV negative, CMV IgG negative, HSV IgG positive    Serologies Latest Ref Rng & Units 8/26/2019   Ferritin 30 - 400 ng/mL 153   Iron % Saturation 15 - 55 % 32     LIVER HISTOLOGY:  7/2014: Cirhosis with mild mixed macro- and microvesicular steatosis. 10/2018. Liver explant from [facility]. Multifocal moderately differentiated HCC.  2.3 cm HCC right lobe. 1.3 cm right lobe. No microscopic invasion. 9/2022. FibroScan performed at [facility]. EkPa was 8.1. IQR/med 7%. .    The results suggested a fibrosis level of F2. The CAP score suggests there is no significant hepatic steatosis. ENDOSCOPIC PROCEDURES:  Not available or performed    RADIOLOGY:  5/2020. MRI of liver. No MRI evidence of hepatocellular carcinoma status post liver transplantation. Splenomegaly with large splenorenal shunt varices. Bilateral gynecomastia. Unremarkable exam otherwise. 5/2020. Chest CT. No pulmonary metastases. Massive splenorenal shunt. Splenomegaly. Post liver transplant. 6/2020. Ultrasound of liver with Duplex. Increased echogenicity of the liver. Patent hepatic veins and portal veins. Portal vein velocity within normal limits. Splenic varices. 12/2020. MRI of liver. No MRI evidence of hepatocellular carcinoma recurrence status post liver transplant with persistent splenomegaly and large splenorenal shunt varices. 12/2020. CT of Chest. No acute process on CT. 2 mm anterior right lower lobe nodule is unchanged since 2019. Recommend CT chest in mid to late April, 2021 to document two-year stability of this likely benign nodule. No other nodule. Right middle lobe nodule from 2019 is no longer present. 5/2021. MRI of liver. New area of enhancement of 5 x 8 mm with no washout LIRADS 3. No recurrence of HCC. Splenomegaly with large shunt. 5/2021. Chest CT. Pulmonary nodule unchanged. 8/2021. MRI of liver. Unchanged focus of 5 and 8 mm lesion. No recurrence of HCC. Splenomegaly with large spleno-renal shunt. See above for updated imaging     OTHER TESTING:  Not available or performed    FOLLOW-UP:  All of the issues listed above in the Assessment and Plan were discussed with the patient. All questions were answered. The patient expressed a clear understanding of the above. [address] [address] in 3 months for ongoing monitoring and treatment. JORGE LUIS [address] of [address] [facility] [address] [address] Rainer Espinosa [address] [place] [address] [place]

## 2022-09-27 LAB
ALBUMIN SERPL-MCNC: 4 G/DL (ref 3.5–5)
ALBUMIN/GLOB SERPL: 1.3 {RATIO} (ref 1.1–2.2)
ALP SERPL-CCNC: 92 U/L (ref 45–117)
ALT SERPL-CCNC: 38 U/L (ref 12–78)
ANION GAP SERPL CALC-SCNC: 2 MMOL/L (ref 5–15)
AST SERPL-CCNC: 24 U/L (ref 15–37)
BASOPHILS # BLD: 0.1 K/UL (ref 0–0.1)
BASOPHILS NFR BLD: 3 % (ref 0–1)
BILIRUB DIRECT SERPL-MCNC: 0.5 MG/DL (ref 0–0.2)
BILIRUB SERPL-MCNC: 1.9 MG/DL (ref 0.2–1)
BUN SERPL-MCNC: 28 MG/DL (ref 6–20)
BUN/CREAT SERPL: 16 (ref 12–20)
CALCIUM SERPL-MCNC: 10.3 MG/DL (ref 8.5–10.1)
CHLORIDE SERPL-SCNC: 108 MMOL/L (ref 97–108)
CO2 SERPL-SCNC: 29 MMOL/L (ref 21–32)
CREAT SERPL-MCNC: 1.72 MG/DL (ref 0.7–1.3)
DIFFERENTIAL METHOD BLD: ABNORMAL
EOSINOPHIL # BLD: 0.3 K/UL (ref 0–0.4)
EOSINOPHIL NFR BLD: 10 % (ref 0–7)
ERYTHROCYTE [DISTWIDTH] IN BLOOD BY AUTOMATED COUNT: 14.9 % (ref 11.5–14.5)
GLOBULIN SER CALC-MCNC: 3 G/DL (ref 2–4)
GLUCOSE SERPL-MCNC: 150 MG/DL (ref 65–100)
HCT VFR BLD AUTO: 34.8 % (ref 36.6–50.3)
HGB BLD-MCNC: 11.4 G/DL (ref 12.1–17)
IMM GRANULOCYTES # BLD AUTO: 0 K/UL (ref 0–0.04)
IMM GRANULOCYTES NFR BLD AUTO: 1 % (ref 0–0.5)
INR PPP: 1 (ref 0.9–1.1)
LYMPHOCYTES # BLD: 0.6 K/UL (ref 0.8–3.5)
LYMPHOCYTES NFR BLD: 18 % (ref 12–49)
MAGNESIUM SERPL-MCNC: 1.5 MG/DL (ref 1.6–2.4)
MCH RBC QN AUTO: 31.8 PG (ref 26–34)
MCHC RBC AUTO-ENTMCNC: 32.8 G/DL (ref 30–36.5)
MCV RBC AUTO: 97.2 FL (ref 80–99)
MONOCYTES # BLD: 0.3 K/UL (ref 0–1)
MONOCYTES NFR BLD: 8 % (ref 5–13)
NEUTS SEG # BLD: 2 K/UL (ref 1.8–8)
NEUTS SEG NFR BLD: 60 % (ref 32–75)
NRBC # BLD: 0 K/UL (ref 0–0.01)
NRBC BLD-RTO: 0 PER 100 WBC
PLATELET # BLD AUTO: 118 K/UL (ref 150–400)
PMV BLD AUTO: 10.3 FL (ref 8.9–12.9)
POTASSIUM SERPL-SCNC: 5 MMOL/L (ref 3.5–5.1)
PROT SERPL-MCNC: 7 G/DL (ref 6.4–8.2)
PROTHROMBIN TIME: 10.2 SEC (ref 9–11.1)
RBC # BLD AUTO: 3.58 M/UL (ref 4.1–5.7)
RBC MORPH BLD: ABNORMAL
SODIUM SERPL-SCNC: 139 MMOL/L (ref 136–145)
TSH SERPL DL<=0.05 MIU/L-ACNC: 2.48 UIU/ML (ref 0.36–3.74)
WBC # BLD AUTO: 3.3 K/UL (ref 4.1–11.1)

## 2022-09-28 LAB — TACROLIMUS, UTACRT: 3.7 NG/ML

## 2022-10-03 NOTE — PROGRESS NOTES
Letter sent via my chart regarding the blood work results. The magnesium was low, advised he start OTC magnesium 500 mg daily. The sr. Creatinine increased. Advised he see nephrology.

## 2022-10-05 ENCOUNTER — HOSPITAL ENCOUNTER (OUTPATIENT)
Dept: MRI IMAGING | Age: 67
Discharge: HOME | End: 2022-10-05
Attending: NURSE PRACTITIONER
Payer: MEDICARE

## 2022-10-05 VITALS — WEIGHT: 237 LBS | BODY MASS INDEX: 33.05 KG/M2

## 2022-10-05 DIAGNOSIS — Z94.4 H/O LIVER TRANSPLANT (HCC): ICD-10-CM

## 2022-10-05 DIAGNOSIS — C22.0 LIVER CELL CARCINOMA (HCC): ICD-10-CM

## 2022-10-05 PROCEDURE — 74011250636 HC RX REV CODE- 250/636: Performed by: RADIOLOGY

## 2022-10-05 PROCEDURE — 74183 MRI ABD W/O CNTR FLWD CNTR: CPT

## 2022-10-05 PROCEDURE — A9575 INJ GADOTERATE MEGLUMI 0.1ML: HCPCS | Performed by: RADIOLOGY

## 2022-10-05 RX ORDER — GADOTERATE MEGLUMINE 376.9 MG/ML
20 INJECTION INTRAVENOUS ONCE
Status: COMPLETED | OUTPATIENT
Start: 2022-10-05 | End: 2022-10-05

## 2022-10-05 RX ADMIN — GADOTERATE MEGLUMINE 20 ML: 376.9 INJECTION INTRAVENOUS at 11:29

## 2022-10-09 NOTE — PROGRESS NOTES
Letter sent via my chart regarding the MRI. The liver lesion has not changed. The kidney lesion is concerning for a cancer but has not changed in size. Urology is following.

## 2022-12-07 ENCOUNTER — TELEPHONE (OUTPATIENT)
Dept: HEMATOLOGY | Age: 67
End: 2022-12-07

## 2022-12-07 NOTE — TELEPHONE ENCOUNTER
Call Urology office and spoke with rep Shin and she states patient had an appointment 06/29/2022 and has a follow up on 7/05/2023 with Dr. Jefe Joseph.

## 2022-12-16 ENCOUNTER — APPOINTMENT (OUTPATIENT)
Dept: GENERAL RADIOLOGY | Age: 67
End: 2022-12-16
Attending: STUDENT IN AN ORGANIZED HEALTH CARE EDUCATION/TRAINING PROGRAM
Payer: MEDICARE

## 2022-12-16 ENCOUNTER — HOSPITAL ENCOUNTER (EMERGENCY)
Age: 67
Discharge: HOME | End: 2022-12-16
Attending: EMERGENCY MEDICINE
Payer: MEDICARE

## 2022-12-16 VITALS
TEMPERATURE: 97.5 F | HEART RATE: 95 BPM | SYSTOLIC BLOOD PRESSURE: 125 MMHG | OXYGEN SATURATION: 98 % | DIASTOLIC BLOOD PRESSURE: 64 MMHG | RESPIRATION RATE: 16 BRPM

## 2022-12-16 DIAGNOSIS — M10.9 GOUTY ARTHRITIS OF GREAT TOE: ICD-10-CM

## 2022-12-16 DIAGNOSIS — U07.1 COVID-19: Primary | ICD-10-CM

## 2022-12-16 PROCEDURE — 71046 X-RAY EXAM CHEST 2 VIEWS: CPT

## 2022-12-16 PROCEDURE — 99283 EMERGENCY DEPT VISIT LOW MDM: CPT

## 2022-12-16 PROCEDURE — 74011250637 HC RX REV CODE- 250/637: Performed by: EMERGENCY MEDICINE

## 2022-12-16 RX ORDER — METHYLPREDNISOLONE 4 MG/1
4 TABLET ORAL
Qty: 1 DOSE PACK | Refills: 0 | Status: SHIPPED | OUTPATIENT
Start: 2022-12-16

## 2022-12-16 RX ORDER — HYDROCODONE BITARTRATE AND ACETAMINOPHEN 5; 325 MG/1; MG/1
1 TABLET ORAL ONCE
Status: COMPLETED | OUTPATIENT
Start: 2022-12-16 | End: 2022-12-16

## 2022-12-16 RX ORDER — HYDROCODONE BITARTRATE AND ACETAMINOPHEN 5; 325 MG/1; MG/1
1 TABLET ORAL
Qty: 10 TABLET | Refills: 0 | Status: SHIPPED | OUTPATIENT
Start: 2022-12-16 | End: 2022-12-19

## 2022-12-16 RX ADMIN — HYDROCODONE BITARTRATE AND ACETAMINOPHEN 1 TABLET: 5; 325 TABLET ORAL at 17:23

## 2022-12-17 NOTE — ED PROVIDER NOTES
79-year-old male with history as below, presents to the emergency department stating that he was diagnosed with COVID 19 on12/8 and has been taking Paxlovid. He states that his symptoms have been improving but he still has some cough and diarrhea. Starting last night he developed pain in his bilateral great toes and he states that he thinks that he has gout. He states that they are very tender and felt sharp and burning and this pain has been constant since the onset. He took a dose of Tylenol at home to no avail of his symptoms. He denies any trauma to the area. He denies any fever, or any other medical concerns. He denies any known ingestion of red meat or EtOH or shellfish or other known precipitant for gout. Toe Pain   Pertinent negatives include no numbness, no back pain and no neck pain. Cough  Pertinent negatives include no chest pain, no chills, no headaches, no rhinorrhea, no sore throat, no myalgias, no shortness of breath, no nausea and no vomiting. Past Medical History:   Diagnosis Date    Arthritis     Autoimmune disease ([address]     ITP    CAD (coronary artery disease)     enlarged heart ? Cancer ([address]     skin ca removed from forehead    Chronic kidney disease     kidney stones    Coagulation disorder ([address]     low plts    Diabetes ([address]     type 2    GERD (gastroesophageal reflux disease)     Hepatic encephalopathy (HCC)     Hypertension     Ill-defined condition     obesity per pt    Ill-defined condition     anemia    Liver disease     elevated liver enzymes    Liver transplant candidate     Pt is on the list for a liver transplant as of 8/2018    PUD (peptic ulcer disease)     stomach ulcers       Past Surgical History:   Procedure Laterality Date    HX APPENDECTOMY      HX OTHER SURGICAL      mohs procedure for skin ca.     HX UROLOGICAL      vasectomy    DE ABDOMEN SURGERY PROC UNLISTED      ana         Family History:   Problem Relation Age of Onset    Cancer Mother multiple myeloma    Cancer Father         prostate    Mult Sclerosis Sister     Cancer Maternal Grandmother         ?where    Cancer Maternal Grandfather         ?where    Heart Failure Paternal Grandmother     Cancer Paternal Grandfather         ? where       Social History     Socioeconomic History    Marital status:      Spouse name: Not on file    Number of children: Not on file    Years of education: Not on file    Highest education level: Not on file   Occupational History    Not on file   Tobacco Use    Smoking status: Never    Smokeless tobacco: Never   Vaping Use    Vaping Use: Never used   Substance and Sexual Activity    Alcohol use: No    Drug use: No    Sexual activity: Not on file   Other Topics Concern    Not on file   Social History Narrative    Not on file     Social Determinants of Health     Financial Resource Strain: Not on file   Food Insecurity: Not on file   Transportation Needs: Not on file   Physical Activity: Not on file   Stress: Not on file   Social Connections: Not on file   Intimate Partner Violence: Not on file   Housing Stability: Not on file         ALLERGIES: Patient has no known allergies. Review of Systems   Constitutional:  Negative for activity change, appetite change, chills and fever. HENT:  Negative for congestion, rhinorrhea, sinus pain, sneezing and sore throat. Eyes:  Negative for photophobia and visual disturbance. Respiratory:  Positive for cough. Negative for shortness of breath. Cardiovascular:  Negative for chest pain. Gastrointestinal:  Positive for diarrhea. Negative for abdominal pain, blood in stool, constipation, nausea and vomiting. Genitourinary:  Negative for difficulty urinating, dysuria, flank pain, hematuria, penile pain and testicular pain. Musculoskeletal:  Positive for arthralgias (Bilateral great toes). Negative for back pain, myalgias and neck pain. Skin:  Negative for rash and wound.    Neurological:  Negative for syncope, weakness, light-headedness, numbness and headaches. Psychiatric/Behavioral:  Negative for self-injury and suicidal ideas. All other systems reviewed and are negative. Vitals:    12/16/22 1356   BP: 125/64   Pulse: 95   Resp: 16   Temp: 97.5 °F (36.4 °C)   SpO2: 98%            Physical Exam  Vitals and nursing note reviewed. Constitutional:       General: He is not in acute distress. Appearance: Normal appearance. He is well-developed. He is obese. He is not diaphoretic. Comments: Very pleasant, no acute distress. HENT:      Head: Normocephalic and atraumatic. Nose: Nose normal.   Eyes:      Extraocular Movements: Extraocular movements intact. Conjunctiva/sclera: Conjunctivae normal.      Pupils: Pupils are equal, round, and reactive to light. Cardiovascular:      Rate and Rhythm: Normal rate and regular rhythm. Heart sounds: Normal heart sounds. Pulmonary:      Effort: Pulmonary effort is normal.      Breath sounds: Normal breath sounds. Abdominal:      General: There is no distension. Palpations: Abdomen is soft. Tenderness: There is no abdominal tenderness. Musculoskeletal:         General: Tenderness present. Cervical back: Neck supple. Legs:    Skin:     General: Skin is warm and dry. Neurological:      General: No focal deficit present. Mental Status: He is alert and oriented to person, place, and time. Cranial Nerves: No cranial nerve deficit. Sensory: No sensory deficit. Motor: No weakness. Coordination: Coordination normal.        MDM    30-year-old male with recent COVID-19 presents with cough and diarrhea that he states are improving on paxlovid. He is afebrile with vital signs stable satting 98% on room air. Also with bilateral great toe tenderness, suspect gout. He was given dose of [place] in the ED and Rx for same for pain relief and Rx Medrol Dosepak for symptomatic relief.   This will also likely improve his COVID-19 symptoms as well. Recommended PCP follow-up for further evaluation and return precautions were given for worsening or concerns. This plan was discussed with the patient and his wife at the bedside and they stated both understanding and agreement. Please note that this dictation was completed with [employer], the computer voice recognition software. Quite often unanticipated grammatical, syntax, homophones, and other interpretive errors are inadvertently transcribed by the computer software. Please disregard these errors. Please excuse any errors that have escaped final proofreading.           Procedures n/a

## 2022-12-20 ENCOUNTER — TELEPHONE (OUTPATIENT)
Dept: HEMATOLOGY | Age: 67
End: 2022-12-20

## 2022-12-20 NOTE — TELEPHONE ENCOUNTER
Called and spoke to patient, he still has cough from COVID but states that he tested negative this week. Reviewed that last MRI was in October, plan to get labs with FU appt 1/18/23 with April Agnieszka since his appt is at 8am - reminded him not to take his immunosuppression before labs. He verbalized understanding.        ----- Message from Tanisha Erazo sent at 12/20/2022  8:17 AM EST -----  Regarding: FW: For Jose Raul Dickens R.N.    ----- Message -----  From: Konstantin Paul  Sent: 12/19/2022   6:19 PM EST  To: [facility]ia [facility]  Subject: For Jose Raul Dickens R.N.                        [place] and April! Happiest of Holidays to ALL at the [address]!!!    I am just taking a moment to fill both of you in to my health the last 2 weeks. I saw Dr Leticia Gordon (Kidney) on Dec 8th and he was very happy with my visit. I weighed 229 and my BP was 130/82. He was a very happy camper. The next day I developed a heavy cough (Katie Began had been being treated for pneumonia) and called Dr. Omkar Schaffer office. They told my to take a home Covid test and sure enough I tested Covid Positive. Katie Began then took a test and she too was positive. I was placed on a cough suppressant (Promethazine) and the Pfizer Paxlovid protocol. Upon completing  that I had a sharp right foot toe pain (which has been attributed to a fracture in Jan 22). However Friday morning upon arising I had pain in both big toes and could not put weight on my feet. I grabbed a walker and made it to the [facility] ER where my suspicions were confirmed, I had Gout! Dr. Melita Montenegro placed me on 3 days of Hydrocodone which has helped tremendously with the pain. I do not feel that any of this has damaged my liver, and as of tonight 12/19/22 I feel almost normal.  Just want to keep you up to date. QUESTIONS:  Should I have an MRI before my appointment with you on the 18th of January. Also should I do blood work at the [facility] by the 12th or so? Fun times!

## 2023-01-09 ENCOUNTER — TELEPHONE (OUTPATIENT)
Dept: HEMATOLOGY | Age: 68
End: 2023-01-09

## 2023-01-09 DIAGNOSIS — Z94.4 H/O LIVER TRANSPLANT (HCC): Primary | ICD-10-CM

## 2023-01-09 DIAGNOSIS — C22.0 HEPATOCELLULAR CARCINOMA (HCC): Primary | ICD-10-CM

## 2023-01-09 DIAGNOSIS — N28.9 KIDNEY LESION: ICD-10-CM

## 2023-01-09 DIAGNOSIS — C22.0 LIVER CELL CARCINOMA (HCC): ICD-10-CM

## 2023-01-09 NOTE — TELEPHONE ENCOUNTER
Aches and pain still post COVID with no respiratory symptoms, left kidney/flank pain last night and couldn't sleep well, today he feels some better, 1 year FU with urology is in December 2023, FU with Agnieszka is scheduled for 1/18/23, calling to see if MRI should be completed prior to appt. Discussed patient symptoms with Ms. Aaron, patient did take Paxlovid for COVID, back on immunosuppression now, Per Ms. Aaron, check labs and send patient to repeat MRI prior to his appt on 1/18/23. Orders entered and scheduled MRI for 1/16/23 at 8:15am.    Returned call to patient with the above information. Patient verbalized understanding.

## 2023-01-16 ENCOUNTER — HOSPITAL ENCOUNTER (OUTPATIENT)
Dept: MRI IMAGING | Age: 68
Discharge: HOME | End: 2023-01-16
Attending: NURSE PRACTITIONER
Payer: MEDICARE

## 2023-01-16 VITALS — WEIGHT: 220 LBS | BODY MASS INDEX: 30.68 KG/M2

## 2023-01-16 DIAGNOSIS — C22.0 HEPATOCELLULAR CARCINOMA (HCC): ICD-10-CM

## 2023-01-16 DIAGNOSIS — N28.9 KIDNEY LESION: ICD-10-CM

## 2023-01-16 PROCEDURE — A9575 INJ GADOTERATE MEGLUMI 0.1ML: HCPCS | Performed by: NURSE PRACTITIONER

## 2023-01-16 PROCEDURE — 74011250636 HC RX REV CODE- 250/636: Performed by: NURSE PRACTITIONER

## 2023-01-16 PROCEDURE — 74183 MRI ABD W/O CNTR FLWD CNTR: CPT

## 2023-01-16 RX ORDER — GADOTERATE MEGLUMINE 376.9 MG/ML
20 INJECTION INTRAVENOUS ONCE
Status: COMPLETED | OUTPATIENT
Start: 2023-01-16 | End: 2023-01-16

## 2023-01-16 RX ADMIN — GADOTERATE MEGLUMINE 20 ML: 376.9 INJECTION INTRAVENOUS at 09:08

## 2023-01-17 DIAGNOSIS — F41.9 ANXIETY: ICD-10-CM

## 2023-01-17 RX ORDER — FLUOXETINE 10 MG/1
CAPSULE ORAL
Qty: 90 CAPSULE | Refills: 2 | Status: SHIPPED | OUTPATIENT
Start: 2023-01-17

## 2023-01-18 ENCOUNTER — OFFICE VISIT (OUTPATIENT)
Dept: HEMATOLOGY | Age: 68
End: 2023-01-18
Payer: MEDICARE

## 2023-01-18 VITALS
SYSTOLIC BLOOD PRESSURE: 137 MMHG | TEMPERATURE: 97 F | WEIGHT: 225 LBS | RESPIRATION RATE: 17 BRPM | BODY MASS INDEX: 31.5 KG/M2 | HEIGHT: 71 IN | DIASTOLIC BLOOD PRESSURE: 61 MMHG | HEART RATE: 84 BPM | OXYGEN SATURATION: 100 %

## 2023-01-18 DIAGNOSIS — N18.32 STAGE 3B CHRONIC KIDNEY DISEASE (HCC): ICD-10-CM

## 2023-01-18 DIAGNOSIS — Z79.60 LONG-TERM USE OF IMMUNOSUPPRESSANT MEDICATION: ICD-10-CM

## 2023-01-18 DIAGNOSIS — D50.8 IRON DEFICIENCY ANEMIA SECONDARY TO INADEQUATE DIETARY IRON INTAKE: ICD-10-CM

## 2023-01-18 DIAGNOSIS — D69.6 THROMBOCYTOPENIA, UNSPECIFIED (HCC): ICD-10-CM

## 2023-01-18 DIAGNOSIS — Z94.4 H/O LIVER TRANSPLANT (HCC): Primary | ICD-10-CM

## 2023-01-18 LAB
FERRITIN SERPL-MCNC: 166 NG/ML (ref 26–388)
IRON SATN MFR SERPL: 39 % (ref 20–50)
IRON SERPL-MCNC: 94 UG/DL (ref 35–150)
TIBC SERPL-MCNC: 239 UG/DL (ref 250–450)

## 2023-01-18 PROCEDURE — G0463 HOSPITAL OUTPT CLINIC VISIT: HCPCS | Performed by: NURSE PRACTITIONER

## 2023-01-18 RX ORDER — RISEDRONATE SODIUM 150 MG/1
150 TABLET, FILM COATED ORAL
COMMUNITY
Start: 2022-10-26

## 2023-01-18 NOTE — PROGRESS NOTES
Identified pt with two pt identifiers(name and ). Reviewed record in preparation for visit and have obtained necessary documentation. Chief Complaint   Patient presents with    Other     Hepatocellular carcinoma ([address]  3 mo f/u          Vitals:    23 0804   BP: 137/61   Pulse: 84   Resp: 17   Temp: 97 °F (36.1 °C)   TempSrc: Temporal   SpO2: 100%   Weight: 225 lb (102.1 kg)   Height: 5' 11\" (1.803 m)   PainSc:   2   PainLoc: Back       Health Maintenance Review: Patient reminded of \"due or due soon\" health maintenance. I have asked the patient to contact his/her primary care provider (PCP) for follow-up on his/her health maintenance. Coordination of Care Questionnaire:  :   1) Have you been to an emergency room, urgent care, or hospitalized since your last visit? If yes, where when, and reason for visit? Yes,  [facility]  for Covid symptoms       2. Have seen or consulted any other health care provider since your last visit? If yes, where when, and reason for visit? no      Patient is accompanied by wife I have received verbal consent from Tara Miramontes to discuss any/all medical information while they are present in the room.

## 2023-01-18 NOTE — PROGRESS NOTES
[address] [facility], [place], [address], [place], [place]      KENNETH Mei, ACNP-IKE Aaron [facility]HAROLDO Morse, [facility]       [address]    at [address], [address]  [address].    [phone]    FAX: [address]    at [facility]    [address], [address] [place] [address], [address]    [phone]    FAX: [phone]       Patient Care Team:  Shashank Mittal MD as PCP - General (Family Medicine)  Shashank Mittal MD as PCP - [facility]  Nadiya Hall MD (Nephrology)  Miah Saavedra MD (Gastroenterology)  Claudene Sago, MD as Physician (Internal Medicine Physician)  Ladarius Vines MD as Physician (Neurology)  Lynda Carrasquillo RN as Care Coordinator (Hepatology)  Berna Aaron NP as Nurse Practitioner (Hepatology)      Problem List  Date Reviewed: 9/26/2022            Codes Class Noted    Renal mass ICD-10-CM: N28.89  ICD-9-CM: 593.9  6/26/2022        Chronic renal disease, stage III ICD-10-CM: N18.30  ICD-9-CM: 585.3  6/22/2022        Thrombocytopenia, unspecified ICD-10-CM: D69.6  ICD-9-CM: 287.5  3/22/2022        Generalized anxiety disorder ICD-10-CM: F41.1  ICD-9-CM: 300.02  6/22/2020        Stage 2 chronic kidney disease ICD-10-CM: N18.2  ICD-9-CM: 585.2  2/5/2020        Severe obesity ([address] ICD-10-CM: E66.01  ICD-9-CM: 278.01  2/4/2020        H/O liver transplant [facility]) ICD-10-CM: Z94.4  ICD-9-CM: V42.7  12/1/2018        Long-term use of immunosuppressant medication ICD-10-CM: Z79.60  ICD-9-CM: V58.69  12/1/2018        Liver transplant complication [facility]) FHR-54-EE: T86.40  ICD-9-CM: 996.82  12/1/2018        Neuropathy involving both lower extremities ICD-10-CM: G57.93  ICD-9-CM: 356.9  1/2/2018        CAMEJO (nonalcoholic steatohepatitis) ICD-10-CM: K75.81  ICD-9-CM: 571.8  11/2/2017        GERD (gastroesophageal reflux disease) (Chronic) ICD-10-CM: K21.9  ICD-9-CM: 530.81  2/22/2016        CAD (coronary artery disease) (Chronic) ICD-10-CM: I25.10  ICD-9-CM: 414.00  2/22/2016        DM type 2 (diabetes mellitus, type 2) (HCC) (Chronic) ICD-10-CM: E11.9  ICD-9-CM: 250.00  2/22/2016           Hang Fleming returns to the [address] for management of liver transplant graft function, to monitor and adjust immune suppression and to assess for recurrence of the primary liver disease. The active problem list, all pertinent past medical history, medications, liver histology, endoscopic studies, radiologic findings and laboratory findings related to the liver disorder were reviewed with the patient. The patient underwent a liver transplant at [facility] [place] in 10/2018. He was found to have a small [address] in his liver explant. Post-transplant imaging are now being performed every 6 months with no recurrence. There was a new finding of a questionable LIRADS 5 lesion 6/2021, imaging was unchanged 8/2021. The patient is taking the following for immune suppression: Tacrolimus 0.5 mg twice daily. This was a recent change 9/2021. The patient discontinued sirolimus 1/2020 due to proteinuria, mouth ulcers and lower extremity edema. He continues to follow with Nephrology. The patient had a surveillance MRI performed 6/2022 which demonstrated no recurrence of HCC. The was an incidental finding of a renal mass that was T2 Hyperintense. This was not noted on previous imaging but the radiologist felt it was present dating back to 2020 and has increased in size. The MRI was reviewed by Urology who felt no treatment was needed at this time. Since the last office visit the patient was diagnosed with Covid 12/09/2022.  He has remained extremely fatigued since that time. Weight is down ~ 12 lbs due to poor appetite and early satiety. Colonoscopy is scheduled for 4/2023. The patient has the following symptoms that may be related to liver disease: Fatigue. The patient has not experienced fatigue, fevers, chills, or pain in the right side over the liver. The patient completes all daily activities without any functional limitations. 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 The date of the LT was 10/2018. Have performed laboratory testing to monitor liver function and degree of liver injury. This included BMP, hepatic panel, CBC with platelet count and INR. Laboratory testing from 1/16/2023 reviewed in detail. The liver transaminases and ALP are normal. The liver function is normal. The platelet count is depressed. Assessment of liver fibrosis was performed with Fibroscan in the office today. The result was 8.1 kPa which correlates with stage 2 septal fibrosis. The CAP score of 134 suggests NO hepatic steatosis. Hepatocellular carcinoma   This was present in native liver prior to undergoing liver transplant. There was viable HCC in the liver explant,  2 lesions in the right lobe. 12/2020 MRI of liver demonstrated no liver mass lesions. 12/2020 Chest CT demonstrated no metastases. Stable pulmonary nodule. 5/2021. MRI of liver demonstrated a new 5 x 9 mm lesion with no washout LIRADS 3.   5/2021. Chest CT. Stable 2 pulmonary nodule. 8/2021. MRI of liver No change in 5 and 8 mm focus of enhancement. Large splenorenal varices. 3/2022. MRI of liver. Small focus 9 x 5 mm of arterial enhancement unchanged. 3/2022. CT of Chest. Previously seen granulomas not visualized. Will continue screening up to year 5 which will be 10/2023.   6/2022. MRI of liver. No recurrence of HCC. New left renal mass concerning for renal cell carcinoma.    Urology evaluated and will await the next imaging scheduled for 10/2022. This will also evaluate for recurrence of [address]   10/2022 MRI of Abdomen. No significant change in the 8 mm area of focus. No change in left kidney lesion. 2023. MRI of Abdomen. Small increase in liver lesion which no measures 9 mm. T2 lesion in kidney has decreased in size and barely visible. Immune Suppression  The patient was switched from Tacrolimus to Sirolimus because of an elevation in Scr. Sirolimus was subsequently discontinued 2020 for proteinuria, mouth ulcers and worsening lower extremity edema. The patient is currently taking TAC at a dose of 1 mg in am an 0.5 mg in pm.   The level is in therapeutic range at 3-4. Will continue with the current dose of 1 mg in am and 0.5 mg in pm.  Sr. Creatinine is ranging elevated in the 1.5-1.7 range but is stable. Fatigue and Cognitive Changes  This is ongoing. TSH normal.     Anemia   This is of unclear etiology, it;s most likely due to CKD. The HGB has now  to 9.9. Will order ferritin and iron sat. Iron replacement will be initiated if low. Neutropenia   The WBC has decreased to 3.1   This resolved when cellcept was discontinued. Thrombocytopenia   This is secondary to cirrhosis. Some patients do not recover the PLT back to normal after LT. He has a very large splenorenal shunt. This was evaluated by [facility] who feels no intervention is needed. The platelet count is adequate for the patient to undergo procedures without the need for platelet transfusion or platelet growth factors. Chronic kidney injury   This is a common adverse event of immune suppression. The Sr. Creatinine has remained elevated but stable in the 1.5-1.7. NSAIDs should be avoided since these agents can worsen renal insufficiency. Lower Extremity Edema  This is now resolved. Hypercholesterolemia   This can be caused by immune suppression. Serum cholesterol will be monitored at periodic intervals.     Hypertension   This is a common side effect of immune suppression. The patient reports improvement in the blood pressure on Lisinopril. Currently managed by the PCP. Low serum magnesium   This is a common side effect of immune suppression. The serum Mag is in the normal range. No treatment is needed. Generalized Anxiety Disorder   Patient is currently on Prozac 10 mg, symptoms are well controlled. Will continue Prozac at 10 mg daily. Osteoporosis   Osteoporosis is commin in patients with cirhrosis prior to liver transplant. The most recent DEXA scan 11/2021 demonstrates a 5.9% bone loss in the hips. Fosamax was started 11/2021. This caused GI upset and he is now on Risedronate     Monitoring for skin Cancer  The patient was counseled regarding increased risk of skin cancer in transplant recipients and need to have any new skin lesions evaluated by dermatology and removed if suspicious. The patient was instructed to see Dermatology annually examination. Vaccinations  Routine vaccinations against other bacterial and viral agents can be performed as long as this is with attentuatted virus. Live virus vaccines should not be administered. Annual flu vaccination should be administered. ALLERGIES  No Known Allergies    MEDICATIONS  Current Outpatient Medications   Medication Sig    FLUoxetine (PROzac) 10 mg capsule TAKE ONE CAPSULE BY MOUTH ONE TIME DAILY    glimepiride (AMARYL) 1 mg tablet Take 1 mg by mouth daily. Taking 2 tabs in the evening    tacrolimus (PROGRAF) 0.5 mg capsule Take 2 cap in am and 1 cap in pm    amLODIPine (NORVASC) 10 mg tablet Take  by mouth daily. glucose blood VI test strips (OneTouch Ultra Blue Test Strip) strip 1 Strip by Injection route daily. lisinopriL (PRINIVIL, ZESTRIL) 20 mg tablet Take 1 Tablet by mouth daily. Every other day    cholecalciferol (VITAMIN D3) (5000 Units/125 mcg) tab tablet Take  by mouth daily.     metFORMIN (GLUCOPHAGE) 1,000 mg tablet Take 1,000 mg by mouth two (2) times a day. aspirin delayed-release 81 mg tablet Take 81 mg by mouth daily. levothyroxine (SYNTHROID) 75 mcg tablet Take 75 mcg by mouth daily. risedronate (ACTONEL) 150 mg tablet     methylPREDNISolone (Medrol, Abel,) 4 mg tablet Take 1 Tablet by mouth Specific Days and Specific Times. (Patient not taking: Reported on 2023)     No current facility-administered medications for this visit. SYSTEM REVIEW NOT RELATED TO LIVER DISEASE OR REVIEWED ABOVE:  Constitution systems: Negative for fever, chills, weight gain, weight loss. Eyes: Negative for visual changes. ENT: Negative for sore throat, painful swallowing. Respiratory: Negative for cough, hemoptysis, SOB. Cardiology: Negative for chest pain, palpitations. GI:  Negative for constipation or diarrhea. : Negative for urinary frequency, dysuria, hematuria, nocturia. Skin: Negative for rash. Hematology: Negative for easy bruising, blood clots. Musculo-skeletal: Negative for back pain, muscle pain, weakness. Neurologic: Negative for headaches, dizziness, vertigo, memory problems not related to HE. Psychology: Negative for anxiety, depression. FAMILY HISTORY:  The father  of prostate cancer. The mother  of multiple myeloma. There is no family history of liver disease. SOCIAL HISTORY:  The patient is . The patient has 3 children. The patient occasional cigar. The patient has been abstinent from alcohol since 2016. The patient does not work, he is on social security. PHYSICAL EXAMINATION:  Visit Vitals  /61 (BP 1 Location: Left upper arm, BP Patient Position: Sitting, BP Cuff Size: Adult)   Pulse 84   Temp 97 °F (36.1 °C) (Temporal)   Resp 17   Ht 5' 11\" (1.803 m)   Wt 225 lb (102.1 kg)   SpO2 100%   BMI 31.38 kg/m²       General: No acute distress. Eyes: Sclera anicteric. ENT: No oral lesions. Thyroid normal.  Nodes: No adenopathy. Skin: No spider angiomata. No jaundice. No palmar erythema. Respiratory: Lungs clear to auscultation. Cardiovascular: Regular heart rate. No murmurs. No JVD. Abdomen: Soft non-tender, liver size normal to percussion/palpation. Spleen not palpable. No obvious ascites. Extremities: No edema. No muscle wasting. No gross arthritic changes. Neurologic: Alert and oriented. Cranial nerves grossly intact. No asterixis. LABORATORY STUDIES:  Liver [place] [address] & Units 2023   WBC 4.1 - 11.1 K/uL 3.1 (L) 3.3 (L)   ANC 1.8 - 8.0 K/UL 1.7 (L) 2.0   HGB 12.1 - 17.0 g/dL 9.9 (L) 11.4 (L)    - 400 K/uL 90 (L) 118 (L)   INR 0.9 - 1.1   1.0 1.0   AST 15 - 37 U/L 13 (L) 24   ALT 12 - 78 U/L 19 38   Alk Phos 45 - 117 U/L 77 92   Bili, Total 0.2 - 1.0 MG/DL 1.1 (H) 1.9 (H)   Bili, Direct 0.0 - 0.2 MG/DL 0.3 (H) 0.5 (H)   Albumin 3.5 - 5.0 g/dL 3.7 4.0   BUN 6 - 20 MG/DL 16 28 (H)   Creat 0.70 - 1.30 MG/DL 1.53 (H) 1.72 (H)   Creat (iSTAT) 0.6 - 1.3 mg/dL     Na 136 - 145 mmol/L 140 139   K 3.5 - 5.1 mmol/L 5.0 5.0   Cl 97 - 108 mmol/L 107 108   CO2 21 - 32 mmol/L 30 29   Glucose 65 - 100 mg/dL 170 (H) 150 (H)   Magnesium 1.6 - 2.4 mg/dL 1.7 1.5 (L)     Liver Virology and Transplant Immune Suppression Tacrolimus Level   Latest Ref Rng & Units 2.0 - 20.0 ng/mL   2022 3.9   2022 2.2     Liver Virology and Transplant Immune Suppression Tacrolimus   Latest Ref Rng & Units Ther Rn.0-15.0 ng/mL   2023 3.8 (L)   2022 3.7 (L)     Cancer Screening Latest Ref Rng & Units 2022 12/15/2021 2021   AFP, Serum 0.0 - 8.4 ng/mL 1.7 1.7 1.6   AFP-L3% 0.0 - 9.9 % Comment Comment Comment     SEROLOGIES:  2018.   HAV total positive, HBsurface antigne negative, anti-HBcore negative, anti-HBsurface negative, anti-HCV negative, Anti-HIV negative, CMV IgG negative, HSV IgG positive    Serologies Latest Ref Rng & Units 2019   Ferritin 30 - 400 ng/mL 153   Iron % Saturation 15 - 55 % 32     LIVER HISTOLOGY:  7/2014: Cirhosis with mild mixed macro- and microvesicular steatosis. 10/2018. Liver explant from [facility]. Multifocal moderately differentiated HCC.  2.3 cm HCC right lobe. 1.3 cm right lobe. No microscopic invasion. 9/2022. [facility]. EkPa was 8.1. IQR/med 7%. . The results suggested a fibrosis level of F2. The CAP score suggests there is no significant hepatic steatosis. ENDOSCOPIC PROCEDURES:  Not available or performed    RADIOLOGY:  5/2020. MRI of liver. No MRI evidence of hepatocellular carcinoma status post liver transplantation. Splenomegaly with large splenorenal shunt varices. Bilateral gynecomastia. Unremarkable exam otherwise. 5/2020. Chest CT. No pulmonary metastases. Massive splenorenal shunt. Splenomegaly. Post liver transplant. 6/2020. Ultrasound of liver with Duplex. Increased echogenicity of the liver. Patent hepatic veins and portal veins. Portal vein velocity within normal limits. Splenic varices. 12/2020. MRI of liver. No MRI evidence of hepatocellular carcinoma recurrence status post liver transplant with persistent splenomegaly and large splenorenal shunt varices. 12/2020. CT of Chest. No acute process on CT. 2 mm anterior right lower lobe nodule is unchanged since 2019. Recommend CT chest in mid to late April, 2021 to document two-year stability of this likely benign nodule. No other nodule. Right middle lobe nodule from 2019 is no longer present. 5/2021. MRI of liver. New area of enhancement of 5 x 8 mm with no washout LIRADS 3. No recurrence of HCC. Splenomegaly with large shunt. 5/2021. Chest CT. Pulmonary nodule unchanged. 8/2021. MRI of liver. Unchanged focus of 5 and 8 mm lesion. No recurrence of HCC. Splenomegaly with large spleno-renal shunt.    See above for updated imaging     OTHER TESTING:  Not available or performed    FOLLOW-UP:  All of the issues listed above in the Assessment and Plan were discussed with the patient. All questions were answered. The patient expressed a clear understanding of the above. [address] [address] in 3 months for ongoing monitoring. April MARISOL Perez-[facility] of [address] [address] [address], [address] [facility].  [address] [facility]

## 2023-02-24 DIAGNOSIS — Z79.60 LONG-TERM USE OF IMMUNOSUPPRESSANT MEDICATION: ICD-10-CM

## 2023-02-24 DIAGNOSIS — Z94.4 H/O LIVER TRANSPLANT (HCC): ICD-10-CM

## 2023-02-24 RX ORDER — TACROLIMUS 0.5 MG/1
CAPSULE ORAL
Qty: 270 CAPSULE | Refills: 3 | Status: SHIPPED | OUTPATIENT
Start: 2023-02-24

## 2023-04-15 LAB — HBA1C MFR BLD HPLC: 4.9 %

## 2023-04-21 DIAGNOSIS — Z79.60 LONG-TERM USE OF IMMUNOSUPPRESSANT MEDICATION: ICD-10-CM

## 2023-04-21 DIAGNOSIS — Z94.4 H/O LIVER TRANSPLANT (HCC): Primary | ICD-10-CM

## 2023-04-30 ENCOUNTER — DOCUMENTATION ONLY (OUTPATIENT)
Dept: HEMATOLOGY | Age: 68
End: 2023-04-30

## 2023-05-02 ENCOUNTER — OFFICE VISIT (OUTPATIENT)
Dept: HEMATOLOGY | Age: 68
End: 2023-05-02
Payer: MEDICARE

## 2023-05-02 VITALS
WEIGHT: 228 LBS | OXYGEN SATURATION: 98 % | HEART RATE: 75 BPM | SYSTOLIC BLOOD PRESSURE: 144 MMHG | DIASTOLIC BLOOD PRESSURE: 69 MMHG | HEIGHT: 71 IN | BODY MASS INDEX: 31.92 KG/M2 | TEMPERATURE: 97.2 F

## 2023-05-02 DIAGNOSIS — N28.89 RENAL MASS: ICD-10-CM

## 2023-05-02 DIAGNOSIS — D69.6 THROMBOCYTOPENIA, UNSPECIFIED (HCC): ICD-10-CM

## 2023-05-02 DIAGNOSIS — N18.32 TYPE 2 DIABETES MELLITUS WITH STAGE 3B CHRONIC KIDNEY DISEASE, WITHOUT LONG-TERM CURRENT USE OF INSULIN (HCC): ICD-10-CM

## 2023-05-02 DIAGNOSIS — R16.0 LIVER MASS: ICD-10-CM

## 2023-05-02 DIAGNOSIS — Z94.4 H/O LIVER TRANSPLANT (HCC): Primary | ICD-10-CM

## 2023-05-02 DIAGNOSIS — N18.32 STAGE 3B CHRONIC KIDNEY DISEASE (HCC): ICD-10-CM

## 2023-05-02 DIAGNOSIS — K75.81 NASH (NONALCOHOLIC STEATOHEPATITIS): ICD-10-CM

## 2023-05-02 DIAGNOSIS — E11.22 TYPE 2 DIABETES MELLITUS WITH STAGE 3B CHRONIC KIDNEY DISEASE, WITHOUT LONG-TERM CURRENT USE OF INSULIN (HCC): ICD-10-CM

## 2023-05-02 DIAGNOSIS — E11.9 TYPE 2 DIABETES MELLITUS WITHOUT COMPLICATION, WITHOUT LONG-TERM CURRENT USE OF INSULIN (HCC): Chronic | ICD-10-CM

## 2023-05-02 DIAGNOSIS — E11.40 TYPE 2 DIABETES MELLITUS WITH DIABETIC NEUROPATHY, WITHOUT LONG-TERM CURRENT USE OF INSULIN (HCC): ICD-10-CM

## 2023-05-02 DIAGNOSIS — Z79.60 LONG-TERM USE OF IMMUNOSUPPRESSANT MEDICATION: ICD-10-CM

## 2023-05-02 PROCEDURE — G0463 HOSPITAL OUTPT CLINIC VISIT: HCPCS | Performed by: NURSE PRACTITIONER

## 2023-05-02 PROCEDURE — G8536 NO DOC ELDER MAL SCRN: HCPCS | Performed by: NURSE PRACTITIONER

## 2023-05-02 PROCEDURE — 1101F PT FALLS ASSESS-DOCD LE1/YR: CPT | Performed by: NURSE PRACTITIONER

## 2023-05-02 PROCEDURE — 1123F ACP DISCUSS/DSCN MKR DOCD: CPT | Performed by: NURSE PRACTITIONER

## 2023-05-02 PROCEDURE — 2022F DILAT RTA XM EVC RTNOPTHY: CPT | Performed by: NURSE PRACTITIONER

## 2023-05-02 PROCEDURE — G8432 DEP SCR NOT DOC, RNG: HCPCS | Performed by: NURSE PRACTITIONER

## 2023-05-02 PROCEDURE — 3017F COLORECTAL CA SCREEN DOC REV: CPT | Performed by: NURSE PRACTITIONER

## 2023-05-02 PROCEDURE — G8427 DOCREV CUR MEDS BY ELIG CLIN: HCPCS | Performed by: NURSE PRACTITIONER

## 2023-05-02 PROCEDURE — 99215 OFFICE O/P EST HI 40 MIN: CPT | Performed by: NURSE PRACTITIONER

## 2023-05-02 PROCEDURE — G8417 CALC BMI ABV UP PARAM F/U: HCPCS | Performed by: NURSE PRACTITIONER

## 2023-05-02 PROCEDURE — 3046F HEMOGLOBIN A1C LEVEL >9.0%: CPT | Performed by: NURSE PRACTITIONER

## 2023-05-02 RX ORDER — METFORMIN HYDROCHLORIDE 500 MG/1
TABLET ORAL 2 TIMES DAILY WITH MEALS
COMMUNITY

## 2023-05-03 PROBLEM — E11.22 TYPE 2 DIABETES MELLITUS WITH CHRONIC KIDNEY DISEASE (HCC): Status: ACTIVE | Noted: 2023-05-03

## 2023-05-03 PROBLEM — E11.40 TYPE 2 DIABETES MELLITUS WITH DIABETIC NEUROPATHY (HCC): Status: ACTIVE | Noted: 2023-05-03

## 2023-05-04 DIAGNOSIS — Z94.4 LIVER TRANSPLANT STATUS (HCC): Primary | ICD-10-CM

## 2023-05-04 DIAGNOSIS — C22.0 LIVER CELL CARCINOMA (HCC): ICD-10-CM

## 2023-05-18 ENCOUNTER — TELEPHONE (OUTPATIENT)
Age: 68
End: 2023-05-18

## 2023-05-18 NOTE — TELEPHONE ENCOUNTER
Received VM from patient asking if he could enjoy the [place] while out of town on a trip. Returned call to patient, he states that he and his wife will be staying in a nice hotel with a tub jacuzzi and wants to know if it's ok to use it. Reviewed that transplant patients should not go into dyer like ocean, rivers, ponds or communal hot tubs due to infection risks; however, it you are using a personal tub that has been cleaned and water isn't from a well - it should be ok. Just be aware of the risks, patient states he is well aware and just wanted to be sure about the risks.

## 2023-09-06 ENCOUNTER — TELEPHONE (OUTPATIENT)
Age: 68
End: 2023-09-06

## 2023-09-06 NOTE — TELEPHONE ENCOUNTER
Spoke with patient, received lab results from 8/18/2023 visit to Nephrology - these were reviewed by Ms. Krista, no issues and no medication changes at this time. Patient states there were not changes made by the Nephrologist either. Patient feeling well and happy to feel healthy again. Confirmed MRI scheduled for 10/3/2023 and FU with MsFazal Krista on 11/1/23.

## 2023-10-03 ENCOUNTER — HOSPITAL ENCOUNTER (OUTPATIENT)
Facility: HOSPITAL | Age: 68
Discharge: HOME | End: 2023-10-06
Payer: MEDICARE

## 2023-10-03 DIAGNOSIS — C22.0 LIVER CELL CARCINOMA (HCC): ICD-10-CM

## 2023-10-03 DIAGNOSIS — Z94.4 LIVER TRANSPLANT STATUS (HCC): ICD-10-CM

## 2023-10-03 LAB — CREAT BLD-MCNC: 2.2 MG/DL (ref 0.6–1.3)

## 2023-10-03 PROCEDURE — 82565 ASSAY OF CREATININE: CPT

## 2023-10-03 PROCEDURE — 74183 MRI ABD W/O CNTR FLWD CNTR: CPT

## 2023-10-03 PROCEDURE — A9575 INJ GADOTERATE MEGLUMI 0.1ML: HCPCS | Performed by: INTERNAL MEDICINE

## 2023-10-03 PROCEDURE — 6360000004 HC RX CONTRAST MEDICATION: Performed by: INTERNAL MEDICINE

## 2023-10-03 RX ORDER — GADOTERATE MEGLUMINE 376.9 MG/ML
INJECTION INTRAVENOUS
Status: DISPENSED
Start: 2023-10-03 | End: 2023-10-04

## 2023-10-03 RX ADMIN — GADOTERATE MEGLUMINE 20 ML: 376.9 INJECTION, SOLUTION INTRAVENOUS at 12:13

## 2023-10-21 ENCOUNTER — APPOINTMENT (OUTPATIENT)
Facility: HOSPITAL | Age: 68
End: 2023-10-21
Payer: MEDICARE

## 2023-10-21 ENCOUNTER — HOSPITAL ENCOUNTER (EMERGENCY)
Facility: HOSPITAL | Age: 68
Discharge: HOME | End: 2023-10-22
Attending: EMERGENCY MEDICINE
Payer: MEDICARE

## 2023-10-21 DIAGNOSIS — S01.81XA FACIAL LACERATION, INITIAL ENCOUNTER: Primary | ICD-10-CM

## 2023-10-21 PROCEDURE — 99284 EMERGENCY DEPT VISIT MOD MDM: CPT

## 2023-10-21 PROCEDURE — 2500000003 HC RX 250 WO HCPCS: Performed by: EMERGENCY MEDICINE

## 2023-10-21 PROCEDURE — 70450 CT HEAD/BRAIN W/O DYE: CPT

## 2023-10-21 PROCEDURE — 12011 RPR F/E/E/N/L/M 2.5 CM/<: CPT

## 2023-10-21 PROCEDURE — 70486 CT MAXILLOFACIAL W/O DYE: CPT

## 2023-10-21 RX ORDER — LIDOCAINE HYDROCHLORIDE AND EPINEPHRINE 10; 10 MG/ML; UG/ML
20 INJECTION, SOLUTION INFILTRATION; PERINEURAL
Status: COMPLETED | OUTPATIENT
Start: 2023-10-21 | End: 2023-10-21

## 2023-10-21 RX ADMIN — LIDOCAINE HYDROCHLORIDE,EPINEPHRINE BITARTRATE 20 ML: 10; .01 INJECTION, SOLUTION INFILTRATION; PERINEURAL at 22:37

## 2023-10-21 ASSESSMENT — PAIN SCALES - GENERAL: PAINLEVEL_OUTOF10: 1

## 2023-10-21 ASSESSMENT — PAIN DESCRIPTION - ONSET: ONSET: ON-GOING

## 2023-10-21 ASSESSMENT — PAIN - FUNCTIONAL ASSESSMENT
PAIN_FUNCTIONAL_ASSESSMENT: ACTIVITIES ARE NOT PREVENTED
PAIN_FUNCTIONAL_ASSESSMENT: 0-10

## 2023-10-21 ASSESSMENT — PAIN DESCRIPTION - LOCATION: LOCATION: HEAD

## 2023-10-21 ASSESSMENT — PAIN DESCRIPTION - DESCRIPTORS: DESCRIPTORS: ACHING

## 2023-10-21 ASSESSMENT — PAIN DESCRIPTION - PAIN TYPE: TYPE: ACUTE PAIN

## 2023-10-21 ASSESSMENT — PAIN DESCRIPTION - ORIENTATION: ORIENTATION: RIGHT

## 2023-10-22 VITALS
OXYGEN SATURATION: 96 % | SYSTOLIC BLOOD PRESSURE: 145 MMHG | RESPIRATION RATE: 15 BRPM | DIASTOLIC BLOOD PRESSURE: 68 MMHG | HEIGHT: 72 IN | BODY MASS INDEX: 31.71 KG/M2 | TEMPERATURE: 97.8 F | WEIGHT: 234.13 LBS | HEART RATE: 96 BPM

## 2023-10-22 PROCEDURE — 6370000000 HC RX 637 (ALT 250 FOR IP): Performed by: EMERGENCY MEDICINE

## 2023-10-22 RX ORDER — CEPHALEXIN 500 MG/1
500 CAPSULE ORAL 3 TIMES DAILY
Qty: 21 CAPSULE | Refills: 0 | Status: SHIPPED | OUTPATIENT
Start: 2023-10-22 | End: 2023-10-29

## 2023-10-22 RX ORDER — CEPHALEXIN 250 MG/1
500 CAPSULE ORAL
Status: COMPLETED | OUTPATIENT
Start: 2023-10-22 | End: 2023-10-22

## 2023-10-22 RX ADMIN — CEPHALEXIN 500 MG: 250 CAPSULE ORAL at 02:01

## 2023-10-22 ASSESSMENT — ENCOUNTER SYMPTOMS
BACK PAIN: 0
COUGH: 0
EYE PAIN: 0
SORE THROAT: 0
SHORTNESS OF BREATH: 0
BOWEL INCONTINENCE: 0
DIARRHEA: 0
ABDOMINAL PAIN: 0
VISUAL CHANGE: 0
RHINORRHEA: 0
VOMITING: 0
COLOR CHANGE: 0
NAUSEA: 0

## 2023-10-22 NOTE — ED PROVIDER NOTES
well and was discharged home for suture removal in 7 days. Amount and/or Complexity of Data Reviewed  Radiology: ordered. Risk  Prescription drug management. REASSESSMENT            CONSULTS:  None    PROCEDURES:  Unless otherwise noted below, none     Procedures    PROCEDURE NOTE - LACERATION REPAIR:  2:16 AM  Procedure by Heber Jeffery MD  Complexity: simple   2cm linear laceration to face  was irrigated copiously with NS under jet lavage, prepped with Betadine and draped in a sterile fashion. The area was anesthetized via local infiltration of 5 mL Lidocaine 1%. The wound was explored with the following results: No foreign bodies found. The wound was repaired with One layer suture closure: Skin Layer:  4 sutures placed, stitch type:simple interrupted, suture: 5-0 nylon. .  The wound was closed with good hemostasis and approximation. Sterile dressing applied. Estimated blood loss: minimal  The procedure took 1-15 minutes, and pt tolerated well. FINAL IMPRESSION      1.  Facial laceration, initial encounter          DISPOSITION/PLAN   DISPOSITION Decision To Discharge 10/22/2023 01:50:05 AM      PATIENT REFERRED TO:  Leonora Gordillo MD  [facility] [address] [address],[facility]  [phone]    Call       [facility]  [address] [place] [id number] [phone]  In 1 week  For suture removal      DISCHARGE MEDICATIONS:  Discharge Medication List as of 10/22/2023  1:51 AM        START taking these medications    Details   cephALEXin (KEFLEX) 500 MG capsule Take 1 capsule by mouth 3 times daily for 7 days, Disp-21 capsule, R-0Print               (Please note that portions of this note were completed with a voice recognition program.  Efforts were made to edit the dictations but occasionally words are mis-transcribed.)    Heber Jeffery MD (electronically signed)  Emergency Attending Physician / Physician Assistant / Nurse Practitioner

## 2023-10-22 NOTE — ED NOTES
Laceration was cleaned with NS solution.       Elli Johnson RN  10/21/23 0391 Detail Level: Detailed

## 2023-10-22 NOTE — ED NOTES
Patient left ED in no acute distress, alert and oriented x4. Patient was encourage to come back if symptoms get worse. Patient was provided with discharge instructions and prescriptions. All questions were answered. Patient left ambulatory.         Apollo Griffin RN  10/22/23 [id number]

## 2023-10-22 NOTE — ED TRIAGE NOTES
[facility] [facility] Emergency Room Nursing Note        Patient Name: Naty Niño      : 1955             MRN: [id number]      Chief Complaint:  Fall and Laceration      Admit Diagnosis: No admission diagnoses are documented for this encounter. Admitting Provider: No admitting provider for patient encounter. Surgery: * No surgery found *           Patient arrived to the ER ambulatory from home with complaints of Falling when he tripped on a stove top and sustained a Right Eye Brow Laceration (1\") that happened today. Pt states no LOC & not on blood thinners. Last Tetanus shot > 5 years.          Lines:        Signed by: Naveen Yanez RN, WILLIAM, BSN, [facility]                                              10/21/2023 at 10:17 PM

## 2023-11-06 RX ORDER — FLUOXETINE 10 MG/1
CAPSULE ORAL
Qty: 90 CAPSULE | Refills: 2 | Status: SHIPPED | OUTPATIENT
Start: 2023-11-06

## 2023-12-08 DIAGNOSIS — Z94.4 LIVER TRANSPLANT STATUS (HCC): ICD-10-CM

## 2023-12-08 DIAGNOSIS — Z94.4 LIVER TRANSPLANT STATUS (HCC): Primary | ICD-10-CM

## 2023-12-08 LAB
ALBUMIN SERPL-MCNC: 4.2 G/DL (ref 3.5–5)
ALBUMIN/GLOB SERPL: 1.4 (ref 1.1–2.2)
ALP SERPL-CCNC: 77 U/L (ref 45–117)
ALT SERPL-CCNC: 19 U/L (ref 12–78)
ANION GAP SERPL CALC-SCNC: 6 MMOL/L (ref 5–15)
AST SERPL-CCNC: 9 U/L (ref 15–37)
BASOPHILS # BLD: 0 K/UL (ref 0–0.1)
BASOPHILS NFR BLD: 1 % (ref 0–1)
BILIRUB DIRECT SERPL-MCNC: 0.3 MG/DL (ref 0–0.2)
BILIRUB SERPL-MCNC: 1.4 MG/DL (ref 0.2–1)
BUN SERPL-MCNC: 39 MG/DL (ref 6–20)
BUN/CREAT SERPL: 18 (ref 12–20)
CALCIUM SERPL-MCNC: 9.5 MG/DL (ref 8.5–10.1)
CHLORIDE SERPL-SCNC: 105 MMOL/L (ref 97–108)
CO2 SERPL-SCNC: 26 MMOL/L (ref 21–32)
CREAT SERPL-MCNC: 2.18 MG/DL (ref 0.7–1.3)
DIFFERENTIAL METHOD BLD: ABNORMAL
EOSINOPHIL # BLD: 0.2 K/UL (ref 0–0.4)
EOSINOPHIL NFR BLD: 5 % (ref 0–7)
ERYTHROCYTE [DISTWIDTH] IN BLOOD BY AUTOMATED COUNT: 14.6 % (ref 11.5–14.5)
GLOBULIN SER CALC-MCNC: 3 G/DL (ref 2–4)
GLUCOSE SERPL-MCNC: 194 MG/DL (ref 65–100)
HCT VFR BLD AUTO: 30.8 % (ref 36.6–50.3)
HGB BLD-MCNC: 10.5 G/DL (ref 12.1–17)
IMM GRANULOCYTES # BLD AUTO: 0 K/UL (ref 0–0.04)
IMM GRANULOCYTES NFR BLD AUTO: 1 % (ref 0–0.5)
INR PPP: 1 (ref 0.9–1.1)
LYMPHOCYTES # BLD: 0.8 K/UL (ref 0.8–3.5)
LYMPHOCYTES NFR BLD: 21 % (ref 12–49)
MAGNESIUM SERPL-MCNC: 1.6 MG/DL (ref 1.6–2.4)
MCH RBC QN AUTO: 31.5 PG (ref 26–34)
MCHC RBC AUTO-ENTMCNC: 34.1 G/DL (ref 30–36.5)
MCV RBC AUTO: 92.5 FL (ref 80–99)
MONOCYTES # BLD: 0.3 K/UL (ref 0–1)
MONOCYTES NFR BLD: 9 % (ref 5–13)
NEUTS SEG # BLD: 2.3 K/UL (ref 1.8–8)
NEUTS SEG NFR BLD: 63 % (ref 32–75)
NRBC # BLD: 0 K/UL (ref 0–0.01)
NRBC BLD-RTO: 0 PER 100 WBC
PLATELET # BLD AUTO: 102 K/UL (ref 150–400)
PMV BLD AUTO: 9.4 FL (ref 8.9–12.9)
POTASSIUM SERPL-SCNC: 5.3 MMOL/L (ref 3.5–5.1)
PROT SERPL-MCNC: 7.2 G/DL (ref 6.4–8.2)
PROTHROMBIN TIME: 10.4 SEC (ref 9–11.1)
RBC # BLD AUTO: 3.33 M/UL (ref 4.1–5.7)
RBC MORPH BLD: ABNORMAL
SODIUM SERPL-SCNC: 137 MMOL/L (ref 136–145)
WBC # BLD AUTO: 3.6 K/UL (ref 4.1–11.1)

## 2023-12-11 LAB — TACROLIMUS BLD-MCNC: 5.1 NG/ML (ref 2–20)

## 2023-12-13 ENCOUNTER — OFFICE VISIT (OUTPATIENT)
Age: 68
End: 2023-12-13
Payer: MEDICARE

## 2023-12-13 VITALS
HEIGHT: 72 IN | WEIGHT: 234 LBS | BODY MASS INDEX: 31.69 KG/M2 | TEMPERATURE: 97.4 F | HEART RATE: 67 BPM | SYSTOLIC BLOOD PRESSURE: 136 MMHG | DIASTOLIC BLOOD PRESSURE: 65 MMHG

## 2023-12-13 DIAGNOSIS — F41.1 GENERALIZED ANXIETY DISORDER: ICD-10-CM

## 2023-12-13 DIAGNOSIS — D63.1 ANEMIA DUE TO STAGE 3B CHRONIC KIDNEY DISEASE (HCC): ICD-10-CM

## 2023-12-13 DIAGNOSIS — Z94.4 H/O LIVER TRANSPLANT (HCC): Primary | ICD-10-CM

## 2023-12-13 DIAGNOSIS — N18.32 TYPE 2 DIABETES MELLITUS WITH STAGE 3B CHRONIC KIDNEY DISEASE, WITHOUT LONG-TERM CURRENT USE OF INSULIN (HCC): ICD-10-CM

## 2023-12-13 DIAGNOSIS — N18.32 ANEMIA DUE TO STAGE 3B CHRONIC KIDNEY DISEASE (HCC): ICD-10-CM

## 2023-12-13 DIAGNOSIS — E11.22 TYPE 2 DIABETES MELLITUS WITH STAGE 3B CHRONIC KIDNEY DISEASE, WITHOUT LONG-TERM CURRENT USE OF INSULIN (HCC): ICD-10-CM

## 2023-12-13 PROCEDURE — 1123F ACP DISCUSS/DSCN MKR DOCD: CPT | Performed by: NURSE PRACTITIONER

## 2023-12-13 PROCEDURE — 3017F COLORECTAL CA SCREEN DOC REV: CPT | Performed by: NURSE PRACTITIONER

## 2023-12-13 PROCEDURE — 99214 OFFICE O/P EST MOD 30 MIN: CPT | Performed by: NURSE PRACTITIONER

## 2023-12-13 PROCEDURE — 4004F PT TOBACCO SCREEN RCVD TLK: CPT | Performed by: NURSE PRACTITIONER

## 2023-12-13 PROCEDURE — G8427 DOCREV CUR MEDS BY ELIG CLIN: HCPCS | Performed by: NURSE PRACTITIONER

## 2023-12-13 PROCEDURE — G8417 CALC BMI ABV UP PARAM F/U: HCPCS | Performed by: NURSE PRACTITIONER

## 2023-12-13 PROCEDURE — 2022F DILAT RTA XM EVC RTNOPTHY: CPT | Performed by: NURSE PRACTITIONER

## 2023-12-13 PROCEDURE — 3046F HEMOGLOBIN A1C LEVEL >9.0%: CPT | Performed by: NURSE PRACTITIONER

## 2023-12-13 PROCEDURE — G8484 FLU IMMUNIZE NO ADMIN: HCPCS | Performed by: NURSE PRACTITIONER

## 2023-12-13 RX ORDER — TACROLIMUS 1 MG/1
1 CAPSULE ORAL 2 TIMES DAILY
COMMUNITY

## 2023-12-13 RX ORDER — FLUOXETINE 10 MG/1
20 CAPSULE ORAL DAILY
Qty: 180 CAPSULE | Refills: 3 | Status: SHIPPED | OUTPATIENT
Start: 2023-12-13

## 2023-12-17 PROBLEM — N18.32 ANEMIA DUE TO STAGE 3B CHRONIC KIDNEY DISEASE (HCC): Status: ACTIVE | Noted: 2022-06-22

## 2023-12-17 PROBLEM — D63.1 ANEMIA DUE TO STAGE 3B CHRONIC KIDNEY DISEASE (HCC): Status: ACTIVE | Noted: 2022-06-22

## 2024-01-25 ENCOUNTER — TELEPHONE (OUTPATIENT)
Age: 69
End: 2024-01-25

## 2024-01-25 NOTE — TELEPHONE ENCOUNTER
Called pt to push appt to morning due to Florencio being out    Lvm with all details and office #   Yes - the patient is able to be screened

## 2024-01-31 NOTE — PROGRESS NOTES
[facility] [place] at [facility]  [address], [address] [place] [address]  W: [phone]  F: [phone]    Reason for Visit:   Deon Vaughn is a 68 y.o. male who is seen in consultation at the request of Dr. Huma Velasco for evaluation of anemia.  Deon Vaughn is a 68 y.o. male who is seen by synchronous (real-time) audio-video technology on 2/1/2024     History of Present Illness:   Patient is a 68-year-old male with a history of cirrhosis secondary to Au status post liver transplant in 2018 seen for anemia.  He also has stable small HCC which is being monitored.  He is on immunosuppression with tacrolimus and a history of chronic thrombocytopenia.  Has underlying CKD.  His platelet counts have ranged in the 100 GAVE range.  His hemoglobin has been worsening gradually over time and has dropped from 11.7 g/dL in December 2021 to 10.5 g/dL in December 2023.  Prior workup showed iron saturation 39% and a ferritin of 166.  He is sent for further evaluation.    No bleeding and no melena. Has fatigue. No pica.      Past Medical History:   Diagnosis Date    Arthritis     Autoimmune disease (HCC)     ITP    CAD (coronary artery disease)     enlarged heart ?    Cancer (HCC)     skin ca removed from forehead    Chronic kidney disease     kidney stones    Coagulation disorder (HCC)     low plts    COVID-19 12/2022    Diabetes (HCC)     type 2    GERD (gastroesophageal reflux disease)     Hepatic encephalopathy (HCC)     Hypertension     Ill-defined condition     obesity per pt    Ill-defined condition     anemia    Liver disease     elevated liver enzymes    Liver transplant candidate     Pt is on the list for a liver transplant as of 8/2018    PUD (peptic ulcer disease)     stomach ulcers      Past Surgical History:   Procedure Laterality Date    APPENDECTOMY      BIOPSY LIVER      COLONOSCOPY      LIVER SURGERY PROCEDURE UNLISTED      OTHER SURGICAL HISTORY      mohs procedure for skin ca.

## 2024-02-01 ENCOUNTER — TELEMEDICINE (OUTPATIENT)
Age: 69
End: 2024-02-01
Payer: MEDICARE

## 2024-02-01 DIAGNOSIS — D63.1 ANEMIA DUE TO CHRONIC KIDNEY DISEASE, UNSPECIFIED CKD STAGE: Primary | ICD-10-CM

## 2024-02-01 DIAGNOSIS — K75.81 NASH (NONALCOHOLIC STEATOHEPATITIS): ICD-10-CM

## 2024-02-01 DIAGNOSIS — D63.1 ANEMIA DUE TO CHRONIC KIDNEY DISEASE, UNSPECIFIED CKD STAGE: ICD-10-CM

## 2024-02-01 DIAGNOSIS — N18.9 ANEMIA DUE TO CHRONIC KIDNEY DISEASE, UNSPECIFIED CKD STAGE: Primary | ICD-10-CM

## 2024-02-01 DIAGNOSIS — N18.9 ANEMIA DUE TO CHRONIC KIDNEY DISEASE, UNSPECIFIED CKD STAGE: ICD-10-CM

## 2024-02-01 PROCEDURE — 99204 OFFICE O/P NEW MOD 45 MIN: CPT | Performed by: INTERNAL MEDICINE

## 2024-02-01 PROCEDURE — 1123F ACP DISCUSS/DSCN MKR DOCD: CPT | Performed by: INTERNAL MEDICINE

## 2024-02-01 PROCEDURE — G8428 CUR MEDS NOT DOCUMENT: HCPCS | Performed by: INTERNAL MEDICINE

## 2024-02-01 PROCEDURE — 3017F COLORECTAL CA SCREEN DOC REV: CPT | Performed by: INTERNAL MEDICINE

## 2024-02-14 DIAGNOSIS — Z94.4 LIVER TRANSPLANT STATUS (HCC): ICD-10-CM

## 2024-02-14 LAB
ALBUMIN SERPL-MCNC: 3.9 G/DL (ref 3.5–5)
ALBUMIN/GLOB SERPL: 1.3 (ref 1.1–2.2)
ALP SERPL-CCNC: 79 U/L (ref 45–117)
ALT SERPL-CCNC: 19 U/L (ref 12–78)
ANION GAP SERPL CALC-SCNC: 4 MMOL/L (ref 5–15)
AST SERPL-CCNC: 11 U/L (ref 15–37)
BASOPHILS # BLD: 0 K/UL (ref 0–0.1)
BASOPHILS NFR BLD: 1 % (ref 0–1)
BILIRUB DIRECT SERPL-MCNC: 0.3 MG/DL (ref 0–0.2)
BILIRUB SERPL-MCNC: 1.3 MG/DL (ref 0.2–1)
BUN SERPL-MCNC: 32 MG/DL (ref 6–20)
BUN/CREAT SERPL: 16 (ref 12–20)
CALCIUM SERPL-MCNC: 10.4 MG/DL (ref 8.5–10.1)
CHLORIDE SERPL-SCNC: 107 MMOL/L (ref 97–108)
CO2 SERPL-SCNC: 27 MMOL/L (ref 21–32)
CREAT SERPL-MCNC: 1.97 MG/DL (ref 0.7–1.3)
DIFFERENTIAL METHOD BLD: ABNORMAL
EOSINOPHIL # BLD: 0.2 K/UL (ref 0–0.4)
EOSINOPHIL NFR BLD: 5 % (ref 0–7)
ERYTHROCYTE [DISTWIDTH] IN BLOOD BY AUTOMATED COUNT: 14.3 % (ref 11.5–14.5)
GLOBULIN SER CALC-MCNC: 3.1 G/DL (ref 2–4)
GLUCOSE SERPL-MCNC: 192 MG/DL (ref 65–100)
HCT VFR BLD AUTO: 32.8 % (ref 36.6–50.3)
HGB BLD-MCNC: 11.1 G/DL (ref 12.1–17)
IMM GRANULOCYTES # BLD AUTO: 0 K/UL (ref 0–0.04)
IMM GRANULOCYTES NFR BLD AUTO: 1 % (ref 0–0.5)
INR PPP: 1 (ref 0.9–1.1)
LYMPHOCYTES # BLD: 0.6 K/UL (ref 0.8–3.5)
LYMPHOCYTES NFR BLD: 16 % (ref 12–49)
MAGNESIUM SERPL-MCNC: 1.6 MG/DL (ref 1.6–2.4)
MCH RBC QN AUTO: 31.9 PG (ref 26–34)
MCHC RBC AUTO-ENTMCNC: 33.8 G/DL (ref 30–36.5)
MCV RBC AUTO: 94.3 FL (ref 80–99)
MONOCYTES # BLD: 0.3 K/UL (ref 0–1)
MONOCYTES NFR BLD: 8 % (ref 5–13)
NEUTS SEG # BLD: 2.4 K/UL (ref 1.8–8)
NEUTS SEG NFR BLD: 69 % (ref 32–75)
NRBC # BLD: 0 K/UL (ref 0–0.01)
NRBC BLD-RTO: 0 PER 100 WBC
PLATELET # BLD AUTO: 114 K/UL (ref 150–400)
PMV BLD AUTO: 9.7 FL (ref 8.9–12.9)
POTASSIUM SERPL-SCNC: 5.4 MMOL/L (ref 3.5–5.1)
PROT SERPL-MCNC: 7 G/DL (ref 6.4–8.2)
PROTHROMBIN TIME: 10.4 SEC (ref 9–11.1)
RBC # BLD AUTO: 3.48 M/UL (ref 4.1–5.7)
RBC MORPH BLD: ABNORMAL
SODIUM SERPL-SCNC: 138 MMOL/L (ref 136–145)
WBC # BLD AUTO: 3.5 K/UL (ref 4.1–11.1)

## 2024-02-15 LAB
BASOPHILS # BLD AUTO: 0 X10E3/UL (ref 0–0.2)
BASOPHILS NFR BLD AUTO: 1 %
EOSINOPHIL # BLD AUTO: 0.2 X10E3/UL (ref 0–0.4)
EOSINOPHIL NFR BLD AUTO: 5 %
ERYTHROCYTE [DISTWIDTH] IN BLOOD BY AUTOMATED COUNT: 13.1 % (ref 11.6–15.4)
HCT VFR BLD AUTO: 33.2 % (ref 37.5–51)
HGB BLD-MCNC: 11.2 G/DL (ref 13–17.7)
IMM GRANULOCYTES # BLD AUTO: 0 X10E3/UL (ref 0–0.1)
IMM GRANULOCYTES NFR BLD AUTO: 1 %
IRON SATN MFR SERPL: 41 % (ref 15–55)
IRON SERPL-MCNC: 101 UG/DL (ref 38–169)
LYMPHOCYTES # BLD AUTO: 0.6 X10E3/UL (ref 0.7–3.1)
LYMPHOCYTES NFR BLD AUTO: 17 %
MCH RBC QN AUTO: 31.4 PG (ref 26.6–33)
MCHC RBC AUTO-ENTMCNC: 33.7 G/DL (ref 31.5–35.7)
MCV RBC AUTO: 93 FL (ref 79–97)
MONOCYTES # BLD AUTO: 0.3 X10E3/UL (ref 0.1–0.9)
MONOCYTES NFR BLD AUTO: 8 %
NEUTROPHILS # BLD AUTO: 2.3 X10E3/UL (ref 1.4–7)
NEUTROPHILS NFR BLD AUTO: 68 %
PLATELET # BLD AUTO: 113 X10E3/UL (ref 150–450)
RBC # BLD AUTO: 3.57 X10E6/UL (ref 4.14–5.8)
TIBC SERPL-MCNC: 248 UG/DL (ref 250–450)
UIBC SERPL-MCNC: 147 UG/DL (ref 111–343)
VIT B12 SERPL-MCNC: 450 PG/ML (ref 232–1245)
WBC # BLD AUTO: 3.4 X10E3/UL (ref 3.4–10.8)

## 2024-02-19 LAB — TACROLIMUS BLD-MCNC: 10 NG/ML (ref 2–20)

## 2024-02-21 ENCOUNTER — TELEPHONE (OUTPATIENT)
Age: 69
End: 2024-02-21

## 2024-02-21 NOTE — TELEPHONE ENCOUNTER
Called patient to check to see if he took his tac before labs on 2/14/24, he states that he did and apologized, He saw Dr. Matias on 2/1/24 - he states she didn't change anything and said to continue to do what he was doing. Discussed that his liver function is stable - no reason to repeat labs for tac level since his liver function is stable, FU appt scheduled for 4/16/24, repeat labs one week prior to appt. Patient verbalized understanding with repeat back.

## 2024-03-04 RX ORDER — TACROLIMUS 0.5 MG/1
CAPSULE ORAL
Qty: 270 CAPSULE | Refills: 3 | Status: SHIPPED | OUTPATIENT
Start: 2024-03-04

## 2024-04-11 DIAGNOSIS — K75.81 NONALCOHOLIC STEATOHEPATITIS (NASH): ICD-10-CM

## 2024-04-11 DIAGNOSIS — N18.32 TYPE 2 DIABETES MELLITUS WITH STAGE 3B CHRONIC KIDNEY DISEASE, WITHOUT LONG-TERM CURRENT USE OF INSULIN (HCC): ICD-10-CM

## 2024-04-11 DIAGNOSIS — Z94.4 H/O LIVER TRANSPLANT (HCC): ICD-10-CM

## 2024-04-11 DIAGNOSIS — Z94.4 H/O LIVER TRANSPLANT (HCC): Primary | ICD-10-CM

## 2024-04-11 DIAGNOSIS — E11.22 TYPE 2 DIABETES MELLITUS WITH STAGE 3B CHRONIC KIDNEY DISEASE, WITHOUT LONG-TERM CURRENT USE OF INSULIN (HCC): ICD-10-CM

## 2024-04-11 DIAGNOSIS — Z94.4 LIVER TRANSPLANT STATUS (HCC): ICD-10-CM

## 2024-04-12 LAB
25(OH)D3 SERPL-MCNC: 58.3 NG/ML (ref 30–100)
ALBUMIN SERPL-MCNC: 4 G/DL (ref 3.5–5)
ALBUMIN/GLOB SERPL: 1.3 (ref 1.1–2.2)
ALP SERPL-CCNC: 80 U/L (ref 45–117)
ALT SERPL-CCNC: 20 U/L (ref 12–78)
ANION GAP SERPL CALC-SCNC: 3 MMOL/L (ref 5–15)
AST SERPL-CCNC: 11 U/L (ref 15–37)
BASOPHILS # BLD: 0 K/UL (ref 0–0.1)
BASOPHILS NFR BLD: 1 % (ref 0–1)
BILIRUB DIRECT SERPL-MCNC: 0.3 MG/DL (ref 0–0.2)
BILIRUB SERPL-MCNC: 1.1 MG/DL (ref 0.2–1)
BUN SERPL-MCNC: 38 MG/DL (ref 6–20)
BUN/CREAT SERPL: 17 (ref 12–20)
CALCIUM SERPL-MCNC: 10.4 MG/DL (ref 8.5–10.1)
CALCIUM SERPL-MCNC: 10.4 MG/DL (ref 8.5–10.1)
CHLORIDE SERPL-SCNC: 108 MMOL/L (ref 97–108)
CHOLEST SERPL-MCNC: 141 MG/DL
CO2 SERPL-SCNC: 28 MMOL/L (ref 21–32)
CREAT SERPL-MCNC: 2.24 MG/DL (ref 0.7–1.3)
DIFFERENTIAL METHOD BLD: ABNORMAL
EOSINOPHIL # BLD: 0.2 K/UL (ref 0–0.4)
EOSINOPHIL NFR BLD: 5 % (ref 0–7)
ERYTHROCYTE [DISTWIDTH] IN BLOOD BY AUTOMATED COUNT: 14.6 % (ref 11.5–14.5)
EST. AVERAGE GLUCOSE BLD GHB EST-MCNC: 128 MG/DL
GLOBULIN SER CALC-MCNC: 3 G/DL (ref 2–4)
GLUCOSE SERPL-MCNC: 204 MG/DL (ref 65–100)
HBA1C MFR BLD: 6.1 % (ref 4–5.6)
HCT VFR BLD AUTO: 32 % (ref 36.6–50.3)
HDLC SERPL-MCNC: 31 MG/DL
HDLC SERPL: 4.5 (ref 0–5)
HGB BLD-MCNC: 11.3 G/DL (ref 12.1–17)
IMM GRANULOCYTES # BLD AUTO: 0 K/UL (ref 0–0.04)
IMM GRANULOCYTES NFR BLD AUTO: 1 % (ref 0–0.5)
INR PPP: 1 (ref 0.9–1.1)
LDLC SERPL CALC-MCNC: 82.2 MG/DL (ref 0–100)
LYMPHOCYTES # BLD: 0.6 K/UL (ref 0.8–3.5)
LYMPHOCYTES NFR BLD: 16 % (ref 12–49)
MAGNESIUM SERPL-MCNC: 2 MG/DL (ref 1.6–2.4)
MCH RBC QN AUTO: 32.7 PG (ref 26–34)
MCHC RBC AUTO-ENTMCNC: 35.3 G/DL (ref 30–36.5)
MCV RBC AUTO: 92.5 FL (ref 80–99)
MONOCYTES # BLD: 0.2 K/UL (ref 0–1)
MONOCYTES NFR BLD: 7 % (ref 5–13)
NEUTS SEG # BLD: 2.5 K/UL (ref 1.8–8)
NEUTS SEG NFR BLD: 70 % (ref 32–75)
NRBC # BLD: 0 K/UL (ref 0–0.01)
NRBC BLD-RTO: 0 PER 100 WBC
PLATELET # BLD AUTO: 118 K/UL (ref 150–400)
PMV BLD AUTO: 9.4 FL (ref 8.9–12.9)
POTASSIUM SERPL-SCNC: 5.2 MMOL/L (ref 3.5–5.1)
PROT SERPL-MCNC: 7 G/DL (ref 6.4–8.2)
PROTHROMBIN TIME: 10.4 SEC (ref 9–11.1)
PTH-INTACT SERPL-MCNC: <6.3 PG/ML (ref 18.4–88)
RBC # BLD AUTO: 3.46 M/UL (ref 4.1–5.7)
RBC MORPH BLD: ABNORMAL
SODIUM SERPL-SCNC: 139 MMOL/L (ref 136–145)
T4 FREE SERPL-MCNC: 1.2 NG/DL (ref 0.8–1.5)
TRIGL SERPL-MCNC: 139 MG/DL
TSH SERPL DL<=0.05 MIU/L-ACNC: 2.44 UIU/ML (ref 0.36–3.74)
VLDLC SERPL CALC-MCNC: 27.8 MG/DL
WBC # BLD AUTO: 3.5 K/UL (ref 4.1–11.1)

## 2024-04-13 LAB — TACROLIMUS BLD-MCNC: 8.1 NG/ML (ref 2–20)

## 2024-04-15 ENCOUNTER — PATIENT MESSAGE (OUTPATIENT)
Age: 69
End: 2024-04-15

## 2024-04-15 DIAGNOSIS — Z94.4 LIVER TRANSPLANT STATUS (HCC): ICD-10-CM

## 2024-04-15 DIAGNOSIS — Z94.4 H/O LIVER TRANSPLANT (HCC): Primary | ICD-10-CM

## 2024-04-16 ENCOUNTER — OFFICE VISIT (OUTPATIENT)
Age: 69
End: 2024-04-16
Payer: MEDICARE

## 2024-04-16 VITALS
HEART RATE: 76 BPM | SYSTOLIC BLOOD PRESSURE: 130 MMHG | RESPIRATION RATE: 18 BRPM | DIASTOLIC BLOOD PRESSURE: 69 MMHG | HEIGHT: 72 IN | WEIGHT: 235 LBS | BODY MASS INDEX: 31.83 KG/M2 | OXYGEN SATURATION: 98 %

## 2024-04-16 DIAGNOSIS — Z94.4 H/O LIVER TRANSPLANT (HCC): Primary | ICD-10-CM

## 2024-04-16 DIAGNOSIS — Z79.60 LONG-TERM USE OF IMMUNOSUPPRESSANT MEDICATION: ICD-10-CM

## 2024-04-16 DIAGNOSIS — K75.81 NASH (NONALCOHOLIC STEATOHEPATITIS): ICD-10-CM

## 2024-04-16 PROCEDURE — 99214 OFFICE O/P EST MOD 30 MIN: CPT | Performed by: NURSE PRACTITIONER

## 2024-04-16 PROCEDURE — G8427 DOCREV CUR MEDS BY ELIG CLIN: HCPCS | Performed by: NURSE PRACTITIONER

## 2024-04-16 PROCEDURE — 3017F COLORECTAL CA SCREEN DOC REV: CPT | Performed by: NURSE PRACTITIONER

## 2024-04-16 PROCEDURE — G8417 CALC BMI ABV UP PARAM F/U: HCPCS | Performed by: NURSE PRACTITIONER

## 2024-04-16 PROCEDURE — 4004F PT TOBACCO SCREEN RCVD TLK: CPT | Performed by: NURSE PRACTITIONER

## 2024-04-16 PROCEDURE — 1123F ACP DISCUSS/DSCN MKR DOCD: CPT | Performed by: NURSE PRACTITIONER

## 2024-04-16 RX ORDER — LANOLIN ALCOHOL/MO/W.PET/CERES
325 CREAM (GRAM) TOPICAL
COMMUNITY

## 2024-04-16 NOTE — PROGRESS NOTES
Room:  I have reviewed all needed documentation in preparation for visit. Verified patient by name and date of birth  Chief Complaint   Patient presents with    Follow-up     4 month        Vitals:    04/16/24 1445   BP: 130/69   Pulse: 76   Resp: 18   SpO2: 98%   Weight: 106.6 kg (235 lb)   Height: 1.829 m (6')        Health Maintenance Due   Topic Date Due    Pneumococcal 65+ years Vaccine (1 of 2 - PCV) Never done    Diabetic foot exam  Never done    Diabetic Alb to Cr ratio (uACR) test  Never done    Diabetic retinal exam  Never done    Hepatitis C screen  Never done    Shingles vaccine (1 of 2) Never done    Respiratory Syncytial Virus (RSV) Pregnant or age 60 yrs+ (1 - 1-dose 60+ series) Never done    COVID-19 Vaccine (4 - 2023-24 season) 09/01/2023    Annual Wellness Visit (Medicare)  Never done    Depression Screen  05/02/2024

## 2024-04-16 NOTE — PROGRESS NOTES
[facility]      Kaden Flores MD, FACP, FACG, FAASLD      Rachael Weber, PA-C    Huma Velasco, [facility]-BC   Ledy Jarquinjaime, [facility]   Funmi Wilson, FNP-C  Ronni Perez FNP-C   Rhina Zhang [facility]   [address], [address]   [place]  [address] [phone]   FAX: [phone]  [facility]   [address], [address]   [place]  [address] [phone]   FAX: [phone]     Patient Care Team:  Rebekah Dior MD as PCP - General  Rebekah Dior MD as PCP - Empaneled Provider  Virginia Up MD as Physician  Joseph Crawford MD as Physician  Huma Velasco, APRN - NP as Nurse Practitioner  Rosa Delacruz, RN as Care Coordinator (Hepatology)    Patient Active Problem List   Diagnosis    Long-term use of immunosuppressant medication    DM type 2 (diabetes mellitus, type 2) (HCC)    Stage 2 chronic kidney disease    CASTILLO (nonalcoholic steatohepatitis)    CAD (coronary artery disease)    Severe obesity (HCC)    GERD (gastroesophageal reflux disease)    Liver transplant complication (HCC)    Generalized anxiety disorder    Neuropathy involving both lower extremities    H/O liver transplant (HCC)    Thrombocytopenia, unspecified (HCC)    Anemia due to chronic kidney disease    Renal mass    Type 2 diabetes mellitus with chronic kidney disease (HCC)    Type 2 diabetes mellitus with diabetic neuropathy (HCC)       Deon Vaughn returns to the Liver [facility] for management of liver transplant graft function, to monitor and adjust immune suppression and to assess for recurrence of the primary liver disease.  The active problem list, all pertinent past medical history, medications, liver histology, endoscopic studies, radiologic findings and laboratory findings related

## 2024-04-17 DIAGNOSIS — Z94.4 H/O LIVER TRANSPLANT (HCC): Primary | ICD-10-CM

## 2024-04-17 DIAGNOSIS — Z79.60 LONG-TERM USE OF IMMUNOSUPPRESSANT MEDICATION: ICD-10-CM

## 2024-04-17 DIAGNOSIS — R41.0 EPISODE OF CONFUSION: ICD-10-CM

## 2024-04-17 RX ORDER — TACROLIMUS 0.5 MG/1
0.5 CAPSULE ORAL 2 TIMES DAILY
Qty: 180 CAPSULE | Refills: 3 | Status: SHIPPED | OUTPATIENT
Start: 2024-04-17

## 2024-04-17 NOTE — PROGRESS NOTES
Per Ms. Velasco order Ammonia levels - patient has large splenorenal shunt and may be contributing to his confusion/mental cloudiness.

## 2024-04-30 DIAGNOSIS — Z94.4 H/O LIVER TRANSPLANT (HCC): ICD-10-CM

## 2024-04-30 DIAGNOSIS — Z79.60 LONG-TERM USE OF IMMUNOSUPPRESSANT MEDICATION: ICD-10-CM

## 2024-04-30 LAB
ALBUMIN SERPL-MCNC: 3.8 G/DL (ref 3.5–5)
ALBUMIN/GLOB SERPL: 1.1 (ref 1.1–2.2)
ALP SERPL-CCNC: 84 U/L (ref 45–117)
ALT SERPL-CCNC: 26 U/L (ref 12–78)
ANION GAP SERPL CALC-SCNC: 5 MMOL/L (ref 5–15)
AST SERPL-CCNC: 16 U/L (ref 15–37)
BASOPHILS # BLD: 0 K/UL (ref 0–0.1)
BASOPHILS NFR BLD: 1 % (ref 0–1)
BILIRUB DIRECT SERPL-MCNC: 0.4 MG/DL (ref 0–0.2)
BILIRUB SERPL-MCNC: 1.4 MG/DL (ref 0.2–1)
BUN SERPL-MCNC: 39 MG/DL (ref 6–20)
BUN/CREAT SERPL: 20 (ref 12–20)
CALCIUM SERPL-MCNC: 10.2 MG/DL (ref 8.5–10.1)
CHLORIDE SERPL-SCNC: 106 MMOL/L (ref 97–108)
CO2 SERPL-SCNC: 27 MMOL/L (ref 21–32)
CREAT SERPL-MCNC: 1.97 MG/DL (ref 0.7–1.3)
DIFFERENTIAL METHOD BLD: ABNORMAL
EOSINOPHIL # BLD: 0.1 K/UL (ref 0–0.4)
EOSINOPHIL NFR BLD: 4 % (ref 0–7)
ERYTHROCYTE [DISTWIDTH] IN BLOOD BY AUTOMATED COUNT: 14.6 % (ref 11.5–14.5)
GLOBULIN SER CALC-MCNC: 3.4 G/DL (ref 2–4)
GLUCOSE SERPL-MCNC: 230 MG/DL (ref 65–100)
HCT VFR BLD AUTO: 31 % (ref 36.6–50.3)
HGB BLD-MCNC: 10.7 G/DL (ref 12.1–17)
IMM GRANULOCYTES # BLD AUTO: 0.1 K/UL (ref 0–0.04)
IMM GRANULOCYTES NFR BLD AUTO: 2 % (ref 0–0.5)
INR PPP: 1 (ref 0.9–1.1)
LYMPHOCYTES # BLD: 0.6 K/UL (ref 0.8–3.5)
LYMPHOCYTES NFR BLD: 16 % (ref 12–49)
MAGNESIUM SERPL-MCNC: 1.9 MG/DL (ref 1.6–2.4)
MCH RBC QN AUTO: 32.5 PG (ref 26–34)
MCHC RBC AUTO-ENTMCNC: 34.5 G/DL (ref 30–36.5)
MCV RBC AUTO: 94.2 FL (ref 80–99)
MONOCYTES # BLD: 0.3 K/UL (ref 0–1)
MONOCYTES NFR BLD: 8 % (ref 5–13)
NEUTS SEG # BLD: 2.6 K/UL (ref 1.8–8)
NEUTS SEG NFR BLD: 69 % (ref 32–75)
NRBC # BLD: 0 K/UL (ref 0–0.01)
NRBC BLD-RTO: 0 PER 100 WBC
PLATELET # BLD AUTO: 112 K/UL (ref 150–400)
PMV BLD AUTO: 9.2 FL (ref 8.9–12.9)
POTASSIUM SERPL-SCNC: 5.8 MMOL/L (ref 3.5–5.1)
PROT SERPL-MCNC: 7.2 G/DL (ref 6.4–8.2)
PROTHROMBIN TIME: 10.3 SEC (ref 9–11.1)
RBC # BLD AUTO: 3.29 M/UL (ref 4.1–5.7)
RBC MORPH BLD: ABNORMAL
SODIUM SERPL-SCNC: 138 MMOL/L (ref 136–145)
WBC # BLD AUTO: 3.7 K/UL (ref 4.1–11.1)

## 2024-05-02 ENCOUNTER — HOSPITAL ENCOUNTER (OUTPATIENT)
Facility: HOSPITAL | Age: 69
Setting detail: RECURRING SERIES
Discharge: HOME | End: 2024-05-05
Payer: MEDICARE

## 2024-05-02 LAB — TACROLIMUS BLD-MCNC: 1.8 NG/ML (ref 2–20)

## 2024-05-02 PROCEDURE — 97110 THERAPEUTIC EXERCISES: CPT | Performed by: PHYSICAL THERAPIST

## 2024-05-02 PROCEDURE — 97162 PT EVAL MOD COMPLEX 30 MIN: CPT | Performed by: PHYSICAL THERAPIST

## 2024-05-02 NOTE — THERAPY EVALUATION
included in education: patient (P)  Barriers to Learning/Limitations: none  Measures taken if barriers to learning present: N/A  Patient Self Reported Health Status: fair  Rehabilitation Potential: good    Short Term Goals: To be accomplished in 4-6 treatments.  Pt will be ind with HEP.   Long Term Goals: To be accomplished in 24 treatments.  Pt will improved tandem stance to 30 sec bilaterally for improved stability with activities.  Pt will be able to maintain EC balance on unstable surface for 30 sec for improved stability in low light environments.  Pt will improved 5x sit to stand to 14 sec or less for improved LE strength and endurance.  Pt will improve FOTO by 10 pts to demonstrate improved function.     Frequency / Duration: Patient to be seen 2 times per week for 24 treatments.    Patient/ Caregiver education and instruction: Diagnosis, prognosis, self care, activity modification, and exercises   [x]  Plan of care has been reviewed with PTA      Certification Period: 5/2/24 - 8/1/24      Bettie Suarez, PT       5/2/2024       11:22 AM        ===================================================================  I certify that the above Therapy Services are being furnished while the patient is under my care. I agree with the treatment plan and certify that this therapy is necessary.    Physician's Signature:_________________________   DATE:_________   TIME:________                           Enrico Branett MD    ** Signature, Date and Time must be completed for valid certification **  Please sign and fax to [phone].  Thank you

## 2024-05-06 ENCOUNTER — HOSPITAL ENCOUNTER (OUTPATIENT)
Facility: HOSPITAL | Age: 69
Setting detail: RECURRING SERIES
Discharge: HOME | End: 2024-05-09
Payer: MEDICARE

## 2024-05-06 PROCEDURE — 97110 THERAPEUTIC EXERCISES: CPT

## 2024-05-06 PROCEDURE — 97112 NEUROMUSCULAR REEDUCATION: CPT

## 2024-05-06 NOTE — PROGRESS NOTES
PHYSICAL THERAPY - MEDICARE DAILY TREATMENT NOTE (updated 3/23)      Date: 2024          Patient Name:  Deon Vaughn :  1955   Medical   Diagnosis:  Other abnormalities of gait and mobility [R26.89] Treatment Diagnosis:  R26.81   Unsteadiness on feet    Referral Source:  Enrico Barnett MD Insurance:   Payor: MEDICARE / Plan: MEDICARE PART A AND B / Product Type: *No Product type* /                     Patient  verified yes     Visit #   Current  / Total 2 MN   Time   In / Out 11:35A 12:20P   Total Treatment Time 45   Total Timed Codes 45   1:1 Treatment Time 45       [facility] Totals Reminder:  bill using total billable   min of TIMED therapeutic procedures and modalities.   8-22 min = 1 unit; 23-37 min = 2 units; 38-52 min = 3 units; 53-67 min = 4 units; 68-82 min = 5 units            SUBJECTIVE    Pain Level (0-10 scale): denies     Any medication changes, allergies to medications, adverse drug reactions, diagnosis change, or new procedure performed?: [x] No    [] Yes (see summary sheet for update)  Medications: Verified on Patient Summary List    Subjective functional status/changes:     Reports balance issues tend to be backward-  He reports being fatigued after IE    OBJECTIVE      Therapeutic Procedures:  Tx Min Billable or 1:1 Min (if diff from Tx Min) Procedure, Rationale, Specifics   35  59310 Therapeutic Exercise (timed):  increase ROM, strength, coordination, balance, and proprioception to improve patient's ability to progress to PLOF and address remaining functional goals. (see flow sheet as applicable)     Details if applicable:     10  23228 Neuromuscular Re-Education (timed):  improve balance, coordination, kinesthetic sense, posture, core stability and proprioception to improve patient's ability to develop conscious control of individual muscles and awareness of position of extremities in order to progress to PLOF and address remaining functional goals. (see flow sheet as applicable)  None

## 2024-05-09 ENCOUNTER — HOSPITAL ENCOUNTER (OUTPATIENT)
Facility: HOSPITAL | Age: 69
Setting detail: RECURRING SERIES
Discharge: HOME | End: 2024-05-12
Payer: MEDICARE

## 2024-05-09 PROCEDURE — 97110 THERAPEUTIC EXERCISES: CPT

## 2024-05-09 PROCEDURE — 97112 NEUROMUSCULAR REEDUCATION: CPT

## 2024-05-09 NOTE — PROGRESS NOTES
PHYSICAL THERAPY - MEDICARE DAILY TREATMENT NOTE (updated 3/23)      Date: 2024          Patient Name:  Deon Vaughn :  1955   Medical   Diagnosis:  Other abnormalities of gait and mobility [R26.89] Treatment Diagnosis:  R26.81   Unsteadiness on feet    Referral Source:  Enrico Barnett MD Insurance:   Payor: MEDICARE / Plan: MEDICARE PART A AND B / Product Type: *No Product type* /                     Patient  verified yes     Visit #   Current  / Total 3 MN   Time   In / Out 11:05A 12:00P   Total Treatment Time 55   Total Timed Codes 55   1:1 Treatment Time 55      [facility] Totals Reminder:  bill using total billable   min of TIMED therapeutic procedures and modalities.   8-22 min = 1 unit; 23-37 min = 2 units; 38-52 min = 3 units; 53-67 min = 4 units; 68-82 min = 5 units            SUBJECTIVE    Pain Level (0-10 scale): denies     Any medication changes, allergies to medications, adverse drug reactions, diagnosis change, or new procedure performed?: [x] No    [] Yes (see summary sheet for update)  Medications: Verified on Patient Summary List    Subjective functional status/changes:     Reports that he had a LOB 1 episode yesterday- missed last step coming down steps - caught self with handrail.  Not sore after last tx  OBJECTIVE      Therapeutic Procedures:  Tx Min Billable or 1:1 Min (if diff from Tx Min) Procedure, Rationale, Specifics   35  56583 Therapeutic Exercise (timed):  increase ROM, strength, coordination, balance, and proprioception to improve patient's ability to progress to PLOF and address remaining functional goals. (see flow sheet as applicable)     Details if applicable:     20  34184 Neuromuscular Re-Education (timed):  improve balance, coordination, kinesthetic sense, posture, core stability and proprioception to improve patient's ability to develop conscious control of individual muscles and awareness of position of extremities in order to progress to PLOF and address remaining

## 2024-05-15 ENCOUNTER — HOSPITAL ENCOUNTER (OUTPATIENT)
Facility: HOSPITAL | Age: 69
Setting detail: RECURRING SERIES
Discharge: HOME | End: 2024-05-18
Payer: MEDICARE

## 2024-05-15 PROCEDURE — 97112 NEUROMUSCULAR REEDUCATION: CPT

## 2024-05-15 PROCEDURE — 97110 THERAPEUTIC EXERCISES: CPT

## 2024-05-15 NOTE — PROGRESS NOTES
PHYSICAL THERAPY - MEDICARE DAILY TREATMENT NOTE (updated 3/23)      Date: 5/15/2024          Patient Name:  Deon Vaughn :  1955   Medical   Diagnosis:  Other abnormalities of gait and mobility [R26.89] Treatment Diagnosis:  R26.81   Unsteadiness on feet    Referral Source:  Enrico Barnett MD Insurance:   Payor: MEDICARE / Plan: MEDICARE PART A AND B / Product Type: *No Product type* /                     Patient  verified yes     Visit #   Current  / Total 4 MN   Time   In / Out 1:05 P 2:05 P   Total Treatment Time 60   Total Timed Codes 60   1:1 Treatment Time 50      [facility] Totals Reminder:  bill using total billable   min of TIMED therapeutic procedures and modalities.   8-22 min = 1 unit; 23-37 min = 2 units; 38-52 min = 3 units; 53-67 min = 4 units; 68-82 min = 5 units            SUBJECTIVE    Pain Level (0-10 scale): 0/10    Any medication changes, allergies to medications, adverse drug reactions, diagnosis change, or new procedure performed?: [x] No    [] Yes (see summary sheet for update)  Medications: Verified on Patient Summary List    Subjective functional status/changes:     Reports continues to be challenged with balance and trying to clear his feet when he walks. Good compliance with HEP.     OBJECTIVE      Therapeutic Procedures:  Tx Min Billable or 1:1 Min (if diff from Tx Min) Procedure, Rationale, Specifics   40 30 [id number] Therapeutic Exercise (timed):  increase ROM, strength, coordination, balance, and proprioception to improve patient's ability to progress to PLOF and address remaining functional goals. (see flow sheet as applicable)     Details if applicable:      01766 Neuromuscular Re-Education (timed):  improve balance, coordination, kinesthetic sense, posture, core stability and proprioception to improve patient's ability to develop conscious control of individual muscles and awareness of position of extremities in order to progress to PLOF and address remaining functional

## 2024-05-21 ENCOUNTER — HOSPITAL ENCOUNTER (OUTPATIENT)
Facility: HOSPITAL | Age: 69
Setting detail: RECURRING SERIES
Discharge: HOME | End: 2024-05-24
Payer: MEDICARE

## 2024-05-21 PROCEDURE — 97110 THERAPEUTIC EXERCISES: CPT | Performed by: PHYSICAL THERAPIST

## 2024-05-21 PROCEDURE — 97112 NEUROMUSCULAR REEDUCATION: CPT | Performed by: PHYSICAL THERAPIST

## 2024-05-21 NOTE — PROGRESS NOTES
PHYSICAL THERAPY - MEDICARE DAILY TREATMENT NOTE (updated 3/23)      Date: 2024          Patient Name:  Deon Vaughn :  1955   Medical   Diagnosis:  Other abnormalities of gait and mobility [R26.89] Treatment Diagnosis:  R26.81   Unsteadiness on feet    Referral Source:  Enrico Barnett MD Insurance:   Payor: MEDICARE / Plan: MEDICARE PART A AND B / Product Type: *No Product type* /                     Patient  verified yes     Visit #   Current  / Total 5 MN   Time   In / Out 1100 A 1200 P   Total Treatment Time 60   Total Timed Codes 50   1:1 Treatment Time 30      [facility] Totals Reminder:  bill using total billable   min of TIMED therapeutic procedures and modalities.   8-22 min = 1 unit; 23-37 min = 2 units; 38-52 min = 3 units; 53-67 min = 4 units; 68-82 min = 5 units            SUBJECTIVE    Pain Level (0-10 scale): 5/10    Any medication changes, allergies to medications, adverse drug reactions, diagnosis change, or new procedure performed?: [x] No    [] Yes (see summary sheet for update)  Medications: Verified on Patient Summary List    Subjective functional status/changes:     Reports that he feels better after leaving PT. He is feeling achy all over and thoracic spine hurts today.     OBJECTIVE      Therapeutic Procedures:  Tx Min Billable or 1:1 Min (if diff from Tx Min) Procedure, Rationale, Specifics   30 10 13621 Therapeutic Exercise (timed):  increase ROM, strength, coordination, balance, and proprioception to improve patient's ability to progress to PLOF and address remaining functional goals. (see flow sheet as applicable)     Details if applicable:      54958 Neuromuscular Re-Education (timed):  improve balance, coordination, kinesthetic sense, posture, core stability and proprioception to improve patient's ability to develop conscious control of individual muscles and awareness of position of extremities in order to progress to PLOF and address remaining functional goals. (see

## 2024-05-23 ENCOUNTER — HOSPITAL ENCOUNTER (OUTPATIENT)
Facility: HOSPITAL | Age: 69
Setting detail: RECURRING SERIES
Discharge: HOME | End: 2024-05-26
Payer: MEDICARE

## 2024-05-23 PROCEDURE — 97112 NEUROMUSCULAR REEDUCATION: CPT | Performed by: PHYSICAL THERAPIST

## 2024-05-23 PROCEDURE — 97110 THERAPEUTIC EXERCISES: CPT | Performed by: PHYSICAL THERAPIST

## 2024-05-23 NOTE — PROGRESS NOTES
PHYSICAL THERAPY - MEDICARE DAILY TREATMENT NOTE (updated 3/23)      Date: 2024          Patient Name:  Deon Vaughn :  1955   Medical   Diagnosis:  Other abnormalities of gait and mobility [R26.89] Treatment Diagnosis:  R26.81   Unsteadiness on feet    Referral Source:  Enrico Barnett MD Insurance:   Payor: MEDICARE / Plan: MEDICARE PART A AND B / Product Type: *No Product type* /                     Patient  verified yes     Visit #   Current  / Total 6 MN   Time   In / Out 1101 A 1202 P   Total Treatment Time 61   Total Timed Codes 61   1:1 Treatment Time 40      [facility] Totals Reminder:  bill using total billable   min of TIMED therapeutic procedures and modalities.   8-22 min = 1 unit; 23-37 min = 2 units; 38-52 min = 3 units; 53-67 min = 4 units; 68-82 min = 5 units            SUBJECTIVE    Pain Level (0-10 scale): 1/10    Any medication changes, allergies to medications, adverse drug reactions, diagnosis change, or new procedure performed?: [x] No    [] Yes (see summary sheet for update)  Medications: Verified on Patient Summary List    Subjective functional status/changes:     Reports that he feels much better today than previously in the week. He has tried to do a better job with his exercises yesterday and had a lot less pain.     OBJECTIVE      Therapeutic Procedures:  Tx Min Billable or 1:1 Min (if diff from Tx Min) Procedure, Rationale, Specifics   31  67930 Therapeutic Exercise (timed):  increase ROM, strength, coordination, balance, and proprioception to improve patient's ability to progress to PLOF and address remaining functional goals. (see flow sheet as applicable)     Details if applicable:     112 Neuromuscular Re-Education (timed):  improve balance, coordination, kinesthetic sense, posture, core stability and proprioception to improve patient's ability to develop conscious control of individual muscles and awareness of position of extremities in order to progress to PLOF

## 2024-05-29 ENCOUNTER — HOSPITAL ENCOUNTER (OUTPATIENT)
Facility: HOSPITAL | Age: 69
Setting detail: RECURRING SERIES
End: 2024-05-29
Payer: MEDICARE

## 2024-06-03 ENCOUNTER — APPOINTMENT (OUTPATIENT)
Facility: HOSPITAL | Age: 69
End: 2024-06-03
Payer: MEDICARE

## 2024-06-05 ENCOUNTER — HOSPITAL ENCOUNTER (OUTPATIENT)
Facility: HOSPITAL | Age: 69
Setting detail: RECURRING SERIES
Discharge: HOME | End: 2024-06-08
Payer: MEDICARE

## 2024-06-05 PROCEDURE — 97112 NEUROMUSCULAR REEDUCATION: CPT

## 2024-06-05 PROCEDURE — 97110 THERAPEUTIC EXERCISES: CPT

## 2024-06-05 NOTE — PROGRESS NOTES
PHYSICAL THERAPY - MEDICARE DAILY TREATMENT NOTE (updated 3/23)      Date: 2024          Patient Name:  Deon Vaughn :  1955   Medical   Diagnosis:  Other abnormalities of gait and mobility [R26.89] Treatment Diagnosis:  R26.81   Unsteadiness on feet    Referral Source:  Enrico Barnett MD Insurance:   Payor: MEDICARE / Plan: MEDICARE PART A AND B / Product Type: *No Product type* /                     Patient  verified yes     Visit #   Current  / Total 7 MN   Time   In / Out 11:02A 11:52 P   Total Treatment Time 50   Total Timed Codes 40   1:1 Treatment Time 23      [facility] Totals Reminder:  bill using total billable   min of TIMED therapeutic procedures and modalities.   8-22 min = 1 unit; 23-37 min = 2 units; 38-52 min = 3 units; 53-67 min = 4 units; 68-82 min = 5 units            SUBJECTIVE    Pain Level (0-10 scale): /10    Any medication changes, allergies to medications, adverse drug reactions, diagnosis change, or new procedure performed?: [x] No    [] Yes (see summary sheet for update)  Medications: Verified on Patient Summary List    Subjective functional status/changes:     Reports he was sick the past 2 weeks, didn't do much except rested. States his balance feels better overall but feels like there is more that he can work on. States while he was sick he propped himself up due to post nasal drip. He slept in a funky position which aggravated the R side of his neck. States increased R UT pain today.     OBJECTIVE    MHP x 10 minutes R shoulder at end of session to reduce pain and improve soft tissue mobility.     Therapeutic Procedures:  Tx Min Billable or 1:1 Min (if diff from Tx Min) Procedure, Rationale, Specifics    71 [id number] Therapeutic Exercise (timed):  increase ROM, strength, coordination, balance, and proprioception to improve patient's ability to progress to PLOF and address remaining functional goals. (see flow sheet as applicable)     Details if applicable:      51 [id number]

## 2024-06-10 ENCOUNTER — HOSPITAL ENCOUNTER (OUTPATIENT)
Facility: HOSPITAL | Age: 69
Setting detail: RECURRING SERIES
Discharge: HOME | End: 2024-06-13
Payer: MEDICARE

## 2024-06-10 PROCEDURE — 97112 NEUROMUSCULAR REEDUCATION: CPT | Performed by: PHYSICAL THERAPIST

## 2024-06-10 PROCEDURE — 97110 THERAPEUTIC EXERCISES: CPT | Performed by: PHYSICAL THERAPIST

## 2024-06-10 NOTE — PROGRESS NOTES
PHYSICAL THERAPY - MEDICARE DAILY TREATMENT NOTE (updated 3/23)      Date: 6/10/2024          Patient Name:  Deon Vaughn :  1955   Medical   Diagnosis:  Other abnormalities of gait and mobility [R26.89] Treatment Diagnosis:  R26.81   Unsteadiness on feet    Referral Source:  Enrico Barnett MD Insurance:   Payor: MEDICARE / Plan: MEDICARE PART A AND B / Product Type: *No Product type* /                     Patient  verified yes     Visit #   Current  / Total 8 MN   Time   In / Out 1103 A 1205 P   Total Treatment Time 62   Total Timed Codes 62   1:1 Treatment Time 62      [facility] Totals Reminder:  bill using total billable   min of TIMED therapeutic procedures and modalities.   8-22 min = 1 unit; 23-37 min = 2 units; 38-52 min = 3 units; 53-67 min = 4 units; 68-82 min = 5 units            SUBJECTIVE    Pain Level (0-10 scale): 0/10    Any medication changes, allergies to medications, adverse drug reactions, diagnosis change, or new procedure performed?: [x] No    [] Yes (see summary sheet for update)  Medications: Verified on Patient Summary List    Subjective functional status/changes:     Pt reports that he feels his balance and strength are improving. He reports that his pain has been better with exercises as well.     OBJECTIVE      Therapeutic Procedures:  Tx Min Billable or 1:1 Min (if diff from Tx Min) Procedure, Rationale, Specifics   42  38794 Therapeutic Exercise (timed):  increase ROM, strength, coordination, balance, and proprioception to improve patient's ability to progress to PLOF and address remaining functional goals. (see flow sheet as applicable)     Details if applicable:  see flow sheet plus reassessment   20  44421 Neuromuscular Re-Education (timed):  improve balance, coordination, kinesthetic sense, posture, core stability and proprioception to improve patient's ability to develop conscious control of individual muscles and awareness of position of extremities in order to progress to

## 2024-06-10 NOTE — PROGRESS NOTES
Physical Therapy at [facility],   a part of [facility]  [address] [address]  Phone: [phone]  Fax: [phone]     PHYSICAL THERAPY PROGRESS NOTE  Patient Name:  Deon Vaughn :  1955   Treatment/Medical Diagnosis: Other abnormalities of gait and mobility [R26.89]   Referral Source:  Enrico Barnett MD     Date of Initial Visit:  24 Attended Visits:  8 Missed Visits:  1     SUMMARY OF TREATMENT/ASSESSMENT:  Pt has completed 8 skilled PT sessions for gait and mobility deficits. He has made improvements with balance and strength, but continues to have reduced greg and poor ground clearance bilaterally. He has met STG and 1/4 LTG at this time, but is expected to continue making gains towards remaining goals with continued skilled PT services.   Patient will continue to benefit from skilled PT / OT services to modify and progress therapeutic interventions, analyze and address strength deficits, analyze and cue for proper movement patterns, and analyze and address imbalance/dizziness to address functional deficits and attain remaining goals.    CURRENT STATUS/GOALS:    LOWER QUARTER                            MUSCLE STRENGTH  KEY                                                                             R                      L  0 - No Contraction                   Knee ext                      4 +                     4+  1 - Trace                                           flex                     4+                    5  2 - Poor                                   Hip ext                         4                      4  3 - Fair                                           flex                         4+                      4+  4 - Good                                        abd                        4+                      4+  5 - Normal                                     add                        4+                      4+

## 2024-06-12 ENCOUNTER — HOSPITAL ENCOUNTER (OUTPATIENT)
Facility: HOSPITAL | Age: 69
Setting detail: RECURRING SERIES
Discharge: HOME | End: 2024-06-15
Payer: MEDICARE

## 2024-06-12 PROCEDURE — 97112 NEUROMUSCULAR REEDUCATION: CPT

## 2024-06-12 PROCEDURE — 97110 THERAPEUTIC EXERCISES: CPT

## 2024-06-12 NOTE — PROGRESS NOTES
awareness of position of extremities in order to progress to PLOF and address remaining functional goals. (see flow sheet as applicable)     Details if applicable:  Balance in // bars CG to Min A needed for higer level activty   55 40    Total Total         [x]  Patient Education billed concurrently with other procedures   [x] Review HEP    [] Progressed/Changed HEP, detail:    [] Other detail:         Other Objective/Functional Measures      Pain Level at end of session (0-10 scale): 0/10    Assessment   Progressing well with balance and strength.   Patient will continue to benefit from skilled PT / OT services to modify and progress therapeutic interventions, analyze and address strength deficits, analyze and cue for proper movement patterns, and analyze and address imbalance/dizziness to address functional deficits and attain remaining goals.    Progress toward goals / Updated goals:  []  See Progress Note/Recertification    Short Term Goals: To be accomplished in 4-6 treatments.  Pt will be ind with HEP.  Met  Long Term Goals: To be accomplished in 24 treatments.  Pt will improved tandem stance to 30 sec bilaterally for improved stability with activities. Progressing  Pt will be able to maintain EC balance on unstable surface for 30 sec for improved stability in low light environments. Met  Pt will improved 5x sit to stand to 14 sec or less for improved LE strength and endurance. Not Met  Pt will improve FOTO by 10 pts to demonstrate improved function. Progressing      PLAN   Continue plan of care  Re-Cert Due: 8/2/24  [x]  Upgrade activities as tolerated  []  Discharge due to:  []  Other:      Stella Juarez, PTA       6/12/2024       11:18 AM

## 2024-06-17 ENCOUNTER — HOSPITAL ENCOUNTER (OUTPATIENT)
Facility: HOSPITAL | Age: 69
Setting detail: RECURRING SERIES
Discharge: HOME | End: 2024-06-20
Payer: MEDICARE

## 2024-06-17 PROCEDURE — 97110 THERAPEUTIC EXERCISES: CPT | Performed by: PHYSICAL THERAPIST

## 2024-06-17 PROCEDURE — 97112 NEUROMUSCULAR REEDUCATION: CPT | Performed by: PHYSICAL THERAPIST

## 2024-06-17 NOTE — PROGRESS NOTES
PHYSICAL THERAPY - MEDICARE DAILY TREATMENT NOTE (updated 3/23)      Date: 2024          Patient Name:  Deon Vaughn :  1955   Medical   Diagnosis:  Other abnormalities of gait and mobility [R26.89] Treatment Diagnosis:  R26.81   Unsteadiness on feet    Referral Source:  Enrico Barnett MD Insurance:   Payor: MEDICARE / Plan: MEDICARE PART A AND B / Product Type: *No Product type* /                     Patient  verified yes     Visit #   Current  / Total 10 MN   Time   In / Out 1107 A 1200 PM   Total Treatment Time 53   Total Timed Codes 53   1:1 Treatment Time 40      [facility] Totals Reminder:  bill using total billable   min of TIMED therapeutic procedures and modalities.   8-22 min = 1 unit; 23-37 min = 2 units; 38-52 min = 3 units; 53-67 min = 4 units; 68-82 min = 5 units            SUBJECTIVE    Pain Level (0-10 scale): 0/10    Any medication changes, allergies to medications, adverse drug reactions, diagnosis change, or new procedure performed?: [x] No    [] Yes (see summary sheet for update)  Medications: Verified on Patient Summary List    Subjective functional status/changes:     Pt reports that he feels fatigued today, but is not having any pain.     OBJECTIVE      Therapeutic Procedures:  Tx Min Billable or 1:1 Min (if diff from Tx Min) Procedure, Rationale, Specifics   33 20 [id number] Therapeutic Exercise (timed):  increase ROM, strength, coordination, balance, and proprioception to improve patient's ability to progress to PLOF and address remaining functional goals. (see flow sheet as applicable)     Details if applicable:  see flow sheet plus reassessment   20 20 66980 Neuromuscular Re-Education (timed):  improve balance, coordination, kinesthetic sense, posture, core stability and proprioception to improve patient's ability to develop conscious control of individual muscles and awareness of position of extremities in order to progress to PLOF and address remaining functional goals. (see flow

## 2024-06-19 ENCOUNTER — HOSPITAL ENCOUNTER (OUTPATIENT)
Facility: HOSPITAL | Age: 69
Setting detail: RECURRING SERIES
Discharge: HOME | End: 2024-06-22
Payer: MEDICARE

## 2024-06-19 PROCEDURE — 97112 NEUROMUSCULAR REEDUCATION: CPT | Performed by: PHYSICAL THERAPIST

## 2024-06-19 PROCEDURE — 97110 THERAPEUTIC EXERCISES: CPT | Performed by: PHYSICAL THERAPIST

## 2024-06-19 NOTE — PROGRESS NOTES
PHYSICAL THERAPY - MEDICARE DAILY TREATMENT NOTE (updated 3/23)      Date: 2024          Patient Name:  Deon Vaughn :  1955   Medical   Diagnosis:  Other abnormalities of gait and mobility [R26.89] Treatment Diagnosis:  R26.81   Unsteadiness on feet    Referral Source:  Enrico Barnett MD Insurance:   Payor: MEDICARE / Plan: MEDICARE PART A AND B / Product Type: *No Product type* /                     Patient  verified yes     Visit #   Current  / Total 11 MN   Time   In / Out 1100 A 1155 PM   Total Treatment Time 55   Total Timed Codes 55   1:1 Treatment Time 40      [facility] Totals Reminder:  bill using total billable   min of TIMED therapeutic procedures and modalities.   8-22 min = 1 unit; 23-37 min = 2 units; 38-52 min = 3 units; 53-67 min = 4 units; 68-82 min = 5 units            SUBJECTIVE    Pain Level (0-10 scale): 0/10    Any medication changes, allergies to medications, adverse drug reactions, diagnosis change, or new procedure performed?: [x] No    [] Yes (see summary sheet for update)  Medications: Verified on Patient Summary List    Subjective functional status/changes:     Pt reports that he has more energy than earlier in the week.     OBJECTIVE      Therapeutic Procedures:  Tx Min Billable or 1:1 Min (if diff from Tx Min) Procedure, Rationale, Specifics   35 20 14374 Therapeutic Exercise (timed):  increase ROM, strength, coordination, balance, and proprioception to improve patient's ability to progress to PLOF and address remaining functional goals. (see flow sheet as applicable)     Details if applicable:  see flow sheet plus reassessment   20 20 90977 Neuromuscular Re-Education (timed):  improve balance, coordination, kinesthetic sense, posture, core stability and proprioception to improve patient's ability to develop conscious control of individual muscles and awareness of position of extremities in order to progress to PLOF and address remaining functional goals. (see flow sheet as

## 2024-06-24 ENCOUNTER — HOSPITAL ENCOUNTER (OUTPATIENT)
Facility: HOSPITAL | Age: 69
Setting detail: RECURRING SERIES
Discharge: HOME | End: 2024-06-27
Payer: MEDICARE

## 2024-06-24 PROCEDURE — 97110 THERAPEUTIC EXERCISES: CPT | Performed by: PHYSICAL THERAPIST

## 2024-06-24 PROCEDURE — 97112 NEUROMUSCULAR REEDUCATION: CPT | Performed by: PHYSICAL THERAPIST

## 2024-06-24 NOTE — PROGRESS NOTES
PHYSICAL THERAPY - MEDICARE DAILY TREATMENT NOTE (updated 3/23)      Date: 2024          Patient Name:  Deon Vaughn :  1955   Medical   Diagnosis:  Other abnormalities of gait and mobility [R26.89] Treatment Diagnosis:  R26.81   Unsteadiness on feet    Referral Source:  Enrico Barnett MD Insurance:   Payor: MEDICARE / Plan: MEDICARE PART A AND B / Product Type: *No Product type* /                     Patient  verified yes     Visit #   Current  / Total 12 MN   Time   In / Out 100 P 151 PM   Total Treatment Time 51   Total Timed Codes 51   1:1 Treatment Time 40      [facility] Totals Reminder:  bill using total billable   min of TIMED therapeutic procedures and modalities.   8-22 min = 1 unit; 23-37 min = 2 units; 38-52 min = 3 units; 53-67 min = 4 units; 68-82 min = 5 units            SUBJECTIVE    Pain Level (0-10 scale): 0/10    Any medication changes, allergies to medications, adverse drug reactions, diagnosis change, or new procedure performed?: [x] No    [] Yes (see summary sheet for update)  Medications: Verified on Patient Summary List    Subjective functional status/changes:     Pt reports no new problems.     OBJECTIVE      Therapeutic Procedures:  Tx Min Billable or 1:1 Min (if diff from Tx Min) Procedure, Rationale, Specifics   31 20 [id number] Therapeutic Exercise (timed):  increase ROM, strength, coordination, balance, and proprioception to improve patient's ability to progress to PLOF and address remaining functional goals. (see flow sheet as applicable)     Details if applicable:  see flow sheet plus reassessment   20 20 03562 Neuromuscular Re-Education (timed):  improve balance, coordination, kinesthetic sense, posture, core stability and proprioception to improve patient's ability to develop conscious control of individual muscles and awareness of position of extremities in order to progress to PLOF and address remaining functional goals. (see flow sheet as applicable)     Details if

## 2024-06-26 ENCOUNTER — HOSPITAL ENCOUNTER (OUTPATIENT)
Facility: HOSPITAL | Age: 69
Setting detail: RECURRING SERIES
Discharge: HOME | End: 2024-06-29
Payer: MEDICARE

## 2024-06-26 PROCEDURE — 97110 THERAPEUTIC EXERCISES: CPT

## 2024-06-26 PROCEDURE — 97112 NEUROMUSCULAR REEDUCATION: CPT

## 2024-06-26 NOTE — PROGRESS NOTES
PHYSICAL THERAPY - MEDICARE DAILY TREATMENT NOTE (updated 3/23)      Date: 2024          Patient Name:  Deon Vaughn :  1955   Medical   Diagnosis:  Other abnormalities of gait and mobility [R26.89] Treatment Diagnosis:  R26.81   Unsteadiness on feet    Referral Source:  Enrico Barnett MD Insurance:   Payor: MEDICARE / Plan: MEDICARE PART A AND B / Product Type: *No Product type* /                     Patient  verified yes     Visit #   Current  / Total 13 MN   Time   In / Out 10:58 A 11:50 AM   Total Treatment Time 52   Total Timed Codes 52   1:1 Treatment Time 38      [facility] Totals Reminder:  bill using total billable   min of TIMED therapeutic procedures and modalities.   8-22 min = 1 unit; 23-37 min = 2 units; 38-52 min = 3 units; 53-67 min = 4 units; 68-82 min = 5 units            SUBJECTIVE    Pain Level (0-10 scale): 0/10    Any medication changes, allergies to medications, adverse drug reactions, diagnosis change, or new procedure performed?: [x] No    [] Yes (see summary sheet for update)  Medications: Verified on Patient Summary List    Subjective functional status/changes:     Pt reports he is doing well however when he was coming down the stairs today he caught his toe and almost fell.    OBJECTIVE      Therapeutic Procedures:  Tx Min Billable or 1:1 Min (if diff from Tx Min) Procedure, Rationale, Specifics   32 18 36954 Therapeutic Exercise (timed):  increase ROM, strength, coordination, balance, and proprioception to improve patient's ability to progress to PLOF and address remaining functional goals. (see flow sheet as applicable)     Details if applicable:  see flow sheet plus reassessment   20 20 82334 Neuromuscular Re-Education (timed):  improve balance, coordination, kinesthetic sense, posture, core stability and proprioception to improve patient's ability to develop conscious control of individual muscles and awareness of position of extremities in order to progress to PLOF and

## 2024-07-01 ENCOUNTER — APPOINTMENT (OUTPATIENT)
Facility: HOSPITAL | Age: 69
End: 2024-07-01
Payer: MEDICARE

## 2024-07-03 ENCOUNTER — APPOINTMENT (OUTPATIENT)
Facility: HOSPITAL | Age: 69
End: 2024-07-03
Payer: MEDICARE

## 2024-07-09 ENCOUNTER — HOSPITAL ENCOUNTER (OUTPATIENT)
Facility: HOSPITAL | Age: 69
Setting detail: RECURRING SERIES
Discharge: HOME | End: 2024-07-12
Payer: MEDICARE

## 2024-07-09 DIAGNOSIS — R41.0 EPISODE OF CONFUSION: ICD-10-CM

## 2024-07-09 DIAGNOSIS — Z94.4 H/O LIVER TRANSPLANT (HCC): ICD-10-CM

## 2024-07-09 DIAGNOSIS — Z79.60 LONG-TERM USE OF IMMUNOSUPPRESSANT MEDICATION: ICD-10-CM

## 2024-07-09 LAB
ALBUMIN SERPL-MCNC: 3.8 G/DL (ref 3.5–5)
ALBUMIN/GLOB SERPL: 1.2 (ref 1.1–2.2)
ALP SERPL-CCNC: 102 U/L (ref 45–117)
ALT SERPL-CCNC: 20 U/L (ref 12–78)
AMMONIA PLAS-SCNC: 14 UMOL/L
ANION GAP SERPL CALC-SCNC: 2 MMOL/L (ref 5–15)
AST SERPL-CCNC: 10 U/L (ref 15–37)
BASOPHILS # BLD: 0 K/UL (ref 0–0.1)
BASOPHILS NFR BLD: 1 % (ref 0–1)
BILIRUB DIRECT SERPL-MCNC: 0.3 MG/DL (ref 0–0.2)
BILIRUB SERPL-MCNC: 1 MG/DL (ref 0.2–1)
BUN SERPL-MCNC: 38 MG/DL (ref 6–20)
BUN/CREAT SERPL: 20 (ref 12–20)
CALCIUM SERPL-MCNC: 9.4 MG/DL (ref 8.5–10.1)
CHLORIDE SERPL-SCNC: 105 MMOL/L (ref 97–108)
CO2 SERPL-SCNC: 29 MMOL/L (ref 21–32)
CREAT SERPL-MCNC: 1.93 MG/DL (ref 0.7–1.3)
DIFFERENTIAL METHOD BLD: ABNORMAL
EOSINOPHIL # BLD: 0.1 K/UL (ref 0–0.4)
EOSINOPHIL NFR BLD: 2 % (ref 0–7)
ERYTHROCYTE [DISTWIDTH] IN BLOOD BY AUTOMATED COUNT: 13.9 % (ref 11.5–14.5)
GLOBULIN SER CALC-MCNC: 3.2 G/DL (ref 2–4)
GLUCOSE SERPL-MCNC: 226 MG/DL (ref 65–100)
HCT VFR BLD AUTO: 32.5 % (ref 36.6–50.3)
HGB BLD-MCNC: 11.5 G/DL (ref 12.1–17)
IMM GRANULOCYTES # BLD AUTO: 0 K/UL (ref 0–0.04)
IMM GRANULOCYTES NFR BLD AUTO: 1 % (ref 0–0.5)
INR PPP: 0.9 (ref 0.9–1.1)
LYMPHOCYTES # BLD: 0.6 K/UL (ref 0.8–3.5)
LYMPHOCYTES NFR BLD: 12 % (ref 12–49)
MAGNESIUM SERPL-MCNC: 1.9 MG/DL (ref 1.6–2.4)
MCH RBC QN AUTO: 32.6 PG (ref 26–34)
MCHC RBC AUTO-ENTMCNC: 35.4 G/DL (ref 30–36.5)
MCV RBC AUTO: 92.1 FL (ref 80–99)
MONOCYTES # BLD: 0.3 K/UL (ref 0–1)
MONOCYTES NFR BLD: 7 % (ref 5–13)
NEUTS SEG # BLD: 3.8 K/UL (ref 1.8–8)
NEUTS SEG NFR BLD: 77 % (ref 32–75)
NRBC # BLD: 0 K/UL (ref 0–0.01)
NRBC BLD-RTO: 0 PER 100 WBC
PLATELET # BLD AUTO: 132 K/UL (ref 150–400)
PMV BLD AUTO: 8.9 FL (ref 8.9–12.9)
POTASSIUM SERPL-SCNC: 5 MMOL/L (ref 3.5–5.1)
PROT SERPL-MCNC: 7 G/DL (ref 6.4–8.2)
PROTHROMBIN TIME: 9.8 SEC (ref 9–11.1)
RBC # BLD AUTO: 3.53 M/UL (ref 4.1–5.7)
RBC MORPH BLD: ABNORMAL
SODIUM SERPL-SCNC: 136 MMOL/L (ref 136–145)
WBC # BLD AUTO: 4.8 K/UL (ref 4.1–11.1)

## 2024-07-09 PROCEDURE — 97110 THERAPEUTIC EXERCISES: CPT

## 2024-07-09 NOTE — PROGRESS NOTES
PHYSICAL THERAPY - MEDICARE DAILY TREATMENT NOTE (updated 3/23)      Date: 2024          Patient Name:  Deon Vaughn :  1955   Medical   Diagnosis:  Other abnormalities of gait and mobility [R26.89] Treatment Diagnosis:  R26.81   Unsteadiness on feet    Referral Source:  Enrico Barnett MD Insurance:   Payor: MEDICARE / Plan: MEDICARE PART A AND B / Product Type: *No Product type* /                     Patient  verified yes     Visit #   Current  / Total 14 MN   Time   In / Out 10:33A 11:20 AM   Total Treatment Time 47   Total Timed Codes 47   1:1 Treatment Time 38      [facility] Totals Reminder:  bill using total billable   min of TIMED therapeutic procedures and modalities.   8-22 min = 1 unit; 23-37 min = 2 units; 38-52 min = 3 units; 53-67 min = 4 units; 68-82 min = 5 units            SUBJECTIVE    Pain Level (0-10 scale): 0/10    Any medication changes, allergies to medications, adverse drug reactions, diagnosis change, or new procedure performed?: [x] No    [] Yes (see summary sheet for update)  Medications: Verified on Patient Summary List    Subjective functional status/changes:     Pt reports he feels his balance is much improved and his podiatrist noticed it when he was seen for a recent cortisone injection in each feet. States he isn't able to perform HR/TR as this aggravated his foot pain \"I couldn't walk for 3-4 days.\"     OBJECTIVE      Therapeutic Procedures:  Tx Min Billable or 1:1 Min (if diff from Tx Min) Procedure, Rationale, Specifics   47 38 [id number] Therapeutic Exercise (timed):  increase ROM, strength, coordination, balance, and proprioception to improve patient's ability to progress to PLOF and address remaining functional goals. (see flow sheet as applicable)     Details if applicable:  see flow sheet    Hold today  52789 Neuromuscular Re-Education (timed):  improve balance, coordination, kinesthetic sense, posture, core stability and proprioception to improve patient's ability to

## 2024-07-11 ENCOUNTER — HOSPITAL ENCOUNTER (OUTPATIENT)
Facility: HOSPITAL | Age: 69
Setting detail: RECURRING SERIES
Discharge: HOME | End: 2024-07-14
Payer: MEDICARE

## 2024-07-11 LAB — TACROLIMUS BLD-MCNC: 2.6 NG/ML (ref 2–20)

## 2024-07-11 PROCEDURE — 97110 THERAPEUTIC EXERCISES: CPT

## 2024-07-11 PROCEDURE — 97112 NEUROMUSCULAR REEDUCATION: CPT

## 2024-07-11 NOTE — PROGRESS NOTES
functional goals. (see flow sheet as applicable)     Details if applicable:  Balance in // bars CG to Min A needed for higer level activty   45 40    Total Total         [x]  Patient Education billed concurrently with other procedures   [x] Review HEP    [] Progressed/Changed HEP, detail:    [] Other detail:         Other Objective/Functional Measures      Pain Level at end of session (0-10 scale): 0/10    Assessment   Challenged with tandem balance on airex with EO but otherwise tolerated exercises well. Held from rockerboard a/p taps due to forefoot pain but able to perform laterally without pain.  Patient will continue to benefit from skilled PT / OT services to modify and progress therapeutic interventions, analyze and address strength deficits, analyze and cue for proper movement patterns, and analyze and address imbalance/dizziness to address functional deficits and attain remaining goals.    Progress toward goals / Updated goals:  []  See Progress Note/Recertification    Short Term Goals: To be accomplished in 4-6 treatments.  Pt will be ind with HEP.  Met  Long Term Goals: To be accomplished in 24 treatments.  Pt will improved tandem stance to 30 sec bilaterally for improved stability with activities. Progressing  Pt will be able to maintain EC balance on unstable surface for 30 sec for improved stability in low light environments. Met  Pt will improved 5x sit to stand to 14 sec or less for improved LE strength and endurance. Not Met  Pt will improve FOTO by 10 pts to demonstrate improved function. Progressing      PLAN   Continue plan of care  Re-Cert Due: 8/2/24  [x]  Upgrade activities as tolerated  []  Discharge due to:  []  Other:      Stella Juarez, PTA       7/11/2024       10:17 AM

## 2024-07-16 ENCOUNTER — HOSPITAL ENCOUNTER (OUTPATIENT)
Facility: HOSPITAL | Age: 69
Setting detail: RECURRING SERIES
Discharge: HOME | End: 2024-07-19
Payer: MEDICARE

## 2024-07-16 PROCEDURE — 97110 THERAPEUTIC EXERCISES: CPT

## 2024-07-16 PROCEDURE — 97112 NEUROMUSCULAR REEDUCATION: CPT

## 2024-07-16 NOTE — PROGRESS NOTES
PHYSICAL THERAPY - MEDICARE DAILY TREATMENT NOTE (updated 3/23)      Date: 2024          Patient Name:  Deon Vaughn :  1955   Medical   Diagnosis:  Other abnormalities of gait and mobility [R26.89] Treatment Diagnosis:  R26.81   Unsteadiness on feet    Referral Source:  Enrico Barnett MD Insurance:   Payor: MEDICARE / Plan: MEDICARE PART A AND B / Product Type: *No Product type* /                     Patient  verified yes     Visit #   Current  / Total 15 MN   Time   In / Out 1050 A 1145 A   Total Treatment Time 55   Total Timed Codes 45   1:1 Treatment Time 40      [facility] Totals Reminder:  bill using total billable   min of TIMED therapeutic procedures and modalities.   8-22 min = 1 unit; 23-37 min = 2 units; 38-52 min = 3 units; 53-67 min = 4 units; 68-82 min = 5 units            SUBJECTIVE    Pain Level (0-10 scale): 0/10    Any medication changes, allergies to medications, adverse drug reactions, diagnosis change, or new procedure performed?: [x] No    [] Yes (see summary sheet for update)  Medications: Verified on Patient Summary List    Subjective functional status/changes:     Can do a partial heel raise now    OBJECTIVE      Therapeutic Procedures:  Tx Min Billable or 1:1 Min (if diff from Tx Min) Procedure, Rationale, Specifics   30 25 [id number] Therapeutic Exercise (timed):  increase ROM, strength, coordination, balance, and proprioception to improve patient's ability to progress to PLOF and address remaining functional goals. (see flow sheet as applicable)     Details if applicable:  see flow sheet    15 75 88671 Neuromuscular Re-Education (timed):  improve balance, coordination, kinesthetic sense, posture, core stability and proprioception to improve patient's ability to develop conscious control of individual muscles and awareness of position of extremities in order to progress to PLOF and address remaining functional goals. (see flow sheet as applicable)     Details if applicable:  Balance

## 2024-07-23 ENCOUNTER — HOSPITAL ENCOUNTER (OUTPATIENT)
Facility: HOSPITAL | Age: 69
Setting detail: RECURRING SERIES
Discharge: HOME | End: 2024-07-26
Payer: MEDICARE

## 2024-07-23 PROCEDURE — 97112 NEUROMUSCULAR REEDUCATION: CPT

## 2024-07-23 PROCEDURE — 97110 THERAPEUTIC EXERCISES: CPT

## 2024-07-23 NOTE — PROGRESS NOTES
control of individual muscles and awareness of position of extremities in order to progress to PLOF and address remaining functional goals. (see flow sheet as applicable)     Details if applicable:  Balance in // bars CG to Min A needed for higer level activty   45 40    Total Total         [x]  Patient Education billed concurrently with other procedures   [x] Review HEP    [] Progressed/Changed HEP, detail:    [] Other detail:         Other Objective/Functional Measures      Pain Level at end of session (0-10 scale): 0/10    Assessment   Challenged with lateral stepping/fw/retro walk on Airex foam beam today. Decreased coordination noted during alt. [facility] taps as well In which he required intermittent UE support to stabilize. Most likely d/c to HEP next visit.   Patient will continue to benefit from skilled PT / OT services to modify and progress therapeutic interventions, analyze and address strength deficits, analyze and cue for proper movement patterns, and analyze and address imbalance/dizziness to address functional deficits and attain remaining goals.    Progress toward goals / Updated goals:  []  See Progress Note/Recertification    Short Term Goals: To be accomplished in 4-6 treatments.  Pt will be ind with HEP.  Met  Long Term Goals: To be accomplished in 24 treatments.  Pt will improved tandem stance to 30 sec bilaterally for improved stability with activities. Progressing  Pt will be able to maintain EC balance on unstable surface for 30 sec for improved stability in low light environments. Met  Pt will improved 5x sit to stand to 14 sec or less for improved LE strength and endurance. Not Met  Pt will improve FOTO by 10 pts to demonstrate improved function. Progressing    PLAN   Continue plan of care  Re-Cert Due: 8/2/24  [x]  Upgrade activities as tolerated  []  Discharge due to:  []  Other:      Stella Juarez, PTA       7/23/2024       2:43 PM

## 2024-07-24 ENCOUNTER — OFFICE VISIT (OUTPATIENT)
Age: 69
End: 2024-07-24
Payer: MEDICARE

## 2024-07-24 VITALS
DIASTOLIC BLOOD PRESSURE: 68 MMHG | BODY MASS INDEX: 32.05 KG/M2 | HEIGHT: 72 IN | SYSTOLIC BLOOD PRESSURE: 138 MMHG | WEIGHT: 236.6 LBS | TEMPERATURE: 97.9 F | HEART RATE: 75 BPM | OXYGEN SATURATION: 98 %

## 2024-07-24 DIAGNOSIS — N18.2 STAGE 2 CHRONIC KIDNEY DISEASE: ICD-10-CM

## 2024-07-24 DIAGNOSIS — Z94.4 H/O LIVER TRANSPLANT (HCC): Primary | ICD-10-CM

## 2024-07-24 DIAGNOSIS — Z79.60 LONG-TERM USE OF IMMUNOSUPPRESSANT MEDICATION: ICD-10-CM

## 2024-07-24 PROCEDURE — 1123F ACP DISCUSS/DSCN MKR DOCD: CPT | Performed by: NURSE PRACTITIONER

## 2024-07-24 PROCEDURE — G8417 CALC BMI ABV UP PARAM F/U: HCPCS | Performed by: NURSE PRACTITIONER

## 2024-07-24 PROCEDURE — 1036F TOBACCO NON-USER: CPT | Performed by: NURSE PRACTITIONER

## 2024-07-24 PROCEDURE — 3017F COLORECTAL CA SCREEN DOC REV: CPT | Performed by: NURSE PRACTITIONER

## 2024-07-24 PROCEDURE — G8427 DOCREV CUR MEDS BY ELIG CLIN: HCPCS | Performed by: NURSE PRACTITIONER

## 2024-07-24 PROCEDURE — 99214 OFFICE O/P EST MOD 30 MIN: CPT | Performed by: NURSE PRACTITIONER

## 2024-07-24 NOTE — PROGRESS NOTES
[facility]      Kaden Flores MD, FACP, FACG, FAASLD      Rachael Weber, PA-C    Huma Velasco, [facility]-BC   Ledy Jarquinjaime, [facility]   Funmi Wilson, FNP-C  Ronni Perez FNP-C   Rhina Zhang [facility]   [address], [address]   [place]  [address] [phone]   FAX: [phone]  [facility]   [address], [address]   [place]  [address] [phone]   FAX: [phone]     Patient Care Team:  Rebekah Dior MD as PCP - General  Rebekah Dior MD as PCP - Empaneled Provider  Virginia Up MD as Physician  Joseph Crawford MD as Physician  Huma Velasco, APRN - NP as Nurse Practitioner  Rosa Delacruz, RN as Care Coordinator (Hepatology)    Patient Active Problem List   Diagnosis    Long-term use of immunosuppressant medication    DM type 2 (diabetes mellitus, type 2) (HCC)    Stage 2 chronic kidney disease    CASTILLO (nonalcoholic steatohepatitis)    CAD (coronary artery disease)    Severe obesity (HCC)    GERD (gastroesophageal reflux disease)    Liver transplant complication (HCC)    Generalized anxiety disorder    Neuropathy involving both lower extremities    H/O liver transplant (HCC)    Thrombocytopenia, unspecified (HCC)    Anemia due to chronic kidney disease    Renal mass    Type 2 diabetes mellitus with chronic kidney disease (HCC)    Type 2 diabetes mellitus with diabetic neuropathy (HCC)       Deon Vaughn returns to the [facility] for management of liver transplant graft function, to monitor and adjust immune suppression and to assess for recurrence of the primary liver disease.  The active problem list, all pertinent past medical history, medications, liver histology, endoscopic studies, radiologic findings and laboratory findings related

## 2024-07-24 NOTE — PROGRESS NOTES
Chief Complaint   Patient presents with    Follow-up     Vitals:    07/24/24 1417   BP: 138/68   Site: Right Upper Arm   Position: Sitting   Pulse: 75   Temp: 97.9 °F (36.6 °C)   TempSrc: Temporal   SpO2: 98%   Weight: 107.3 kg (236 lb 9.6 oz)   Height: 1.829 m (6')     .  \"Have you been to the ER, urgent care clinic since your last visit?  Hospitalized since your last visit?\"    NO    “Have you seen or consulted any other health care providers outside of [facility] since your last visit?”    NO      “Have you had a colorectal cancer screening such as a colonoscopy/FIT/Cologuard?    NO    Date of last Colonoscopy: 6/13/2014  No cologuard on file  No FIT/FOBT on file   No flexible sigmoidoscopy on file         Click Here for Release of Records Request

## 2024-07-25 ENCOUNTER — HOSPITAL ENCOUNTER (OUTPATIENT)
Facility: HOSPITAL | Age: 69
Setting detail: RECURRING SERIES
Discharge: HOME | End: 2024-07-28
Payer: MEDICARE

## 2024-07-25 PROCEDURE — 97112 NEUROMUSCULAR REEDUCATION: CPT

## 2024-07-25 PROCEDURE — 97110 THERAPEUTIC EXERCISES: CPT

## 2024-07-25 NOTE — PROGRESS NOTES
PHYSICAL THERAPY - MEDICARE DAILY TREATMENT NOTE (updated 3/23)      Date: 2024          Patient Name:  Deon Vaughn :  1955   Medical   Diagnosis:  Other abnormalities of gait and mobility [R26.89] Treatment Diagnosis:  R26.81   Unsteadiness on feet    Referral Source:  Enrico Barnett MD Insurance:   Payor: MEDICARE / Plan: MEDICARE PART A AND B / Product Type: *No Product type* /                     Patient  verified yes     Visit #   Current  / Total 18 MN   Time   In / Out 10:30 A 11:10 A   Total Treatment Time 40   Total Timed Codes 40   1:1 Treatment Time 38      [facility] Totals Reminder:  bill using total billable   min of TIMED therapeutic procedures and modalities.   8-22 min = 1 unit; 23-37 min = 2 units; 38-52 min = 3 units; 53-67 min = 4 units; 68-82 min = 5 units            SUBJECTIVE    Pain Level (0-10 scale): 0/10    Any medication changes, allergies to medications, adverse drug reactions, diagnosis change, or new procedure performed?: [x] No    [] Yes (see summary sheet for update)  Medications: Verified on Patient Summary List    Subjective functional status/changes:     Reports he is doing very well, states his strength and balance have greatly improved since starting PT. He is very pleased with his progress and is ready for d/c today.    OBJECTIVE    LOWER QUARTER                            MUSCLE STRENGTH  KEY                                                                             R                      L  0 - No Contraction                   Knee ext                      5                    5  1 - Trace                                           flex                     4+                    5  2 - Poor                                   Hip ext                         4                      4  3 - Fair                                           flex                         4+                      4+  4 - Good                                        abd                        4+

## 2024-07-30 ENCOUNTER — APPOINTMENT (OUTPATIENT)
Facility: HOSPITAL | Age: 69
End: 2024-07-30
Payer: MEDICARE

## 2024-08-30 ENCOUNTER — TELEPHONE (OUTPATIENT)
Age: 69
End: 2024-08-30

## 2024-08-30 NOTE — TELEPHONE ENCOUNTER
Received VM from patient with question re: vaccines and returned his call, he will be traveling over the next week to a conference and wants to know if he should go ahead and get his vaccinations. Advised that he should get his vaccines as recommended by PCP - however antibodies may take up to 2 weeks to form for protection - continue to observe standard precautions with increased hand washing and masking as necessary. Patient verbalized understanding.

## 2024-09-23 ENCOUNTER — ANESTHESIA (OUTPATIENT)
Facility: HOSPITAL | Age: 69
End: 2024-09-23
Payer: MEDICARE

## 2024-09-23 ENCOUNTER — HOSPITAL ENCOUNTER (OUTPATIENT)
Facility: HOSPITAL | Age: 69
Setting detail: OUTPATIENT SURGERY
Discharge: HOME | End: 2024-09-23
Attending: INTERNAL MEDICINE | Admitting: INTERNAL MEDICINE
Payer: MEDICARE

## 2024-09-23 ENCOUNTER — ANESTHESIA EVENT (OUTPATIENT)
Facility: HOSPITAL | Age: 69
End: 2024-09-23
Payer: MEDICARE

## 2024-09-23 VITALS
OXYGEN SATURATION: 99 % | WEIGHT: 240 LBS | BODY MASS INDEX: 33.6 KG/M2 | RESPIRATION RATE: 19 BRPM | HEART RATE: 66 BPM | DIASTOLIC BLOOD PRESSURE: 61 MMHG | SYSTOLIC BLOOD PRESSURE: 120 MMHG | HEIGHT: 71 IN | TEMPERATURE: 97.2 F

## 2024-09-23 PROCEDURE — 3700000000 HC ANESTHESIA ATTENDED CARE: Performed by: INTERNAL MEDICINE

## 2024-09-23 PROCEDURE — 3700000001 HC ADD 15 MINUTES (ANESTHESIA): Performed by: INTERNAL MEDICINE

## 2024-09-23 PROCEDURE — 2580000003 HC RX 258: Performed by: INTERNAL MEDICINE

## 2024-09-23 PROCEDURE — 3600007512: Performed by: INTERNAL MEDICINE

## 2024-09-23 PROCEDURE — 7100000011 HC PHASE II RECOVERY - ADDTL 15 MIN: Performed by: INTERNAL MEDICINE

## 2024-09-23 PROCEDURE — 88305 TISSUE EXAM BY PATHOLOGIST: CPT

## 2024-09-23 PROCEDURE — 6360000002 HC RX W HCPCS: Performed by: NURSE ANESTHETIST, CERTIFIED REGISTERED

## 2024-09-23 PROCEDURE — 7100000010 HC PHASE II RECOVERY - FIRST 15 MIN: Performed by: INTERNAL MEDICINE

## 2024-09-23 PROCEDURE — 3600007502: Performed by: INTERNAL MEDICINE

## 2024-09-23 PROCEDURE — 2709999900 HC NON-CHARGEABLE SUPPLY: Performed by: INTERNAL MEDICINE

## 2024-09-23 RX ORDER — SODIUM CHLORIDE 9 MG/ML
INJECTION, SOLUTION INTRAVENOUS CONTINUOUS
Status: DISCONTINUED | OUTPATIENT
Start: 2024-09-23 | End: 2024-09-23 | Stop reason: HOSPADM

## 2024-09-23 RX ORDER — SODIUM CHLORIDE 0.9 % (FLUSH) 0.9 %
5-40 SYRINGE (ML) INJECTION EVERY 12 HOURS SCHEDULED
Status: DISCONTINUED | OUTPATIENT
Start: 2024-09-23 | End: 2024-09-23 | Stop reason: HOSPADM

## 2024-09-23 RX ORDER — SODIUM CHLORIDE 9 MG/ML
25 INJECTION, SOLUTION INTRAVENOUS PRN
Status: DISCONTINUED | OUTPATIENT
Start: 2024-09-23 | End: 2024-09-23 | Stop reason: HOSPADM

## 2024-09-23 RX ORDER — SODIUM CHLORIDE 0.9 % (FLUSH) 0.9 %
5-40 SYRINGE (ML) INJECTION PRN
Status: DISCONTINUED | OUTPATIENT
Start: 2024-09-23 | End: 2024-09-23 | Stop reason: HOSPADM

## 2024-09-23 RX ADMIN — PROPOFOL 25 MG: 10 INJECTION, EMULSION INTRAVENOUS at 09:21

## 2024-09-23 RX ADMIN — SODIUM CHLORIDE: 9 INJECTION, SOLUTION INTRAVENOUS at 09:10

## 2024-09-23 RX ADMIN — PROPOFOL 25 MG: 10 INJECTION, EMULSION INTRAVENOUS at 09:23

## 2024-09-23 RX ADMIN — PROPOFOL 25 MG: 10 INJECTION, EMULSION INTRAVENOUS at 09:25

## 2024-09-23 RX ADMIN — PROPOFOL 25 MG: 10 INJECTION, EMULSION INTRAVENOUS at 09:27

## 2024-09-23 RX ADMIN — PROPOFOL 50 MG: 10 INJECTION, EMULSION INTRAVENOUS at 09:19

## 2024-09-23 RX ADMIN — PROPOFOL 25 MG: 10 INJECTION, EMULSION INTRAVENOUS at 09:22

## 2024-09-23 RX ADMIN — PROPOFOL 25 MG: 10 INJECTION, EMULSION INTRAVENOUS at 09:20

## 2024-09-23 RX ADMIN — PROPOFOL 25 MG: 10 INJECTION, EMULSION INTRAVENOUS at 09:34

## 2024-09-23 RX ADMIN — PROPOFOL 25 MG: 10 INJECTION, EMULSION INTRAVENOUS at 09:29

## 2024-09-23 RX ADMIN — PROPOFOL 25 MG: 10 INJECTION, EMULSION INTRAVENOUS at 09:30

## 2024-09-23 RX ADMIN — PROPOFOL 25 MG: 10 INJECTION, EMULSION INTRAVENOUS at 09:32

## 2024-09-23 ASSESSMENT — PAIN SCALES - GENERAL
PAINLEVEL_OUTOF10: 0

## 2024-09-23 ASSESSMENT — PAIN - FUNCTIONAL ASSESSMENT: PAIN_FUNCTIONAL_ASSESSMENT: NONE - DENIES PAIN

## 2024-09-29 ENCOUNTER — APPOINTMENT (OUTPATIENT)
Facility: HOSPITAL | Age: 69
DRG: 378 | End: 2024-09-29
Payer: MEDICARE

## 2024-09-29 ENCOUNTER — HOSPITAL ENCOUNTER (INPATIENT)
Facility: HOSPITAL | Age: 69
LOS: 2 days | Discharge: HOME | DRG: 378 | End: 2024-10-01
Attending: STUDENT IN AN ORGANIZED HEALTH CARE EDUCATION/TRAINING PROGRAM | Admitting: HOSPITALIST
Payer: MEDICARE

## 2024-09-29 DIAGNOSIS — K59.00 CONSTIPATION, UNSPECIFIED CONSTIPATION TYPE: ICD-10-CM

## 2024-09-29 DIAGNOSIS — K62.89 PROCTITIS: ICD-10-CM

## 2024-09-29 DIAGNOSIS — K62.5 RECTAL BLEEDING: Primary | ICD-10-CM

## 2024-09-29 PROBLEM — K92.2 LOWER GI BLEED: Status: ACTIVE | Noted: 2024-09-29

## 2024-09-29 LAB
ALBUMIN SERPL-MCNC: 4.1 G/DL (ref 3.5–5)
ALBUMIN/GLOB SERPL: 1.2 (ref 1.1–2.2)
ALP SERPL-CCNC: 89 U/L (ref 45–117)
ALT SERPL-CCNC: 27 U/L (ref 12–78)
ANION GAP SERPL CALC-SCNC: 3 MMOL/L (ref 2–12)
APTT PPP: 23.4 SEC (ref 22.1–31)
AST SERPL-CCNC: 18 U/L (ref 15–37)
BASOPHILS # BLD: 0.1 K/UL (ref 0–0.1)
BASOPHILS NFR BLD: 1 % (ref 0–1)
BILIRUB SERPL-MCNC: 1.2 MG/DL (ref 0.2–1)
BUN SERPL-MCNC: 39 MG/DL (ref 6–20)
BUN/CREAT SERPL: 19 (ref 12–20)
CALCIUM SERPL-MCNC: 9.8 MG/DL (ref 8.5–10.1)
CHLORIDE SERPL-SCNC: 105 MMOL/L (ref 97–108)
CO2 SERPL-SCNC: 27 MMOL/L (ref 21–32)
COMMENT:: NORMAL
COMMENT:: NORMAL
CREAT SERPL-MCNC: 2.07 MG/DL (ref 0.7–1.3)
DIFFERENTIAL METHOD BLD: ABNORMAL
EOSINOPHIL # BLD: 0.1 K/UL (ref 0–0.4)
EOSINOPHIL NFR BLD: 1 % (ref 0–7)
ERYTHROCYTE [DISTWIDTH] IN BLOOD BY AUTOMATED COUNT: 13.6 % (ref 11.5–14.5)
GLOBULIN SER CALC-MCNC: 3.3 G/DL (ref 2–4)
GLUCOSE BLD STRIP.AUTO-MCNC: 209 MG/DL (ref 65–117)
GLUCOSE BLD STRIP.AUTO-MCNC: 217 MG/DL (ref 65–117)
GLUCOSE SERPL-MCNC: 265 MG/DL (ref 65–100)
HCT VFR BLD AUTO: 32.8 % (ref 36.6–50.3)
HGB BLD-MCNC: 11.4 G/DL (ref 12.1–17)
IMM GRANULOCYTES # BLD AUTO: 0.1 K/UL (ref 0–0.04)
IMM GRANULOCYTES NFR BLD AUTO: 1 % (ref 0–0.5)
INR PPP: 1 (ref 0.9–1.1)
LIPASE SERPL-CCNC: 55 U/L (ref 13–75)
LYMPHOCYTES # BLD: 0.3 K/UL (ref 0.8–3.5)
LYMPHOCYTES NFR BLD: 6 % (ref 12–49)
MCH RBC QN AUTO: 32.1 PG (ref 26–34)
MCHC RBC AUTO-ENTMCNC: 34.8 G/DL (ref 30–36.5)
MCV RBC AUTO: 92.4 FL (ref 80–99)
MONOCYTES # BLD: 0.3 K/UL (ref 0–1)
MONOCYTES NFR BLD: 5 % (ref 5–13)
NEUTS SEG # BLD: 4.5 K/UL (ref 1.8–8)
NEUTS SEG NFR BLD: 86 % (ref 32–75)
NRBC # BLD: 0 K/UL (ref 0–0.01)
NRBC BLD-RTO: 0 PER 100 WBC
PLATELET # BLD AUTO: 102 K/UL (ref 150–400)
PMV BLD AUTO: 8.5 FL (ref 8.9–12.9)
POTASSIUM SERPL-SCNC: 4.7 MMOL/L (ref 3.5–5.1)
PROT SERPL-MCNC: 7.4 G/DL (ref 6.4–8.2)
PROTHROMBIN TIME: 10.3 SEC (ref 9–11.1)
RBC # BLD AUTO: 3.55 M/UL (ref 4.1–5.7)
RBC MORPH BLD: ABNORMAL
SERVICE CMNT-IMP: ABNORMAL
SERVICE CMNT-IMP: ABNORMAL
SODIUM SERPL-SCNC: 135 MMOL/L (ref 136–145)
SPECIMEN HOLD: NORMAL
SPECIMEN HOLD: NORMAL
THERAPEUTIC RANGE: NORMAL SECS (ref 58–77)
WBC # BLD AUTO: 5.4 K/UL (ref 4.1–11.1)

## 2024-09-29 PROCEDURE — 36415 COLL VENOUS BLD VENIPUNCTURE: CPT

## 2024-09-29 PROCEDURE — 6370000000 HC RX 637 (ALT 250 FOR IP): Performed by: HOSPITALIST

## 2024-09-29 PROCEDURE — 2580000003 HC RX 258: Performed by: STUDENT IN AN ORGANIZED HEALTH CARE EDUCATION/TRAINING PROGRAM

## 2024-09-29 PROCEDURE — 6360000002 HC RX W HCPCS: Performed by: HOSPITALIST

## 2024-09-29 PROCEDURE — 83690 ASSAY OF LIPASE: CPT

## 2024-09-29 PROCEDURE — 6370000000 HC RX 637 (ALT 250 FOR IP): Performed by: STUDENT IN AN ORGANIZED HEALTH CARE EDUCATION/TRAINING PROGRAM

## 2024-09-29 PROCEDURE — 2580000003 HC RX 258: Performed by: HOSPITALIST

## 2024-09-29 PROCEDURE — 96360 HYDRATION IV INFUSION INIT: CPT

## 2024-09-29 PROCEDURE — 74178 CT ABD&PLV WO CNTR FLWD CNTR: CPT

## 2024-09-29 PROCEDURE — 82962 GLUCOSE BLOOD TEST: CPT

## 2024-09-29 PROCEDURE — 99285 EMERGENCY DEPT VISIT HI MDM: CPT

## 2024-09-29 PROCEDURE — 6370000000 HC RX 637 (ALT 250 FOR IP): Performed by: NURSE PRACTITIONER

## 2024-09-29 PROCEDURE — 6360000004 HC RX CONTRAST MEDICATION: Performed by: RADIOLOGY

## 2024-09-29 PROCEDURE — 85610 PROTHROMBIN TIME: CPT

## 2024-09-29 PROCEDURE — 1100000000 HC RM PRIVATE

## 2024-09-29 PROCEDURE — 85730 THROMBOPLASTIN TIME PARTIAL: CPT

## 2024-09-29 PROCEDURE — 85025 COMPLETE CBC W/AUTO DIFF WBC: CPT

## 2024-09-29 PROCEDURE — 80053 COMPREHEN METABOLIC PANEL: CPT

## 2024-09-29 RX ORDER — ONDANSETRON 2 MG/ML
4 INJECTION INTRAMUSCULAR; INTRAVENOUS EVERY 6 HOURS PRN
Status: DISCONTINUED | OUTPATIENT
Start: 2024-09-29 | End: 2024-10-01 | Stop reason: HOSPADM

## 2024-09-29 RX ORDER — 0.9 % SODIUM CHLORIDE 0.9 %
1000 INTRAVENOUS SOLUTION INTRAVENOUS ONCE
Status: COMPLETED | OUTPATIENT
Start: 2024-09-29 | End: 2024-09-29

## 2024-09-29 RX ORDER — LACTULOSE 10 G/15ML
20 SOLUTION ORAL 2 TIMES DAILY
Status: DISCONTINUED | OUTPATIENT
Start: 2024-09-29 | End: 2024-10-01 | Stop reason: HOSPADM

## 2024-09-29 RX ORDER — SODIUM CHLORIDE 9 MG/ML
INJECTION, SOLUTION INTRAVENOUS PRN
Status: DISCONTINUED | OUTPATIENT
Start: 2024-09-29 | End: 2024-10-01 | Stop reason: HOSPADM

## 2024-09-29 RX ORDER — LANOLIN ALCOHOL/MO/W.PET/CERES
3 CREAM (GRAM) TOPICAL NIGHTLY PRN
Status: DISCONTINUED | OUTPATIENT
Start: 2024-09-29 | End: 2024-10-01 | Stop reason: HOSPADM

## 2024-09-29 RX ORDER — SODIUM CHLORIDE 0.9 % (FLUSH) 0.9 %
5-40 SYRINGE (ML) INJECTION EVERY 12 HOURS SCHEDULED
Status: DISCONTINUED | OUTPATIENT
Start: 2024-09-29 | End: 2024-10-01 | Stop reason: HOSPADM

## 2024-09-29 RX ORDER — POTASSIUM CHLORIDE 7.45 MG/ML
10 INJECTION INTRAVENOUS PRN
Status: DISCONTINUED | OUTPATIENT
Start: 2024-09-29 | End: 2024-10-01 | Stop reason: HOSPADM

## 2024-09-29 RX ORDER — ACETAMINOPHEN 650 MG/1
650 SUPPOSITORY RECTAL EVERY 6 HOURS PRN
Status: DISCONTINUED | OUTPATIENT
Start: 2024-09-29 | End: 2024-10-01 | Stop reason: HOSPADM

## 2024-09-29 RX ORDER — INSULIN LISPRO 100 [IU]/ML
0-4 INJECTION, SOLUTION INTRAVENOUS; SUBCUTANEOUS NIGHTLY
Status: DISCONTINUED | OUTPATIENT
Start: 2024-09-29 | End: 2024-10-01 | Stop reason: HOSPADM

## 2024-09-29 RX ORDER — MAGNESIUM SULFATE IN WATER 40 MG/ML
2000 INJECTION, SOLUTION INTRAVENOUS PRN
Status: DISCONTINUED | OUTPATIENT
Start: 2024-09-29 | End: 2024-10-01 | Stop reason: HOSPADM

## 2024-09-29 RX ORDER — IOPAMIDOL 755 MG/ML
100 INJECTION, SOLUTION INTRAVASCULAR
Status: COMPLETED | OUTPATIENT
Start: 2024-09-29 | End: 2024-09-29

## 2024-09-29 RX ORDER — POLYETHYLENE GLYCOL 3350 17 G/17G
17 POWDER, FOR SOLUTION ORAL DAILY PRN
Status: DISCONTINUED | OUTPATIENT
Start: 2024-09-29 | End: 2024-10-01 | Stop reason: HOSPADM

## 2024-09-29 RX ORDER — ONDANSETRON 4 MG/1
4 TABLET, ORALLY DISINTEGRATING ORAL EVERY 8 HOURS PRN
Status: DISCONTINUED | OUTPATIENT
Start: 2024-09-29 | End: 2024-10-01 | Stop reason: HOSPADM

## 2024-09-29 RX ORDER — LEVOTHYROXINE SODIUM 75 UG/1
75 TABLET ORAL DAILY
Status: DISCONTINUED | OUTPATIENT
Start: 2024-09-30 | End: 2024-10-01 | Stop reason: HOSPADM

## 2024-09-29 RX ORDER — TACROLIMUS 0.5 MG/1
0.5 CAPSULE ORAL 2 TIMES DAILY
Status: DISCONTINUED | OUTPATIENT
Start: 2024-09-29 | End: 2024-10-01 | Stop reason: HOSPADM

## 2024-09-29 RX ORDER — SODIUM CHLORIDE 0.9 % (FLUSH) 0.9 %
5-40 SYRINGE (ML) INJECTION PRN
Status: DISCONTINUED | OUTPATIENT
Start: 2024-09-29 | End: 2024-10-01 | Stop reason: HOSPADM

## 2024-09-29 RX ORDER — DEXTROSE MONOHYDRATE 100 MG/ML
INJECTION, SOLUTION INTRAVENOUS CONTINUOUS PRN
Status: DISCONTINUED | OUTPATIENT
Start: 2024-09-29 | End: 2024-10-01 | Stop reason: HOSPADM

## 2024-09-29 RX ORDER — SODIUM CHLORIDE 9 MG/ML
INJECTION, SOLUTION INTRAVENOUS CONTINUOUS
Status: DISCONTINUED | OUTPATIENT
Start: 2024-09-29 | End: 2024-10-01 | Stop reason: HOSPADM

## 2024-09-29 RX ORDER — INSULIN LISPRO 100 [IU]/ML
0-4 INJECTION, SOLUTION INTRAVENOUS; SUBCUTANEOUS
Status: DISCONTINUED | OUTPATIENT
Start: 2024-09-29 | End: 2024-10-01 | Stop reason: HOSPADM

## 2024-09-29 RX ORDER — ACETAMINOPHEN 325 MG/1
650 TABLET ORAL EVERY 6 HOURS PRN
Status: DISCONTINUED | OUTPATIENT
Start: 2024-09-29 | End: 2024-10-01 | Stop reason: HOSPADM

## 2024-09-29 RX ORDER — POTASSIUM CHLORIDE 750 MG/1
40 TABLET, EXTENDED RELEASE ORAL PRN
Status: DISCONTINUED | OUTPATIENT
Start: 2024-09-29 | End: 2024-10-01 | Stop reason: HOSPADM

## 2024-09-29 RX ORDER — SENNA AND DOCUSATE SODIUM 50; 8.6 MG/1; MG/1
2 TABLET, FILM COATED ORAL DAILY
Status: DISCONTINUED | OUTPATIENT
Start: 2024-09-30 | End: 2024-10-01 | Stop reason: HOSPADM

## 2024-09-29 RX ADMIN — Medication 3 MG: at 22:56

## 2024-09-29 RX ADMIN — SODIUM CHLORIDE: 9 INJECTION, SOLUTION INTRAVENOUS at 17:07

## 2024-09-29 RX ADMIN — ACETAMINOPHEN 650 MG: 325 TABLET ORAL at 17:07

## 2024-09-29 RX ADMIN — FLUOXETINE HYDROCHLORIDE 20 MG: 20 CAPSULE ORAL at 12:57

## 2024-09-29 RX ADMIN — SODIUM CHLORIDE 1000 ML: 9 INJECTION, SOLUTION INTRAVENOUS at 11:17

## 2024-09-29 RX ADMIN — TACROLIMUS 0.5 MG: 0.5 CAPSULE ORAL at 20:58

## 2024-09-29 RX ADMIN — IOPAMIDOL 100 ML: 755 INJECTION, SOLUTION INTRAVENOUS at 13:22

## 2024-09-29 RX ADMIN — PANTOPRAZOLE SODIUM 40 MG: 40 INJECTION, POWDER, FOR SOLUTION INTRAVENOUS at 15:58

## 2024-09-29 ASSESSMENT — PAIN DESCRIPTION - ORIENTATION: ORIENTATION: LEFT

## 2024-09-29 ASSESSMENT — PAIN DESCRIPTION - DESCRIPTORS: DESCRIPTORS: ACHING

## 2024-09-29 ASSESSMENT — PAIN SCALES - GENERAL
PAINLEVEL_OUTOF10: 0
PAINLEVEL_OUTOF10: 3
PAINLEVEL_OUTOF10: 0

## 2024-09-29 ASSESSMENT — PAIN DESCRIPTION - LOCATION: LOCATION: RECTUM;FLANK

## 2024-09-29 NOTE — ED NOTES
ischemic attack)     2017       Assessment    Abnormal Assessment Findings: episodes of bowel/ urinary incontinence, GI bleed, HTN  Imaging:   CT ABDOMEN PELVIS W WO CONTRAST Additional Contrast? None   Final Result   Active GI bleeding site not identified   Proctitis   Progression of splenomegaly and splenorenal shunt as compared to the prior   study.   Subcentimeter left renal complex lesion for which follow-up is needed to   differentiate between a complex cyst in cystic mass.      Electronically signed by Betty Cornelius        Abnormal labs:   Abnormal Labs Reviewed   CBC WITH AUTO DIFFERENTIAL - Abnormal; Notable for the following components:       Result Value    RBC 3.55 (*)     Hemoglobin 11.4 (*)     Hematocrit 32.8 (*)     Platelets 102 (*)     MPV 8.5 (*)     Neutrophils % 86 (*)     Lymphocytes % 6 (*)     Immature Granulocytes % 1 (*)     Lymphocytes Absolute 0.3 (*)     Immature Granulocytes Absolute 0.1 (*)     All other components within normal limits   COMPREHENSIVE METABOLIC PANEL - Abnormal; Notable for the following components:    Sodium 135 (*)     Glucose 265 (*)     BUN 39 (*)     Creatinine 2.07 (*)     Est, Glom Filt Rate 34 (*)     Total Bilirubin 1.2 (*)     All other components within normal limits       Vitals/MEWS:    Level of Consciousness: Alert (0)   Vitals:    09/29/24 1030 09/29/24 1100   BP: (!) 160/69 (!) 140/55   Pulse: 79 78   Resp: 18 12   Temp: 98 °F (36.7 °C)    TempSrc: Oral    SpO2: 100% 99%   Weight: 106.8 kg (235 lb 7.2 oz)    Height: 1.803 m (5' 11\")      DI:   Predictive Model Details          29 (Normal)  Factor Value    Calculated 9/29/2024 15:51 39% Age 68 years old    Deterioration Index Model 31% Respiratory rate 12     11% Potassium 4.7 mmol/L     5% Systolic 140     5% Sodium abnormal (135 mmol/L)     3% BUN abnormal (39 MG/DL)     2% Pulse oximetry 99 %     2% Hematocrit abnormal (32.8 %)     2% Platelet count abnormal (102 K/uL)     0% Pulse 78     0% WBC count  5.4 K/uL     0% Temperature 98 °F (36.7 °C)         FiO2 (%):   O2 Flow Rate: O2 Device: None (Room air)    Cardiac Rhythm: Cardiac Rhythm: Sinus rhythm    Pain Scale: Pain Assessment  Pain Assessment: 0-10  Pain Level: 0  Last documented pain score: (0-10 scale) Pain Level: 0  Last documented pain medication administered: N/A     Mental Status: oriented, alert, coherent, logical, thought processes intact, and able to concentrate and follow conversation  Orientation Level: Orientation Level: Oriented X4  NIH Score:    C-SSRS: Risk of Suicide: No Risk    PO Status: Clear Liquid  Bedside swallow: 3 oz Water Swallow Screen: Pass  [place] Coma Scale (GCS): [place] Coma Scale  Eye Opening: Spontaneous  Best Verbal Response: Oriented  Best Motor Response: Obeys commands  [place] Coma Scale Score: 15    Active LDA's:   Peripheral IV 09/29/24 Distal;Right Antecubital (Active)   Site Assessment Clean, dry & intact 09/29/24 1048   Line Status Blood return noted;Brisk blood return;Normal saline locked;Specimen collected 09/29/24 1048   Phlebitis Assessment No symptoms 09/29/24 1048   Infiltration Assessment 0 09/29/24 1048   Dressing Status New dressing applied 09/29/24 1048   Dressing Type Transparent 09/29/24 1048   Dressing Intervention New 09/29/24 1048     Pertinent or High Risk Medications/Drips: no   If Yes, please provide details:   Titratable drips: no   Pending Blood Product Administration: no     Sepsis: Sepsis Risk Score   Predictive Model Details          28 (Low)  Factor Value    Calculated 9/29/2024 15:47 12% Total Active Inpatient Meds 24    [facility] EARLY DETECTION OF SEPSIS VERSION 2 Model 12% O2 Delivery Method ROOM AIR     8% Bilirubin 1.2 MG/DL     6% Age 68 years old     6% Creatinine 2.07 MG/DL     5% Absolute Lymphocytes 0.3 K/UL     4% Platelet Count 102 K/uL     4% Has Imaging Procedure present     3% Diastolic Blood Pressure 55     -3% AST 18 U/L      Recent Labs     09/29/24  1036   WBC 5.4     Blood

## 2024-09-29 NOTE — ED PROVIDER NOTES
[facility] EMERGENCY DEP  EMERGENCY DEPARTMENT ENCOUNTER      Pt Name: Deon Vaughn  MRN: [id number]  Birthdate 1955  Date of evaluation: 9/29/2024  Provider: Matt Arita DO    CHIEF COMPLAINT       Chief Complaint   Patient presents with    Rectal Bleeding         HISTORY OF PRESENT ILLNESS   (Location/Symptom, Timing/Onset, Context/Setting, Quality, Duration, Modifying Factors, Severity)  Note limiting factors.   This is a 68-year-old male presents to ED for evaluation of abdominal pain constipation and rectal bleeding.  Patient had a colonoscopy with polypectomy performed on 9/23/2024.  Reports having some constipation blood in stools this morning, reports that his stool is \"rockhard \"denies any fevers chills nausea vomiting, no dizziness no weakness            Review of External Medical Records:     Nursing Notes were reviewed.    REVIEW OF SYSTEMS    (2-9 systems for level 4, 10 or more for level 5)     Review of Systems   Gastrointestinal:  Positive for abdominal pain and anal bleeding.       Except as noted above the remainder of the review of systems was reviewed and negative.       PAST MEDICAL HISTORY     Past Medical History:   Diagnosis Date    Arthritis     Autoimmune disease (HCC)     ITP    CAD (coronary artery disease)     enlarged heart ?    Cancer (HCC)     skin ca removed from forehead    Chronic kidney disease     kidney stones, stage II    Coagulation disorder (HCC)     low plts    COVID-19 12/2022    Diabetes (HCC)     type 2    GERD (gastroesophageal reflux disease)     Hepatic encephalopathy (HCC)     Hypertension     Ill-defined condition     obesity per pt    Ill-defined condition     anemia    Liver disease     elevated liver enzymes hx CASTILLO    Liver transplant candidate     Pt is on the list for a liver transplant as of 8/2018    PUD (peptic ulcer disease)     stomach ulcers    TIA (transient ischemic attack)     2017         SURGICAL HISTORY       Past Surgical History:  type    3. Proctitis        COVID-19 Suspicion: No  Sepsis present:  No  Reassessment needed: No  Code Status:  Full Code  Readmission: No  Isolation Requirements: no  Recommended Level of Care: med/surg  Department: [facility] Adult ED - [phone]  Consulting Provider: Dr. Glass    Other:  68-year-old male needs admission for proctitis rectal bleeding, liver transplant patient on tacrolimus immunocompromise.  Normal white blood cell count, baseline creatinine elevation, CT shows no active bleeding, has splenomegaly splenorenal shunt, has requested to have Dr. Beckwith.  I did contact him and he is not on-call this evening or next week.  Will contact GI on-call.  Requires observation for the above.     Total critical care time spent exclusive of procedures:  0 minutes.         CONSULTS:  IP CONSULT TO HOSPITALIST  IP CONSULT TO GI    PROCEDURES:  Unless otherwise noted below, none     Procedures      FINAL IMPRESSION      1. Rectal bleeding    2. Constipation, unspecified constipation type    3. Proctitis          DISPOSITION/PLAN   DISPOSITION        PATIENT REFERRED TO:  No follow-up provider specified.    DISCHARGE MEDICATIONS:  New Prescriptions    No medications on file         (Please note that portions of this note were completed with a voice recognition program.  Efforts were made to edit the dictations but occasionally words are mis-transcribed.)    Matt Arita DO (electronically signed)  Emergency Attending Physician / Physician Assistant / Nurse Practitioner              Matt Arita DO  09/29/24 6062

## 2024-09-29 NOTE — ED TRIAGE NOTES
Arrived via EMS from home. He had a colonoscopy on Monday and now is having constipation and blood in stool. He reports stool hard as a rock this morning. Denies dizziness and weakness. Blood is dark red when he has bowel movement. Abdominal pain and cramping.

## 2024-09-30 LAB
ANION GAP SERPL CALC-SCNC: 3 MMOL/L (ref 2–12)
BASOPHILS # BLD: 0.1 K/UL (ref 0–0.1)
BASOPHILS NFR BLD: 1 % (ref 0–1)
BUN SERPL-MCNC: 32 MG/DL (ref 6–20)
BUN/CREAT SERPL: 18 (ref 12–20)
CALCIUM SERPL-MCNC: 8.5 MG/DL (ref 8.5–10.1)
CHLORIDE SERPL-SCNC: 111 MMOL/L (ref 97–108)
CO2 SERPL-SCNC: 24 MMOL/L (ref 21–32)
CREAT SERPL-MCNC: 1.77 MG/DL (ref 0.7–1.3)
DIFFERENTIAL METHOD BLD: ABNORMAL
EOSINOPHIL # BLD: 0.1 K/UL (ref 0–0.4)
EOSINOPHIL NFR BLD: 2 % (ref 0–7)
ERYTHROCYTE [DISTWIDTH] IN BLOOD BY AUTOMATED COUNT: 13.5 % (ref 11.5–14.5)
EST. AVERAGE GLUCOSE BLD GHB EST-MCNC: 114 MG/DL
GLUCOSE BLD STRIP.AUTO-MCNC: 119 MG/DL (ref 65–117)
GLUCOSE BLD STRIP.AUTO-MCNC: 165 MG/DL (ref 65–117)
GLUCOSE BLD STRIP.AUTO-MCNC: 177 MG/DL (ref 65–117)
GLUCOSE BLD STRIP.AUTO-MCNC: 240 MG/DL (ref 65–117)
GLUCOSE SERPL-MCNC: 123 MG/DL (ref 65–100)
HBA1C MFR BLD: 5.6 % (ref 4–5.6)
HCT VFR BLD AUTO: 27 % (ref 36.6–50.3)
HCT VFR BLD AUTO: 27.8 % (ref 36.6–50.3)
HCT VFR BLD AUTO: 28.1 % (ref 36.6–50.3)
HGB BLD-MCNC: 9.3 G/DL (ref 12.1–17)
HGB BLD-MCNC: 9.6 G/DL (ref 12.1–17)
HGB BLD-MCNC: 9.7 G/DL (ref 12.1–17)
IMM GRANULOCYTES # BLD AUTO: 0.1 K/UL (ref 0–0.04)
IMM GRANULOCYTES NFR BLD AUTO: 1 % (ref 0–0.5)
LYMPHOCYTES # BLD: 0.6 K/UL (ref 0.8–3.5)
LYMPHOCYTES NFR BLD: 11 % (ref 12–49)
MCH RBC QN AUTO: 32 PG (ref 26–34)
MCHC RBC AUTO-ENTMCNC: 34.5 G/DL (ref 30–36.5)
MCV RBC AUTO: 92.7 FL (ref 80–99)
MONOCYTES # BLD: 0.5 K/UL (ref 0–1)
MONOCYTES NFR BLD: 9 % (ref 5–13)
NEUTS SEG # BLD: 4 K/UL (ref 1.8–8)
NEUTS SEG NFR BLD: 76 % (ref 32–75)
NRBC # BLD: 0 K/UL (ref 0–0.01)
NRBC BLD-RTO: 0 PER 100 WBC
PLATELET # BLD AUTO: 108 K/UL (ref 150–400)
PMV BLD AUTO: 9 FL (ref 8.9–12.9)
POTASSIUM SERPL-SCNC: 4.1 MMOL/L (ref 3.5–5.1)
RBC # BLD AUTO: 3 M/UL (ref 4.1–5.7)
RBC MORPH BLD: ABNORMAL
SERVICE CMNT-IMP: ABNORMAL
SODIUM SERPL-SCNC: 138 MMOL/L (ref 136–145)
WBC # BLD AUTO: 5.4 K/UL (ref 4.1–11.1)

## 2024-09-30 PROCEDURE — 36415 COLL VENOUS BLD VENIPUNCTURE: CPT

## 2024-09-30 PROCEDURE — 80048 BASIC METABOLIC PNL TOTAL CA: CPT

## 2024-09-30 PROCEDURE — 85014 HEMATOCRIT: CPT

## 2024-09-30 PROCEDURE — 6360000002 HC RX W HCPCS: Performed by: HOSPITALIST

## 2024-09-30 PROCEDURE — 1100000000 HC RM PRIVATE

## 2024-09-30 PROCEDURE — 6370000000 HC RX 637 (ALT 250 FOR IP): Performed by: HOSPITALIST

## 2024-09-30 PROCEDURE — 2580000003 HC RX 258: Performed by: HOSPITALIST

## 2024-09-30 PROCEDURE — 83036 HEMOGLOBIN GLYCOSYLATED A1C: CPT

## 2024-09-30 PROCEDURE — 85018 HEMOGLOBIN: CPT

## 2024-09-30 PROCEDURE — 82962 GLUCOSE BLOOD TEST: CPT

## 2024-09-30 PROCEDURE — 85025 COMPLETE CBC W/AUTO DIFF WBC: CPT

## 2024-09-30 RX ADMIN — SODIUM CHLORIDE: 9 INJECTION, SOLUTION INTRAVENOUS at 05:12

## 2024-09-30 RX ADMIN — TACROLIMUS 0.5 MG: 0.5 CAPSULE ORAL at 20:28

## 2024-09-30 RX ADMIN — INSULIN LISPRO 1 UNITS: 100 INJECTION, SOLUTION INTRAVENOUS; SUBCUTANEOUS at 12:02

## 2024-09-30 RX ADMIN — PANTOPRAZOLE SODIUM 40 MG: 40 INJECTION, POWDER, FOR SOLUTION INTRAVENOUS at 09:37

## 2024-09-30 RX ADMIN — SODIUM CHLORIDE 10 ML: 9 INJECTION INTRAMUSCULAR; INTRAVENOUS; SUBCUTANEOUS at 09:40

## 2024-09-30 RX ADMIN — TACROLIMUS 0.5 MG: 0.5 CAPSULE ORAL at 09:37

## 2024-09-30 RX ADMIN — LEVOTHYROXINE SODIUM 75 MCG: 0.07 TABLET ORAL at 09:37

## 2024-09-30 RX ADMIN — FLUOXETINE HYDROCHLORIDE 20 MG: 20 CAPSULE ORAL at 09:37

## 2024-09-30 NOTE — CARE COORDINATION
Care Management Initial Assessment       RUR: 12% Low   Readmission? No  1st IM letter given? Yes - 9/30/24  1st  letter given: NA    CM met with patient at bedside to introduce self and explain role. Patient lives with his wife and adult son in a 2 story home with ramp access. Patient has a full flight to enter the 2nd floor. Patient was independent with ADL's, IADL's and occasionally ambulates with the use of a cane due to arthritis. Patient also owns a rollator. No history of HH or SNF/IPR. Patient's wife will provide transport home once medically stable. CM will follow as needed.      09/30/24 1100   Service Assessment   Patient Orientation Alert and Oriented;Place;Person;Situation;Self   Cognition Alert   History Provided By Patient   Primary Caregiver Self   Accompanied By/Relationship Wife   Support Systems Spouse/Significant Other;Children;Family Members   Patient's Healthcare Decision Maker is: Legal Next of Kin   PCP Verified by CM Yes  (Dr. Rebekah Dior)   Last Visit to PCP Within last 6 months   Prior Functional Level Independent in ADLs/IADLs   Current Functional Level Independent in ADLs/IADLs   Can patient return to prior living arrangement Yes   Ability to make needs known: Good   Family able to assist with home care needs: Yes   Would you like for me to discuss the discharge plan with any other family members/significant others, and if so, who? Yes   Financial Resources Medicare   Social/Functional History   Lives With Spouse;Son   Type of Home House   Home Layout Two level   Home Equipment Cane;Rollator   ADL Assistance Independent   Homemaking Assistance Independent   Ambulation Assistance Independent   Transfer Assistance Independent   Discharge Planning   Type of Residence House   Living Arrangements Spouse/Significant Other;Children   Current Services Prior To Admission Durable Medical Equipment   Current DME Prior to Arrival Cane   Potential Assistance Needed N/A   DME Ordered? No

## 2024-09-30 NOTE — CONSULTS
[facility] [address] [address]        GASTROENTEROLOGY CONSULTATION NOTE  Magdalene Coyne PA-C  [phone] office  NP/PA in-hospital M-F until 4:30PM  After 5PM or on weekends, please call  for physician on call        NAME:  Deon Vaughn   :   1955   MRN:   [id number]       Referring Physician: Juan J    Consult Date: 2024 11:31 AM    Chief Complaint: Proctitis     History of Present Illness:  Patient is a 68 y.o. who is seen in consultation at the request of Dr. Arita for proctitis. The patient has a past medical history as below. He had a colonoscopy 24 with a removal of 1cm sessile polyp in the sigmoid colon, pathology pending. Reports normal stools all of last week. He reports blood mixed in the stool starting yesterday. Endorses lower abdominal pain. No nausea or vomiting. No indigestion. No fever of chills. History of liver transplant.     I have reviewed the emergency room note, hospital admission note, notes by all other clinicians who have seen the patient during this hospitalization to date. I have reviewed the problem list and the reason for this hospitalization. I have reviewed the allergies and the medications the patient was taking at home prior to this hospitalization.    PMH:  Past Medical History:   Diagnosis Date    Arthritis     Autoimmune disease (HCC)     ITP    CAD (coronary artery disease)     enlarged heart ?    Cancer (HCC)     skin ca removed from forehead    Chronic kidney disease     kidney stones, stage II    Coagulation disorder (HCC)     low plts    COVID-19 2022    Diabetes (HCC)     type 2    GERD (gastroesophageal reflux disease)     Hepatic encephalopathy (HCC)     Hypertension     Ill-defined condition     obesity per pt    Ill-defined condition     anemia    Liver disease     elevated liver enzymes hx CASTILLO    Liver transplant candidate     Pt is on the list for a liver transplant as of 2018    PUD  to CASTILLO  Type 2 DM  CKD stage III  Hypothyroidism     Patient Active Problem List   Diagnosis    Long-term use of immunosuppressant medication    DM type 2 (diabetes mellitus, type 2) (HCC)    Stage 2 chronic kidney disease    CASTILLO (nonalcoholic steatohepatitis)    CAD (coronary artery disease)    Severe obesity    GERD (gastroesophageal reflux disease)    Liver transplant complication (HCC)    Generalized anxiety disorder    Neuropathy involving both lower extremities    H/O liver transplant (HCC)    Thrombocytopenia, unspecified (HCC)    Anemia due to chronic kidney disease    Renal mass    Type 2 diabetes mellitus with chronic kidney disease (HCC)    Type 2 diabetes mellitus with diabetic neuropathy (HCC)    Lower GI bleed     Plan:       Full liquid as tolerated  On BID PPI  Trend CBC  Transfuse as necessary  If pt begins to bleed briskly, order CTA GI bleed protocol and immediately consult IR for intervention if the CTA is positive  Discussed patient with Dr. Arce  Thank you for allowing me to participate in care of Deon TOBAR Roderick       Signed By: JANE Oneal     9/30/2024  11:31 AM

## 2024-09-30 NOTE — PROGRESS NOTES
[facility] Adult  Hospitalist Group                                                                                          Hospitalist Progress Note  Misty Gamez PA-C  Answering service: [phone] OR [phone] from in house phone        Date of Service:  2024  NAME:  Deon Vaughn  :  1955  MRN:  [id number]       Admission Summary:     Deon Vaughn is a 68 y.o. male came to the ED with constipation and rectal bleeding.  He recently, 2024, had colonoscopy with removal of 1 cm sessile polyp in the sigmoid.  He had a hard bowel movement despite recent colon prep and despite having BM at least every other day which he says is regular for him.  Patient denied abdominal pain, nausea, vomiting, fever or chills.  Workup in the ED was fairly unremarkable.  Abdominal pelvic imaging with CT negative for acute GI bleed, showed diffuse thickening of the rectum.     The patient denies any headache, blurry vision, sore throat, trouble swallowing, trouble with speech, chest pain, SOB, cough, fever, chills, N/V/D, abd pain, urinary symptoms, constipation, recent travels, sick contacts, focal or generalized neurological symptoms, falls, injuries, rashes, contact with COVID-19 diagnosed patients, hematemesis, melena, hemoptysis, hematuria, rashes, denies starting any new medications and denies any other concerns or problems besides as mentioned above.      Interval history / Subjective:     Patient seen and examined this morning.  Had a \"hard\" bowel movement yesterday, multiple large loose stools today.  Denies blood in stool today.  Denies abdominal pain, nausea/vomiting.  Would like to advance his diet.      Assessment & Plan:     Blood per rectum   Constipation -- resolved   - Patient had polypectomy on .    - CT showed rectal wall thickening consistent with the above, no clinical evidence of infection.  - clear liquid diet -> ADAT  - monitor hemoglobin and transfuse if hgb <7  - BID PPI  -  1654  Last data filed at 9/30/2024 0512  Gross per 24 hour   Intake 240 ml   Output --   Net 240 ml        Physical Examination:     I had a face to face encounter with this patient and independently examined them on 9/30/2024 as outlined below:          General : alert x 3, awake, no acute distress,   HEENT: PEERL, EOMI, moist mucus membrane  Neck: supple, no JVD, no meningeal signs  Chest: Clear to auscultation bilaterally   CVS: S1 S2 heard, Capillary refill less than 2 seconds  Abd: soft/ non tender, non distended, BS physiological,   Ext: no clubbing, no cyanosis, no edema, brisk 2+ DP pulses  Neuro/Psych: pleasant mood and affect, CN 2-12 grossly intact, sensory grossly within normal limit, Strength 5/5 in all extremities  Skin: warm     Data Review:    Review and/or order of clinical lab test      I have personally and independently reviewed all pertinent labs, diagnostic studies, imaging, and have provided independent interpretation of the same.     Labs:     Recent Labs     09/29/24  1036 09/30/24  0022 09/30/24  0713 09/30/24  1530   WBC 5.4 5.4  --   --    HGB 11.4* 9.6* 9.3* 9.7*   HCT 32.8* 27.8* 27.0* 28.1*   * 108*  --   --      Recent Labs     09/29/24  1036 09/30/24  0022   * 138   K 4.7 4.1    111*   CO2 27 24   BUN 39* 32*     Recent Labs     09/29/24  1036   ALT 27   GLOB 3.3     Recent Labs     09/29/24  1115   INR 1.0   APTT 23.4      No results for input(s): \"TIBC\" in the last 72 hours.    Invalid input(s): \"FE\", \"PSAT\", \"FERR\"   No results found for: \"RBCF\"   No results for input(s): \"PH\", \"PCO2\", \"PO2\" in the last 72 hours.  No results for input(s): \"CPK\" in the last 72 hours.    Invalid input(s): \"CPKMB\", \"CKNDX\", \"TROIQ\"  Lab Results   Component Value Date/Time    CHOL 141 04/11/2024 09:57 AM    HDL 31 04/11/2024 09:57 AM    LDL 82.2 04/11/2024 09:57 AM     No results found for: \"GLUCPOC\"  [unfilled]    Notes reviewed from all clinical/nonclinical/nursing services involved

## 2024-09-30 NOTE — H&P
History and Physical    Date of Service:  9/29/2024  Primary Care Provider: Rebekah Dior MD  Source of information: The patient, Chart review, and Spouse/family member    Chief Complaint: Rectal Bleeding      History of Presenting Illness:   Deon TOBAR Roderick is a 68 y.o. male came to the ED with constipation and rectal bleeding.  He recently, 9/23/2024, had colonoscopy with removal of 1 cm sessile polyp in the sigmoid.  He had a hard bowel movement despite recent colon prep and despite having BM at least every other day which he says is regular for him.  Patient denied abdominal pain, nausea, vomiting, fever or chills.  Workup in the ED was fairly unremarkable.  Abdominal pelvic imaging with CT negative for acute GI bleed, showed diffuse thickening of the rectum.    The patient denies any headache, blurry vision, sore throat, trouble swallowing, trouble with speech, chest pain, SOB, cough, fever, chills, N/V/D, abd pain, urinary symptoms, constipation, recent travels, sick contacts, focal or generalized neurological symptoms, falls, injuries, rashes, contact with COVID-19 diagnosed patients, hematemesis, melena, hemoptysis, hematuria, rashes, denies starting any new medications and denies any other concerns or problems besides as mentioned above.         REVIEW OF SYSTEMS:  A comprehensive review of systems was negative except for that written in the History of Present Illness.     Past Medical History:   Diagnosis Date    Arthritis     Autoimmune disease (HCC)     ITP    CAD (coronary artery disease)     enlarged heart ?    Cancer (HCC)     skin ca removed from forehead    Chronic kidney disease     kidney stones, stage II    Coagulation disorder (HCC)     low plts    COVID-19 12/2022    Diabetes (HCC)     type 2    GERD (gastroesophageal reflux disease)     Hepatic encephalopathy (HCC)     Hypertension     Ill-defined condition     obesity per pt    Ill-defined condition     anemia    Liver disease

## 2024-10-01 VITALS
DIASTOLIC BLOOD PRESSURE: 66 MMHG | HEART RATE: 83 BPM | BODY MASS INDEX: 32.96 KG/M2 | RESPIRATION RATE: 18 BRPM | WEIGHT: 235.45 LBS | OXYGEN SATURATION: 96 % | SYSTOLIC BLOOD PRESSURE: 145 MMHG | TEMPERATURE: 98.3 F | HEIGHT: 71 IN

## 2024-10-01 PROBLEM — K92.2 LOWER GI BLEED: Status: RESOLVED | Noted: 2024-09-29 | Resolved: 2024-10-01

## 2024-10-01 LAB
ANION GAP SERPL CALC-SCNC: 4 MMOL/L (ref 2–12)
BUN SERPL-MCNC: 21 MG/DL (ref 6–20)
BUN/CREAT SERPL: 13 (ref 12–20)
CALCIUM SERPL-MCNC: 8.5 MG/DL (ref 8.5–10.1)
CHLORIDE SERPL-SCNC: 112 MMOL/L (ref 97–108)
CO2 SERPL-SCNC: 24 MMOL/L (ref 21–32)
CREAT SERPL-MCNC: 1.66 MG/DL (ref 0.7–1.3)
GLUCOSE BLD STRIP.AUTO-MCNC: 143 MG/DL (ref 65–117)
GLUCOSE BLD STRIP.AUTO-MCNC: 232 MG/DL (ref 65–117)
GLUCOSE SERPL-MCNC: 137 MG/DL (ref 65–100)
HCT VFR BLD AUTO: 25.8 % (ref 36.6–50.3)
HCT VFR BLD AUTO: 27.4 % (ref 36.6–50.3)
HGB BLD-MCNC: 9 G/DL (ref 12.1–17)
HGB BLD-MCNC: 9.6 G/DL (ref 12.1–17)
POTASSIUM SERPL-SCNC: 4.3 MMOL/L (ref 3.5–5.1)
SERVICE CMNT-IMP: ABNORMAL
SERVICE CMNT-IMP: ABNORMAL
SODIUM SERPL-SCNC: 140 MMOL/L (ref 136–145)

## 2024-10-01 PROCEDURE — 85014 HEMATOCRIT: CPT

## 2024-10-01 PROCEDURE — 36415 COLL VENOUS BLD VENIPUNCTURE: CPT

## 2024-10-01 PROCEDURE — 80048 BASIC METABOLIC PNL TOTAL CA: CPT

## 2024-10-01 PROCEDURE — 2580000003 HC RX 258: Performed by: HOSPITALIST

## 2024-10-01 PROCEDURE — 6370000000 HC RX 637 (ALT 250 FOR IP): Performed by: HOSPITALIST

## 2024-10-01 PROCEDURE — 82962 GLUCOSE BLOOD TEST: CPT

## 2024-10-01 PROCEDURE — 6360000002 HC RX W HCPCS: Performed by: HOSPITALIST

## 2024-10-01 PROCEDURE — 85018 HEMOGLOBIN: CPT

## 2024-10-01 RX ORDER — OMEPRAZOLE 10 MG/1
20 CAPSULE, DELAYED RELEASE ORAL DAILY
Qty: 30 CAPSULE | Refills: 0 | Status: SHIPPED | OUTPATIENT
Start: 2024-10-01

## 2024-10-01 RX ADMIN — PANTOPRAZOLE SODIUM 40 MG: 40 INJECTION, POWDER, FOR SOLUTION INTRAVENOUS at 09:50

## 2024-10-01 RX ADMIN — FLUOXETINE HYDROCHLORIDE 20 MG: 20 CAPSULE ORAL at 09:49

## 2024-10-01 RX ADMIN — LEVOTHYROXINE SODIUM 75 MCG: 0.07 TABLET ORAL at 09:50

## 2024-10-01 RX ADMIN — TACROLIMUS 0.5 MG: 0.5 CAPSULE ORAL at 09:50

## 2024-10-01 ASSESSMENT — PAIN SCALES - GENERAL: PAINLEVEL_OUTOF10: 0

## 2024-10-01 NOTE — DISCHARGE SUMMARY
Discharge Summary       PATIENT ID: Deon Vaughn  MRN: [id number]   YOB: 1955    DATE OF ADMISSION: 9/29/2024 10:28 AM    DATE OF DISCHARGE: 10/01/24   PRIMARY CARE PROVIDER: Rebekah Dior MD     ATTENDING PHYSICIAN: LIAM Fenton MD  DISCHARGING PROVIDER: Misty Gamez PA-C    To contact this individual call [phone] and ask the  to page.  If unavailable ask to be transferred the Adult Hospitalist Department.    CONSULTATIONS: IP CONSULT TO HOSPITALIST  IP CONSULT TO GI  IP CONSULT TO GI    PROCEDURES/SURGERIES: * No surgery found *     ADMITTING DIAGNOSES & HOSPITAL COURSE:     Deon Vaughn is a 68 y.o. male came to the ED with constipation and rectal bleeding.  He recently, 9/23/2024, had colonoscopy with removal of 1 cm sessile polyp in the sigmoid.  He had a hard bowel movement despite recent colon prep and despite having BM at least every other day which he says is regular for him.  Patient denied abdominal pain, nausea, vomiting, fever or chills.  Workup in the ED was fairly unremarkable.  Abdominal pelvic imaging with CT negative for acute GI bleed, showed diffuse thickening of the rectum.     The patient denies any headache, blurry vision, sore throat, trouble swallowing, trouble with speech, chest pain, SOB, cough, fever, chills, N/V/D, abd pain, urinary symptoms, constipation, recent travels, sick contacts, focal or generalized neurological symptoms, falls, injuries, rashes, contact with COVID-19 diagnosed patients, hematemesis, melena, hemoptysis, hematuria, rashes, denies starting any new medications and denies any other concerns or problems besides as mentioned above.        10/1  Patient seen and examined this am. No complaints at this time, asking to dc. Reports 2 loose BM today, denies abd pain, n/v, sob, cp.   Follow up outpatient GI. Return precautions provided. Patient verbalizes understanding and agrees with the plan.           DISCHARGE DIAGNOSES / PLAN:      Blood per  daily            CONTINUE taking these medications      amLODIPine 10 MG tablet  Commonly known as: NORVASC     aspirin 81 MG EC tablet     ferrous sulfate 325 (65 Fe) MG EC tablet  Commonly known as: FE TABS 325     FLUoxetine 10 MG capsule  Commonly known as: PROZAC  Take 2 capsules by mouth daily     glimepiride 1 MG tablet  Commonly known as: AMARYL     levothyroxine 75 MCG tablet  Commonly known as: SYNTHROID     lisinopril 20 MG tablet  Commonly known as: PRINIVIL;ZESTRIL     metFORMIN 1000 MG tablet  Commonly known as: GLUCOPHAGE     tacrolimus 0.5 MG capsule  Commonly known as: proGRAF  Take 1 capsule by mouth 2 times daily     vitamin D3 125 MCG (5000 UT) Tabs tablet  Commonly known as: CHOLECALCIFEROL               Where to Get Your Medications        These medications were sent to [id number] [place], [place] - [address] - P [phone] - F [phone]  [id number] [address] [facility] [address]      Phone: [phone]   omeprazole 10 MG delayed release capsule           NOTIFY YOUR PHYSICIAN FOR ANY OF THE FOLLOWING:   Fever over 101 degrees for 24 hours.   Chest pain, shortness of breath, fever, chills, nausea, vomiting, diarrhea, change in mentation, falling, weakness, bleeding. Severe pain or pain not relieved by medications.  Or, any other signs or symptoms that you may have questions about.    DISPOSITION:   x Home With:   OT  PT  HH  RN       Long term SNF/Inpatient Rehab    Independent/assisted living    Hospice    Other:       PATIENT CONDITION AT DISCHARGE:     Functional status    Poor     Deconditioned    x Independent      Cognition    x Lucid     Forgetful     Dementia      Catheters/lines (plus indication)    Gutierrez     PICC     PEG    x None      Code status    x Full code     DNR      PHYSICAL EXAMINATION AT DISCHARGE:    General : alert x 3, awake, no acute distress,   HEENT: PEERL, EOMI, moist mucus membrane  Neck: supple, no JVD, no meningeal

## 2024-10-01 NOTE — PROGRESS NOTES
ERNESTO CAMPBELL   [address] [address]       GI PROGRESS NOTE  Magdalene Coyne PA-C  [phone] office  NP/PA in-hospital M-F until 4:30PM  After 5PM or on weekends, please call  for physician on call      NAME: Deon Vaughn   :  1955   MRN:  [id number]       Subjective:     Patient resting in bedside chair, eating breakfast. Tolerating solid foods. No further blood in the stool. Not having fully solid stools.     Objective:     VITALS:   Last 24hrs VS reviewed since prior progress note. Most recent are:  Vitals:    10/01/24 1045   BP: (!) 145/66   Pulse: 83   Resp:    Temp: 98.3 °F (36.8 °C)   SpO2:        PHYSICAL EXAM:  General: Cooperative, no acute distress    Neurologic:  Alert and oriented X 3.  HEENT: EOMI, no scleral icterus   Lungs:  CTA bilaterally. No wheezing  Heart:  S1 S2, regular rhythm  Abdomen: Soft, non-distended, no tenderness. +Bowel sounds  Extremities: No edema  Psych:   Good insight. Not anxious or agitated.    Lab Data Reviewed:     Recent Results (from the past 24 hour(s))   POCT Glucose    Collection Time: 24 11:29 AM   Result Value Ref Range    POC Glucose 240 (H) 65 - 117 mg/dL    Performed by: MONIQUE Salazar    Hemoglobin and Hematocrit    Collection Time: 24  3:30 PM   Result Value Ref Range    Hemoglobin 9.7 (L) 12.1 - 17.0 g/dL    Hematocrit 28.1 (L) 36.6 - 50.3 %   POCT Glucose    Collection Time: 24  4:21 PM   Result Value Ref Range    POC Glucose 165 (H) 65 - 117 mg/dL    Performed by: MONIQUE Salazar    POCT Glucose    Collection Time: 24  9:04 PM   Result Value Ref Range    POC Glucose 177 (H) 65 - 117 mg/dL    Performed by: RAFIQ West    Hemoglobin and Hematocrit    Collection Time: 10/01/24 12:12 AM   Result Value Ref Range    Hemoglobin 9.0 (L) 12.1 - 17.0 g/dL    Hematocrit 25.8 (L) 36.6 - 50.3 %   POCT Glucose    Collection Time: 10/01/24  6:25 AM   Result Value Ref Range    POC Glucose 143 (H) 65 -  117 mg/dL    Performed by: RAFIQ West    Basic Metabolic Panel    Collection Time: 10/01/24  8:14 AM   Result Value Ref Range    Sodium 140 136 - 145 mmol/L    Potassium 4.3 3.5 - 5.1 mmol/L    Chloride 112 (H) 97 - 108 mmol/L    CO2 24 21 - 32 mmol/L    Anion Gap 4 2 - 12 mmol/L    Glucose 137 (H) 65 - 100 mg/dL    BUN 21 (H) 6 - 20 MG/DL    Creatinine 1.66 (H) 0.70 - 1.30 MG/DL    BUN/Creatinine Ratio 13 12 - 20      Est, Glom Filt Rate 44 (L) >60 ml/min/1.73m2    Calcium 8.5 8.5 - 10.1 MG/DL   Hemoglobin and Hematocrit    Collection Time: 10/01/24  8:14 AM   Result Value Ref Range    Hemoglobin 9.6 (L) 12.1 - 17.0 g/dL    Hematocrit 27.4 (L) 36.6 - 50.3 %   POCT Glucose    Collection Time: 10/01/24 10:46 AM   Result Value Ref Range    POC Glucose 232 (H) 65 - 117 mg/dL    Performed by: Shalini Sethi (PAULA)             Assessment:     Hematochezia: Hgb 9.6, no further bloody stools. Reports normal bowel movements after his colonoscopy (as above). CT 9/29/24 showing no active bleeding, evidence of proctitis. Suspect bleeding from proctitis. Will monitor Hgb, no plans for endoscopic procedures at this time.   S/p Liver transplant for cirrhosis secondary to CASTILLO  Type 2 DM  CKD stage III  Hypothyroidism     Patient Active Problem List   Diagnosis    Long-term use of immunosuppressant medication    DM type 2 (diabetes mellitus, type 2) (HCC)    Stage 2 chronic kidney disease    CASTILLO (nonalcoholic steatohepatitis)    CAD (coronary artery disease)    Severe obesity    GERD (gastroesophageal reflux disease)    Liver transplant complication (HCC)    Generalized anxiety disorder    Neuropathy involving both lower extremities    H/O liver transplant (HCC)    Thrombocytopenia, unspecified (HCC)    Anemia due to chronic kidney disease    Renal mass    Type 2 diabetes mellitus with chronic kidney disease (HCC)    Type 2 diabetes mellitus with diabetic neuropathy (HCC)    Lower GI bleed     Plan:     Advance diet as  tolerated  On BID PPI  Trend CBC  Transfuse as necessary  If pt begins to bleed briskly, order CTA GI bleed protocol and immediately consult IR for intervention if the CTA is positive  Discussed patient with Dr. Arce  Will sign off, please contact for further management this admission     Signed By: JANE Oneal     10/1/2024  10:59 AM

## 2024-10-01 NOTE — DISCHARGE INSTRUCTIONS
Discharge Instructions       PATIENT ID: Deon Vaughn  MRN: [id number]   YOB: 1955    DATE OF ADMISSION: 9/29/2024   DATE OF DISCHARGE: 10/1/2024    PRIMARY CARE PROVIDER: Rebekah Dior     ATTENDING PHYSICIAN: Eboni Fenton MD   DISCHARGING PROVIDER: Misty Gamez PA-C    To contact this individual call [phone] and ask the  to page.   If unavailable ask to be transferred the Adult Hospitalist Department.    DISCHARGE DIAGNOSES     Blood per rectum   Constipation -- resolved   - Patient had polypectomy on 9/23.    - CT showed rectal wall thickening consistent with the above, no clinical evidence of infection.  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  Chronic anemia, thrombocytopenia likely due to cirrhosis, chronic illness.     Type II DM with hyperglycemia, treated with metformin and glimepiride.  Hold oral hypoglycemics while inpatient, initiate Accu-Cheks, Humalog SSI, hypoglycemic protocol.     CKD stage III, suspect diabetic nephropathy.  Creatinine stable.  Hypothyroidism, continue levothyroxine    CONSULTATIONS: [unfilled]    PROCEDURES/SURGERIES: * No surgery found *    PENDING TEST RESULTS:   At the time of discharge the following test results are still pending: x    [facility] UP AP[facility]MENTS:   [facility]     ADDITIONAL CARE RECOMMENDATIONS:   Follow up outpatient GI as needed   Drink plenty fluids  Follow up with PCP next week for repeat lab work   Return if you experience fevers/chills, nausea/vomiting, inability to tolerate diet, abdominal pain, blood in stool    DIET: low fat, low cholesterol diet    ACTIVITY: activity as tolerated    WOUND CARE: x    EQUIPMENT needed: x      DISCHARGE MEDICATIONS:   See Medication

## 2024-10-01 NOTE — PLAN OF CARE
Problem: Safety - Adult  Goal: Free from fall injury  10/1/2024 0114 by Chely Salazar, RN  Outcome: Progressing  10/1/2024 0114 by Chely Salazar, RN  Outcome: Progressing  10/1/2024 0114 by Chely Salazar, RN  Outcome: Progressing

## 2024-11-13 ENCOUNTER — OFFICE VISIT (OUTPATIENT)
Age: 69
End: 2024-11-13
Payer: MEDICARE

## 2024-11-13 VITALS
OXYGEN SATURATION: 98 % | WEIGHT: 235 LBS | SYSTOLIC BLOOD PRESSURE: 138 MMHG | HEART RATE: 73 BPM | HEIGHT: 71 IN | DIASTOLIC BLOOD PRESSURE: 68 MMHG | BODY MASS INDEX: 32.9 KG/M2 | RESPIRATION RATE: 16 BRPM

## 2024-11-13 DIAGNOSIS — Z94.4 H/O LIVER TRANSPLANT (HCC): Primary | ICD-10-CM

## 2024-11-13 DIAGNOSIS — Z79.60 LONG-TERM USE OF IMMUNOSUPPRESSANT MEDICATION: ICD-10-CM

## 2024-11-13 DIAGNOSIS — D63.1 ANEMIA DUE TO STAGE 3B CHRONIC KIDNEY DISEASE (HCC): ICD-10-CM

## 2024-11-13 DIAGNOSIS — N18.32 ANEMIA DUE TO STAGE 3B CHRONIC KIDNEY DISEASE (HCC): ICD-10-CM

## 2024-11-13 PROCEDURE — G8484 FLU IMMUNIZE NO ADMIN: HCPCS | Performed by: NURSE PRACTITIONER

## 2024-11-13 PROCEDURE — G8427 DOCREV CUR MEDS BY ELIG CLIN: HCPCS | Performed by: NURSE PRACTITIONER

## 2024-11-13 PROCEDURE — G8417 CALC BMI ABV UP PARAM F/U: HCPCS | Performed by: NURSE PRACTITIONER

## 2024-11-13 PROCEDURE — 1123F ACP DISCUSS/DSCN MKR DOCD: CPT | Performed by: NURSE PRACTITIONER

## 2024-11-13 PROCEDURE — 99214 OFFICE O/P EST MOD 30 MIN: CPT | Performed by: NURSE PRACTITIONER

## 2024-11-13 PROCEDURE — 3017F COLORECTAL CA SCREEN DOC REV: CPT | Performed by: NURSE PRACTITIONER

## 2024-11-13 PROCEDURE — 1036F TOBACCO NON-USER: CPT | Performed by: NURSE PRACTITIONER

## 2024-11-13 NOTE — PROGRESS NOTES
Chief Complaint   Patient presents with    Follow-up     /68   Pulse 73   Resp 16   Ht 1.803 m (5' 11\")   Wt 106.6 kg (235 lb)   SpO2 98%   BMI 32.78 kg/m²   \"Have you been to the ER, urgent care clinic since your last visit?  Hospitalized since your last visit?\"    NO    “Have you seen or consulted any other health care providers outside our system since your last visit?”    NO    “Have you had a diabetic eye exam?”    NO     No diabetic eye exam on file          \  
11.5 (L)     - 400 K/uL 112 (L)  132 (L)    INR 0.9 - 1.1   1.0  0.9    AST 15 - 37 U/L 16  10 (L)    ALT 12 - 78 U/L 26  20    Alk Phos 45 - 117 U/L 84  102    Bili, Total 0.2 - 1.0 MG/DL 1.4 (H)  1.0    Bili, Direct 0.0 - 0.2 MG/DL 0.4 (H)  0.3 (H)    Albumin 3.5 - 5.0 g/dL 3.8  3.8    BUN 6 - 20 MG/DL 39 (H)  38 (H)    Creat 0.70 - 1.30 MG/DL 1.97 (H)  1.93 (H)    Na 136 - 145 mmol/L 138  136    K 3.5 - 5.1 mmol/L 5.8 (H)  5.0    Cl 97 - 108 mmol/L 106  105    CO2 21 - 32 mmol/L 27  29    Glucose 65 - 100 mg/dL 230 (H)  226 (H)    Magnesium 1.6 - 2.4 mg/dL 1.9  1.9    Ammonia <32 UMOL/L  14        SUE - Transplant Immune Suppression    Tacrolimus   Latest Ref Rng 2.0 - 20.0 ng/mL   2/14/2024 10.0    4/11/2024 8.1    4/30/2024 1.8 (L)    7/9/2024 2.6       SEROLOGIES:  7/2018.  HAV total positive, HBsurface antigne negative, anti-HBcore negative, anti-HBsurface negative, anti-HCV negative, Anti-HIV negative, CMV IgG negative, HSV IgG positive    Serologies Latest Ref Rng & Units 8/26/2019   Ferritin 30 - 400 ng/mL 153   Iron % Saturation 15 - 55 % 32     Serologies Latest Ref Rng & Units 1/18/2023   Ferritin 26 - 388 NG/   Iron % Saturation 20 - 50 % 39     LIVER HISTOLOGY:  7/2014: Cirhosis with mild mixed macro- and microvesicular steatosis.  10/2018.  Liver explant from [facility].  Multifocal moderately differentiated HCC.  2.3 cm HCC right lobe.  1.3 cm right lobe.  No microscopic invasion.  9/2022.  FibroScan performed at Liver [facility]. EkPa was 8.1.  IQR/med 7%.  .   The results suggested a fibrosis level of F2. The CAP score suggests there is no significant hepatic steatosis.    ENDOSCOPIC PROCEDURES:  Not available or performed    RADIOLOGY:  5/2020. MRI of liver. No MRI evidence of hepatocellular carcinoma status post liver transplantation.  Splenomegaly with large splenorenal shunt varices. Bilateral gynecomastia. Unremarkable exam otherwise.  5/2020. Chest CT. No pulmonary

## 2024-11-19 NOTE — ED TRIAGE NOTES
Pt arrives from home for bilateral great toe pain starting over night. Pt was diagnosed with Covid on Thursday last week and started on Paxlovid. Pt also concerned cough is not getting better.  +Nausea, diarrhea    Hx: liver transplant 2018, CKD
We can't do this med without a visit and there isnt even a visit in the future appt seen  
Regular rate and rhythm, Heart sounds S1 S2 present, no murmurs, rubs or gallops

## 2024-11-25 DIAGNOSIS — N18.32 ANEMIA DUE TO STAGE 3B CHRONIC KIDNEY DISEASE (HCC): ICD-10-CM

## 2024-11-25 DIAGNOSIS — Z79.60 LONG-TERM USE OF IMMUNOSUPPRESSANT MEDICATION: ICD-10-CM

## 2024-11-25 DIAGNOSIS — Z94.4 H/O LIVER TRANSPLANT (HCC): ICD-10-CM

## 2024-11-25 DIAGNOSIS — D63.1 ANEMIA DUE TO STAGE 3B CHRONIC KIDNEY DISEASE (HCC): ICD-10-CM

## 2024-11-25 LAB
ALBUMIN SERPL-MCNC: 3.8 G/DL (ref 3.5–5)
ALBUMIN/GLOB SERPL: 1.1 (ref 1.1–2.2)
ALP SERPL-CCNC: 109 U/L (ref 45–117)
ALT SERPL-CCNC: 26 U/L (ref 12–78)
ANION GAP SERPL CALC-SCNC: 4 MMOL/L (ref 2–12)
AST SERPL-CCNC: 13 U/L (ref 15–37)
BASOPHILS # BLD: 0 K/UL (ref 0–0.1)
BASOPHILS NFR BLD: 1 % (ref 0–1)
BILIRUB DIRECT SERPL-MCNC: 0.3 MG/DL (ref 0–0.2)
BILIRUB SERPL-MCNC: 1.3 MG/DL (ref 0.2–1)
BUN SERPL-MCNC: 31 MG/DL (ref 6–20)
BUN/CREAT SERPL: 17 (ref 12–20)
CALCIUM SERPL-MCNC: 9.7 MG/DL (ref 8.5–10.1)
CHLORIDE SERPL-SCNC: 104 MMOL/L (ref 97–108)
CO2 SERPL-SCNC: 30 MMOL/L (ref 21–32)
CREAT SERPL-MCNC: 1.85 MG/DL (ref 0.7–1.3)
DIFFERENTIAL METHOD BLD: ABNORMAL
EOSINOPHIL # BLD: 0.1 K/UL (ref 0–0.4)
EOSINOPHIL NFR BLD: 3 % (ref 0–7)
ERYTHROCYTE [DISTWIDTH] IN BLOOD BY AUTOMATED COUNT: 13.9 % (ref 11.5–14.5)
FERRITIN SERPL-MCNC: 140 NG/ML (ref 26–388)
GLOBULIN SER CALC-MCNC: 3.4 G/DL (ref 2–4)
GLUCOSE SERPL-MCNC: 209 MG/DL (ref 65–100)
HCT VFR BLD AUTO: 34.8 % (ref 36.6–50.3)
HGB BLD-MCNC: 11.9 G/DL (ref 12.1–17)
IMM GRANULOCYTES # BLD AUTO: 0 K/UL (ref 0–0.04)
IMM GRANULOCYTES NFR BLD AUTO: 1 % (ref 0–0.5)
INR PPP: 1 (ref 0.9–1.1)
IRON SATN MFR SERPL: 36 % (ref 20–50)
IRON SERPL-MCNC: 93 UG/DL (ref 35–150)
LYMPHOCYTES # BLD: 0.7 K/UL (ref 0.8–3.5)
LYMPHOCYTES NFR BLD: 14 % (ref 12–49)
MCH RBC QN AUTO: 31.6 PG (ref 26–34)
MCHC RBC AUTO-ENTMCNC: 34.2 G/DL (ref 30–36.5)
MCV RBC AUTO: 92.6 FL (ref 80–99)
MONOCYTES # BLD: 0.3 K/UL (ref 0–1)
MONOCYTES NFR BLD: 6 % (ref 5–13)
NEUTS SEG # BLD: 3.6 K/UL (ref 1.8–8)
NEUTS SEG NFR BLD: 75 % (ref 32–75)
NRBC # BLD: 0 K/UL (ref 0–0.01)
NRBC BLD-RTO: 0 PER 100 WBC
PLATELET # BLD AUTO: 114 K/UL (ref 150–400)
PMV BLD AUTO: 9.4 FL (ref 8.9–12.9)
POTASSIUM SERPL-SCNC: 5.2 MMOL/L (ref 3.5–5.1)
PROT SERPL-MCNC: 7.2 G/DL (ref 6.4–8.2)
PROTHROMBIN TIME: 10.1 SEC (ref 9–11.1)
RBC # BLD AUTO: 3.76 M/UL (ref 4.1–5.7)
RBC MORPH BLD: ABNORMAL
SODIUM SERPL-SCNC: 138 MMOL/L (ref 136–145)
TIBC SERPL-MCNC: 257 UG/DL (ref 250–450)
WBC # BLD AUTO: 4.7 K/UL (ref 4.1–11.1)

## 2024-11-27 LAB — MAGNESIUM: 1.8 MG/DL (ref 1.6–2.3)

## 2024-12-31 ENCOUNTER — PATIENT MESSAGE (OUTPATIENT)
Age: 69
End: 2024-12-31

## 2025-01-02 NOTE — TELEPHONE ENCOUNTER
Received Mychart message from patient r/t vaccination record updates. Reconciled data and entered updates per patient request.

## 2025-01-04 DIAGNOSIS — F41.1 GENERALIZED ANXIETY DISORDER: ICD-10-CM

## 2025-01-06 RX ORDER — FLUOXETINE 10 MG/1
CAPSULE ORAL
Qty: 180 CAPSULE | Refills: 3 | Status: SHIPPED | OUTPATIENT
Start: 2025-01-06

## 2025-04-09 ENCOUNTER — TELEPHONE (OUTPATIENT)
Age: 70
End: 2025-04-09

## 2025-04-09 DIAGNOSIS — N18.32 TYPE 2 DIABETES MELLITUS WITH STAGE 3B CHRONIC KIDNEY DISEASE, WITHOUT LONG-TERM CURRENT USE OF INSULIN (HCC): ICD-10-CM

## 2025-04-09 DIAGNOSIS — E11.22 TYPE 2 DIABETES MELLITUS WITH STAGE 3B CHRONIC KIDNEY DISEASE, WITHOUT LONG-TERM CURRENT USE OF INSULIN (HCC): ICD-10-CM

## 2025-04-09 DIAGNOSIS — Z94.4 H/O LIVER TRANSPLANT (HCC): Primary | ICD-10-CM

## 2025-04-09 DIAGNOSIS — Z12.5 ENCOUNTER FOR SCREENING FOR MALIGNANT NEOPLASM OF PROSTATE: ICD-10-CM

## 2025-04-09 DIAGNOSIS — Z79.60 LONG-TERM USE OF IMMUNOSUPPRESSANT MEDICATION: ICD-10-CM

## 2025-04-09 DIAGNOSIS — N18.2 STAGE 2 CHRONIC KIDNEY DISEASE: ICD-10-CM

## 2025-04-09 NOTE — TELEPHONE ENCOUNTER
Called patient to check in - he has appt coming up on 4/25/25 with Ms. Velasco and we didn't see labs completed with Dr. Barnett's visit last month. Mailed to patient's home address updated lab orders for completion prior to his visit. He states he will get these done as soon as he receives the paperwork in the mail.

## 2025-04-15 ENCOUNTER — TELEPHONE (OUTPATIENT)
Age: 70
End: 2025-04-15

## 2025-04-15 DIAGNOSIS — Z94.4 H/O LIVER TRANSPLANT (HCC): Primary | ICD-10-CM

## 2025-04-15 DIAGNOSIS — N18.2 STAGE 2 CHRONIC KIDNEY DISEASE: ICD-10-CM

## 2025-04-15 DIAGNOSIS — N18.32 TYPE 2 DIABETES MELLITUS WITH STAGE 3B CHRONIC KIDNEY DISEASE, WITHOUT LONG-TERM CURRENT USE OF INSULIN (HCC): ICD-10-CM

## 2025-04-15 DIAGNOSIS — R53.82 CHRONIC FATIGUE: ICD-10-CM

## 2025-04-15 DIAGNOSIS — Z79.60 LONG-TERM USE OF IMMUNOSUPPRESSANT MEDICATION: ICD-10-CM

## 2025-04-15 DIAGNOSIS — E11.22 TYPE 2 DIABETES MELLITUS WITH STAGE 3B CHRONIC KIDNEY DISEASE, WITHOUT LONG-TERM CURRENT USE OF INSULIN (HCC): ICD-10-CM

## 2025-04-15 NOTE — PROGRESS NOTES
Discussed today's phone from patient with Ms. Velasco - orders entered for standing labwork to be completed tomorrow at her request - patient will come into our office in the am.

## 2025-04-15 NOTE — TELEPHONE ENCOUNTER
Received call from patient, he hasn't felt well for about 2 weeks, blood sugar is running close to 300, went in to see Endocrine and had labs completed, he states they wanted him to let our office know. Labs reviewed in [facility] system - LFTs are normal, creatinine is 1.8 - patient states he will come in and get remaining standing lab orders completed at our office in the morning. Reviewed with patient any medication changes: started balsaglar insulin 100 units daily, he states glimepiride is up to 4mg daily and metformin is still at 500mg twice daily. Let him know that I will update April and see if there are additional lab orders for tomorrow's lab draw. Patient verbalized understanding.

## 2025-04-18 DIAGNOSIS — Z94.4 H/O LIVER TRANSPLANT (HCC): ICD-10-CM

## 2025-04-18 DIAGNOSIS — Z94.4 LIVER TRANSPLANT STATUS: ICD-10-CM

## 2025-04-18 RX ORDER — TACROLIMUS 0.5 MG/1
0.5 CAPSULE ORAL 2 TIMES DAILY
Qty: 180 CAPSULE | Refills: 3 | Status: SHIPPED | OUTPATIENT
Start: 2025-04-18

## 2025-04-21 DIAGNOSIS — Z94.4 H/O LIVER TRANSPLANT (HCC): ICD-10-CM

## 2025-04-21 DIAGNOSIS — Z94.4 LIVER TRANSPLANT STATUS (HCC): ICD-10-CM

## 2025-04-21 RX ORDER — TACROLIMUS 0.5 MG/1
0.5 CAPSULE ORAL 2 TIMES DAILY
Qty: 180 CAPSULE | Refills: 3 | Status: SHIPPED | OUTPATIENT
Start: 2025-04-21

## 2025-04-23 ENCOUNTER — TELEPHONE (OUTPATIENT)
Age: 70
End: 2025-04-23

## 2025-04-23 NOTE — TELEPHONE ENCOUNTER
Patient called and states that he has a cast on his right leg - stress fx to his ankle - he is scheduled for virtual appt with April on Friday but wanted to let us know that he will not be able to get his labs this week - reviewed that he had labs with Dr. Up on 4/14/25 and his LFTs were normal - let him know that I will update April and provide the lab results for review.

## 2025-04-25 ENCOUNTER — TELEMEDICINE (OUTPATIENT)
Age: 70
End: 2025-04-25

## 2025-04-25 DIAGNOSIS — Z94.4 H/O LIVER TRANSPLANT (HCC): Primary | ICD-10-CM

## 2025-04-25 DIAGNOSIS — R16.0 LIVER MASS: ICD-10-CM

## 2025-04-25 DIAGNOSIS — Z79.60 LONG-TERM USE OF IMMUNOSUPPRESSANT MEDICATION: ICD-10-CM

## 2025-04-25 DIAGNOSIS — M80.00XD AGE-RELATED OSTEOPOROSIS WITH CURRENT PATHOLOGICAL FRACTURE WITH ROUTINE HEALING, SUBSEQUENT ENCOUNTER: ICD-10-CM

## 2025-04-25 RX ORDER — INSULIN GLARGINE 100 [IU]/ML
20 INJECTION, SOLUTION SUBCUTANEOUS NIGHTLY
COMMUNITY

## 2025-04-25 ASSESSMENT — PATIENT HEALTH QUESTIONNAIRE - PHQ9
SUM OF ALL RESPONSES TO PHQ QUESTIONS 1-9: 0
SUM OF ALL RESPONSES TO PHQ QUESTIONS 1-9: 0
1. LITTLE INTEREST OR PLEASURE IN DOING THINGS: NOT AT ALL
SUM OF ALL RESPONSES TO PHQ QUESTIONS 1-9: 0
SUM OF ALL RESPONSES TO PHQ QUESTIONS 1-9: 0
2. FEELING DOWN, DEPRESSED OR HOPELESS: NOT AT ALL

## 2025-04-25 NOTE — PROGRESS NOTES
Deon [facility] is a 69 y.o. male  Chief Complaint   Patient presents with    6 Month Follow-Up         Health Maintenance Due   Topic Date Due    Diabetic foot exam  Never done    Diabetic retinal exam  Never done    Hepatitis C screen  Never done    Diabetic Alb to Cr ratio (uACR) test  04/15/2024    Annual Wellness Visit (Medicare)  12/19/2024    COVID-19 Vaccine (9 - Pfizer risk 2024-25 season) 04/08/2025    Lipids  04/11/2025         \"Have you been to the ER, urgent care clinic since your last visit?  Hospitalized since your last visit?\"    NO    “Have you seen or consulted any other health care providers outside of [facility] since your last visit?”    NO          
from 11/25/2024 reviewed in detail. Additional testing included to evaluate progression or regression of disease. Laboratory testing results from today will be communicated by My Chart.      SEROLOGIES:  7/2018.  HAV total positive, HBsurface antigne negative, anti-HBcore negative, anti-HBsurface negative, anti-HCV negative, Anti-HIV negative, CMV IgG negative, HSV IgG positive    Serologies Latest Ref Rng & Units 8/26/2019   Ferritin 30 - 400 ng/mL 153   Iron % Saturation 15 - 55 % 32     Serologies Latest Ref Rng & Units 1/18/2023   Ferritin 26 - 388 NG/   Iron % Saturation 20 - 50 % 39     LIVER HISTOLOGY:  7/2014: Cirhosis with mild mixed macro- and microvesicular steatosis.  10/2018.  Liver explant from [facility].  Multifocal moderately differentiated HCC.  2.3 cm HCC right lobe.  1.3 cm right lobe.  No microscopic invasion.  9/2022.  FibroScan performed at Liver [facility]. EkPa was 8.1.  IQR/med 7%.  .   The results suggested a fibrosis level of F2. The CAP score suggests there is no significant hepatic steatosis.    ENDOSCOPIC PROCEDURES:  Not available or performed    RADIOLOGY:  5/2020. MRI of liver. No MRI evidence of hepatocellular carcinoma status post liver transplantation.  Splenomegaly with large splenorenal shunt varices. Bilateral gynecomastia. Unremarkable exam otherwise.  5/2020. Chest CT. No pulmonary metastases.Massive splenorenal shunt. Splenomegaly. Post liver transplant.   6/2020. Ultrasound of liver with Duplex. Increased echogenicity of the liver. Patent hepatic veins and portal veins. Portal vein velocity within normal limits. Splenic varices.  12/2020. MRI of liver. No MRI evidence of hepatocellular carcinoma recurrence status post liver transplant with persistent splenomegaly and large splenorenal shunt varices.  12/2020. CT of Chest. No acute process on CT. 2 mm anterior right lower lobe nodule is unchanged since 2019. Recommend CT chest in mid to late April, 2021 to

## 2025-04-28 DIAGNOSIS — N18.32 TYPE 2 DIABETES MELLITUS WITH STAGE 3B CHRONIC KIDNEY DISEASE, WITHOUT LONG-TERM CURRENT USE OF INSULIN (HCC): ICD-10-CM

## 2025-04-28 DIAGNOSIS — N18.2 STAGE 2 CHRONIC KIDNEY DISEASE: ICD-10-CM

## 2025-04-28 DIAGNOSIS — Z94.4 H/O LIVER TRANSPLANT (HCC): ICD-10-CM

## 2025-04-28 DIAGNOSIS — Z12.5 ENCOUNTER FOR SCREENING FOR MALIGNANT NEOPLASM OF PROSTATE: ICD-10-CM

## 2025-04-28 DIAGNOSIS — R53.82 CHRONIC FATIGUE: ICD-10-CM

## 2025-04-28 DIAGNOSIS — Z79.60 LONG-TERM USE OF IMMUNOSUPPRESSANT MEDICATION: ICD-10-CM

## 2025-04-28 DIAGNOSIS — E11.22 TYPE 2 DIABETES MELLITUS WITH STAGE 3B CHRONIC KIDNEY DISEASE, WITHOUT LONG-TERM CURRENT USE OF INSULIN (HCC): ICD-10-CM

## 2025-04-28 LAB
BASOPHILS # BLD: 0.04 K/UL (ref 0–0.1)
BASOPHILS NFR BLD: 1 % (ref 0–1)
DIFFERENTIAL METHOD BLD: ABNORMAL
EOSINOPHIL # BLD: 0.12 K/UL (ref 0–0.4)
EOSINOPHIL NFR BLD: 3 % (ref 0–7)
ERYTHROCYTE [DISTWIDTH] IN BLOOD BY AUTOMATED COUNT: 13.6 % (ref 11.5–14.5)
HCT VFR BLD AUTO: 33.2 % (ref 36.6–50.3)
HGB BLD-MCNC: 10.8 G/DL (ref 12.1–17)
IMM GRANULOCYTES # BLD AUTO: 0.05 K/UL (ref 0–0.04)
IMM GRANULOCYTES NFR BLD AUTO: 1.3 % (ref 0–0.5)
INR PPP: 0.9 (ref 0.9–1.1)
IRON SATN MFR SERPL: 29 % (ref 20–50)
IRON SERPL-MCNC: 69 UG/DL (ref 35–150)
LYMPHOCYTES # BLD: 0.56 K/UL (ref 0.8–3.5)
LYMPHOCYTES NFR BLD: 14.1 % (ref 12–49)
MAGNESIUM SERPL-MCNC: 2 MG/DL (ref 1.6–2.4)
MCH RBC QN AUTO: 31.1 PG (ref 26–34)
MCHC RBC AUTO-ENTMCNC: 32.5 G/DL (ref 30–36.5)
MCV RBC AUTO: 95.7 FL (ref 80–99)
MONOCYTES # BLD: 0.36 K/UL (ref 0–1)
MONOCYTES NFR BLD: 9.1 % (ref 5–13)
NEUTS SEG # BLD: 2.87 K/UL (ref 1.8–8)
NEUTS SEG NFR BLD: 71.5 % (ref 32–75)
NRBC # BLD: 0 K/UL (ref 0–0.01)
NRBC BLD-RTO: 0 PER 100 WBC
PLATELET # BLD AUTO: 193 K/UL (ref 150–400)
PMV BLD AUTO: 8.9 FL (ref 8.9–12.9)
PROTHROMBIN TIME: 9.9 SEC (ref 9.2–11.2)
PSA SERPL-MCNC: 0.9 NG/ML (ref 0.01–4)
RBC # BLD AUTO: 3.47 M/UL (ref 4.1–5.7)
RBC MORPH BLD: ABNORMAL
TIBC SERPL-MCNC: 242 UG/DL (ref 250–450)
WBC # BLD AUTO: 4 K/UL (ref 4.1–11.1)

## 2025-04-30 ENCOUNTER — RESULTS FOLLOW-UP (OUTPATIENT)
Age: 70
End: 2025-04-30

## 2025-04-30 LAB — TACROLIMUS BLD-MCNC: 2.6 NG/ML (ref 5–20)

## 2025-05-12 LAB
AFP L3 MFR SERPL: NORMAL % (ref 0–9.9)
AFP SERPL-MCNC: 1.6 NG/ML (ref 0–8.4)

## (undated) DEVICE — SUPPLEMENT DIGESTIVE H2O SOL GI-EASE

## (undated) DEVICE — FORCEPS BX L240CM JAW DIA2.2MM RAD JAW 4 HOT DISP

## (undated) DEVICE — CORD ES L15FT PT RET REUSE VALLEYLAB REM